# Patient Record
Sex: MALE | Race: WHITE | Employment: OTHER | URBAN - NONMETROPOLITAN AREA
[De-identification: names, ages, dates, MRNs, and addresses within clinical notes are randomized per-mention and may not be internally consistent; named-entity substitution may affect disease eponyms.]

---

## 2017-01-17 RX ORDER — DIVALPROEX SODIUM 500 MG/1
500 TABLET, EXTENDED RELEASE ORAL 3 TIMES DAILY
Qty: 30 TABLET | Refills: 2 | Status: SHIPPED | OUTPATIENT
Start: 2017-01-17 | End: 2018-02-28 | Stop reason: DRUGHIGH

## 2017-01-17 RX ORDER — VENLAFAXINE HYDROCHLORIDE 150 MG/1
300 CAPSULE, EXTENDED RELEASE ORAL DAILY
Qty: 60 CAPSULE | Refills: 2 | Status: SHIPPED | OUTPATIENT
Start: 2017-01-17 | End: 2018-11-14

## 2017-01-17 RX ORDER — CLONAZEPAM 1 MG/1
1 TABLET ORAL 3 TIMES DAILY PRN
Qty: 90 TABLET | Refills: 0 | Status: SHIPPED | OUTPATIENT
Start: 2017-01-17 | End: 2017-02-21 | Stop reason: SDUPTHER

## 2017-01-31 ENCOUNTER — TELEPHONE (OUTPATIENT)
Dept: PSYCHIATRY | Age: 42
End: 2017-01-31

## 2017-02-21 RX ORDER — CLONAZEPAM 1 MG/1
1 TABLET ORAL 3 TIMES DAILY PRN
Qty: 90 TABLET | Refills: 0 | Status: SHIPPED | OUTPATIENT
Start: 2017-02-21 | End: 2017-09-13

## 2017-02-24 ENCOUNTER — OFFICE VISIT (OUTPATIENT)
Dept: PSYCHIATRY | Age: 42
End: 2017-02-24
Payer: MEDICARE

## 2017-02-24 VITALS
WEIGHT: 305 LBS | BODY MASS INDEX: 39.16 KG/M2 | HEART RATE: 108 BPM | SYSTOLIC BLOOD PRESSURE: 108 MMHG | DIASTOLIC BLOOD PRESSURE: 74 MMHG | OXYGEN SATURATION: 95 %

## 2017-02-24 DIAGNOSIS — F31.9 BIPOLAR DEPRESSION (HCC): Primary | ICD-10-CM

## 2017-02-24 DIAGNOSIS — F41.9 ANXIETY: ICD-10-CM

## 2017-02-24 PROCEDURE — 99214 OFFICE O/P EST MOD 30 MIN: CPT | Performed by: NURSE PRACTITIONER

## 2017-02-24 PROCEDURE — G8427 DOCREV CUR MEDS BY ELIG CLIN: HCPCS | Performed by: NURSE PRACTITIONER

## 2017-02-24 PROCEDURE — G8484 FLU IMMUNIZE NO ADMIN: HCPCS | Performed by: NURSE PRACTITIONER

## 2017-02-24 PROCEDURE — G8419 CALC BMI OUT NRM PARAM NOF/U: HCPCS | Performed by: NURSE PRACTITIONER

## 2017-02-24 PROCEDURE — 4004F PT TOBACCO SCREEN RCVD TLK: CPT | Performed by: NURSE PRACTITIONER

## 2017-02-27 ENCOUNTER — TELEPHONE (OUTPATIENT)
Dept: PSYCHIATRY | Age: 42
End: 2017-02-27

## 2017-03-06 ENCOUNTER — APPOINTMENT (OUTPATIENT)
Dept: CT IMAGING | Age: 42
DRG: 885 | End: 2017-03-06
Payer: MEDICARE

## 2017-03-06 ENCOUNTER — HOSPITAL ENCOUNTER (INPATIENT)
Age: 42
LOS: 2 days | Discharge: HOME | DRG: 885 | End: 2017-03-08
Attending: EMERGENCY MEDICINE | Admitting: PSYCHIATRY & NEUROLOGY
Payer: MEDICARE

## 2017-03-06 DIAGNOSIS — F32.A DEPRESSION, UNSPECIFIED DEPRESSION TYPE: Primary | ICD-10-CM

## 2017-03-06 PROBLEM — F17.210 CIGARETTE NICOTINE DEPENDENCE WITHOUT COMPLICATION: Status: ACTIVE | Noted: 2017-03-06

## 2017-03-06 LAB
ALBUMIN SERPL-MCNC: 4.1 G/DL (ref 3.5–5.2)
ALP BLD-CCNC: 102 U/L (ref 40–130)
ALT SERPL-CCNC: 30 U/L (ref 5–41)
AMPHETAMINE SCREEN, URINE: NEGATIVE
ANION GAP SERPL CALCULATED.3IONS-SCNC: 12 MMOL/L (ref 7–19)
AST SERPL-CCNC: 31 U/L (ref 5–40)
BARBITURATE SCREEN URINE: NEGATIVE
BASOPHILS ABSOLUTE: 0.1 K/UL (ref 0–0.2)
BASOPHILS RELATIVE PERCENT: 0.4 % (ref 0–1)
BENZODIAZEPINE SCREEN, URINE: NEGATIVE
BILIRUB SERPL-MCNC: 0.3 MG/DL (ref 0.2–1.2)
BUN BLDV-MCNC: 9 MG/DL (ref 6–20)
CALCIUM SERPL-MCNC: 9 MG/DL (ref 8.6–10)
CANNABINOID SCREEN URINE: NEGATIVE
CHLORIDE BLD-SCNC: 94 MMOL/L (ref 98–111)
CO2: 28 MMOL/L (ref 22–29)
COCAINE METABOLITE SCREEN URINE: NEGATIVE
CREAT SERPL-MCNC: 0.8 MG/DL (ref 0.5–1.2)
EOSINOPHILS ABSOLUTE: 0.2 K/UL (ref 0–0.6)
EOSINOPHILS RELATIVE PERCENT: 1.7 % (ref 0–5)
ETHANOL: <10 MG/DL (ref 0–0.08)
GFR NON-AFRICAN AMERICAN: >60
GLOBULIN: 2.7 G/DL
GLUCOSE BLD-MCNC: 110 MG/DL (ref 74–109)
HCT VFR BLD CALC: 44.6 % (ref 42–52)
HEMOGLOBIN: 15 G/DL (ref 14–18)
LYMPHOCYTES ABSOLUTE: 4.5 K/UL (ref 1.1–4.5)
LYMPHOCYTES RELATIVE PERCENT: 39.1 % (ref 20–40)
Lab: ABNORMAL
MCH RBC QN AUTO: 30.7 PG (ref 27–31)
MCHC RBC AUTO-ENTMCNC: 33.6 G/DL (ref 33–37)
MCV RBC AUTO: 91.4 FL (ref 80–94)
MONOCYTES ABSOLUTE: 0.4 K/UL (ref 0–0.9)
MONOCYTES RELATIVE PERCENT: 3.6 % (ref 0–10)
NEUTROPHILS ABSOLUTE: 6.3 K/UL (ref 1.5–7.5)
NEUTROPHILS RELATIVE PERCENT: 54.9 % (ref 50–65)
OPIATE SCREEN URINE: POSITIVE
PDW BLD-RTO: 13.5 % (ref 11.5–14.5)
PLATELET # BLD: 255 K/UL (ref 130–400)
PMV BLD AUTO: 8.7 FL (ref 7.4–10.4)
POTASSIUM SERPL-SCNC: 3.9 MMOL/L (ref 3.5–5)
RBC # BLD: 4.88 M/UL (ref 4.7–6.1)
SODIUM BLD-SCNC: 134 MMOL/L (ref 136–145)
TOTAL PROTEIN: 6.8 G/DL (ref 6.6–8.7)
VALPROIC ACID LEVEL: 56.7 UG/ML (ref 50–100)
WBC # BLD: 11.4 K/UL (ref 4.8–10.8)

## 2017-03-06 PROCEDURE — 99282 EMERGENCY DEPT VISIT SF MDM: CPT | Performed by: EMERGENCY MEDICINE

## 2017-03-06 PROCEDURE — 80053 COMPREHEN METABOLIC PANEL: CPT

## 2017-03-06 PROCEDURE — 6370000000 HC RX 637 (ALT 250 FOR IP): Performed by: NURSE PRACTITIONER

## 2017-03-06 PROCEDURE — 80307 DRUG TEST PRSMV CHEM ANLYZR: CPT

## 2017-03-06 PROCEDURE — 85025 COMPLETE CBC W/AUTO DIFF WBC: CPT

## 2017-03-06 PROCEDURE — G0480 DRUG TEST DEF 1-7 CLASSES: HCPCS

## 2017-03-06 PROCEDURE — 70450 CT HEAD/BRAIN W/O DYE: CPT

## 2017-03-06 PROCEDURE — 1240000000 HC EMOTIONAL WELLNESS R&B

## 2017-03-06 PROCEDURE — 36415 COLL VENOUS BLD VENIPUNCTURE: CPT

## 2017-03-06 PROCEDURE — 80164 ASSAY DIPROPYLACETIC ACD TOT: CPT

## 2017-03-06 PROCEDURE — 93005 ELECTROCARDIOGRAM TRACING: CPT

## 2017-03-06 PROCEDURE — 6370000000 HC RX 637 (ALT 250 FOR IP): Performed by: PSYCHIATRY & NEUROLOGY

## 2017-03-06 PROCEDURE — 99285 EMERGENCY DEPT VISIT HI MDM: CPT

## 2017-03-06 PROCEDURE — 90792 PSYCH DIAG EVAL W/MED SRVCS: CPT | Performed by: NURSE PRACTITIONER

## 2017-03-06 RX ORDER — CLONAZEPAM 1 MG/1
1 TABLET ORAL 3 TIMES DAILY PRN
Status: DISCONTINUED | OUTPATIENT
Start: 2017-03-06 | End: 2017-03-08 | Stop reason: HOSPADM

## 2017-03-06 RX ORDER — LISINOPRIL AND HYDROCHLOROTHIAZIDE 12.5; 1 MG/1; MG/1
1 TABLET ORAL DAILY
Status: DISCONTINUED | OUTPATIENT
Start: 2017-03-06 | End: 2017-03-06 | Stop reason: CLARIF

## 2017-03-06 RX ORDER — IBUPROFEN 600 MG/1
600 TABLET ORAL EVERY 6 HOURS PRN
Status: DISCONTINUED | OUTPATIENT
Start: 2017-03-06 | End: 2017-03-08 | Stop reason: HOSPADM

## 2017-03-06 RX ORDER — NICOTINE 21 MG/24HR
1 PATCH, TRANSDERMAL 24 HOURS TRANSDERMAL DAILY
Status: DISCONTINUED | OUTPATIENT
Start: 2017-03-06 | End: 2017-03-08 | Stop reason: HOSPADM

## 2017-03-06 RX ORDER — VENLAFAXINE HYDROCHLORIDE 75 MG/1
300 CAPSULE, EXTENDED RELEASE ORAL DAILY
Status: DISCONTINUED | OUTPATIENT
Start: 2017-03-06 | End: 2017-03-08 | Stop reason: HOSPADM

## 2017-03-06 RX ORDER — LOPERAMIDE HYDROCHLORIDE 2 MG/1
2 CAPSULE ORAL 4 TIMES DAILY PRN
Status: DISCONTINUED | OUTPATIENT
Start: 2017-03-06 | End: 2017-03-08 | Stop reason: HOSPADM

## 2017-03-06 RX ORDER — DIVALPROEX SODIUM 500 MG/1
500 TABLET, EXTENDED RELEASE ORAL 3 TIMES DAILY
Status: DISCONTINUED | OUTPATIENT
Start: 2017-03-06 | End: 2017-03-08 | Stop reason: HOSPADM

## 2017-03-06 RX ORDER — HYDROCHLOROTHIAZIDE 12.5 MG/1
12.5 CAPSULE, GELATIN COATED ORAL DAILY
Status: DISCONTINUED | OUTPATIENT
Start: 2017-03-06 | End: 2017-03-08 | Stop reason: HOSPADM

## 2017-03-06 RX ORDER — LISINOPRIL 10 MG/1
10 TABLET ORAL DAILY
Status: DISCONTINUED | OUTPATIENT
Start: 2017-03-06 | End: 2017-03-08 | Stop reason: HOSPADM

## 2017-03-06 RX ORDER — PRAVASTATIN SODIUM 20 MG
40 TABLET ORAL NIGHTLY
Status: DISCONTINUED | OUTPATIENT
Start: 2017-03-06 | End: 2017-03-08 | Stop reason: HOSPADM

## 2017-03-06 RX ORDER — GLYCOPYRROLATE 0.2 MG/ML
0.2 INJECTION INTRAMUSCULAR; INTRAVENOUS ONCE
Status: COMPLETED | OUTPATIENT
Start: 2017-03-08 | End: 2017-03-08

## 2017-03-06 RX ORDER — CHOLESTYRAMINE LIGHT 4 G/5.7G
4 POWDER, FOR SUSPENSION ORAL DAILY
Status: DISCONTINUED | OUTPATIENT
Start: 2017-03-06 | End: 2017-03-08 | Stop reason: HOSPADM

## 2017-03-06 RX ORDER — CLONIDINE HYDROCHLORIDE 0.1 MG/1
0.1 TABLET ORAL EVERY 4 HOURS PRN
Status: DISCONTINUED | OUTPATIENT
Start: 2017-03-06 | End: 2017-03-08 | Stop reason: HOSPADM

## 2017-03-06 RX ORDER — ONDANSETRON 4 MG/1
4 TABLET, FILM COATED ORAL EVERY 8 HOURS PRN
Status: DISCONTINUED | OUTPATIENT
Start: 2017-03-06 | End: 2017-03-08 | Stop reason: HOSPADM

## 2017-03-06 RX ORDER — DICYCLOMINE HYDROCHLORIDE 10 MG/1
10 CAPSULE ORAL
Status: DISCONTINUED | OUTPATIENT
Start: 2017-03-06 | End: 2017-03-08 | Stop reason: HOSPADM

## 2017-03-06 RX ADMIN — CLONAZEPAM 1 MG: 1 TABLET ORAL at 15:18

## 2017-03-06 RX ADMIN — CLONAZEPAM 1 MG: 1 TABLET ORAL at 21:10

## 2017-03-06 RX ADMIN — DICYCLOMINE HYDROCHLORIDE 10 MG: 10 CAPSULE ORAL at 15:18

## 2017-03-06 RX ADMIN — PRAVASTATIN SODIUM 40 MG: 20 TABLET ORAL at 21:10

## 2017-03-06 RX ADMIN — LISINOPRIL 10 MG: 10 TABLET ORAL at 13:29

## 2017-03-06 RX ADMIN — VENLAFAXINE HYDROCHLORIDE 300 MG: 75 CAPSULE, EXTENDED RELEASE ORAL at 13:30

## 2017-03-06 RX ADMIN — TRAZODONE HYDROCHLORIDE 150 MG: 100 TABLET ORAL at 21:10

## 2017-03-06 RX ADMIN — CHOLESTYRAMINE 4 G: 4 POWDER, FOR SUSPENSION ORAL at 13:31

## 2017-03-06 RX ADMIN — DIVALPROEX SODIUM 500 MG: 500 TABLET, EXTENDED RELEASE ORAL at 21:10

## 2017-03-06 RX ADMIN — HYDROCHLOROTHIAZIDE 12.5 MG: 12.5 CAPSULE ORAL at 13:30

## 2017-03-06 RX ADMIN — DIVALPROEX SODIUM 500 MG: 500 TABLET, EXTENDED RELEASE ORAL at 14:15

## 2017-03-06 RX ADMIN — LURASIDONE HYDROCHLORIDE 40 MG: 40 TABLET, FILM COATED ORAL at 13:29

## 2017-03-06 ASSESSMENT — SLEEP AND FATIGUE QUESTIONNAIRES
RESTFUL SLEEP: YES
SLEEP PATTERN: DISTURBED/INTERRUPTED SLEEP;INSOMNIA
DO YOU USE A SLEEP AID: YES
DIFFICULTY ARISING: NO
AVERAGE NUMBER OF SLEEP HOURS: 4
DIFFICULTY STAYING ASLEEP: YES
DO YOU HAVE DIFFICULTY SLEEPING: YES
DIFFICULTY FALLING ASLEEP: YES

## 2017-03-06 ASSESSMENT — ENCOUNTER SYMPTOMS
ABDOMINAL PAIN: 0
SHORTNESS OF BREATH: 0
COUGH: 0

## 2017-03-07 PROCEDURE — 6370000000 HC RX 637 (ALT 250 FOR IP): Performed by: NURSE PRACTITIONER

## 2017-03-07 PROCEDURE — 99231 SBSQ HOSP IP/OBS SF/LOW 25: CPT | Performed by: NURSE PRACTITIONER

## 2017-03-07 PROCEDURE — 93005 ELECTROCARDIOGRAM TRACING: CPT

## 2017-03-07 PROCEDURE — 1240000000 HC EMOTIONAL WELLNESS R&B

## 2017-03-07 PROCEDURE — 6370000000 HC RX 637 (ALT 250 FOR IP): Performed by: PSYCHIATRY & NEUROLOGY

## 2017-03-07 RX ADMIN — CLONAZEPAM 1 MG: 1 TABLET ORAL at 14:37

## 2017-03-07 RX ADMIN — DICYCLOMINE HYDROCHLORIDE 10 MG: 10 CAPSULE ORAL at 11:02

## 2017-03-07 RX ADMIN — LISINOPRIL 10 MG: 10 TABLET ORAL at 08:16

## 2017-03-07 RX ADMIN — DICYCLOMINE HYDROCHLORIDE 10 MG: 10 CAPSULE ORAL at 17:47

## 2017-03-07 RX ADMIN — CLONAZEPAM 1 MG: 1 TABLET ORAL at 08:11

## 2017-03-07 RX ADMIN — DIVALPROEX SODIUM 500 MG: 500 TABLET, EXTENDED RELEASE ORAL at 21:07

## 2017-03-07 RX ADMIN — DICYCLOMINE HYDROCHLORIDE 10 MG: 10 CAPSULE ORAL at 06:01

## 2017-03-07 RX ADMIN — TRAZODONE HYDROCHLORIDE 150 MG: 100 TABLET ORAL at 21:08

## 2017-03-07 RX ADMIN — LURASIDONE HYDROCHLORIDE 40 MG: 40 TABLET, FILM COATED ORAL at 08:15

## 2017-03-07 RX ADMIN — DIVALPROEX SODIUM 500 MG: 500 TABLET, EXTENDED RELEASE ORAL at 08:17

## 2017-03-07 RX ADMIN — DIVALPROEX SODIUM 500 MG: 500 TABLET, EXTENDED RELEASE ORAL at 14:35

## 2017-03-07 RX ADMIN — VENLAFAXINE HYDROCHLORIDE 300 MG: 75 CAPSULE, EXTENDED RELEASE ORAL at 08:12

## 2017-03-07 RX ADMIN — HYDROCHLOROTHIAZIDE 12.5 MG: 12.5 CAPSULE ORAL at 08:17

## 2017-03-07 RX ADMIN — CHOLESTYRAMINE 4 G: 4 POWDER, FOR SUSPENSION ORAL at 08:11

## 2017-03-07 RX ADMIN — PRAVASTATIN SODIUM 40 MG: 20 TABLET ORAL at 21:08

## 2017-03-07 ASSESSMENT — PATIENT HEALTH QUESTIONNAIRE - PHQ9: SUM OF ALL RESPONSES TO PHQ QUESTIONS 1-9: 12

## 2017-03-07 ASSESSMENT — LIFESTYLE VARIABLES: HISTORY_ALCOHOL_USE: NO

## 2017-03-08 ENCOUNTER — ANESTHESIA EVENT (OUTPATIENT)
Dept: OPERATING ROOM | Age: 42
DRG: 885 | End: 2017-03-08
Payer: MEDICARE

## 2017-03-08 ENCOUNTER — ANESTHESIA (OUTPATIENT)
Dept: OPERATING ROOM | Age: 42
DRG: 885 | End: 2017-03-08
Payer: MEDICARE

## 2017-03-08 VITALS
RESPIRATION RATE: 17 BRPM | DIASTOLIC BLOOD PRESSURE: 91 MMHG | OXYGEN SATURATION: 98 % | SYSTOLIC BLOOD PRESSURE: 170 MMHG

## 2017-03-08 VITALS
BODY MASS INDEX: 39.06 KG/M2 | OXYGEN SATURATION: 99 % | RESPIRATION RATE: 18 BRPM | HEART RATE: 108 BPM | HEIGHT: 74 IN | WEIGHT: 304.4 LBS | DIASTOLIC BLOOD PRESSURE: 71 MMHG | TEMPERATURE: 98.6 F | SYSTOLIC BLOOD PRESSURE: 108 MMHG

## 2017-03-08 LAB
HBA1C MFR BLD: 5.1 %
TSH SERPL DL<=0.05 MIU/L-ACNC: 2.2 UIU/ML (ref 0.27–4.2)
VITAMIN B-12: 481 PG/ML (ref 211–946)
VITAMIN D 25-HYDROXY: 8.6 NG/ML

## 2017-03-08 PROCEDURE — 82306 VITAMIN D 25 HYDROXY: CPT

## 2017-03-08 PROCEDURE — 83036 HEMOGLOBIN GLYCOSYLATED A1C: CPT

## 2017-03-08 PROCEDURE — 36415 COLL VENOUS BLD VENIPUNCTURE: CPT

## 2017-03-08 PROCEDURE — 84443 ASSAY THYROID STIM HORMONE: CPT

## 2017-03-08 PROCEDURE — 2500000003 HC RX 250 WO HCPCS: Performed by: NURSE PRACTITIONER

## 2017-03-08 PROCEDURE — 6360000002 HC RX W HCPCS: Performed by: NURSE ANESTHETIST, CERTIFIED REGISTERED

## 2017-03-08 PROCEDURE — 90870 ELECTROCONVULSIVE THERAPY: CPT | Performed by: PSYCHIATRY & NEUROLOGY

## 2017-03-08 PROCEDURE — 6370000000 HC RX 637 (ALT 250 FOR IP): Performed by: PSYCHIATRY & NEUROLOGY

## 2017-03-08 PROCEDURE — 3700000000 HC ANESTHESIA ATTENDED CARE: Performed by: PSYCHIATRY & NEUROLOGY

## 2017-03-08 PROCEDURE — 7100000000 HC PACU RECOVERY - FIRST 15 MIN: Performed by: PSYCHIATRY & NEUROLOGY

## 2017-03-08 PROCEDURE — 3700000001 HC ADD 15 MINUTES (ANESTHESIA): Performed by: PSYCHIATRY & NEUROLOGY

## 2017-03-08 PROCEDURE — 6370000000 HC RX 637 (ALT 250 FOR IP): Performed by: NURSE PRACTITIONER

## 2017-03-08 PROCEDURE — 7100000001 HC PACU RECOVERY - ADDTL 15 MIN: Performed by: PSYCHIATRY & NEUROLOGY

## 2017-03-08 PROCEDURE — GZB4ZZZ OTHER ELECTROCONVULSIVE THERAPY: ICD-10-PCS | Performed by: PSYCHIATRY & NEUROLOGY

## 2017-03-08 PROCEDURE — 99238 HOSP IP/OBS DSCHRG MGMT 30/<: CPT | Performed by: PSYCHIATRY & NEUROLOGY

## 2017-03-08 PROCEDURE — 2500000003 HC RX 250 WO HCPCS: Performed by: NURSE ANESTHETIST, CERTIFIED REGISTERED

## 2017-03-08 PROCEDURE — 5130000000 HC BRIDGE APPOINTMENT

## 2017-03-08 PROCEDURE — 82607 VITAMIN B-12: CPT

## 2017-03-08 RX ORDER — ONDANSETRON 2 MG/ML
INJECTION INTRAMUSCULAR; INTRAVENOUS PRN
Status: DISCONTINUED | OUTPATIENT
Start: 2017-03-08 | End: 2017-03-08 | Stop reason: SDUPTHER

## 2017-03-08 RX ORDER — KETOROLAC TROMETHAMINE 30 MG/ML
INJECTION, SOLUTION INTRAMUSCULAR; INTRAVENOUS PRN
Status: DISCONTINUED | OUTPATIENT
Start: 2017-03-08 | End: 2017-03-08 | Stop reason: SDUPTHER

## 2017-03-08 RX ORDER — SUCCINYLCHOLINE CHLORIDE 20 MG/ML
INJECTION INTRAMUSCULAR; INTRAVENOUS PRN
Status: DISCONTINUED | OUTPATIENT
Start: 2017-03-08 | End: 2017-03-08 | Stop reason: SDUPTHER

## 2017-03-08 RX ORDER — HALOPERIDOL 5 MG/ML
INJECTION INTRAMUSCULAR
Status: DISCONTINUED
Start: 2017-03-08 | End: 2017-03-08 | Stop reason: WASHOUT

## 2017-03-08 RX ORDER — HYDROXYZINE HYDROCHLORIDE 25 MG/1
50 TABLET, FILM COATED ORAL EVERY 6 HOURS PRN
Status: DISCONTINUED | OUTPATIENT
Start: 2017-03-08 | End: 2017-03-08 | Stop reason: HOSPADM

## 2017-03-08 RX ADMIN — LISINOPRIL 10 MG: 10 TABLET ORAL at 08:48

## 2017-03-08 RX ADMIN — SUCCINYLCHOLINE CHLORIDE 100 MG: 20 INJECTION, SOLUTION INTRAMUSCULAR; INTRAVENOUS; PARENTERAL at 07:39

## 2017-03-08 RX ADMIN — CLONAZEPAM 1 MG: 1 TABLET ORAL at 08:49

## 2017-03-08 RX ADMIN — ONDANSETRON HYDROCHLORIDE 4 MG: 2 INJECTION, SOLUTION INTRAVENOUS at 07:45

## 2017-03-08 RX ADMIN — GLYCOPYRROLATE 0.2 MG: 0.2 INJECTION, SOLUTION INTRAMUSCULAR; INTRAVENOUS at 05:55

## 2017-03-08 RX ADMIN — DIVALPROEX SODIUM 500 MG: 500 TABLET, EXTENDED RELEASE ORAL at 08:51

## 2017-03-08 RX ADMIN — LURASIDONE HYDROCHLORIDE 40 MG: 40 TABLET, FILM COATED ORAL at 08:49

## 2017-03-08 RX ADMIN — Medication 100 MG: at 07:39

## 2017-03-08 RX ADMIN — KETOROLAC TROMETHAMINE 30 MG: 30 INJECTION, SOLUTION INTRAMUSCULAR at 07:45

## 2017-03-08 RX ADMIN — HYDROCHLOROTHIAZIDE 12.5 MG: 12.5 CAPSULE ORAL at 08:49

## 2017-03-08 RX ADMIN — CHOLESTYRAMINE 4 G: 4 POWDER, FOR SUSPENSION ORAL at 08:48

## 2017-03-08 RX ADMIN — VENLAFAXINE HYDROCHLORIDE 300 MG: 75 CAPSULE, EXTENDED RELEASE ORAL at 08:48

## 2017-03-08 RX ADMIN — DICYCLOMINE HYDROCHLORIDE 10 MG: 10 CAPSULE ORAL at 11:51

## 2017-03-08 ASSESSMENT — PAIN SCALES - GENERAL
PAINLEVEL_OUTOF10: 0

## 2017-03-13 ENCOUNTER — TELEPHONE (OUTPATIENT)
Dept: PSYCHIATRY | Age: 42
End: 2017-03-13

## 2017-03-13 LAB
EKG P AXIS: 53 DEGREES
EKG P AXIS: 55 DEGREES
EKG P-R INTERVAL: 118 MS
EKG P-R INTERVAL: 120 MS
EKG Q-T INTERVAL: 358 MS
EKG Q-T INTERVAL: 376 MS
EKG QRS DURATION: 112 MS
EKG QRS DURATION: 114 MS
EKG QTC CALCULATION (BAZETT): 428 MS
EKG QTC CALCULATION (BAZETT): 430 MS
EKG T AXIS: 52 DEGREES
EKG T AXIS: 52 DEGREES

## 2017-05-03 ENCOUNTER — HOSPITAL ENCOUNTER (OUTPATIENT)
Dept: GENERAL RADIOLOGY | Age: 42
Discharge: HOME | End: 2017-05-03
Payer: MEDICARE

## 2017-05-03 ENCOUNTER — OFFICE VISIT (OUTPATIENT)
Dept: PRIMARY CARE CLINIC | Age: 42
End: 2017-05-03
Payer: MEDICARE

## 2017-05-03 VITALS
HEART RATE: 116 BPM | BODY MASS INDEX: 40.3 KG/M2 | HEIGHT: 74 IN | RESPIRATION RATE: 18 BRPM | SYSTOLIC BLOOD PRESSURE: 124 MMHG | TEMPERATURE: 97.5 F | WEIGHT: 314 LBS | OXYGEN SATURATION: 95 % | DIASTOLIC BLOOD PRESSURE: 88 MMHG

## 2017-05-03 DIAGNOSIS — F17.210 CIGARETTE NICOTINE DEPENDENCE WITHOUT COMPLICATION: ICD-10-CM

## 2017-05-03 DIAGNOSIS — M54.42 ACUTE BILATERAL LOW BACK PAIN WITH LEFT-SIDED SCIATICA: ICD-10-CM

## 2017-05-03 DIAGNOSIS — F31.9 BIPOLAR DEPRESSION (HCC): Primary | ICD-10-CM

## 2017-05-03 PROCEDURE — G8417 CALC BMI ABV UP PARAM F/U: HCPCS | Performed by: FAMILY MEDICINE

## 2017-05-03 PROCEDURE — 99214 OFFICE O/P EST MOD 30 MIN: CPT | Performed by: FAMILY MEDICINE

## 2017-05-03 PROCEDURE — 4004F PT TOBACCO SCREEN RCVD TLK: CPT | Performed by: FAMILY MEDICINE

## 2017-05-03 PROCEDURE — 72100 X-RAY EXAM L-S SPINE 2/3 VWS: CPT

## 2017-05-03 PROCEDURE — G8427 DOCREV CUR MEDS BY ELIG CLIN: HCPCS | Performed by: FAMILY MEDICINE

## 2017-05-03 RX ORDER — CYCLOBENZAPRINE HCL 10 MG
10 TABLET ORAL 2 TIMES DAILY PRN
COMMUNITY
Start: 2017-04-07 | End: 2018-02-14 | Stop reason: ALTCHOICE

## 2017-05-03 RX ORDER — PRAZOSIN HYDROCHLORIDE 1 MG/1
1 CAPSULE ORAL NIGHTLY
COMMUNITY
Start: 2017-04-14 | End: 2017-09-13

## 2017-05-03 RX ORDER — ZIPRASIDONE HYDROCHLORIDE 40 MG/1
40 CAPSULE ORAL NIGHTLY
COMMUNITY
Start: 2017-04-28 | End: 2018-08-14

## 2017-05-03 ASSESSMENT — ENCOUNTER SYMPTOMS
BOWEL INCONTINENCE: 0
COUGH: 0
BACK PAIN: 1
SHORTNESS OF BREATH: 0

## 2017-05-08 RX ORDER — MONTELUKAST SODIUM 4 MG/1
TABLET, CHEWABLE ORAL
Qty: 60 TABLET | Refills: 11 | Status: SHIPPED | OUTPATIENT
Start: 2017-05-08 | End: 2018-06-04 | Stop reason: SDUPTHER

## 2017-06-14 ENCOUNTER — OFFICE VISIT (OUTPATIENT)
Dept: PRIMARY CARE CLINIC | Age: 42
End: 2017-06-14
Payer: MEDICARE

## 2017-06-14 VITALS
HEIGHT: 74 IN | WEIGHT: 310.6 LBS | DIASTOLIC BLOOD PRESSURE: 84 MMHG | SYSTOLIC BLOOD PRESSURE: 122 MMHG | BODY MASS INDEX: 39.86 KG/M2 | OXYGEN SATURATION: 98 % | TEMPERATURE: 96.6 F | HEART RATE: 114 BPM

## 2017-06-14 DIAGNOSIS — F17.210 CIGARETTE NICOTINE DEPENDENCE WITHOUT COMPLICATION: ICD-10-CM

## 2017-06-14 DIAGNOSIS — M54.41 CHRONIC MIDLINE LOW BACK PAIN WITH BILATERAL SCIATICA: ICD-10-CM

## 2017-06-14 DIAGNOSIS — M54.42 CHRONIC MIDLINE LOW BACK PAIN WITH BILATERAL SCIATICA: ICD-10-CM

## 2017-06-14 DIAGNOSIS — I10 ESSENTIAL HYPERTENSION: ICD-10-CM

## 2017-06-14 DIAGNOSIS — F31.9 BIPOLAR DEPRESSION (HCC): Primary | ICD-10-CM

## 2017-06-14 DIAGNOSIS — G89.29 CHRONIC MIDLINE LOW BACK PAIN WITH BILATERAL SCIATICA: ICD-10-CM

## 2017-06-14 PROCEDURE — 4004F PT TOBACCO SCREEN RCVD TLK: CPT | Performed by: FAMILY MEDICINE

## 2017-06-14 PROCEDURE — 99214 OFFICE O/P EST MOD 30 MIN: CPT | Performed by: FAMILY MEDICINE

## 2017-06-14 PROCEDURE — G8427 DOCREV CUR MEDS BY ELIG CLIN: HCPCS | Performed by: FAMILY MEDICINE

## 2017-06-14 PROCEDURE — G8417 CALC BMI ABV UP PARAM F/U: HCPCS | Performed by: FAMILY MEDICINE

## 2017-06-14 ASSESSMENT — ENCOUNTER SYMPTOMS
COUGH: 0
BACK PAIN: 1
SHORTNESS OF BREATH: 0

## 2017-08-09 LAB
AMPHETAMINE SCREEN, URINE: NEGATIVE
BARBITURATE SCREEN URINE: NEGATIVE
BENZODIAZEPINE SCREEN, URINE: NEGATIVE
CANNABINOID SCREEN URINE: NEGATIVE
COCAINE METABOLITE SCREEN URINE: NEGATIVE
Lab: ABNORMAL
OPIATE SCREEN URINE: POSITIVE

## 2017-08-22 ENCOUNTER — OFFICE VISIT (OUTPATIENT)
Dept: GASTROENTEROLOGY | Facility: CLINIC | Age: 42
End: 2017-08-22

## 2017-08-22 VITALS
HEIGHT: 74 IN | HEART RATE: 112 BPM | WEIGHT: 308 LBS | BODY MASS INDEX: 39.53 KG/M2 | TEMPERATURE: 99.3 F | DIASTOLIC BLOOD PRESSURE: 66 MMHG | OXYGEN SATURATION: 98 % | SYSTOLIC BLOOD PRESSURE: 124 MMHG

## 2017-08-22 DIAGNOSIS — K63.5 COLON POLYPS: Primary | ICD-10-CM

## 2017-08-22 DIAGNOSIS — Z87.19 HISTORY OF ANAL FISSURES: ICD-10-CM

## 2017-08-22 PROCEDURE — S0260 H&P FOR SURGERY: HCPCS | Performed by: NURSE PRACTITIONER

## 2017-08-22 RX ORDER — PRAZOSIN HYDROCHLORIDE 1 MG/1
CAPSULE ORAL
Status: ON HOLD | COMMUNITY
Start: 2017-04-14 | End: 2017-10-19

## 2017-08-22 RX ORDER — MONTELUKAST SODIUM 4 MG/1
TABLET, CHEWABLE ORAL 2 TIMES DAILY
COMMUNITY
Start: 2017-08-07

## 2017-08-22 RX ORDER — TRAZODONE HYDROCHLORIDE 100 MG/1
150 TABLET ORAL NIGHTLY
COMMUNITY
Start: 2017-07-18

## 2017-08-22 RX ORDER — LISINOPRIL AND HYDROCHLOROTHIAZIDE 12.5; 1 MG/1; MG/1
TABLET ORAL
COMMUNITY
Start: 2017-03-27

## 2017-08-22 RX ORDER — ZIPRASIDONE HYDROCHLORIDE 40 MG/1
CAPSULE ORAL
COMMUNITY
Start: 2017-07-31

## 2017-08-22 RX ORDER — SODIUM, POTASSIUM,MAG SULFATES 17.5-3.13G
2 SOLUTION, RECONSTITUTED, ORAL ORAL ONCE
Qty: 2 BOTTLE | Refills: 0 | Status: SHIPPED | OUTPATIENT
Start: 2017-08-22 | End: 2017-08-22

## 2017-08-22 RX ORDER — DIVALPROEX SODIUM 500 MG/1
1500 TABLET, EXTENDED RELEASE ORAL DAILY
COMMUNITY
Start: 2017-01-17

## 2017-08-22 RX ORDER — CLONAZEPAM 1 MG/1
TABLET ORAL
Status: ON HOLD | COMMUNITY
Start: 2017-08-07 | End: 2017-10-19

## 2017-08-22 RX ORDER — HYDROCODONE BITARTRATE AND ACETAMINOPHEN 7.5; 325 MG/1; MG/1
1 TABLET ORAL EVERY 6 HOURS PRN
COMMUNITY
Start: 2017-08-05

## 2017-08-22 RX ORDER — PRAVASTATIN SODIUM 40 MG
TABLET ORAL DAILY
COMMUNITY
Start: 2017-07-31

## 2017-08-22 RX ORDER — VENLAFAXINE HYDROCHLORIDE 150 MG/1
CAPSULE, EXTENDED RELEASE ORAL DAILY
COMMUNITY
Start: 2017-01-17

## 2017-08-22 NOTE — PROGRESS NOTES
Chief Complaint   Patient presents with   • Colon Cancer Screening     Patient is here today for colon screening.     Subjective   HPI  Lane Cleaning is a 42 y.o. male who presents as a referral for preventative maintenance. He has no complaints of nausea or vomiting. No change in bowels. No wt loss. No BRBPR. No melena. There is no family hx for colon cancer. No abdominal pain. The patients last colonoscopy was 8/23/12 with anal diaz   Past Medical History:   Diagnosis Date   • Anxiety    • Colon polyp    • HTN (hypertension)    • Rectal fissure      Past Surgical History:   Procedure Laterality Date   • ANAL FISSURECTOMY     • CHOLECYSTECTOMY     • COLONOSCOPY  08/23/2012   • HERNIA REPAIR         Current Outpatient Prescriptions:   •  clonazePAM (KlonoPIN) 1 MG tablet, , Disp: , Rfl:   •  colestipol (COLESTID) 1 g tablet, , Disp: , Rfl:   •  divalproex (DEPAKOTE) 500 MG 24 hr tablet, Take  by mouth., Disp: , Rfl:   •  HYDROcodone-acetaminophen (NORCO) 7.5-325 MG per tablet, , Disp: , Rfl:   •  lisinopril-hydrochlorothiazide (PRINZIDE,ZESTORETIC) 10-12.5 MG per tablet, TAKE 1 TABLET BY MOUTH ONCE DAILY., Disp: , Rfl:   •  pravastatin (PRAVACHOL) 40 MG tablet, , Disp: , Rfl:   •  prazosin (MINIPRESS) 1 MG capsule, Take  by mouth., Disp: , Rfl:   •  traZODone (DESYREL) 100 MG tablet, , Disp: , Rfl:   •  venlafaxine XR (EFFEXOR-XR) 150 MG 24 hr capsule, Take  by mouth., Disp: , Rfl:   •  ziprasidone (GEODON) 40 MG capsule, , Disp: , Rfl:   •  sodium-potassium-magnesium sulfates (SUPREP BOWEL PREP KIT) 17.5-3.13-1.6 GM/180ML solution oral solution, Take 2 bottles by mouth 1 (One) Time for 1 dose. Split dose prep as directed by office instructions provided.  2 bottles = one kit., Disp: 2 bottle, Rfl: 0  No Known Allergies  Social History     Social History   • Marital status:      Spouse name: N/A   • Number of children: N/A   • Years of education: N/A     Occupational History   • Not on file.     Social  "History Main Topics   • Smoking status: Current Every Day Smoker   • Smokeless tobacco: Never Used   • Alcohol use No   • Drug use: Not on file   • Sexual activity: Not on file     Other Topics Concern   • Not on file     Social History Narrative     History reviewed. No pertinent family history.    REVIEW OF SYSTEMS  General: well appearing, no fever chills or sweats, no unexplained wt loss  HEENT: no acute visual or hearing disturbances  Cardiovascular: No chest pain or palpitations  Pulmonary: No shortness of breath, coughing, wheezing or hemoptysis  : No burning, urgency, hematuria, or dysuria  Musculoskeletal: No joint pain or stiffness  Peripheral: no edema  Skin: No lesions or rashes  Neuro: No dizziness, headaches, stroke, syncope  Endocrine: No hot or cold intolerances  Hematological: No blood dyscrasias    Objective   Vitals:    08/22/17 1400   BP: 124/66   Pulse: 112   Temp: 99.3 °F (37.4 °C)   SpO2: 98%   Weight: (!) 308 lb (140 kg)   Height: 74\" (188 cm)     Body mass index is 39.54 kg/(m^2).    PHYSICAL EXAM  General: age appropriate well nourished well appearing, no acute distress  Head: normocephalic and atraumatic  Global assessment-supple  Neck-No JVD noted, no lymphadenopathy  Pulmonary-clear to auscultation bilaterally, normal respiratory effort  Cardiovascular-normal rate and rhythm, normal heart sounds, S1 and S2 noted  Abdomen-soft, non tender, non distended, normal bowel sounds all 4 quadrants, no hepatosplenomegaly noted  Extremities-No clubbing cyanosis or edema  Neuro-Non focal, converses appropriately, awake, alert, oriented    Imaging Results (most recent)     None        Assessment/Plan   Lane was seen today for colon cancer screening.    Diagnoses and all orders for this visit:    Colon polyps  -     Case Request; Standing  -     Case Request    History of anal fissures  Comments:   repaired 2012  Orders:  -     Case Request; Standing  -     Case Request    Other " orders  -     Implement Anesthesia Orders Day of Procedure; Standing  -     Obtain Informed Consent; Standing  -     Verify bowel prep was successful; Standing  -     sodium-potassium-magnesium sulfates (SUPREP BOWEL PREP KIT) 17.5-3.13-1.6 GM/180ML solution oral solution; Take 2 bottles by mouth 1 (One) Time for 1 dose. Split dose prep as directed by office instructions provided.  2 bottles = one kit.      COLONOSCOPY WITH ANESTHESIA (N/A)  Return for FU sp procedure as directed.  There are no Patient Instructions on file for this visit.    All risks, benefits, alternatives, and indications of colonoscopy procedure have been discussed with the patient. Risks to include perforation of the colon requiring possible surgery or colostomy, risk of bleeding from biopsies or removal of colon tissue, possibility of missing a colon polyp or cancer, or adverse drug reaction.  Benefits to include the diagnosis and management of disease of the colon and rectum. Alternatives to include barium enema, radiographic evaluation, lab testing or no intervention. Pt verbalizes understanding and agrees.

## 2017-09-13 ENCOUNTER — OFFICE VISIT (OUTPATIENT)
Dept: PRIMARY CARE CLINIC | Age: 42
End: 2017-09-13
Payer: MEDICARE

## 2017-09-13 VITALS
SYSTOLIC BLOOD PRESSURE: 118 MMHG | OXYGEN SATURATION: 95 % | DIASTOLIC BLOOD PRESSURE: 78 MMHG | WEIGHT: 305.6 LBS | TEMPERATURE: 96.3 F | HEIGHT: 74 IN | BODY MASS INDEX: 39.22 KG/M2 | HEART RATE: 112 BPM

## 2017-09-13 DIAGNOSIS — F31.9 BIPOLAR DEPRESSION (HCC): ICD-10-CM

## 2017-09-13 DIAGNOSIS — K14.6 TONGUE PAIN: ICD-10-CM

## 2017-09-13 DIAGNOSIS — G89.29 CHRONIC MIDLINE LOW BACK PAIN WITH BILATERAL SCIATICA: ICD-10-CM

## 2017-09-13 DIAGNOSIS — M54.42 CHRONIC MIDLINE LOW BACK PAIN WITH BILATERAL SCIATICA: ICD-10-CM

## 2017-09-13 DIAGNOSIS — I10 ESSENTIAL HYPERTENSION: Primary | ICD-10-CM

## 2017-09-13 DIAGNOSIS — M54.41 CHRONIC MIDLINE LOW BACK PAIN WITH BILATERAL SCIATICA: ICD-10-CM

## 2017-09-13 PROCEDURE — G8427 DOCREV CUR MEDS BY ELIG CLIN: HCPCS | Performed by: FAMILY MEDICINE

## 2017-09-13 PROCEDURE — G8417 CALC BMI ABV UP PARAM F/U: HCPCS | Performed by: FAMILY MEDICINE

## 2017-09-13 PROCEDURE — 99214 OFFICE O/P EST MOD 30 MIN: CPT | Performed by: FAMILY MEDICINE

## 2017-09-13 PROCEDURE — 4004F PT TOBACCO SCREEN RCVD TLK: CPT | Performed by: FAMILY MEDICINE

## 2017-09-13 RX ORDER — LISINOPRIL AND HYDROCHLOROTHIAZIDE 12.5; 1 MG/1; MG/1
0.5 TABLET ORAL DAILY
Qty: 30 TABLET | Refills: 11 | Status: SHIPPED | OUTPATIENT
Start: 2017-09-13 | End: 2018-04-09 | Stop reason: ALTCHOICE

## 2017-09-13 ASSESSMENT — ENCOUNTER SYMPTOMS
COUGH: 0
SHORTNESS OF BREATH: 0
BACK PAIN: 1

## 2017-10-09 ENCOUNTER — OFFICE VISIT (OUTPATIENT)
Dept: PRIMARY CARE CLINIC | Age: 42
End: 2017-10-09
Payer: MEDICARE

## 2017-10-09 VITALS
SYSTOLIC BLOOD PRESSURE: 124 MMHG | HEART RATE: 105 BPM | WEIGHT: 315 LBS | BODY MASS INDEX: 40.43 KG/M2 | DIASTOLIC BLOOD PRESSURE: 80 MMHG | HEIGHT: 74 IN | OXYGEN SATURATION: 97 % | TEMPERATURE: 97.9 F

## 2017-10-09 DIAGNOSIS — R42 DIZZINESS: Primary | ICD-10-CM

## 2017-10-09 PROCEDURE — 90688 IIV4 VACCINE SPLT 0.5 ML IM: CPT | Performed by: FAMILY MEDICINE

## 2017-10-09 PROCEDURE — G8427 DOCREV CUR MEDS BY ELIG CLIN: HCPCS | Performed by: FAMILY MEDICINE

## 2017-10-09 PROCEDURE — G8484 FLU IMMUNIZE NO ADMIN: HCPCS | Performed by: FAMILY MEDICINE

## 2017-10-09 PROCEDURE — 4004F PT TOBACCO SCREEN RCVD TLK: CPT | Performed by: FAMILY MEDICINE

## 2017-10-09 PROCEDURE — 99213 OFFICE O/P EST LOW 20 MIN: CPT | Performed by: FAMILY MEDICINE

## 2017-10-09 PROCEDURE — G0008 ADMIN INFLUENZA VIRUS VAC: HCPCS | Performed by: FAMILY MEDICINE

## 2017-10-09 PROCEDURE — G8417 CALC BMI ABV UP PARAM F/U: HCPCS | Performed by: FAMILY MEDICINE

## 2017-10-09 ASSESSMENT — ENCOUNTER SYMPTOMS
SHORTNESS OF BREATH: 0
COUGH: 0

## 2017-10-09 NOTE — PROGRESS NOTES
After obtaining consent, and per orders of Dr. Bethanie Calderon, injection of Flu given in Left deltoid by Gita Oswald. Patient instructed to remain in clinic for 20 minutes afterwards, and to report any adverse reaction to me immediately.
Adam Banda MD   HYDROcodone-acetaminophen (NORCO) 7.5-325 MG per tablet Take 1 tablet by mouth every 6 hours as needed. Yes Historical Provider, MD       Past Medical History:   Diagnosis Date    Anxiety     Chronic back pain     Depression     Hypertension     IBS (irritable bowel syndrome)     Panic disorder     at times afraid to go outside    Unspecified sleep apnea     bipap       Past Surgical History:   Procedure Laterality Date    ANUS SURGERY      APPENDECTOMY  2/3/14    CHOLECYSTECTOMY  2009    COLONOSCOPY  2012        ELECTROCONVULSIVE THERAPY N/A 3/8/2017    ELECTROCONVULSIVE THERAPY performed by Birgit Crooks DO at [address]  [address]    umbilical     INGUINAL HERNIA REPAIR Right 1990    right ing with mesh    VASECTOMY         Social History     Social History    Marital status:      Spouse name: N/A    Number of children: N/A    Years of education: N/A     Social History Main Topics    Smoking status: Current Every Day Smoker     Packs/day: 1.00     Years: 24.00     Types: Cigarettes    Smokeless tobacco: Never Used    Alcohol use No    Drug use: No    Sexual activity: Not Asked     Other Topics Concern    None     Social History Narrative    None       Physical Exam   Constitutional: He is oriented to person, place, and time. He appears well-developed and well-nourished. No distress. HENT:   Head: Normocephalic and atraumatic. Eyes: Conjunctivae are normal. No scleral icterus. Cardiovascular: Normal rate, regular rhythm and normal heart sounds. No murmur heard. Pulmonary/Chest: Effort normal and breath sounds normal. No respiratory distress. He has no wheezes. Genitourinary: Rectal exam shows no fissure and no tenderness. Prostate is not enlarged and not tender. Musculoskeletal: He exhibits no edema. Neurological: He is alert and oriented to person, place, and time. Skin: Skin is warm. No rash noted.

## 2017-10-19 ENCOUNTER — HOSPITAL ENCOUNTER (OUTPATIENT)
Facility: HOSPITAL | Age: 42
Setting detail: HOSPITAL OUTPATIENT SURGERY
Discharge: HOME | End: 2017-10-19
Attending: INTERNAL MEDICINE | Admitting: INTERNAL MEDICINE

## 2017-10-19 ENCOUNTER — ANESTHESIA (OUTPATIENT)
Dept: GASTROENTEROLOGY | Facility: HOSPITAL | Age: 42
End: 2017-10-19

## 2017-10-19 ENCOUNTER — ANESTHESIA EVENT (OUTPATIENT)
Dept: GASTROENTEROLOGY | Facility: HOSPITAL | Age: 42
End: 2017-10-19

## 2017-10-19 VITALS
HEART RATE: 93 BPM | DIASTOLIC BLOOD PRESSURE: 84 MMHG | WEIGHT: 310 LBS | SYSTOLIC BLOOD PRESSURE: 131 MMHG | BODY MASS INDEX: 39.78 KG/M2 | HEIGHT: 74 IN | RESPIRATION RATE: 15 BRPM | OXYGEN SATURATION: 98 % | TEMPERATURE: 98.7 F

## 2017-10-19 DIAGNOSIS — Z87.19 HISTORY OF ANAL FISSURES: ICD-10-CM

## 2017-10-19 DIAGNOSIS — K63.5 COLON POLYPS: ICD-10-CM

## 2017-10-19 PROCEDURE — 45385 COLONOSCOPY W/LESION REMOVAL: CPT | Performed by: INTERNAL MEDICINE

## 2017-10-19 PROCEDURE — 25010000002 PROPOFOL 10 MG/ML EMULSION: Performed by: NURSE ANESTHETIST, CERTIFIED REGISTERED

## 2017-10-19 PROCEDURE — 88305 TISSUE EXAM BY PATHOLOGIST: CPT | Performed by: INTERNAL MEDICINE

## 2017-10-19 RX ORDER — SODIUM CHLORIDE 0.9 % (FLUSH) 0.9 %
3 SYRINGE (ML) INJECTION AS NEEDED
Status: DISCONTINUED | OUTPATIENT
Start: 2017-10-19 | End: 2017-10-19 | Stop reason: HOSPADM

## 2017-10-19 RX ORDER — PROPOFOL 10 MG/ML
VIAL (ML) INTRAVENOUS AS NEEDED
Status: DISCONTINUED | OUTPATIENT
Start: 2017-10-19 | End: 2017-10-19 | Stop reason: SURG

## 2017-10-19 RX ORDER — SODIUM CHLORIDE 9 MG/ML
500 INJECTION, SOLUTION INTRAVENOUS CONTINUOUS PRN
Status: DISCONTINUED | OUTPATIENT
Start: 2017-10-19 | End: 2017-10-19 | Stop reason: HOSPADM

## 2017-10-19 RX ADMIN — SODIUM CHLORIDE 500 ML: 0.9 INJECTION, SOLUTION INTRAVENOUS at 07:31

## 2017-10-19 RX ADMIN — PROPOFOL 50 MG: 10 INJECTION, EMULSION INTRAVENOUS at 08:24

## 2017-10-19 RX ADMIN — PROPOFOL 50 MG: 10 INJECTION, EMULSION INTRAVENOUS at 08:20

## 2017-10-19 RX ADMIN — PROPOFOL 50 MG: 10 INJECTION, EMULSION INTRAVENOUS at 08:06

## 2017-10-19 RX ADMIN — PROPOFOL 50 MG: 10 INJECTION, EMULSION INTRAVENOUS at 08:19

## 2017-10-19 RX ADMIN — PROPOFOL 50 MG: 10 INJECTION, EMULSION INTRAVENOUS at 08:15

## 2017-10-19 RX ADMIN — PROPOFOL 50 MG: 10 INJECTION, EMULSION INTRAVENOUS at 08:09

## 2017-10-19 RX ADMIN — PROPOFOL 50 MG: 10 INJECTION, EMULSION INTRAVENOUS at 08:12

## 2017-10-19 RX ADMIN — PROPOFOL 50 MG: 10 INJECTION, EMULSION INTRAVENOUS at 08:17

## 2017-10-19 RX ADMIN — PROPOFOL 100 MG: 10 INJECTION, EMULSION INTRAVENOUS at 08:03

## 2017-10-19 NOTE — ANESTHESIA PREPROCEDURE EVALUATION
Anesthesia Evaluation     Patient summary reviewed   no history of anesthetic complications:  NPO Solid Status: > 8 hours       Airway   Mallampati: II  TM distance: >3 FB  Neck ROM: full  Dental      Pulmonary    (+) a smoker, sleep apnea,   (-) COPD, asthma  Cardiovascular   Exercise tolerance: good (4-7 METS)    (+) hypertension, hyperlipidemia  (-) pacemaker, past MI, angina, cardiac stents      Neuro/Psych  (-) seizures, TIA, CVA  GI/Hepatic/Renal/Endo    (+) morbid obesity,   (-) GERD, liver disease, no renal disease, diabetes    Musculoskeletal     Abdominal    Substance History      OB/GYN          Other                                        Anesthesia Plan    ASA 3     general     intravenous induction   Anesthetic plan and risks discussed with patient.

## 2017-10-19 NOTE — PLAN OF CARE
Problem: GI Endoscopy (Adult)  Goal: Signs and Symptoms of Listed Potential Problems Will be Absent or Manageable (GI Endoscopy)  Outcome: Outcome(s) achieved Date Met:  10/19/17    10/19/17 0818   GI Endoscopy   Problems Assessed (GI Endoscopy) all   Problems Present (GI Endoscopy) none

## 2017-10-19 NOTE — H&P
"    Chief Complaint:   COLON polyps    Subjective     HPI:   He had an adenomatous colon polyp removed from the rectum in 2009.  Last colonoscopy was in 2012 and was unremarkable.    Past Medical History:   Past Medical History:   Diagnosis Date   • Anxiety    • Colon polyp    • HTN (hypertension)    • Rectal fissure        Past Surgical History:  [unfilled]    Family History:  History reviewed. No pertinent family history.    Social History:   reports that he has been smoking.  He has been smoking about 1.00 pack per day. He has never used smokeless tobacco. He reports that he does not drink alcohol.    Medications:   Prescriptions Prior to Admission   Medication Sig Dispense Refill Last Dose   • colestipol (COLESTID) 1 g tablet 2 (Two) Times a Day.   10/18/2017 at Unknown time   • divalproex (DEPAKOTE) 500 MG 24 hr tablet Take 1,500 mg by mouth Daily.   10/18/2017 at Unknown time   • HYDROcodone-acetaminophen (NORCO) 7.5-325 MG per tablet 1 tablet Every 6 (Six) Hours As Needed.   10/19/2017 at 0400   • lisinopril-hydrochlorothiazide (PRINZIDE,ZESTORETIC) 10-12.5 MG per tablet TAKE 1/2 TABLET BY MOUTH ONCE DAILY.   10/18/2017 at pm   • pravastatin (PRAVACHOL) 40 MG tablet Daily.   10/18/2017 at Unknown time   • traZODone (DESYREL) 100 MG tablet 150 mg Every Night.   10/17/2017 at Unknown time   • venlafaxine XR (EFFEXOR-XR) 150 MG 24 hr capsule Take  by mouth Daily.   10/18/2017 at Unknown time   • ziprasidone (GEODON) 40 MG capsule    10/18/2017 at Unknown time       Allergies:  Review of patient's allergies indicates no known allergies.    ROS:    General: Weight stable  Resp: No SOA  Cardiovascular: No CP      Objective     /71 (Patient Position: Sitting)  Pulse 90  Temp 98.7 °F (37.1 °C) (Temporal Artery )   Resp 20  Ht 74\" (188 cm)  Wt (!) 310 lb (141 kg)  SpO2 99%  BMI 39.8 kg/m2    Physical Exam   Constitutional: Pt is oriented to person, place, and in no distress.  Eyes: No icterus  ENMT: " Unremarkable   Cardiovascular: Normal rate, regular rhythm.    Pulmonary/Chest: No distress.  No audible wheezes  Abdominal: Soft.   Skin: Skin is warm and dry.   Psychiatric: Mood, memory, affect and judgment appear normal.     Assessment/Plan     Diagnosis:  Colon polyps    Anticipated Surgical Procedure:  Colonoscopy    The risks, benefits, and alternatives of colonoscopy were reviewed with the patient today.  Risks including perforation of the colon possibly requiring surgery or colostomy.  Additional risks include risk of bleeding from biopsies or removal of colon tissue.  There is also the risk of a drug reaction or problems with anesthesia.  This will be discussed with the further by the anesthesia team on the day of the procedure.  Lastly there is a possibility of missing a colon polyp or cancer.  The benefits include the diagnosis and management of disease of the colon and rectum.  Alternatives to colonoscopy include barium enema, laboratory testing, radiographic evaluation, or no intervention.  The patient verbalizes understanding and agrees.    Much of this encounter note is an electronic transcription/translation of spoken language to printed text. The electronic translation of spoken language may permit erroneous, or at times, nonsensical words or phrases to be inadvertently transcribed; although I have reviewed the note for such errors, some may still exist.

## 2017-10-19 NOTE — ANESTHESIA POSTPROCEDURE EVALUATION
Patient: Lane Cleaning    Procedure Summary     Date Anesthesia Start Anesthesia Stop Room / Location    10/19/17 0759 0831  [facility] /  [facility]       Procedure Diagnosis Surgeon Provider    COLONOSCOPY WITH ANESTHESIA (N/A ) Colon polyps; History of anal fissures  (Colon polyps [K63.5]; History of anal fissures [Z87.19]) MD Timothy Huff CRNA          Anesthesia Type: general  Last vitals  BP   135/71 (10/19/17 0712)   Temp   98.7 °F (37.1 °C) (10/19/17 0712)   Pulse   90 (10/19/17 0712)   Resp   20 (10/19/17 0712)     SpO2   99 % (10/19/17 0712)     Post Anesthesia Care and Evaluation    Patient location during evaluation: PHASE II  Patient participation: complete - patient participated  Level of consciousness: awake and alert  Pain score: 0  Pain management: adequate  Airway patency: patent  Anesthetic complications: No anesthetic complications  PONV Status: none  Cardiovascular status: acceptable and stable  Respiratory status: acceptable and unassisted  Hydration status: acceptable

## 2017-10-19 NOTE — PLAN OF CARE
Problem: Patient Care Overview (Adult)  Goal: Adult Individualization and Mutuality  Outcome: Ongoing (interventions implemented as appropriate)    10/19/17 0710   Individualization   Patient Specific Preferences none

## 2017-10-19 NOTE — PLAN OF CARE
Problem: Patient Care Overview (Adult)  Goal: Plan of Care Review  Outcome: Outcome(s) achieved Date Met:  10/19/17    10/19/17 0834   Coping/Psychosocial Response Interventions   Plan Of Care Reviewed With patient;family   Patient Care Overview   Progress improving   Outcome Evaluation   Outcome Summary/Follow up Plan discharge criteria met

## 2017-10-19 NOTE — PLAN OF CARE
Problem: Patient Care Overview (Adult)  Goal: Plan of Care Review  Outcome: Ongoing (interventions implemented as appropriate)    10/19/17 0809   Coping/Psychosocial Response Interventions   Plan Of Care Reviewed With patient   Patient Care Overview   Progress no change   Outcome Evaluation   Outcome Summary/Follow up Plan tolerating well         Problem: GI Endoscopy (Adult)  Goal: Signs and Symptoms of Listed Potential Problems Will be Absent or Manageable (GI Endoscopy)  Outcome: Ongoing (interventions implemented as appropriate)

## 2017-10-20 ENCOUNTER — TELEPHONE (OUTPATIENT)
Dept: GASTROENTEROLOGY | Facility: CLINIC | Age: 42
End: 2017-10-20

## 2017-10-20 LAB
CYTO UR: NORMAL
LAB AP CASE REPORT: NORMAL
LAB AP CLINICAL INFORMATION: NORMAL
Lab: NORMAL
PATH REPORT.FINAL DX SPEC: NORMAL
PATH REPORT.GROSS SPEC: NORMAL

## 2017-10-20 NOTE — TELEPHONE ENCOUNTER
----- Message from Sharon Sigala MD sent at 10/20/2017 12:24 PM CDT -----  I am writing to inform you that the polyp removed from the colon did not have any cancer. It no longer poses a threat to you since it was completely removed.  However, in the future, it is possible that additional polyps might grow.  For this reason, we will repeat colonoscopy in 5 years as planned.    If you have any questions, please call our office.    Sincerely,        Sharon Sigala MD

## 2017-11-01 RX ORDER — PRAVASTATIN SODIUM 40 MG
TABLET ORAL
Qty: 30 TABLET | Refills: 0 | Status: SHIPPED | OUTPATIENT
Start: 2017-11-01 | End: 2017-12-11 | Stop reason: SDUPTHER

## 2017-11-23 ENCOUNTER — APPOINTMENT (OUTPATIENT)
Dept: GENERAL RADIOLOGY | Age: 42
End: 2017-11-23
Payer: MEDICARE

## 2017-11-23 ENCOUNTER — HOSPITAL ENCOUNTER (OUTPATIENT)
Age: 42
Setting detail: OBSERVATION
Discharge: HOME | End: 2017-11-24
Attending: EMERGENCY MEDICINE | Admitting: INTERNAL MEDICINE
Payer: MEDICARE

## 2017-11-23 DIAGNOSIS — R07.9 ACUTE CHEST PAIN: Primary | ICD-10-CM

## 2017-11-23 LAB
ALBUMIN SERPL-MCNC: 3.8 G/DL (ref 3.5–5.2)
ALP BLD-CCNC: 109 U/L (ref 40–130)
ALT SERPL-CCNC: 32 U/L (ref 5–41)
AMYLASE: 70 U/L (ref 28–100)
ANION GAP SERPL CALCULATED.3IONS-SCNC: 9 MMOL/L (ref 7–19)
AST SERPL-CCNC: 49 U/L (ref 5–40)
BASOPHILS ABSOLUTE: 0.1 K/UL (ref 0–0.2)
BASOPHILS RELATIVE PERCENT: 0.9 % (ref 0–1)
BILIRUB SERPL-MCNC: <0.2 MG/DL (ref 0.2–1.2)
BUN BLDV-MCNC: 6 MG/DL (ref 6–20)
C-REACTIVE PROTEIN: 0.25 MG/DL (ref 0–0.5)
CALCIUM SERPL-MCNC: 9.3 MG/DL (ref 8.6–10)
CHLORIDE BLD-SCNC: 95 MMOL/L (ref 98–111)
CO2: 30 MMOL/L (ref 22–29)
CREAT SERPL-MCNC: 0.9 MG/DL (ref 0.5–1.2)
D DIMER: <0.27 UG/ML FEU (ref 0–0.48)
EOSINOPHILS ABSOLUTE: 0.3 K/UL (ref 0–0.6)
EOSINOPHILS RELATIVE PERCENT: 2.3 % (ref 0–5)
GFR NON-AFRICAN AMERICAN: >60
GLUCOSE BLD-MCNC: 120 MG/DL (ref 74–109)
HCT VFR BLD CALC: 45.2 % (ref 42–52)
HEMOGLOBIN: 15.3 G/DL (ref 14–18)
LIPASE: 26 U/L (ref 13–60)
LYMPHOCYTES ABSOLUTE: 5.1 K/UL (ref 1.1–4.5)
LYMPHOCYTES RELATIVE PERCENT: 45.1 % (ref 20–40)
MCH RBC QN AUTO: 29.5 PG (ref 27–31)
MCHC RBC AUTO-ENTMCNC: 33.8 G/DL (ref 33–37)
MCV RBC AUTO: 87.1 FL (ref 80–94)
MONOCYTES ABSOLUTE: 0.5 K/UL (ref 0–0.9)
MONOCYTES RELATIVE PERCENT: 4.2 % (ref 0–10)
NEUTROPHILS ABSOLUTE: 5.3 K/UL (ref 1.5–7.5)
NEUTROPHILS RELATIVE PERCENT: 47.1 % (ref 50–65)
PDW BLD-RTO: 13.2 % (ref 11.5–14.5)
PERFORMED ON: NORMAL
PERFORMED ON: NORMAL
PLATELET # BLD: 252 K/UL (ref 130–400)
PMV BLD AUTO: 8.3 FL (ref 9.4–12.4)
POC TROPONIN I: 0 NG/ML (ref 0–0.08)
POC TROPONIN I: 0 NG/ML (ref 0–0.08)
POTASSIUM SERPL-SCNC: 3.9 MMOL/L (ref 3.5–5)
PRO-BNP: 5 PG/ML (ref 0–450)
RBC # BLD: 5.19 M/UL (ref 4.7–6.1)
SEDIMENTATION RATE, ERYTHROCYTE: 7 MM/HR (ref 0–10)
SODIUM BLD-SCNC: 134 MMOL/L (ref 136–145)
TOTAL PROTEIN: 6.9 G/DL (ref 6.6–8.7)
TROPONIN: <0.01 NG/ML (ref 0–0.03)
TROPONIN: <0.01 NG/ML (ref 0–0.03)
TSH SERPL DL<=0.05 MIU/L-ACNC: 3.31 UIU/ML (ref 0.27–4.2)
VALPROIC ACID LEVEL: 45.1 UG/ML (ref 50–100)
WBC # BLD: 11.2 K/UL (ref 4.8–10.8)

## 2017-11-23 PROCEDURE — 6370000000 HC RX 637 (ALT 250 FOR IP): Performed by: INTERNAL MEDICINE

## 2017-11-23 PROCEDURE — 85652 RBC SED RATE AUTOMATED: CPT

## 2017-11-23 PROCEDURE — 84443 ASSAY THYROID STIM HORMONE: CPT

## 2017-11-23 PROCEDURE — 82150 ASSAY OF AMYLASE: CPT

## 2017-11-23 PROCEDURE — 86140 C-REACTIVE PROTEIN: CPT

## 2017-11-23 PROCEDURE — G0378 HOSPITAL OBSERVATION PER HR: HCPCS

## 2017-11-23 PROCEDURE — 6370000000 HC RX 637 (ALT 250 FOR IP): Performed by: EMERGENCY MEDICINE

## 2017-11-23 PROCEDURE — 99284 EMERGENCY DEPT VISIT MOD MDM: CPT | Performed by: EMERGENCY MEDICINE

## 2017-11-23 PROCEDURE — 71010 XR CHEST PORTABLE: CPT

## 2017-11-23 PROCEDURE — 96365 THER/PROPH/DIAG IV INF INIT: CPT

## 2017-11-23 PROCEDURE — 2580000003 HC RX 258: Performed by: INTERNAL MEDICINE

## 2017-11-23 PROCEDURE — 99285 EMERGENCY DEPT VISIT HI MDM: CPT

## 2017-11-23 PROCEDURE — 99220 PR INITIAL OBSERVATION CARE/DAY 70 MINUTES: CPT | Performed by: INTERNAL MEDICINE

## 2017-11-23 PROCEDURE — 96372 THER/PROPH/DIAG INJ SC/IM: CPT

## 2017-11-23 PROCEDURE — 83690 ASSAY OF LIPASE: CPT

## 2017-11-23 PROCEDURE — 96375 TX/PRO/DX INJ NEW DRUG ADDON: CPT

## 2017-11-23 PROCEDURE — 36415 COLL VENOUS BLD VENIPUNCTURE: CPT

## 2017-11-23 PROCEDURE — 83880 ASSAY OF NATRIURETIC PEPTIDE: CPT

## 2017-11-23 PROCEDURE — 80164 ASSAY DIPROPYLACETIC ACD TOT: CPT

## 2017-11-23 PROCEDURE — 6360000002 HC RX W HCPCS: Performed by: HOSPITALIST

## 2017-11-23 PROCEDURE — 6360000002 HC RX W HCPCS: Performed by: EMERGENCY MEDICINE

## 2017-11-23 PROCEDURE — 2580000003 HC RX 258: Performed by: EMERGENCY MEDICINE

## 2017-11-23 PROCEDURE — 84484 ASSAY OF TROPONIN QUANT: CPT

## 2017-11-23 PROCEDURE — 80053 COMPREHEN METABOLIC PANEL: CPT

## 2017-11-23 PROCEDURE — 2500000003 HC RX 250 WO HCPCS: Performed by: INTERNAL MEDICINE

## 2017-11-23 PROCEDURE — C9113 INJ PANTOPRAZOLE SODIUM, VIA: HCPCS | Performed by: HOSPITALIST

## 2017-11-23 PROCEDURE — 85379 FIBRIN DEGRADATION QUANT: CPT

## 2017-11-23 PROCEDURE — 6360000002 HC RX W HCPCS: Performed by: INTERNAL MEDICINE

## 2017-11-23 PROCEDURE — 93005 ELECTROCARDIOGRAM TRACING: CPT

## 2017-11-23 PROCEDURE — 85025 COMPLETE CBC W/AUTO DIFF WBC: CPT

## 2017-11-23 RX ORDER — TRAZODONE HYDROCHLORIDE 50 MG/1
150 TABLET ORAL NIGHTLY
Status: DISCONTINUED | OUTPATIENT
Start: 2017-11-23 | End: 2017-11-24 | Stop reason: HOSPADM

## 2017-11-23 RX ORDER — ACETAMINOPHEN 325 MG/1
650 TABLET ORAL EVERY 4 HOURS PRN
Status: DISCONTINUED | OUTPATIENT
Start: 2017-11-23 | End: 2017-11-24 | Stop reason: HOSPADM

## 2017-11-23 RX ORDER — DIVALPROEX SODIUM 500 MG/1
1500 TABLET, EXTENDED RELEASE ORAL NIGHTLY
Status: DISCONTINUED | OUTPATIENT
Start: 2017-11-23 | End: 2017-11-24 | Stop reason: HOSPADM

## 2017-11-23 RX ORDER — FENTANYL CITRATE 50 UG/ML
50 INJECTION, SOLUTION INTRAMUSCULAR; INTRAVENOUS ONCE
Status: COMPLETED | OUTPATIENT
Start: 2017-11-23 | End: 2017-11-23

## 2017-11-23 RX ORDER — ZIPRASIDONE HYDROCHLORIDE 20 MG/1
40 CAPSULE ORAL 2 TIMES DAILY WITH MEALS
Status: DISCONTINUED | OUTPATIENT
Start: 2017-11-24 | End: 2017-11-24 | Stop reason: HOSPADM

## 2017-11-23 RX ORDER — NITROGLYCERIN 0.4 MG/1
0.4 TABLET SUBLINGUAL ONCE
Status: COMPLETED | OUTPATIENT
Start: 2017-11-23 | End: 2017-11-23

## 2017-11-23 RX ORDER — ASPIRIN 81 MG/1
81 TABLET, CHEWABLE ORAL DAILY
Status: DISCONTINUED | OUTPATIENT
Start: 2017-11-24 | End: 2017-11-24 | Stop reason: HOSPADM

## 2017-11-23 RX ORDER — NITROGLYCERIN 20 MG/100ML
5 INJECTION INTRAVENOUS CONTINUOUS
Status: DISCONTINUED | OUTPATIENT
Start: 2017-11-23 | End: 2017-11-24 | Stop reason: HOSPADM

## 2017-11-23 RX ORDER — PRAVASTATIN SODIUM 20 MG
40 TABLET ORAL NIGHTLY
Status: DISCONTINUED | OUTPATIENT
Start: 2017-11-23 | End: 2017-11-24 | Stop reason: HOSPADM

## 2017-11-23 RX ORDER — HYDROCODONE BITARTRATE AND ACETAMINOPHEN 7.5; 325 MG/1; MG/1
1 TABLET ORAL EVERY 6 HOURS PRN
Status: DISCONTINUED | OUTPATIENT
Start: 2017-11-23 | End: 2017-11-24 | Stop reason: HOSPADM

## 2017-11-23 RX ORDER — ONDANSETRON 2 MG/ML
4 INJECTION INTRAMUSCULAR; INTRAVENOUS EVERY 6 HOURS PRN
Status: DISCONTINUED | OUTPATIENT
Start: 2017-11-23 | End: 2017-11-24 | Stop reason: HOSPADM

## 2017-11-23 RX ORDER — NICOTINE 21 MG/24HR
1 PATCH, TRANSDERMAL 24 HOURS TRANSDERMAL DAILY
Status: DISCONTINUED | OUTPATIENT
Start: 2017-11-23 | End: 2017-11-24 | Stop reason: HOSPADM

## 2017-11-23 RX ORDER — LORAZEPAM 2 MG/ML
1 INJECTION INTRAMUSCULAR ONCE
Status: COMPLETED | OUTPATIENT
Start: 2017-11-23 | End: 2017-11-23

## 2017-11-23 RX ORDER — SODIUM CHLORIDE 0.9 % (FLUSH) 0.9 %
10 SYRINGE (ML) INJECTION PRN
Status: DISCONTINUED | OUTPATIENT
Start: 2017-11-23 | End: 2017-11-24 | Stop reason: HOSPADM

## 2017-11-23 RX ORDER — CYCLOBENZAPRINE HCL 10 MG
10 TABLET ORAL 2 TIMES DAILY PRN
Status: DISCONTINUED | OUTPATIENT
Start: 2017-11-23 | End: 2017-11-24 | Stop reason: HOSPADM

## 2017-11-23 RX ORDER — 0.9 % SODIUM CHLORIDE 0.9 %
1000 INTRAVENOUS SOLUTION INTRAVENOUS ONCE
Status: COMPLETED | OUTPATIENT
Start: 2017-11-23 | End: 2017-11-23

## 2017-11-23 RX ORDER — SODIUM CHLORIDE 0.9 % (FLUSH) 0.9 %
10 SYRINGE (ML) INJECTION EVERY 12 HOURS SCHEDULED
Status: DISCONTINUED | OUTPATIENT
Start: 2017-11-23 | End: 2017-11-24 | Stop reason: HOSPADM

## 2017-11-23 RX ORDER — VENLAFAXINE HYDROCHLORIDE 75 MG/1
300 CAPSULE, EXTENDED RELEASE ORAL DAILY
Status: DISCONTINUED | OUTPATIENT
Start: 2017-11-24 | End: 2017-11-24 | Stop reason: HOSPADM

## 2017-11-23 RX ORDER — PANTOPRAZOLE SODIUM 40 MG/10ML
40 INJECTION, POWDER, LYOPHILIZED, FOR SOLUTION INTRAVENOUS 2 TIMES DAILY
Status: DISCONTINUED | OUTPATIENT
Start: 2017-11-23 | End: 2017-11-24 | Stop reason: HOSPADM

## 2017-11-23 RX ADMIN — TRAZODONE HYDROCHLORIDE 150 MG: 50 TABLET ORAL at 22:11

## 2017-11-23 RX ADMIN — ENOXAPARIN SODIUM 40 MG: 40 INJECTION SUBCUTANEOUS at 22:11

## 2017-11-23 RX ADMIN — SODIUM CHLORIDE 1000 ML: 9 INJECTION, SOLUTION INTRAVENOUS at 16:09

## 2017-11-23 RX ADMIN — LORAZEPAM 1 MG: 2 INJECTION, SOLUTION INTRAMUSCULAR; INTRAVENOUS at 17:03

## 2017-11-23 RX ADMIN — METOPROLOL TARTRATE 25 MG: 25 TABLET ORAL at 22:12

## 2017-11-23 RX ADMIN — NITROGLYCERIN 0.4 MG: 0.4 TABLET SUBLINGUAL at 16:34

## 2017-11-23 RX ADMIN — Medication 10 ML: at 22:11

## 2017-11-23 RX ADMIN — FENTANYL CITRATE 50 MCG: 50 INJECTION, SOLUTION INTRAMUSCULAR; INTRAVENOUS at 19:13

## 2017-11-23 RX ADMIN — DIVALPROEX SODIUM 1500 MG: 500 TABLET, FILM COATED, EXTENDED RELEASE ORAL at 22:12

## 2017-11-23 RX ADMIN — NITROGLYCERIN 5 MCG/MIN: 20 INJECTION INTRAVENOUS at 22:30

## 2017-11-23 RX ADMIN — Medication 30 ML: at 21:40

## 2017-11-23 RX ADMIN — PRAVASTATIN SODIUM 40 MG: 20 TABLET ORAL at 22:12

## 2017-11-23 RX ADMIN — PANTOPRAZOLE SODIUM 40 MG: 40 INJECTION, POWDER, FOR SOLUTION INTRAVENOUS at 22:12

## 2017-11-23 RX ADMIN — NITROGLYCERIN 0.4 MG: 0.4 TABLET SUBLINGUAL at 18:31

## 2017-11-23 ASSESSMENT — PAIN SCALES - GENERAL
PAINLEVEL_OUTOF10: 5
PAINLEVEL_OUTOF10: 7
PAINLEVEL_OUTOF10: 7

## 2017-11-23 ASSESSMENT — PAIN DESCRIPTION - PAIN TYPE: TYPE: ACUTE PAIN

## 2017-11-23 ASSESSMENT — PAIN DESCRIPTION - LOCATION: LOCATION: CHEST

## 2017-11-23 ASSESSMENT — PAIN DESCRIPTION - DESCRIPTORS: DESCRIPTORS: PRESSURE;SHARP

## 2017-11-23 NOTE — ED NOTES
Patient states he was arguing with family and started having chestpain     Jayden Martínez RN  11/23/17 1689

## 2017-11-23 NOTE — ED NOTES
Pt reports pain before nitro was a 7, and now after nitro is down to a 4.      Arnie Tyson RN  11/23/17 0742

## 2017-11-23 NOTE — ED PROVIDER NOTES
Laterality Date    ANUS SURGERY      APPENDECTOMY  2/3/14    CHOLECYSTECTOMY  2009    COLONOSCOPY  2012        ELECTROCONVULSIVE THERAPY N/A 3/8/2017    ELECTROCONVULSIVE THERAPY performed by Gillian Ewing DO at [address]  [address]    umbilical     INGUINAL HERNIA REPAIR Right 1990    right ing with mesh    VASECTOMY           CURRENT MEDICATIONS       Current Discharge Medication List      CONTINUE these medications which have NOT CHANGED    Details   pravastatin (PRAVACHOL) 40 MG tablet TAKE 1 TABLET IN THE EVENING  Qty: 30 tablet, Refills: 0      lisinopril-hydrochlorothiazide (PRINZIDE;ZESTORETIC) 10-12.5 MG per tablet Take 0.5 tablets by mouth daily  Qty: 30 tablet, Refills: 11      Magic Mouthwash (MIRACLE MOUTHWASH) Swish and spit 5 mLs 4 times daily as needed for Irritation  Qty: 120 mL, Refills: 0    Associated Diagnoses: Tongue pain      colestipol (COLESTID) 1 G tablet TAKE 1 TABLET BY MOUTH TWICE DAILY. Qty: 60 tablet, Refills: 11      ziprasidone (GEODON) 40 MG capsule Take 40 mg by mouth 2 times daily (with meals)       cyclobenzaprine (FLEXERIL) 10 MG tablet Take 10 mg by mouth 2 times daily as needed       divalproex (DEPAKOTE ER) 500 MG extended release tablet Take 1 tablet by mouth three times daily  Qty: 30 tablet, Refills: 2      venlafaxine (EFFEXOR XR) 150 MG extended release capsule Take 2 capsules by mouth daily  Qty: 60 capsule, Refills: 2      traZODone (DESYREL) 150 MG tablet Take 50 mg (1/3 of tab) up to 150 (whole tab) mg by mouth at bedtime for sleep  Qty: 30 tablet, Refills: 2      HYDROcodone-acetaminophen (NORCO) 7.5-325 MG per tablet Take 1 tablet by mouth every 6 hours as needed.              ALLERGIES     Dye [gadolinium derivatives]    FAMILY HISTORY       Family History   Problem Relation Age of Onset    Diabetes Mother     Cancer Mother      uterine CA    Depression Mother     Mental Illness Mother     Hypertension (!) 144/82 93/62 (!) 92/58 109/73   Pulse: 90 81 88 92   Resp: 18 14 14 16   Temp: 97.6 °F (36.4 °C) 98.1 °F (36.7 °C) 98 °F (36.7 °C) 97.5 °F (36.4 °C)   TempSrc: Temporal Temporal Temporal Temporal   SpO2: 95% 92% 92% 96%   Weight: (!) 313 lb 9.6 oz (142.2 kg)  (!) 313 lb 9.6 oz (142.2 kg)    Height: 6' 2\" (1.88 m)          MDM  Number of Diagnoses or Management Options  Acute chest pain:      Amount and/or Complexity of Data Reviewed  Clinical lab tests: ordered and reviewed  Tests in the radiology section of CPT®: ordered and reviewed  Discuss the patient with other providers: yes  Independent visualization of images, tracings, or specimens: yes        Patient seems quite anxious and does have a history of anxiety and panic attacks, vital signs stable, mild tachypnea upon arrival and sat 91-93 while in the room, will check d-dimer, but have generally low suspicion of PE, sharp left-sided chest pain and after argument, questionable history of some mild dyspnea on exertion, no lower extremity edema generally have low suspicion of CHF will check BNP did have a echo and February 2016 that showed grade 1 diastolic dysfunction otherwise had a normal ejection fraction of 60-65%, had a stress test in April 2016 that was normal, does have RF of HTN, smoker and Fhx, continue closely monitor    Neg work up in ER, pt continues to complain of pain, got some relief with nitro, trop neg x2, no ekg changes, still question if stress/anxiety related, due to ongoing pain discussed case with Dr. Julianne Brown as does have risk factors, request hospitalist admit if want to keep, discussed case with Dr. Susanna Morales hospitalist service in regards to case and chest pain rule out, she is agreeable with admit         CONSULTS:  IP CONSULT TO CARDIOLOGY    PROCEDURES:  Unless otherwise noted below, none     Procedures    FINAL IMPRESSION      1.  Acute chest pain          DISPOSITION/PLAN   DISPOSITION Admitted    PATIENT REFERRED

## 2017-11-23 NOTE — ED NOTES
Pt reporting feeling itchy and weird after nitro administration, MD notified.      Aadm Coleman RN  11/23/17 1678

## 2017-11-24 VITALS
RESPIRATION RATE: 18 BRPM | HEART RATE: 105 BPM | DIASTOLIC BLOOD PRESSURE: 84 MMHG | TEMPERATURE: 96.6 F | OXYGEN SATURATION: 97 % | BODY MASS INDEX: 40.25 KG/M2 | HEIGHT: 74 IN | SYSTOLIC BLOOD PRESSURE: 126 MMHG | WEIGHT: 313.6 LBS

## 2017-11-24 LAB
LV EF: 58 %
LVEF MODALITY: NORMAL
TROPONIN: <0.01 NG/ML (ref 0–0.03)

## 2017-11-24 PROCEDURE — 96376 TX/PRO/DX INJ SAME DRUG ADON: CPT

## 2017-11-24 PROCEDURE — C9113 INJ PANTOPRAZOLE SODIUM, VIA: HCPCS | Performed by: HOSPITALIST

## 2017-11-24 PROCEDURE — 36415 COLL VENOUS BLD VENIPUNCTURE: CPT

## 2017-11-24 PROCEDURE — C8928 TTE W OR W/O FOL W/CON,STRES: HCPCS

## 2017-11-24 PROCEDURE — G0378 HOSPITAL OBSERVATION PER HR: HCPCS

## 2017-11-24 PROCEDURE — 2500000003 HC RX 250 WO HCPCS: Performed by: INTERNAL MEDICINE

## 2017-11-24 PROCEDURE — 6360000002 HC RX W HCPCS: Performed by: INTERNAL MEDICINE

## 2017-11-24 PROCEDURE — 93306 TTE W/DOPPLER COMPLETE: CPT

## 2017-11-24 PROCEDURE — 99217 PR OBSERVATION CARE DISCHARGE MANAGEMENT: CPT | Performed by: INTERNAL MEDICINE

## 2017-11-24 PROCEDURE — 6370000000 HC RX 637 (ALT 250 FOR IP): Performed by: INTERNAL MEDICINE

## 2017-11-24 PROCEDURE — 6360000004 HC RX CONTRAST MEDICATION: Performed by: INTERNAL MEDICINE

## 2017-11-24 PROCEDURE — 6360000002 HC RX W HCPCS: Performed by: HOSPITALIST

## 2017-11-24 PROCEDURE — 96366 THER/PROPH/DIAG IV INF ADDON: CPT

## 2017-11-24 PROCEDURE — 99205 OFFICE O/P NEW HI 60 MIN: CPT | Performed by: INTERNAL MEDICINE

## 2017-11-24 PROCEDURE — 2580000003 HC RX 258: Performed by: INTERNAL MEDICINE

## 2017-11-24 PROCEDURE — 84484 ASSAY OF TROPONIN QUANT: CPT

## 2017-11-24 RX ORDER — METOPROLOL TARTRATE 5 MG/5ML
2.5 INJECTION INTRAVENOUS PRN
Status: DISCONTINUED | OUTPATIENT
Start: 2017-11-24 | End: 2017-11-24 | Stop reason: HOSPADM

## 2017-11-24 RX ORDER — DOBUTAMINE HYDROCHLORIDE 200 MG/100ML
10 INJECTION INTRAVENOUS CONTINUOUS PRN
Status: DISCONTINUED | OUTPATIENT
Start: 2017-11-24 | End: 2017-11-24 | Stop reason: HOSPADM

## 2017-11-24 RX ORDER — SODIUM CHLORIDE 9 MG/ML
INJECTION, SOLUTION INTRAVENOUS
Status: COMPLETED | OUTPATIENT
Start: 2017-11-24 | End: 2017-11-24

## 2017-11-24 RX ORDER — ATROPINE SULFATE 0.1 MG/ML
0.5 INJECTION INTRAVENOUS PRN
Status: DISCONTINUED | OUTPATIENT
Start: 2017-11-24 | End: 2017-11-24 | Stop reason: HOSPADM

## 2017-11-24 RX ORDER — NICOTINE 21 MG/24HR
1 PATCH, TRANSDERMAL 24 HOURS TRANSDERMAL DAILY
Qty: 30 PATCH | Refills: 0 | Status: SHIPPED | OUTPATIENT
Start: 2017-11-25 | End: 2017-12-12

## 2017-11-24 RX ORDER — PANTOPRAZOLE SODIUM 40 MG/1
40 TABLET, DELAYED RELEASE ORAL DAILY
Qty: 30 TABLET | Refills: 0 | Status: SHIPPED | OUTPATIENT
Start: 2017-11-24 | End: 2017-12-12

## 2017-11-24 RX ADMIN — SODIUM CHLORIDE: 9 INJECTION, SOLUTION INTRAVENOUS at 16:02

## 2017-11-24 RX ADMIN — PANTOPRAZOLE SODIUM 40 MG: 40 INJECTION, POWDER, FOR SOLUTION INTRAVENOUS at 09:21

## 2017-11-24 RX ADMIN — HYDROCODONE BITARTRATE AND ACETAMINOPHEN 1 TABLET: 7.5; 325 TABLET ORAL at 14:28

## 2017-11-24 RX ADMIN — DOBUTAMINE HYDROCHLORIDE 10 MCG/KG/MIN: 200 INJECTION INTRAVENOUS at 16:04

## 2017-11-24 RX ADMIN — ATROPINE SULFATE 0.5 MG: 0.1 INJECTION, SOLUTION INTRAVENOUS at 16:26

## 2017-11-24 RX ADMIN — PERFLUTREN 2.2 MG: 6.52 INJECTION, SUSPENSION INTRAVENOUS at 16:05

## 2017-11-24 RX ADMIN — METOPROLOL TARTRATE 2.5 MG: 5 INJECTION INTRAVENOUS at 16:24

## 2017-11-24 RX ADMIN — HYDROCODONE BITARTRATE AND ACETAMINOPHEN 1 TABLET: 7.5; 325 TABLET ORAL at 08:19

## 2017-11-24 RX ADMIN — ASPIRIN 81 MG CHEWABLE TABLET 81 MG: 81 TABLET CHEWABLE at 09:20

## 2017-11-24 RX ADMIN — VENLAFAXINE HYDROCHLORIDE 300 MG: 75 CAPSULE, EXTENDED RELEASE ORAL at 09:20

## 2017-11-24 RX ADMIN — HYDROCODONE BITARTRATE AND ACETAMINOPHEN 1 TABLET: 7.5; 325 TABLET ORAL at 02:38

## 2017-11-24 ASSESSMENT — PAIN SCALES - GENERAL
PAINLEVEL_OUTOF10: 6
PAINLEVEL_OUTOF10: 6
PAINLEVEL_OUTOF10: 4
PAINLEVEL_OUTOF10: 0
PAINLEVEL_OUTOF10: 0

## 2017-11-24 ASSESSMENT — ENCOUNTER SYMPTOMS
RHINORRHEA: 0
SHORTNESS OF BREATH: 1
ORTHOPNEA: 0
EYE PAIN: 0
HEARTBURN: 0
HEMOPTYSIS: 0
NAUSEA: 0
VOMITING: 0
COUGH: 0
BLURRED VISION: 0
DOUBLE VISION: 0
ABDOMINAL PAIN: 0
WHEEZING: 0

## 2017-11-24 ASSESSMENT — PAIN DESCRIPTION - LOCATION: LOCATION: BACK

## 2017-11-24 ASSESSMENT — PAIN DESCRIPTION - PAIN TYPE: TYPE: CHRONIC PAIN

## 2017-11-24 NOTE — CONSULTS
Cardiology Consult Note    ADMISSION DAY:  11/23/2017  3:55 PM   Consult Date:  11/24/2017 8:19 AM    Reason for Consultation: chest pain    History of Present Illness:   He is a 41-year-old man with a history of chest pain. He has been seen in our clinic previously by one of our nurse practitioners about a year and a half ago. He underwent an exercise stress echocardiogram which did not show any evidence of ischemia. He describes somewhat atypical sharp chest pain at that time. He does have a history of hypertension and family history of CAD. He has not been seen back in our clinic since then. He describes a sharp pain left side of his chest also with some pressure has been on and off for the last 2 weeks. It usually happens with activity. It usually lasts about 10-15 minutes but sometimes up to 30 minutes. Yesterday's episode however lasted for several hours and he came to the emergency room. It is very similar to the pain he had a year and a half ago except that this time it is now more severe. He received nitroglycerin with some relief. However because he continued to have pain in the emergency room he was admitted for further evaluation. He also has some nausea with these episodes but no vomiting. Has shortness of breath and anxiety. He has chronic problems of hypertension but also has had some issues with dizziness. Those things are better when his medications were adjusted by Dr. Diogo Ca. His wife says that he has a tendency to elevated heart rates when she checks his blood pressure often gets above 100. It has been like that for years. He has not however had syncope. She also notes that he has been having a lot of heartburn lately. He has never had palpitations.     Allergies   Allergen Reactions    Dye [Gadolinium Derivatives] Hives     Mild rash that last less than 2 hours after MRI or CT scans       Current Medications  Current Facility-Administered Medications: pantoprazole

## 2017-11-24 NOTE — H&P
mouth daily  traZODone (DESYREL) 150 MG tablet, Take 50 mg (1/3 of tab) up to 150 (whole tab) mg by mouth at bedtime for sleep  HYDROcodone-acetaminophen (NORCO) 7.5-325 MG per tablet, Take 1 tablet by mouth every 6 hours as needed. Allergies:  Dye [gadolinium derivatives]    Social History:   TOBACCO:   reports that he has been smoking Cigarettes. He has a 24.00 pack-year smoking history. He has never used smokeless tobacco.  ETOH:   reports that he does not drink alcohol. Patient currently lives with family     Family History:       Problem Relation Age of Onset    Diabetes Mother     Cancer Mother      uterine CA    Depression Mother     Mental Illness Mother     Hypertension Mother     Other Mother      blood clots    Heart Disease Brother     Depression Brother     Mental Illness Brother     Hypertension Brother     Breast Cancer Maternal Grandmother     Cancer Maternal Grandmother      breast CA    Heart Attack Maternal Grandmother     Other Maternal Grandmother      blood clots       I have personally reviewed above histories  REVIEW OF SYSTEMS:  Review of Systems   Constitutional: Negative for chills, fever and weight loss. HENT: Negative for ear discharge, ear pain, hearing loss and tinnitus. Eyes: Negative for blurred vision, double vision and pain. Respiratory: Positive for shortness of breath. Negative for cough, hemoptysis and wheezing. Cardiovascular: Positive for chest pain. Negative for palpitations, orthopnea, claudication, leg swelling and PND. Gastrointestinal: Negative for abdominal pain, heartburn, nausea and vomiting. Genitourinary: Negative for dysuria, frequency and urgency. Musculoskeletal: Negative. Skin: Negative. Neurological: Negative for dizziness, tingling, tremors and headaches. Endo/Heme/Allergies: Does not bruise/bleed easily. Psychiatric/Behavioral: Negative.       PHYSICAL EXAM:  BP 93/62   Pulse 81   Temp 98.1 °F (36.7 °C) (Temporal) within normal limits   COMPREHENSIVE METABOLIC PANEL - Abnormal; Notable for the following:     Sodium 134 (*)     Chloride 95 (*)     CO2 30 (*)     Glucose 120 (*)     AST 49 (*)     All other components within normal limits   VALPROIC ACID LEVEL, TOTAL - Abnormal; Notable for the following:     Valproic Acid Lvl 45.1 (*)     All other components within normal limits   BRAIN NATRIURETIC PEPTIDE   D-DIMER, QUANTITATIVE   TROPONIN   SEDIMENTATION RATE    Narrative:     1600   C-REACTIVE PROTEIN   AMYLASE   LIPASE   TSH WITHOUT REFLEX   TROPONIN   TROPONIN   TROPONIN   TROPONIN   POCT TROPONIN   POCT TROPONIN   POCT VENOUS   POCT VENOUS          IMPRESSION:    1. Chest pain    2. HX of htn     PLAN:     1. Admit to pcu  2. Serial enzymes  3. ekg prn  4. Nitro gtt in view that pain not going away with conventional therapy  5.  Echo  6. sress test      Cassia Hodge MD    Internal Medicine Hospitalist

## 2017-11-24 NOTE — DISCHARGE SUMMARY
Hospitalist Discharge Summary    Darya Hussein  :  1975  MRN:  [id number]    Admit date:  2017  Discharge date: 2017     Admitting Physician:  Fany Silveira MD  Primary Care Physician:  Killian Cervantes MD    Discharge Diagnoses: Active Problems:    Chest pain (non-cardiac)      Hospital Course: This 37 yo man was admitted to [facility] through the ER with   recurring episodes of chest pain with some features suggestive of myocardial ischemic pain. His CBC and CMP were normal except for a very slight elevation of his AST. His admission EKG   showed no acute ST changes and his troponin levels were normal.  A D-dimer was normal   A pro-BNP level was normal.  His chest x-ray was normal.  He did have risk factors of inhabled   tobacco abuse, hypertension and hyperlipidemia. He has been taking medications for the last    two of these risk factors. He also admitted to frequent symptoms of \"heartburn\" and regurgitation   of stomach contents. The patient was seen in consultation by Dr. Patel Noonan, cardiologist.   Upon my recommendations, Dr. Jarvis Carter, cardiologist performed a dobutamine cardiac stress test   which was negative and normal.  During the hospitalization, pantoprazole therapy was   initiated to alleviate symptoms of gastroesophageal reflux. He was dicharged in stable condition,   greatly reassured by the results of his tests for outpatient follow-up. Discharge Medications:       Yakelin Biswas   Home Medication Instructions JORGE:[id number]    Printed on:17 3225   Medication Information                      colestipol (COLESTID) 1 G tablet  TAKE 1 TABLET BY MOUTH TWICE DAILY.              cyclobenzaprine (FLEXERIL) 10 MG tablet  Take 10 mg by mouth 2 times daily as needed              divalproex (DEPAKOTE ER) 500 MG extended release tablet  Take 1 tablet by mouth three times daily             HYDROcodone-acetaminophen (NORCO) 7.5-325 MG per tablet  Take 1 tablet by mouth every 6 hours as needed.              lisinopril-hydrochlorothiazide (PRINZIDE;ZESTORETIC) 10-12.5 MG per tablet  Take 0.5 tablets by mouth daily             Magic Mouthwash (MIRACLE MOUTHWASH)  Swish and spit 5 mLs 4 times daily as needed for Irritation             nicotine (NICODERM CQ) 21 MG/24HR  Place 1 patch onto the skin daily             pantoprazole (PROTONIX) 40 MG tablet  Take 1 tablet by mouth daily             pravastatin (PRAVACHOL) 40 MG tablet  TAKE 1 TABLET IN THE EVENING             traZODone (DESYREL) 150 MG tablet  Take 50 mg (1/3 of tab) up to 150 (whole tab) mg by mouth at bedtime for sleep             venlafaxine (EFFEXOR XR) 150 MG extended release capsule  Take 2 capsules by mouth daily             ziprasidone (GEODON) 40 MG capsule  Take 40 mg by mouth 2 times daily (with meals)                  Consults:  Cardiology    Significant Diagnostic Studies:  See summary above    Treatments:   See summary above    Disposition:   Discharge home to the care of his family  Follow up with Stiven Richmond MD as previously scheduled    Signed:  Mark Gage  11/24/2017, 5:51 PM   Less than 30 minutes was spent on this discharge

## 2017-11-24 NOTE — DISCHARGE INSTR - DIET
 Good nutrition is important when healing from an illness, injury, or surgery. Follow any nutrition recommendations given to you during your hospital stay.  If you were given an oral nutrition supplement while in the hospital, continue to take this supplement at home. You can take it with meals, in-between meals, and/or before bedtime. These supplements can be purchased at most local grocery stores, pharmacies, and chain super-stores.  If you have any questions about your diet or nutrition, call the hospital and ask for the dietitian. Follow a low fat diet and avoid drinks containing alcohol                      for the next two weeks.

## 2017-11-24 NOTE — PLAN OF CARE
Problem: Falls - Risk of  Goal: Absence of falls  Outcome: Ongoing      Problem: Pain:  Goal: Pain level will decrease  Pain level will decrease  Outcome: Ongoing    Goal: Control of acute pain  Control of acute pain  Outcome: Ongoing    Goal: Control of chronic pain  Control of chronic pain  Outcome: Ongoing

## 2017-11-24 NOTE — CARE COORDINATION
[address] Transitions Interview     2017    Patient: Isabelle Zheng Patient : 1975   MRN: [id number]  Reason for Admission: There are no discharge diagnoses documented for the most recent discharge. RARS: [facility] with: Isabelle Zheng and wife      Readmission Risk  Patient Active Problem List   Diagnosis    Depression    Hypertension    Anxiety    History of colon polyps    S/P laparoscopic appendectomy    Bipolar depression ([facility] [address]    Cigarette nicotine dependence without complication    Chest pain       Inpatient Assessment  Care Transitions Summary    Care Transitions Inpatient Review  Medication Review  Housing Review  Social Support  Durable Medical Equipment  Functional Review  Hearing and Vision  Care Transitions Interventions         Follow Up:  Met with patient and wife at the bedside. Discussed CTC process and patient reported that he does not feel this is something he is interested in at this time. He reported that he and his wife are very diligent and manage his health care very well at home and he does not see any need at this time. Did take the opportunity to discuss smoking cessation with patient. Encouraged him become involved in some type of cessation program so as to decrease his risks. Aware that if he changes his mind upon discharge, CTC will resume process as indicated. Future Appointments  Date Time Provider Praful Smith   2017 11:30 AM Darryl Chong MD [facility] [address] [facility]  There are no preventive care reminders to display for this patient.     Elena Wheatley RN

## 2017-11-27 ENCOUNTER — CARE COORDINATION (OUTPATIENT)
Dept: CASE MANAGEMENT | Age: 42
End: 2017-11-27

## 2017-11-27 LAB
EKG P AXIS: 39 DEGREES
EKG P-R INTERVAL: 118 MS
EKG Q-T INTERVAL: 362 MS
EKG QRS DURATION: 114 MS
EKG QTC CALCULATION (BAZETT): 425 MS
EKG T AXIS: 53 DEGREES

## 2017-12-12 ENCOUNTER — OFFICE VISIT (OUTPATIENT)
Dept: PRIMARY CARE CLINIC | Age: 42
End: 2017-12-12
Payer: MEDICARE

## 2017-12-12 VITALS
HEIGHT: 74 IN | DIASTOLIC BLOOD PRESSURE: 82 MMHG | HEART RATE: 105 BPM | OXYGEN SATURATION: 98 % | TEMPERATURE: 98.3 F | SYSTOLIC BLOOD PRESSURE: 130 MMHG | WEIGHT: 315 LBS | BODY MASS INDEX: 40.43 KG/M2

## 2017-12-12 DIAGNOSIS — R06.09 DOE (DYSPNEA ON EXERTION): ICD-10-CM

## 2017-12-12 DIAGNOSIS — Z72.0 TOBACCO ABUSE: ICD-10-CM

## 2017-12-12 DIAGNOSIS — I10 ESSENTIAL HYPERTENSION: ICD-10-CM

## 2017-12-12 DIAGNOSIS — M54.16 LUMBAR RADICULOPATHY: ICD-10-CM

## 2017-12-12 DIAGNOSIS — R07.9 CHEST PAIN, UNSPECIFIED TYPE: Primary | ICD-10-CM

## 2017-12-12 PROCEDURE — G8484 FLU IMMUNIZE NO ADMIN: HCPCS | Performed by: FAMILY MEDICINE

## 2017-12-12 PROCEDURE — 99214 OFFICE O/P EST MOD 30 MIN: CPT | Performed by: FAMILY MEDICINE

## 2017-12-12 PROCEDURE — G8427 DOCREV CUR MEDS BY ELIG CLIN: HCPCS | Performed by: FAMILY MEDICINE

## 2017-12-12 PROCEDURE — G8417 CALC BMI ABV UP PARAM F/U: HCPCS | Performed by: FAMILY MEDICINE

## 2017-12-12 PROCEDURE — 4004F PT TOBACCO SCREEN RCVD TLK: CPT | Performed by: FAMILY MEDICINE

## 2017-12-12 RX ORDER — PRAVASTATIN SODIUM 40 MG
TABLET ORAL
Qty: 30 TABLET | Refills: 0 | Status: SHIPPED | OUTPATIENT
Start: 2017-12-12 | End: 2017-12-12 | Stop reason: SDUPTHER

## 2017-12-12 RX ORDER — PRAVASTATIN SODIUM 40 MG
TABLET ORAL
Qty: 30 TABLET | Refills: 11 | Status: ON HOLD | OUTPATIENT
Start: 2017-12-12 | End: 2018-03-07 | Stop reason: HOSPADM

## 2017-12-12 ASSESSMENT — ENCOUNTER SYMPTOMS
BACK PAIN: 1
COUGH: 0
SHORTNESS OF BREATH: 1

## 2017-12-12 NOTE — PROGRESS NOTES
Darion Chaney is a 36 y.o. male    Chief Complaint   Patient presents with    Follow-up     2 months    Dizziness       Chest Pain    This is a new problem. The current episode started 1 to 4 weeks ago. The onset quality is sudden. The problem occurs constantly. The problem has been gradually worsening. The pain is present in the substernal region. The pain is moderate. The quality of the pain is described as dull, heavy and pressure. Associated symptoms include back pain and shortness of breath. Pertinent negatives include no cough or fever. The pain is aggravated by exertion. Risk factors include male gender, lack of exercise, obesity, smoking/tobacco exposure and sedentary lifestyle. His past medical history is significant for hyperlipidemia and hypertension. His family medical history is significant for CAD. Prior diagnostic workup includes stress echo. Review of Systems   Constitutional: Positive for fatigue. Negative for chills and fever. Respiratory: Positive for shortness of breath. Negative for cough. Cardiovascular: Positive for chest pain. Negative for leg swelling. Musculoskeletal: Positive for back pain. Prior to Admission medications    Medication Sig Start Date End Date Taking? Authorizing Provider   pravastatin (PRAVACHOL) 40 MG tablet TAKE 1 TABLET IN THE EVENING 12/12/17  Yes Evangelina Jimenez MD   lisinopril-hydrochlorothiazide [facility]) 10-12.5 MG per tablet Take 0.5 tablets by mouth daily 9/13/17  Yes Evangelina Jimenez MD   colestipol (COLESTID) 1 G tablet TAKE 1 TABLET BY MOUTH TWICE DAILY.  5/8/17  Yes Evangelina Jimenez MD   ziprasidone (GEODON) 40 MG capsule Take 40 mg by mouth 2 times daily (with meals)  4/28/17  Yes Historical Provider, MD   cyclobenzaprine (FLEXERIL) 10 MG tablet Take 10 mg by mouth 2 times daily as needed  4/7/17  Yes Historical Provider, MD   divalproex (DEPAKOTE ER) 500 MG extended release tablet Take 1 tablet by mouth No murmur heard. Pulmonary/Chest: Effort normal and breath sounds normal. No respiratory distress. He has no wheezes. Genitourinary: Rectal exam shows no fissure and no tenderness. Prostate is not enlarged and not tender. Musculoskeletal: He exhibits no edema. Neurological: He is alert and oriented to person, place, and time. Skin: Skin is warm. No rash noted. Psychiatric:   Flat affect     Nursing note and vitals reviewed. Assessment    ICD-10-CM ICD-9-CM    1. Chest pain, unspecified type R07.9 786.50 [facility] Ines Elizabeth MD for further evaluation. Possibly need heart cath due to patient's risk factors and worsening symptoms. 2. Essential hypertension I10 401.9 [facility] Ines Elizabeth MD   3. RODRIGUEZ (dyspnea on exertion) R06.09 786.09 [facility] Ines Elizabeth MD   4. Lumbar radiculopathy M54.16 724.4 Nerve conduction test   5. Tobacco abuse Z72.0 305.1 [facility] Ines Elizabeth MD       Plan    Orders Placed This Encounter   Medications    pravastatin (PRAVACHOL) 40 MG tablet     Sig: TAKE 1 TABLET IN THE EVENING     Dispense:  30 tablet     Refill:  11       Orders Placed This Encounter   Procedures   [facility] Ines Elizabeth MD    Nerve conduction test        No Follow-up on file. There are no Patient Instructions on file for this visit.

## 2017-12-21 ENCOUNTER — HOSPITAL ENCOUNTER (OUTPATIENT)
Dept: NEUROLOGY | Age: 42
Discharge: HOME | End: 2017-12-21
Payer: MEDICARE

## 2017-12-21 PROCEDURE — 95910 NRV CNDJ TEST 7-8 STUDIES: CPT | Performed by: PSYCHIATRY & NEUROLOGY

## 2017-12-21 PROCEDURE — 95910 NRV CNDJ TEST 7-8 STUDIES: CPT

## 2017-12-21 PROCEDURE — 95886 MUSC TEST DONE W/N TEST COMP: CPT | Performed by: PSYCHIATRY & NEUROLOGY

## 2017-12-21 PROCEDURE — 95886 MUSC TEST DONE W/N TEST COMP: CPT

## 2018-01-29 DIAGNOSIS — E78.1 HIGH TRIGLYCERIDES: ICD-10-CM

## 2018-01-29 LAB — VALPROIC ACID LEVEL: 42.1 UG/ML (ref 50–100)

## 2018-02-14 ENCOUNTER — HOSPITAL ENCOUNTER (OUTPATIENT)
Dept: NON INVASIVE DIAGNOSTICS | Age: 43
Discharge: HOME | End: 2018-02-14
Payer: MEDICARE

## 2018-02-14 ENCOUNTER — OFFICE VISIT (OUTPATIENT)
Dept: CARDIOLOGY | Age: 43
End: 2018-02-14
Payer: MEDICARE

## 2018-02-14 VITALS
BODY MASS INDEX: 40.17 KG/M2 | SYSTOLIC BLOOD PRESSURE: 140 MMHG | WEIGHT: 313 LBS | DIASTOLIC BLOOD PRESSURE: 98 MMHG | HEART RATE: 110 BPM | HEIGHT: 74 IN

## 2018-02-14 DIAGNOSIS — I10 ESSENTIAL HYPERTENSION: Primary | ICD-10-CM

## 2018-02-14 DIAGNOSIS — R07.9 CHEST PAIN IN ADULT: ICD-10-CM

## 2018-02-14 DIAGNOSIS — I10 ESSENTIAL HYPERTENSION: ICD-10-CM

## 2018-02-14 PROCEDURE — G8427 DOCREV CUR MEDS BY ELIG CLIN: HCPCS | Performed by: INTERNAL MEDICINE

## 2018-02-14 PROCEDURE — 93227 XTRNL ECG REC<48 HR R&I: CPT | Performed by: INTERNAL MEDICINE

## 2018-02-14 PROCEDURE — 93000 ELECTROCARDIOGRAM COMPLETE: CPT | Performed by: INTERNAL MEDICINE

## 2018-02-14 PROCEDURE — 99204 OFFICE O/P NEW MOD 45 MIN: CPT | Performed by: INTERNAL MEDICINE

## 2018-02-14 PROCEDURE — 93226 XTRNL ECG REC<48 HR SCAN A/R: CPT

## 2018-02-14 PROCEDURE — G8417 CALC BMI ABV UP PARAM F/U: HCPCS | Performed by: INTERNAL MEDICINE

## 2018-02-14 PROCEDURE — 4004F PT TOBACCO SCREEN RCVD TLK: CPT | Performed by: INTERNAL MEDICINE

## 2018-02-14 PROCEDURE — 93225 XTRNL ECG REC<48 HRS REC: CPT

## 2018-02-14 PROCEDURE — G8484 FLU IMMUNIZE NO ADMIN: HCPCS | Performed by: INTERNAL MEDICINE

## 2018-02-14 RX ORDER — ASPIRIN 81 MG/1
81 TABLET ORAL DAILY
Qty: 30 TABLET | Refills: 0 | COMMUNITY
Start: 2018-02-14 | End: 2020-06-12

## 2018-02-14 RX ORDER — NITROGLYCERIN 0.4 MG/1
0.4 TABLET SUBLINGUAL EVERY 5 MIN PRN
Qty: 25 TABLET | Refills: 3 | Status: SHIPPED | OUTPATIENT
Start: 2018-02-14 | End: 2018-04-09 | Stop reason: ALTCHOICE

## 2018-02-14 RX ORDER — HYDROCODONE BITARTRATE AND ACETAMINOPHEN 10; 325 MG/1; MG/1
1 TABLET ORAL EVERY 8 HOURS PRN
COMMUNITY

## 2018-02-14 ASSESSMENT — ENCOUNTER SYMPTOMS: SHORTNESS OF BREATH: 1

## 2018-02-14 NOTE — PROGRESS NOTES
(PRINZIDE;ZESTORETIC) 10-12.5 MG per tablet, Take 0.5 tablets by mouth daily, Disp: 30 tablet, Rfl: 11    colestipol (COLESTID) 1 G tablet, TAKE 1 TABLET BY MOUTH TWICE DAILY. , Disp: 60 tablet, Rfl: 11    ziprasidone (GEODON) 40 MG capsule, Take 40 mg by mouth 2 times daily (with meals) , Disp: , Rfl:     divalproex (DEPAKOTE ER) 500 MG extended release tablet, Take 1 tablet by mouth three times daily (Patient taking differently: Take 1,500 mg by mouth nightly ), Disp: 30 tablet, Rfl: 2    venlafaxine (EFFEXOR XR) 150 MG extended release capsule, Take 2 capsules by mouth daily, Disp: 60 capsule, Rfl: 2    traZODone (DESYREL) 150 MG tablet, Take 50 mg (1/3 of tab) up to 150 (whole tab) mg by mouth at bedtime for sleep, Disp: 30 tablet, Rfl: 2    PE:  Vitals:    02/14/18 1326   BP: (!) 140/98   Pulse:        Estimated body mass index is 40.19 kg/m² as calculated from the following:    Height as of this encounter: 6' 2\" (1.88 m). Weight as of this encounter: 313 lb (142 kg). General - No acute distress  Eyes - PERRL, anicteric sclerae; no lid-lag  ENMT - Atraumatic; Mucous membranes moist, oropharynx clear  Neck - trachea midline, thyroid non-tender  Cardio - No jugular venous distension                Clear s1 s2, no gallop, rub, murmur                 No edema, normal pulses  Resp - Normal effort, Clear to auscultation bilaterally  GI - abdomen soft, non-tender, no hepatosplenomegaly  Skin - warm and dry; no rashes  Psych - A+O x 3, normal affect    Lab Results   Component Value Date    CREATININE 0.9 11/23/2017    CREATININE 0.8 03/06/2017    CREATININE 0.7 10/26/2016    CREATININE 0.7 05/23/2012    HGB 15.3 11/23/2017    HGB 15.0 03/06/2017    HGB 13.2 01/31/2016    PROBNP 5 11/23/2017    PROBNP 43 01/31/2016       ECG 02/14/18  Sinus tachycardia, incomplete right bundle branch block    TTE 11/24/17    1. Normal LV systolic function with LVEF of 55-60%   2.  Trace mitral regurgitation     Assessment,

## 2018-02-28 ENCOUNTER — OFFICE VISIT (OUTPATIENT)
Dept: CARDIOLOGY | Age: 43
End: 2018-02-28
Payer: MEDICARE

## 2018-02-28 VITALS
BODY MASS INDEX: 40.43 KG/M2 | HEIGHT: 74 IN | HEART RATE: 100 BPM | DIASTOLIC BLOOD PRESSURE: 80 MMHG | SYSTOLIC BLOOD PRESSURE: 100 MMHG | WEIGHT: 315 LBS

## 2018-02-28 DIAGNOSIS — R07.89 OTHER CHEST PAIN: ICD-10-CM

## 2018-02-28 DIAGNOSIS — I10 ESSENTIAL HYPERTENSION: ICD-10-CM

## 2018-02-28 DIAGNOSIS — I10 ESSENTIAL HYPERTENSION: Primary | ICD-10-CM

## 2018-02-28 DIAGNOSIS — Z72.0 TOBACCO ABUSE: ICD-10-CM

## 2018-02-28 DIAGNOSIS — R06.02 SHORTNESS OF BREATH: ICD-10-CM

## 2018-02-28 DIAGNOSIS — Z01.818 PREOP TESTING: ICD-10-CM

## 2018-02-28 DIAGNOSIS — I95.1 ORTHOSTATIC HYPOTENSION: ICD-10-CM

## 2018-02-28 LAB
ANION GAP SERPL CALCULATED.3IONS-SCNC: 16 MMOL/L (ref 7–19)
BUN BLDV-MCNC: 8 MG/DL (ref 6–20)
CALCIUM SERPL-MCNC: 9.4 MG/DL (ref 8.6–10)
CHLORIDE BLD-SCNC: 94 MMOL/L (ref 98–111)
CO2: 26 MMOL/L (ref 22–29)
CREAT SERPL-MCNC: 0.7 MG/DL (ref 0.5–1.2)
GFR NON-AFRICAN AMERICAN: >60
GLUCOSE BLD-MCNC: 81 MG/DL (ref 74–109)
HCT VFR BLD CALC: 44.1 % (ref 42–52)
HEMOGLOBIN: 15.1 G/DL (ref 14–18)
MCH RBC QN AUTO: 29.8 PG (ref 27–31)
MCHC RBC AUTO-ENTMCNC: 34.2 G/DL (ref 33–37)
MCV RBC AUTO: 87 FL (ref 80–94)
PDW BLD-RTO: 13.7 % (ref 11.5–14.5)
PLATELET # BLD: 286 K/UL (ref 130–400)
PMV BLD AUTO: 8.6 FL (ref 9.4–12.4)
POTASSIUM SERPL-SCNC: 4.1 MMOL/L (ref 3.5–5)
RBC # BLD: 5.07 M/UL (ref 4.7–6.1)
SODIUM BLD-SCNC: 136 MMOL/L (ref 136–145)
WBC # BLD: 11.5 K/UL (ref 4.8–10.8)

## 2018-02-28 PROCEDURE — 99213 OFFICE O/P EST LOW 20 MIN: CPT | Performed by: NURSE PRACTITIONER

## 2018-02-28 PROCEDURE — 4004F PT TOBACCO SCREEN RCVD TLK: CPT | Performed by: NURSE PRACTITIONER

## 2018-02-28 PROCEDURE — G8484 FLU IMMUNIZE NO ADMIN: HCPCS | Performed by: NURSE PRACTITIONER

## 2018-02-28 PROCEDURE — 93000 ELECTROCARDIOGRAM COMPLETE: CPT | Performed by: NURSE PRACTITIONER

## 2018-02-28 PROCEDURE — 85610 PROTHROMBIN TIME: CPT | Performed by: NURSE PRACTITIONER

## 2018-02-28 PROCEDURE — G8427 DOCREV CUR MEDS BY ELIG CLIN: HCPCS | Performed by: NURSE PRACTITIONER

## 2018-02-28 PROCEDURE — G8417 CALC BMI ABV UP PARAM F/U: HCPCS | Performed by: NURSE PRACTITIONER

## 2018-02-28 RX ORDER — RANITIDINE 150 MG/1
150 TABLET ORAL 2 TIMES DAILY
Qty: 3 TABLET | Refills: 0 | Status: SHIPPED | OUTPATIENT
Start: 2018-02-28 | End: 2018-04-09 | Stop reason: ALTCHOICE

## 2018-02-28 RX ORDER — PREDNISONE 50 MG/1
50 TABLET ORAL 2 TIMES DAILY
Qty: 3 TABLET | Refills: 0 | Status: SHIPPED | OUTPATIENT
Start: 2018-02-28 | End: 2018-03-02

## 2018-02-28 RX ORDER — TRAZODONE HYDROCHLORIDE 150 MG/1
150 TABLET ORAL NIGHTLY
COMMUNITY
End: 2021-09-22

## 2018-02-28 RX ORDER — DIVALPROEX SODIUM 500 MG/1
500 TABLET, DELAYED RELEASE ORAL NIGHTLY
COMMUNITY
End: 2019-07-30

## 2018-02-28 RX ORDER — DIPHENHYDRAMINE HCL 25 MG
50 CAPSULE ORAL 2 TIMES DAILY
Qty: 6 CAPSULE | Refills: 0 | Status: SHIPPED | OUTPATIENT
Start: 2018-02-28 | End: 2018-03-02

## 2018-02-28 NOTE — PROGRESS NOTES
3%, stroke <1%, renal dysfunction <5%, myocardial infarction <1%, coronary dissection <1%, need for emergency open heart surgery <1, death <1% . He verbalized understanding and agreed to proceed with this plan. He understands that given his Dye allergy he will be pretreated with Benadryl, Prednisone, and Tagamet the day before and morning of the procedure. He will obtain pre op testing today. Patient Education: Material provided in the patient handout includes:   Smoking tips    Long discussion about smoking cessation. He is given information in his AVS. He is also advised he can try using the Nicotine patches starting with the higher strength for 6 weeks and taper down in 6 week intervals. While he is using the patches he can buy suckers to use in place of the cigarettes to help break the habit. He was receptive to this. Please do not hesitate to contact me for any questions or concerns.     Sincerely yours,    SANDY Feng

## 2018-03-01 ENCOUNTER — TELEPHONE (OUTPATIENT)
Dept: CARDIOLOGY | Age: 43
End: 2018-03-01

## 2018-03-01 DIAGNOSIS — R07.9 CHEST PAIN IN ADULT: Primary | ICD-10-CM

## 2018-03-06 ENCOUNTER — HOSPITAL ENCOUNTER (OUTPATIENT)
Dept: CARDIAC CATH/INVASIVE PROCEDURES | Age: 43
Setting detail: OBSERVATION
Discharge: HOME | End: 2018-03-07
Attending: INTERNAL MEDICINE | Admitting: INTERNAL MEDICINE
Payer: MEDICARE

## 2018-03-06 PROBLEM — I25.10 CAD IN NATIVE ARTERY: Status: ACTIVE | Noted: 2018-03-06

## 2018-03-06 LAB — POC ACT LR: 387 SEC

## 2018-03-06 PROCEDURE — 6370000000 HC RX 637 (ALT 250 FOR IP)

## 2018-03-06 PROCEDURE — 92928 PRQ TCAT PLMT NTRAC ST 1 LES: CPT | Performed by: INTERNAL MEDICINE

## 2018-03-06 PROCEDURE — 2720000001 HC MISC SURG SUPPLY STERILE $51-500

## 2018-03-06 PROCEDURE — C1769 GUIDE WIRE: HCPCS

## 2018-03-06 PROCEDURE — C1876 STENT, NON-COA/NON-COV W/DEL: HCPCS

## 2018-03-06 PROCEDURE — 93458 L HRT ARTERY/VENTRICLE ANGIO: CPT | Performed by: INTERNAL MEDICINE

## 2018-03-06 PROCEDURE — S0028 INJECTION, FAMOTIDINE, 20 MG: HCPCS

## 2018-03-06 PROCEDURE — 6370000000 HC RX 637 (ALT 250 FOR IP): Performed by: INTERNAL MEDICINE

## 2018-03-06 PROCEDURE — C1887 CATHETER, GUIDING: HCPCS

## 2018-03-06 PROCEDURE — 85347 COAGULATION TIME ACTIVATED: CPT

## 2018-03-06 PROCEDURE — 2580000003 HC RX 258: Performed by: INTERNAL MEDICINE

## 2018-03-06 PROCEDURE — C1725 CATH, TRANSLUMIN NON-LASER: HCPCS

## 2018-03-06 PROCEDURE — 2500000003 HC RX 250 WO HCPCS

## 2018-03-06 PROCEDURE — 2720000000 HC MISC SURG SUPPLY STERILE $0-50

## 2018-03-06 PROCEDURE — C1894 INTRO/SHEATH, NON-LASER: HCPCS

## 2018-03-06 PROCEDURE — 2709999900 HC NON-CHARGEABLE SUPPLY

## 2018-03-06 PROCEDURE — 6360000002 HC RX W HCPCS

## 2018-03-06 PROCEDURE — G0378 HOSPITAL OBSERVATION PER HR: HCPCS

## 2018-03-06 RX ORDER — SODIUM CHLORIDE 0.9 % (FLUSH) 0.9 %
10 SYRINGE (ML) INJECTION EVERY 12 HOURS SCHEDULED
Status: DISCONTINUED | OUTPATIENT
Start: 2018-03-06 | End: 2018-03-07 | Stop reason: HOSPADM

## 2018-03-06 RX ORDER — CHOLESTYRAMINE LIGHT 4 G/5.7G
4 POWDER, FOR SUSPENSION ORAL DAILY
Status: DISCONTINUED | OUTPATIENT
Start: 2018-03-06 | End: 2018-03-07 | Stop reason: HOSPADM

## 2018-03-06 RX ORDER — NICOTINE 21 MG/24HR
1 PATCH, TRANSDERMAL 24 HOURS TRANSDERMAL DAILY
Status: DISCONTINUED | OUTPATIENT
Start: 2018-03-06 | End: 2018-03-07 | Stop reason: HOSPADM

## 2018-03-06 RX ORDER — LISINOPRIL AND HYDROCHLOROTHIAZIDE 12.5; 1 MG/1; MG/1
0.5 TABLET ORAL DAILY
Status: DISCONTINUED | OUTPATIENT
Start: 2018-03-06 | End: 2018-03-06 | Stop reason: SDUPTHER

## 2018-03-06 RX ORDER — VENLAFAXINE HYDROCHLORIDE 75 MG/1
300 CAPSULE, EXTENDED RELEASE ORAL DAILY
Status: DISCONTINUED | OUTPATIENT
Start: 2018-03-06 | End: 2018-03-07 | Stop reason: HOSPADM

## 2018-03-06 RX ORDER — DIPHENHYDRAMINE HCL 25 MG
50 TABLET ORAL
COMMUNITY
End: 2018-03-12 | Stop reason: CLARIF

## 2018-03-06 RX ORDER — HYDROCHLOROTHIAZIDE 25 MG/1
6.25 TABLET ORAL DAILY
Status: DISCONTINUED | OUTPATIENT
Start: 2018-03-06 | End: 2018-03-07 | Stop reason: HOSPADM

## 2018-03-06 RX ORDER — TRAZODONE HYDROCHLORIDE 150 MG/1
150 TABLET ORAL NIGHTLY
Status: DISCONTINUED | OUTPATIENT
Start: 2018-03-06 | End: 2018-03-07 | Stop reason: HOSPADM

## 2018-03-06 RX ORDER — HYDROCODONE BITARTRATE AND ACETAMINOPHEN 10; 325 MG/1; MG/1
1 TABLET ORAL EVERY 6 HOURS PRN
Status: DISCONTINUED | OUTPATIENT
Start: 2018-03-06 | End: 2018-03-07 | Stop reason: HOSPADM

## 2018-03-06 RX ORDER — ACETAMINOPHEN 325 MG/1
650 TABLET ORAL EVERY 4 HOURS PRN
Status: DISCONTINUED | OUTPATIENT
Start: 2018-03-06 | End: 2018-03-07 | Stop reason: HOSPADM

## 2018-03-06 RX ORDER — CLOPIDOGREL BISULFATE 75 MG/1
75 TABLET ORAL DAILY
Status: DISCONTINUED | OUTPATIENT
Start: 2018-03-07 | End: 2018-03-07 | Stop reason: HOSPADM

## 2018-03-06 RX ORDER — DIVALPROEX SODIUM 500 MG/1
1500 TABLET, DELAYED RELEASE ORAL NIGHTLY
Status: DISCONTINUED | OUTPATIENT
Start: 2018-03-06 | End: 2018-03-07 | Stop reason: HOSPADM

## 2018-03-06 RX ORDER — NITROGLYCERIN 0.4 MG/1
0.4 TABLET SUBLINGUAL EVERY 5 MIN PRN
Status: DISCONTINUED | OUTPATIENT
Start: 2018-03-06 | End: 2018-03-07 | Stop reason: HOSPADM

## 2018-03-06 RX ORDER — SODIUM CHLORIDE 0.9 % (FLUSH) 0.9 %
10 SYRINGE (ML) INJECTION PRN
Status: DISCONTINUED | OUTPATIENT
Start: 2018-03-06 | End: 2018-03-07 | Stop reason: HOSPADM

## 2018-03-06 RX ORDER — SODIUM CHLORIDE 9 MG/ML
INJECTION, SOLUTION INTRAVENOUS CONTINUOUS
Status: DISCONTINUED | OUTPATIENT
Start: 2018-03-06 | End: 2018-03-06

## 2018-03-06 RX ORDER — HYDROCHLOROTHIAZIDE 25 MG/1
6.25 TABLET ORAL DAILY
Status: DISCONTINUED | OUTPATIENT
Start: 2018-03-06 | End: 2018-03-06 | Stop reason: SDUPTHER

## 2018-03-06 RX ORDER — SODIUM CHLORIDE 0.9 % (FLUSH) 0.9 %
10 SYRINGE (ML) INJECTION EVERY 12 HOURS SCHEDULED
Status: DISCONTINUED | OUTPATIENT
Start: 2018-03-06 | End: 2018-03-06 | Stop reason: SDUPTHER

## 2018-03-06 RX ORDER — ONDANSETRON 2 MG/ML
4 INJECTION INTRAMUSCULAR; INTRAVENOUS EVERY 6 HOURS PRN
Status: DISCONTINUED | OUTPATIENT
Start: 2018-03-06 | End: 2018-03-07 | Stop reason: HOSPADM

## 2018-03-06 RX ORDER — ASPIRIN 325 MG
325 TABLET ORAL ONCE
Status: DISCONTINUED | OUTPATIENT
Start: 2018-03-06 | End: 2018-03-06 | Stop reason: SDUPTHER

## 2018-03-06 RX ORDER — LISINOPRIL 5 MG/1
2.5 TABLET ORAL DAILY
Status: DISCONTINUED | OUTPATIENT
Start: 2018-03-06 | End: 2018-03-07 | Stop reason: HOSPADM

## 2018-03-06 RX ORDER — ASPIRIN 81 MG/1
243 TABLET, CHEWABLE ORAL ONCE
Status: COMPLETED | OUTPATIENT
Start: 2018-03-06 | End: 2018-03-06

## 2018-03-06 RX ORDER — DIVALPROEX SODIUM 250 MG/1
500 TABLET, DELAYED RELEASE ORAL 3 TIMES DAILY
Status: DISCONTINUED | OUTPATIENT
Start: 2018-03-06 | End: 2018-03-06

## 2018-03-06 RX ORDER — NICOTINE 21 MG/24HR
1 PATCH, TRANSDERMAL 24 HOURS TRANSDERMAL EVERY 24 HOURS
COMMUNITY
End: 2018-04-11

## 2018-03-06 RX ORDER — LISINOPRIL 5 MG/1
2.5 TABLET ORAL DAILY
Status: DISCONTINUED | OUTPATIENT
Start: 2018-03-06 | End: 2018-03-06 | Stop reason: SDUPTHER

## 2018-03-06 RX ORDER — ATORVASTATIN CALCIUM 40 MG/1
80 TABLET, FILM COATED ORAL NIGHTLY
Status: DISCONTINUED | OUTPATIENT
Start: 2018-03-06 | End: 2018-03-07 | Stop reason: HOSPADM

## 2018-03-06 RX ORDER — SODIUM CHLORIDE 9 MG/ML
INJECTION, SOLUTION INTRAVENOUS CONTINUOUS
Status: ACTIVE | OUTPATIENT
Start: 2018-03-06 | End: 2018-03-06

## 2018-03-06 RX ORDER — ASPIRIN 81 MG/1
81 TABLET ORAL DAILY
Status: DISCONTINUED | OUTPATIENT
Start: 2018-03-06 | End: 2018-03-07 | Stop reason: HOSPADM

## 2018-03-06 RX ORDER — ZIPRASIDONE HYDROCHLORIDE 20 MG/1
40 CAPSULE ORAL NIGHTLY
Status: DISCONTINUED | OUTPATIENT
Start: 2018-03-06 | End: 2018-03-07 | Stop reason: HOSPADM

## 2018-03-06 RX ORDER — SODIUM CHLORIDE 0.9 % (FLUSH) 0.9 %
10 SYRINGE (ML) INJECTION PRN
Status: DISCONTINUED | OUTPATIENT
Start: 2018-03-06 | End: 2018-03-06 | Stop reason: SDUPTHER

## 2018-03-06 RX ADMIN — METOPROLOL TARTRATE 25 MG: 25 TABLET ORAL at 13:25

## 2018-03-06 RX ADMIN — ASPIRIN 81 MG CHEWABLE TABLET 243 MG: 81 TABLET CHEWABLE at 08:01

## 2018-03-06 RX ADMIN — LISINOPRIL 2.5 MG: 5 TABLET ORAL at 13:24

## 2018-03-06 RX ADMIN — Medication 10 ML: at 20:01

## 2018-03-06 RX ADMIN — HYDROCHLOROTHIAZIDE 6.25 MG: 25 TABLET ORAL at 13:24

## 2018-03-06 RX ADMIN — ATORVASTATIN CALCIUM 80 MG: 40 TABLET, FILM COATED ORAL at 20:00

## 2018-03-06 RX ADMIN — SODIUM CHLORIDE: 9 INJECTION, SOLUTION INTRAVENOUS at 07:34

## 2018-03-06 RX ADMIN — ZIPRASIDONE HYDROCHLORIDE 40 MG: 20 CAPSULE ORAL at 20:01

## 2018-03-06 RX ADMIN — HYDROCODONE BITARTRATE AND ACETAMINOPHEN 1 TABLET: 10; 325 TABLET ORAL at 13:25

## 2018-03-06 RX ADMIN — TRAZODONE HYDROCHLORIDE 150 MG: 150 TABLET ORAL at 20:01

## 2018-03-06 RX ADMIN — METOPROLOL TARTRATE 25 MG: 25 TABLET ORAL at 20:01

## 2018-03-06 RX ADMIN — DIVALPROEX SODIUM 1500 MG: 500 TABLET, DELAYED RELEASE ORAL at 22:19

## 2018-03-06 RX ADMIN — HYDROCODONE BITARTRATE AND ACETAMINOPHEN 1 TABLET: 10; 325 TABLET ORAL at 20:01

## 2018-03-06 ASSESSMENT — ENCOUNTER SYMPTOMS: SHORTNESS OF BREATH: 1

## 2018-03-06 ASSESSMENT — PAIN SCALES - GENERAL
PAINLEVEL_OUTOF10: 0
PAINLEVEL_OUTOF10: 8
PAINLEVEL_OUTOF10: 0
PAINLEVEL_OUTOF10: 7

## 2018-03-06 NOTE — H&P
divalproex (DEPAKOTE) DR tablet 1,500 mg, 1,500 mg, Oral, Nightly, Chandra Mistry MD    Current Infused Meds   sodium chloride 75 mL/hr at 03/06/18 1004        PE:  BP (!) 150/104   Pulse 93   Temp 98.8 °F (37.1 °C) (Temporal)   Resp 18   Ht 6' 2\" (1.88 m)   Wt (!) 315 lb 3.2 oz (143 kg)   SpO2 94%   BMI 40.47 kg/m²       Intake/Output Summary (Last 24 hours) at 03/06/18 1704  Last data filed at 03/06/18 1047   Gross per 24 hour   Intake              360 ml   Output                0 ml   Net              360 ml       Gen - NAD  Psych - A+O x 3  ENMT - MMM, OP Clear  GI - soft, non-tender  Resp - Normal effort, CTA Bilat  Cardio - No JVD or bruit,                 Clear s1 s2, no gallop, rub, murmur                No cyanosis or edema    R Radial 2+     R Femoral 2+     R Popliteal 2+     R Posterior Tibial 2+     R Dorsalis Pedis 2+        Justin's Test Nml R     Mallampati Score: II (soft palate, uvula, fauces visible)  CCS Class:  III  New York Heart Association Class: I  ASA Risk Classification: ASA 2 - Patient with mild systemic disease with no functional limitations    Is this procedure considered an emergency? No  Does the patient have an upcoming procedure or surgery? yes  epidural   Does the patient have h/o GI bleeding? no  Does the patient have a significant history of falls? no    Impression:  36 y.o. male with unstable angina, smoking, hypertension    Plan:  Planned Procedure: Left Heart Cath and Percutaneous Coronary or Graft Intervention  Sedation Plan:   Moderate sedation  Post Sedation Plan of care => Location:  [facility]  Type of stent to be used if needed:  BMS    [facility]

## 2018-03-06 NOTE — LETTER
[address] [facility]  Cardiac Rehab Department  [address]   [phone]  Toll Free [phone]              March 7, 2018    Dear Dante Lew,    We apologize that a member of the Cardiac Rehab staff was unable to see you prior to your discharge from [address] [place] [address]. Please find this informational packet that has been put together for you on heart disease and the guidelines you are to follow concerning your present cardiac condition and immediate recovery. We kindly ask that you call us to confirm your receipt of this letter and to discuss any questions that you may presently have. Due to your recent hospitalization and/or intervention your cardiologist, Dr. Basia Huggins, has referred you to Phase II Outpatient Cardiac Rehab. Your orientation and assessment appointment has been scheduled for:     DAY:  Tuesday  DATE:   March 20, 2018  TIME:   10:00 a.m    For your convenience the Cardiac Rehab Department is located on the first floor of the hospital within [facility] [place] [address]. Please confirm or cancel this appointment no less than 24 hours prior. If you live outside of the immediate [facility], when you call we will provide contact information for a Cardiac Rehab facility nearer your residence. Furthermore, feel free to reach out to us at anytime and our staff will be more than pleased to assist you. Thank you.     To Your Health,        Cardiac Rehab Staff

## 2018-03-07 VITALS
BODY MASS INDEX: 40.43 KG/M2 | DIASTOLIC BLOOD PRESSURE: 96 MMHG | WEIGHT: 315 LBS | OXYGEN SATURATION: 93 % | HEIGHT: 74 IN | SYSTOLIC BLOOD PRESSURE: 140 MMHG | HEART RATE: 85 BPM | RESPIRATION RATE: 16 BRPM | TEMPERATURE: 97 F

## 2018-03-07 PROCEDURE — G0378 HOSPITAL OBSERVATION PER HR: HCPCS

## 2018-03-07 PROCEDURE — 99217 PR OBSERVATION CARE DISCHARGE MANAGEMENT: CPT | Performed by: INTERNAL MEDICINE

## 2018-03-07 PROCEDURE — 6370000000 HC RX 637 (ALT 250 FOR IP): Performed by: INTERNAL MEDICINE

## 2018-03-07 PROCEDURE — 2580000003 HC RX 258: Performed by: INTERNAL MEDICINE

## 2018-03-07 RX ORDER — ATORVASTATIN CALCIUM 80 MG/1
80 TABLET, FILM COATED ORAL NIGHTLY
Qty: 30 TABLET | Refills: 3 | Status: SHIPPED | OUTPATIENT
Start: 2018-03-07 | End: 2018-06-28 | Stop reason: SDUPTHER

## 2018-03-07 RX ORDER — CLOPIDOGREL BISULFATE 75 MG/1
75 TABLET ORAL DAILY
Qty: 30 TABLET | Refills: 3 | Status: SHIPPED | OUTPATIENT
Start: 2018-03-07 | End: 2018-08-14

## 2018-03-07 RX ADMIN — LISINOPRIL 2.5 MG: 5 TABLET ORAL at 08:10

## 2018-03-07 RX ADMIN — METOPROLOL TARTRATE 25 MG: 25 TABLET ORAL at 08:10

## 2018-03-07 RX ADMIN — Medication 10 ML: at 08:10

## 2018-03-07 RX ADMIN — VENLAFAXINE HYDROCHLORIDE 300 MG: 75 CAPSULE, EXTENDED RELEASE ORAL at 08:15

## 2018-03-07 RX ADMIN — HYDROCHLOROTHIAZIDE 6.25 MG: 25 TABLET ORAL at 08:16

## 2018-03-07 RX ADMIN — CHOLESTYRAMINE 4 G: 4 POWDER, FOR SUSPENSION ORAL at 08:15

## 2018-03-07 RX ADMIN — ASPIRIN 81 MG: 81 TABLET, COATED ORAL at 08:10

## 2018-03-07 RX ADMIN — CLOPIDOGREL BISULFATE 75 MG: 75 TABLET ORAL at 08:10

## 2018-03-07 ASSESSMENT — PAIN SCALES - GENERAL
PAINLEVEL_OUTOF10: 0

## 2018-03-07 NOTE — DISCHARGE SUMMARY
tablet  · clopidogrel 75 MG tablet         D/C Instructions:     Diet: Cardiac     Activity: Ad natasha, no restrictions     Follow-Up:  Magda Segura 4 weeks    Buzz Segura MD, MSc  Director of the [facility]

## 2018-03-08 ENCOUNTER — TELEPHONE (OUTPATIENT)
Dept: INTERNAL MEDICINE | Age: 43
End: 2018-03-08

## 2018-03-08 ENCOUNTER — CARE COORDINATION (OUTPATIENT)
Dept: CASE MANAGEMENT | Age: 43
End: 2018-03-08

## 2018-03-08 DIAGNOSIS — I25.10 CAD IN NATIVE ARTERY: Primary | ICD-10-CM

## 2018-03-08 PROCEDURE — 1111F DSCHRG MED/CURRENT MED MERGE: CPT | Performed by: FAMILY MEDICINE

## 2018-03-08 NOTE — CARE COORDINATION
[facility] Transitions Initial Follow Up Call    Call within 2 business days of discharge: Yes    Patient: Radha Nieves Patient : 1975   MRN: [id number]  Reason for Admission: There are no discharge diagnoses documented for the most recent discharge. Discharge Date: 3/7/18 RARS: [facility] Risk Score: 25     Spoke with: Radha Salter    Non-face-to-face services provided:      Care Transitions 24 Hour Call    Do you have a copy of your discharge instructions?:  Yes  Do you have all of your prescriptions and are they filled?:  Yes  Have you scheduled your follow up appointment?:  Yes  How are you going to get to your appointment?:  Car - family or friend to transport  Were you discharged with any Home Care or Post Acute Services:  No  Do you have support at home?:  Partner/Spouse/SO  Do you feel like you have everything you need to keep you well at home?:  Yes  Are you an active caregiver in your home?:  No  Care Transitions Interventions         Follow Up: Spoke with patient for initial follow up call. He reported, \"I haven't felt right since I left. \"  When asked what he meant, he reported that he has no energy, is not in a good mood, is not himself, in worn out but cannot sleep. He denied any chest pain, shortness of breath, dizziness, lightheadedness, edema. Denied any GI related symptoms, nausea, vomiting, diarrhea, constipation, etc.  Reported that appetite is very good. Reported that he is drinking plenty of fluids. Does have a blood pressure monitor but has not been checking his blood pressure and heart rate. Encouraged to do so at least twice daily, possibly three times daily for the next few days just to see how it is going to run. Did a med review and noted differences in the way patient reported that he takes Lisinopril-HCTZ and also Metoprolol.   He did report that his wife usually manages his meds and sets them up in a planner, but he is not aware

## 2018-03-12 ENCOUNTER — OFFICE VISIT (OUTPATIENT)
Dept: PRIMARY CARE CLINIC | Age: 43
End: 2018-03-12
Payer: MEDICARE

## 2018-03-12 ENCOUNTER — HOSPITAL ENCOUNTER (OUTPATIENT)
Dept: GENERAL RADIOLOGY | Age: 43
Discharge: HOME | End: 2018-03-12
Payer: MEDICARE

## 2018-03-12 VITALS
OXYGEN SATURATION: 96 % | DIASTOLIC BLOOD PRESSURE: 85 MMHG | WEIGHT: 315 LBS | TEMPERATURE: 97.1 F | RESPIRATION RATE: 18 BRPM | HEART RATE: 83 BPM | SYSTOLIC BLOOD PRESSURE: 123 MMHG | BODY MASS INDEX: 40.43 KG/M2 | HEIGHT: 74 IN

## 2018-03-12 DIAGNOSIS — R06.02 SOB (SHORTNESS OF BREATH): ICD-10-CM

## 2018-03-12 DIAGNOSIS — Z71.6 TOBACCO ABUSE COUNSELING: ICD-10-CM

## 2018-03-12 DIAGNOSIS — Z87.891 PERSONAL HISTORY OF SMOKING: ICD-10-CM

## 2018-03-12 DIAGNOSIS — F33.1 MODERATE EPISODE OF RECURRENT MAJOR DEPRESSIVE DISORDER (HCC): Chronic | ICD-10-CM

## 2018-03-12 DIAGNOSIS — Z09 HOSPITAL DISCHARGE FOLLOW-UP: ICD-10-CM

## 2018-03-12 DIAGNOSIS — I25.10 CAD IN NATIVE ARTERY: Primary | ICD-10-CM

## 2018-03-12 DIAGNOSIS — I25.10 CAD IN NATIVE ARTERY: ICD-10-CM

## 2018-03-12 PROCEDURE — 1111F DSCHRG MED/CURRENT MED MERGE: CPT | Performed by: NURSE PRACTITIONER

## 2018-03-12 PROCEDURE — 71046 X-RAY EXAM CHEST 2 VIEWS: CPT

## 2018-03-12 PROCEDURE — 4000F TOBACCO USE TXMNT COUNSELING: CPT | Performed by: NURSE PRACTITIONER

## 2018-03-12 PROCEDURE — 99495 TRANSJ CARE MGMT MOD F2F 14D: CPT | Performed by: NURSE PRACTITIONER

## 2018-03-12 ASSESSMENT — ENCOUNTER SYMPTOMS
SHORTNESS OF BREATH: 0
COUGH: 1
CHEST TIGHTNESS: 0
APNEA: 0
ALLERGIC/IMMUNOLOGIC NEGATIVE: 1
WHEEZING: 1
CHOKING: 0
GASTROINTESTINAL NEGATIVE: 1
STRIDOR: 0

## 2018-03-12 ASSESSMENT — PATIENT HEALTH QUESTIONNAIRE - PHQ9
4. FEELING TIRED OR HAVING LITTLE ENERGY: 2
1. LITTLE INTEREST OR PLEASURE IN DOING THINGS: 2
SUM OF ALL RESPONSES TO PHQ9 QUESTIONS 1 & 2: 4
6. FEELING BAD ABOUT YOURSELF - OR THAT YOU ARE A FAILURE OR HAVE LET YOURSELF OR YOUR FAMILY DOWN: 1
5. POOR APPETITE OR OVEREATING: 1
2. FEELING DOWN, DEPRESSED OR HOPELESS: 2
7. TROUBLE CONCENTRATING ON THINGS, SUCH AS READING THE NEWSPAPER OR WATCHING TELEVISION: 1
SUM OF ALL RESPONSES TO PHQ QUESTIONS 1-9: 11
10. IF YOU CHECKED OFF ANY PROBLEMS, HOW DIFFICULT HAVE THESE PROBLEMS MADE IT FOR YOU TO DO YOUR WORK, TAKE CARE OF THINGS AT HOME, OR GET ALONG WITH OTHER PEOPLE: 1
8. MOVING OR SPEAKING SO SLOWLY THAT OTHER PEOPLE COULD HAVE NOTICED. OR THE OPPOSITE, BEING SO FIGETY OR RESTLESS THAT YOU HAVE BEEN MOVING AROUND A LOT MORE THAN USUAL: 1
3. TROUBLE FALLING OR STAYING ASLEEP: 1
9. THOUGHTS THAT YOU WOULD BE BETTER OFF DEAD, OR OF HURTING YOURSELF: 0

## 2018-03-13 ENCOUNTER — CARE COORDINATION (OUTPATIENT)
Dept: CASE MANAGEMENT | Age: 43
End: 2018-03-13

## 2018-03-13 NOTE — CARE COORDINATION
[facility] Transitions Follow Up Call    3/13/2018    Patient: Arthur Valdovinos  Patient : 1975   MRN: [id number]  Reason for Admission: There are no discharge diagnoses documented for the most recent discharge. Discharge Date: 3/7/18 RARS: Risk Score: 24       Spoke with:  [address] Transitions Subsequent and Final Call    Subsequent and Final Calls  Do you have any ongoing symptoms?:  No  Have your medications changed?:  No  Do you have any questions related to your medications?:  No  Do you currently have any active services?:  No  Do you have any needs or concerns that I can assist you with?:  No  Identified Barriers:  None  Care Transitions Interventions  Other Interventions: Follow Up: Spoke with patient yesterday and he reported that he is doing very well. Reported that he is much better. Is getting stronger and feeling fine. Denied any shortness of breath, congestion, cough, chest pain or other issues. Reported that he is eating well, drinking well and sleeping better, normal for him. No new complaints. Reported that he did not think he needed any further follow up calls.   Ending CTC per patient request.  Future Appointments  Date Time Provider Praful Smith   2018 8:45 AM Diya Casas MD [facility] [place]   2018 4:00 PM Marlen Guerrero MD [address] [facility] Almodovar RN

## 2018-03-13 NOTE — PROGRESS NOTES
[address] [place] [address], [facility]  Phone:  [phone]  Fax:  [phone] Transitional Care Management Services      Katherine Moralez   YOB: 1975    Date of Office Visit:  3/12/2018  Date of Hospital Admission: 3/6/18  Date of Hospital Discharge: 3/7/18  [facility] Risk Score [risk of hospital readmission >=10  medium risk (chance of readmission ~ 12%) >14  high risk (chance of readmission ~18%)]:Risk Score: 24    Care management risk score Rising risk (score 2-5) and Complex Care (Scores >=6): 4       Patient Active Problem List   Diagnosis    Depression    Hypertension    Anxiety    History of colon polyps    S/P laparoscopic appendectomy    Bipolar depression ([facility] [address]    Cigarette nicotine dependence without complication    Chest pain    Acute chest pain    Leg pain, bilateral    Preop testing    CAD in native artery       Allergies   Allergen Reactions    Dye [Gadolinium Derivatives] Hives     Mild rash that last less than 2 hours after MRI or CT scans       Medications listed as ordered at the time of discharge from hospital   Dagmar CALVILLO   Home Medication Instructions MARCELINO:    Printed on:03/14/18 7347   Medication Information                      aspirin EC 81 MG EC tablet  Take 1 tablet by mouth daily             atorvastatin (LIPITOR) 80 MG tablet  Take 1 tablet by mouth nightly             clopidogrel (PLAVIX) 75 MG tablet  Take 1 tablet by mouth daily             colestipol (COLESTID) 1 G tablet  TAKE 1 TABLET BY MOUTH TWICE DAILY. divalproex (DEPAKOTE) 500 MG DR tablet  Take 1,500 mg by mouth nightly              HYDROcodone-acetaminophen (NORCO)  MG per tablet  Take 1 tablet by mouth every 8 hours as needed for Pain.              lisinopril-hydrochlorothiazide (PRINZIDE;ZESTORETIC) 10-12.5 MG per tablet  Take 0.5 tablets by mouth daily             metoprolol tartrate (LOPRESSOR) 25 MG tablet  Take 1 tablet

## 2018-03-14 ASSESSMENT — PATIENT HEALTH QUESTIONNAIRE - PHQ9
9. THOUGHTS THAT YOU WOULD BE BETTER OFF DEAD, OR OF HURTING YOURSELF: 0
4. FEELING TIRED OR HAVING LITTLE ENERGY: 1
6. FEELING BAD ABOUT YOURSELF - OR THAT YOU ARE A FAILURE OR HAVE LET YOURSELF OR YOUR FAMILY DOWN: 0
SUM OF ALL RESPONSES TO PHQ9 QUESTIONS 1 & 2: 3
3. TROUBLE FALLING OR STAYING ASLEEP: 1
7. TROUBLE CONCENTRATING ON THINGS, SUCH AS READING THE NEWSPAPER OR WATCHING TELEVISION: 2
10. IF YOU CHECKED OFF ANY PROBLEMS, HOW DIFFICULT HAVE THESE PROBLEMS MADE IT FOR YOU TO DO YOUR WORK, TAKE CARE OF THINGS AT HOME, OR GET ALONG WITH OTHER PEOPLE: 1
2. FEELING DOWN, DEPRESSED OR HOPELESS: 1
8. MOVING OR SPEAKING SO SLOWLY THAT OTHER PEOPLE COULD HAVE NOTICED. OR THE OPPOSITE, BEING SO FIGETY OR RESTLESS THAT YOU HAVE BEEN MOVING AROUND A LOT MORE THAN USUAL: 2
1. LITTLE INTEREST OR PLEASURE IN DOING THINGS: 2
5. POOR APPETITE OR OVEREATING: 1
SUM OF ALL RESPONSES TO PHQ QUESTIONS 1-9: 10

## 2018-03-27 ENCOUNTER — TELEPHONE (OUTPATIENT)
Dept: CARDIOLOGY | Age: 43
End: 2018-03-27

## 2018-04-09 ENCOUNTER — OFFICE VISIT (OUTPATIENT)
Dept: CARDIOLOGY | Age: 43
End: 2018-04-09
Payer: MEDICARE

## 2018-04-09 VITALS
HEART RATE: 90 BPM | WEIGHT: 315 LBS | HEIGHT: 74 IN | DIASTOLIC BLOOD PRESSURE: 80 MMHG | BODY MASS INDEX: 40.43 KG/M2 | SYSTOLIC BLOOD PRESSURE: 108 MMHG

## 2018-04-09 DIAGNOSIS — I10 ESSENTIAL HYPERTENSION: Primary | ICD-10-CM

## 2018-04-09 PROCEDURE — 99214 OFFICE O/P EST MOD 30 MIN: CPT | Performed by: INTERNAL MEDICINE

## 2018-04-09 PROCEDURE — 4004F PT TOBACCO SCREEN RCVD TLK: CPT | Performed by: INTERNAL MEDICINE

## 2018-04-09 PROCEDURE — G8427 DOCREV CUR MEDS BY ELIG CLIN: HCPCS | Performed by: INTERNAL MEDICINE

## 2018-04-09 PROCEDURE — G8417 CALC BMI ABV UP PARAM F/U: HCPCS | Performed by: INTERNAL MEDICINE

## 2018-04-09 PROCEDURE — G8598 ASA/ANTIPLAT THER USED: HCPCS | Performed by: INTERNAL MEDICINE

## 2018-04-09 PROCEDURE — 93000 ELECTROCARDIOGRAM COMPLETE: CPT | Performed by: INTERNAL MEDICINE

## 2018-04-09 ASSESSMENT — ENCOUNTER SYMPTOMS: SHORTNESS OF BREATH: 0

## 2018-04-11 ENCOUNTER — OFFICE VISIT (OUTPATIENT)
Dept: PRIMARY CARE CLINIC | Age: 43
End: 2018-04-11
Payer: MEDICARE

## 2018-04-11 VITALS
OXYGEN SATURATION: 93 % | SYSTOLIC BLOOD PRESSURE: 122 MMHG | BODY MASS INDEX: 40.43 KG/M2 | HEART RATE: 84 BPM | WEIGHT: 315 LBS | HEIGHT: 74 IN | DIASTOLIC BLOOD PRESSURE: 82 MMHG | TEMPERATURE: 97.4 F

## 2018-04-11 DIAGNOSIS — Z71.6 TOBACCO ABUSE COUNSELING: ICD-10-CM

## 2018-04-11 DIAGNOSIS — I10 ESSENTIAL HYPERTENSION: ICD-10-CM

## 2018-04-11 DIAGNOSIS — G89.29 CHRONIC MIDLINE LOW BACK PAIN WITH BILATERAL SCIATICA: ICD-10-CM

## 2018-04-11 DIAGNOSIS — F31.9 BIPOLAR DEPRESSION (HCC): ICD-10-CM

## 2018-04-11 DIAGNOSIS — M54.42 CHRONIC MIDLINE LOW BACK PAIN WITH BILATERAL SCIATICA: ICD-10-CM

## 2018-04-11 DIAGNOSIS — M54.41 CHRONIC MIDLINE LOW BACK PAIN WITH BILATERAL SCIATICA: ICD-10-CM

## 2018-04-11 DIAGNOSIS — I25.10 CAD IN NATIVE ARTERY: Primary | ICD-10-CM

## 2018-04-11 PROBLEM — Z01.818 PREOP TESTING: Status: RESOLVED | Noted: 2018-02-28 | Resolved: 2018-04-11

## 2018-04-11 PROCEDURE — G8427 DOCREV CUR MEDS BY ELIG CLIN: HCPCS | Performed by: FAMILY MEDICINE

## 2018-04-11 PROCEDURE — G8417 CALC BMI ABV UP PARAM F/U: HCPCS | Performed by: FAMILY MEDICINE

## 2018-04-11 PROCEDURE — G8598 ASA/ANTIPLAT THER USED: HCPCS | Performed by: FAMILY MEDICINE

## 2018-04-11 PROCEDURE — 99214 OFFICE O/P EST MOD 30 MIN: CPT | Performed by: FAMILY MEDICINE

## 2018-04-11 PROCEDURE — 4004F PT TOBACCO SCREEN RCVD TLK: CPT | Performed by: FAMILY MEDICINE

## 2018-04-11 RX ORDER — NICOTINE 21 MG/24HR
1 PATCH, TRANSDERMAL 24 HOURS TRANSDERMAL EVERY 24 HOURS
Qty: 30 PATCH | Refills: 0 | Status: SHIPPED | OUTPATIENT
Start: 2018-04-11 | End: 2018-08-14

## 2018-04-30 ASSESSMENT — ENCOUNTER SYMPTOMS
SHORTNESS OF BREATH: 0
COUGH: 0

## 2018-06-04 RX ORDER — MONTELUKAST SODIUM 4 MG/1
TABLET, CHEWABLE ORAL
Qty: 60 TABLET | Refills: 0 | Status: SHIPPED | OUTPATIENT
Start: 2018-06-04 | End: 2018-07-03 | Stop reason: SDUPTHER

## 2018-06-28 DIAGNOSIS — I25.10 CAD IN NATIVE ARTERY: Primary | ICD-10-CM

## 2018-06-29 RX ORDER — ATORVASTATIN CALCIUM 80 MG/1
80 TABLET, FILM COATED ORAL NIGHTLY
Qty: 30 TABLET | Refills: 5 | Status: SHIPPED | OUTPATIENT
Start: 2018-06-29 | End: 2019-08-19 | Stop reason: SDUPTHER

## 2018-07-03 RX ORDER — MONTELUKAST SODIUM 4 MG/1
TABLET, CHEWABLE ORAL
Qty: 60 TABLET | Refills: 0 | Status: SHIPPED | OUTPATIENT
Start: 2018-07-03 | End: 2018-08-09 | Stop reason: SDUPTHER

## 2018-08-09 RX ORDER — MONTELUKAST SODIUM 4 MG/1
TABLET, CHEWABLE ORAL
Qty: 60 TABLET | Refills: 0 | Status: SHIPPED | OUTPATIENT
Start: 2018-08-09 | End: 2018-09-05 | Stop reason: SDUPTHER

## 2018-08-14 ENCOUNTER — OFFICE VISIT (OUTPATIENT)
Dept: PRIMARY CARE CLINIC | Age: 43
End: 2018-08-14
Payer: MEDICARE

## 2018-08-14 VITALS
HEART RATE: 108 BPM | OXYGEN SATURATION: 97 % | BODY MASS INDEX: 40.43 KG/M2 | WEIGHT: 315 LBS | DIASTOLIC BLOOD PRESSURE: 80 MMHG | TEMPERATURE: 97.9 F | HEIGHT: 74 IN | SYSTOLIC BLOOD PRESSURE: 130 MMHG

## 2018-08-14 DIAGNOSIS — I10 ESSENTIAL HYPERTENSION: ICD-10-CM

## 2018-08-14 DIAGNOSIS — I25.10 CAD IN NATIVE ARTERY: Primary | ICD-10-CM

## 2018-08-14 DIAGNOSIS — R73.9 HYPERGLYCEMIA: ICD-10-CM

## 2018-08-14 DIAGNOSIS — F31.9 BIPOLAR DEPRESSION (HCC): ICD-10-CM

## 2018-08-14 DIAGNOSIS — E66.01 MORBID OBESITY WITH BMI OF 40.0-44.9, ADULT (HCC): ICD-10-CM

## 2018-08-14 DIAGNOSIS — M54.42 CHRONIC MIDLINE LOW BACK PAIN WITH BILATERAL SCIATICA: ICD-10-CM

## 2018-08-14 DIAGNOSIS — G89.29 CHRONIC MIDLINE LOW BACK PAIN WITH BILATERAL SCIATICA: ICD-10-CM

## 2018-08-14 DIAGNOSIS — M54.41 CHRONIC MIDLINE LOW BACK PAIN WITH BILATERAL SCIATICA: ICD-10-CM

## 2018-08-14 DIAGNOSIS — K92.1 BLOOD IN STOOL: ICD-10-CM

## 2018-08-14 LAB
ALBUMIN SERPL-MCNC: 4 G/DL (ref 3.5–5.2)
ALP BLD-CCNC: 131 U/L (ref 40–130)
ALT SERPL-CCNC: 48 U/L (ref 5–41)
ANION GAP SERPL CALCULATED.3IONS-SCNC: 16 MMOL/L (ref 7–19)
AST SERPL-CCNC: 64 U/L (ref 5–40)
BILIRUB SERPL-MCNC: 0.5 MG/DL (ref 0.2–1.2)
BUN BLDV-MCNC: 11 MG/DL (ref 6–20)
CALCIUM SERPL-MCNC: 9.4 MG/DL (ref 8.6–10)
CHLORIDE BLD-SCNC: 97 MMOL/L (ref 98–111)
CO2: 26 MMOL/L (ref 22–29)
CREAT SERPL-MCNC: 0.8 MG/DL (ref 0.5–1.2)
GFR NON-AFRICAN AMERICAN: >60
GLUCOSE BLD-MCNC: 97 MG/DL (ref 74–109)
HBA1C MFR BLD: 5.4 %
HCT VFR BLD CALC: 43.7 % (ref 42–52)
HEMOGLOBIN: 15 G/DL (ref 14–18)
MCH RBC QN AUTO: 30.4 PG (ref 27–31)
MCHC RBC AUTO-ENTMCNC: 34.3 G/DL (ref 33–37)
MCV RBC AUTO: 88.5 FL (ref 80–94)
PDW BLD-RTO: 13.2 % (ref 11.5–14.5)
PLATELET # BLD: 273 K/UL (ref 130–400)
PMV BLD AUTO: 9.1 FL (ref 9.4–12.4)
POTASSIUM SERPL-SCNC: 4.1 MMOL/L (ref 3.5–5)
RBC # BLD: 4.94 M/UL (ref 4.7–6.1)
SODIUM BLD-SCNC: 139 MMOL/L (ref 136–145)
TOTAL PROTEIN: 7.4 G/DL (ref 6.6–8.7)
WBC # BLD: 11.6 K/UL (ref 4.8–10.8)

## 2018-08-14 PROCEDURE — G8598 ASA/ANTIPLAT THER USED: HCPCS | Performed by: FAMILY MEDICINE

## 2018-08-14 PROCEDURE — G8417 CALC BMI ABV UP PARAM F/U: HCPCS | Performed by: FAMILY MEDICINE

## 2018-08-14 PROCEDURE — 99214 OFFICE O/P EST MOD 30 MIN: CPT | Performed by: FAMILY MEDICINE

## 2018-08-14 PROCEDURE — 4004F PT TOBACCO SCREEN RCVD TLK: CPT | Performed by: FAMILY MEDICINE

## 2018-08-14 PROCEDURE — G8427 DOCREV CUR MEDS BY ELIG CLIN: HCPCS | Performed by: FAMILY MEDICINE

## 2018-08-14 PROCEDURE — 83036 HEMOGLOBIN GLYCOSYLATED A1C: CPT | Performed by: FAMILY MEDICINE

## 2018-08-14 RX ORDER — VORTIOXETINE 10 MG/1
TABLET, FILM COATED ORAL
COMMUNITY
Start: 2018-05-23 | End: 2021-09-22

## 2018-08-14 RX ORDER — LAMOTRIGINE 25 MG/1
50 TABLET ORAL 2 TIMES DAILY
COMMUNITY
Start: 2018-05-23 | End: 2019-02-13

## 2018-08-14 NOTE — PROGRESS NOTES
Mayra Alcala is a 39 y.o. male    Chief Complaint   Patient presents with    Follow-up     3 months    Hypertension    Coronary Artery Disease       HPI  Mayra Alcala presents to the office for follow up of multiple medical problems including HTN, CAD, HLD, and bipolar disorder. Patient also has history of chronic pain and sees pain management. Patient also noticed one episode of bright red blood in his stool. Patient states this occurred after he had to strain significantly. Patient denies any further bleeding. Patient states he hasn't noticed any blood in stool in the past 3 weeks. Patient has a long-standing history of bipolar disorder. Patient is currently seeing Dr. Sidney Gonsalves for treatment. Patient denies any suicidal or homicidal ideation. Patient is stable on his current medications. Patient denies any side effects to these medications. Treatment Adherence:   Medication compliance:  compliant all of the time  Diet compliance:  compliant most of the time  Weight trend: stable  Current exercise: no regular exercise  Barriers: impairment:  physical: chronic back pain    Hypertension:  Home blood pressure monitoring: No. Patient denies chest pain and shortness of breath. Antihypertensive medication side effects: no medication side effects noted. Use of agents associated with hypertension: none. Sodium   Date Value   08/14/2018 139 mmol/L   05/23/2012 137 MEQ/L    BUN (mg/dL)   Date Value   08/14/2018 11    Glucose (mg/dL)   Date Value   08/14/2018 97      Potassium   Date Value   08/14/2018 4.1 mmol/L   05/23/2012 4.0 mEq/L    Creatinine (MG/DL)   Date Value   05/23/2012 0.7     CREATININE (mg/dL)   Date Value   08/14/2018 0.8         Hyperlipidemia:  No new myalgias or GI upset on atorvastatin (Lipitor).      Lab Results   Component Value Date    CHOL 194 10/26/2016    TRIG 433 (H) 10/26/2016    HDL 39 (L) 10/26/2016    LDLCALC - (AA) 10/26/2016 APPENDECTOMY  2/3/14    CHOLECYSTECTOMY  2009    COLONOSCOPY  2012        ELECTROCONVULSIVE THERAPY N/A 3/8/2017    ELECTROCONVULSIVE THERAPY performed by Victoriano Camilo DO at [address]  [address]    umbilical     INGUINAL HERNIA REPAIR Right 1990    right ing with mesh    VASECTOMY         Social History     Social History    Marital status:      Spouse name: N/A    Number of children: N/A    Years of education: N/A     Social History Main Topics    Smoking status: Current Every Day Smoker     Packs/day: 0.50     Years: 24.00     Types: Cigarettes    Smokeless tobacco: Never Used    Alcohol use No    Drug use: No    Sexual activity: Not Asked     Other Topics Concern    None     Social History Narrative    None       Physical Exam   Constitutional: He is oriented to person, place, and time. He appears well-developed and well-nourished. No distress. HENT:   Head: Normocephalic and atraumatic. Eyes: Conjunctivae are normal. No scleral icterus. Cardiovascular: Normal rate, regular rhythm and normal heart sounds. No murmur heard. Pulmonary/Chest: Effort normal and breath sounds normal. No respiratory distress. He has no wheezes. Genitourinary: Rectal exam shows no fissure and no tenderness. Prostate is not enlarged and not tender. Musculoskeletal: He exhibits no edema. Neurological: He is alert and oriented to person, place, and time. Skin: Skin is warm. No rash noted. Psychiatric:   Flat affect     Nursing note and vitals reviewed. Assessment    ICD-10-CM ICD-9-CM    1. CAD in native artery I25.10 414.01 Stable. No acute chest pain. 2. Essential hypertension I10 401.9 The current medical regimen is effective;  continue present plan and medications. CBC      Comprehensive Metabolic Panel   3. Chronic midline low back pain with bilateral sciatica M54.41 724.2 Stable. Continue to see pain management.      M54.42 724.3

## 2018-08-16 ENCOUNTER — TELEPHONE (OUTPATIENT)
Dept: PRIMARY CARE CLINIC | Age: 43
End: 2018-08-16

## 2018-08-17 ASSESSMENT — ENCOUNTER SYMPTOMS
COLOR CHANGE: 0
COUGH: 0
SHORTNESS OF BREATH: 0

## 2018-08-28 ENCOUNTER — TELEPHONE (OUTPATIENT)
Dept: PRIMARY CARE CLINIC | Age: 43
End: 2018-08-28

## 2018-08-28 NOTE — TELEPHONE ENCOUNTER
Dr. Nancy Helms stated they discussed this at his appointment and he does not feel that Chantix is right for the patient do to his bipolar depressive order.

## 2018-08-28 NOTE — TELEPHONE ENCOUNTER
Pt called and is requesting to start chantix. I let him know that he would more than likely need to be seen first due to possible side effects. Pt was agreeable and would like a call back.

## 2018-09-05 RX ORDER — MONTELUKAST SODIUM 4 MG/1
TABLET, CHEWABLE ORAL
Qty: 60 TABLET | Refills: 0 | Status: SHIPPED | OUTPATIENT
Start: 2018-09-05 | End: 2018-10-16 | Stop reason: SDUPTHER

## 2018-10-16 RX ORDER — MONTELUKAST SODIUM 4 MG/1
TABLET, CHEWABLE ORAL
Qty: 60 TABLET | Refills: 0 | Status: SHIPPED | OUTPATIENT
Start: 2018-10-16 | End: 2018-11-15 | Stop reason: SDUPTHER

## 2018-11-14 ENCOUNTER — OFFICE VISIT (OUTPATIENT)
Dept: PRIMARY CARE CLINIC | Age: 43
End: 2018-11-14
Payer: MEDICARE

## 2018-11-14 VITALS
BODY MASS INDEX: 40.43 KG/M2 | HEIGHT: 74 IN | DIASTOLIC BLOOD PRESSURE: 82 MMHG | WEIGHT: 315 LBS | HEART RATE: 84 BPM | TEMPERATURE: 97.1 F | SYSTOLIC BLOOD PRESSURE: 122 MMHG | OXYGEN SATURATION: 93 %

## 2018-11-14 DIAGNOSIS — I25.10 CAD IN NATIVE ARTERY: ICD-10-CM

## 2018-11-14 DIAGNOSIS — E66.01 MORBID OBESITY WITH BMI OF 40.0-44.9, ADULT (HCC): ICD-10-CM

## 2018-11-14 DIAGNOSIS — F31.9 BIPOLAR DEPRESSION (HCC): ICD-10-CM

## 2018-11-14 DIAGNOSIS — I10 ESSENTIAL HYPERTENSION: Primary | ICD-10-CM

## 2018-11-14 PROCEDURE — G8427 DOCREV CUR MEDS BY ELIG CLIN: HCPCS | Performed by: FAMILY MEDICINE

## 2018-11-14 PROCEDURE — 99214 OFFICE O/P EST MOD 30 MIN: CPT | Performed by: FAMILY MEDICINE

## 2018-11-14 PROCEDURE — G8482 FLU IMMUNIZE ORDER/ADMIN: HCPCS | Performed by: FAMILY MEDICINE

## 2018-11-14 PROCEDURE — 4004F PT TOBACCO SCREEN RCVD TLK: CPT | Performed by: FAMILY MEDICINE

## 2018-11-14 PROCEDURE — G8417 CALC BMI ABV UP PARAM F/U: HCPCS | Performed by: FAMILY MEDICINE

## 2018-11-14 PROCEDURE — G8598 ASA/ANTIPLAT THER USED: HCPCS | Performed by: FAMILY MEDICINE

## 2018-11-14 NOTE — PROGRESS NOTES
Value Date    LDLCALC - (AA) 10/26/2016     No results found for: LABVLDL, VLDL  No results found for: [facility]  Lab Results   Component Value Date     08/14/2018    K 4.1 08/14/2018    CL 97 (L) 08/14/2018    CO2 26 08/14/2018    BUN 11 08/14/2018    CREATININE 0.8 08/14/2018    GLUCOSE 97 08/14/2018    CALCIUM 9.4 08/14/2018    PROT 7.4 08/14/2018    LABALBU 4.0 08/14/2018    BILITOT 0.5 08/14/2018    ALKPHOS 131 (H) 08/14/2018    AST 64 (H) 08/14/2018    ALT 48 (H) 08/14/2018    LABGLOM >60 08/14/2018    GLOB 2.7 03/06/2017           Review of Systems   Constitutional: Negative for chills and fever. Respiratory: Negative for cough and shortness of breath. Cardiovascular: Negative for chest pain and leg swelling. Prior to Admission medications    Medication Sig Start Date End Date Taking? Authorizing Provider   metoprolol tartrate (LOPRESSOR) 25 MG tablet TAKE 1 TABLET BY MOUTH TWICE A DAY. 10/31/18  Yes Talbot Mohs, APRN   TRINTELLIX 10 MG TABS tablet  5/23/18  Yes Historical Provider, MD   lamoTRIgine (LAMICTAL) 25 MG tablet  5/23/18  Yes Historical Provider, MD   atorvastatin (LIPITOR) 80 MG tablet Take 1 tablet by mouth nightly 6/29/18  Yes SANDY Ceron   divalproex (DEPAKOTE) 500 MG DR tablet Take 1,500 mg by mouth nightly    Yes Historical Provider, MD   traZODone (DESYREL) 150 MG tablet Take 150 mg by mouth nightly   Yes Historical Provider, MD   HYDROcodone-acetaminophen (NORCO)  MG per tablet Take 1 tablet by mouth every 8 hours as needed for Pain. Yes Historical Provider, MD   aspirin EC 81 MG EC tablet Take 1 tablet by mouth daily 2/14/18  Yes Ammy Puente MD   colestipol (COLESTID) 1 g tablet TAKE 1 TABLET BY MOUTH TWICE DAILY.  11/15/18   Desean Goddard MD       Past Medical History:   Diagnosis Date    Anxiety     Arthritis     CAD in native artery 3/6/2018    Chronic back pain     Depression     Hyperlipidemia     Hypertension     IBS (irritable

## 2018-11-15 RX ORDER — MONTELUKAST SODIUM 4 MG/1
TABLET, CHEWABLE ORAL
Qty: 60 TABLET | Refills: 0 | Status: SHIPPED | OUTPATIENT
Start: 2018-11-15 | End: 2018-12-26 | Stop reason: SDUPTHER

## 2018-12-02 ASSESSMENT — ENCOUNTER SYMPTOMS
SHORTNESS OF BREATH: 0
COUGH: 0

## 2019-01-09 ENCOUNTER — OFFICE VISIT (OUTPATIENT)
Dept: CARDIOLOGY | Age: 44
End: 2019-01-09
Payer: MEDICARE

## 2019-01-09 VITALS
HEART RATE: 106 BPM | SYSTOLIC BLOOD PRESSURE: 138 MMHG | HEIGHT: 74 IN | BODY MASS INDEX: 40.43 KG/M2 | WEIGHT: 315 LBS | DIASTOLIC BLOOD PRESSURE: 106 MMHG

## 2019-01-09 DIAGNOSIS — E78.5 DYSLIPIDEMIA: ICD-10-CM

## 2019-01-09 DIAGNOSIS — I10 ESSENTIAL HYPERTENSION: Primary | ICD-10-CM

## 2019-01-09 DIAGNOSIS — F31.9 BIPOLAR DEPRESSION (HCC): ICD-10-CM

## 2019-01-09 DIAGNOSIS — Z72.0 TOBACCO ABUSE: ICD-10-CM

## 2019-01-09 DIAGNOSIS — R07.89 OTHER CHEST PAIN: ICD-10-CM

## 2019-01-09 DIAGNOSIS — I25.10 CAD IN NATIVE ARTERY: ICD-10-CM

## 2019-01-09 DIAGNOSIS — I10 ESSENTIAL HYPERTENSION: ICD-10-CM

## 2019-01-09 DIAGNOSIS — Z71.6 TOBACCO ABUSE COUNSELING: ICD-10-CM

## 2019-01-09 LAB
ALBUMIN SERPL-MCNC: 4 G/DL (ref 3.5–5.2)
ALP BLD-CCNC: 166 U/L (ref 40–130)
ALT SERPL-CCNC: 47 U/L (ref 5–41)
ANION GAP SERPL CALCULATED.3IONS-SCNC: 12 MMOL/L (ref 7–19)
AST SERPL-CCNC: 56 U/L (ref 5–40)
BILIRUB SERPL-MCNC: 0.6 MG/DL (ref 0.2–1.2)
BUN BLDV-MCNC: 9 MG/DL (ref 6–20)
CALCIUM SERPL-MCNC: 9.4 MG/DL (ref 8.6–10)
CHLORIDE BLD-SCNC: 103 MMOL/L (ref 98–111)
CHOLESTEROL, TOTAL: 122 MG/DL (ref 160–199)
CO2: 26 MMOL/L (ref 22–29)
CREAT SERPL-MCNC: 0.8 MG/DL (ref 0.5–1.2)
GFR NON-AFRICAN AMERICAN: >60
GLUCOSE BLD-MCNC: 95 MG/DL (ref 74–109)
HCT VFR BLD CALC: 46.4 % (ref 42–52)
HDLC SERPL-MCNC: 45 MG/DL (ref 55–121)
HEMOGLOBIN: 15.4 G/DL (ref 14–18)
LDL CHOLESTEROL CALCULATED: 30 MG/DL
MCH RBC QN AUTO: 28.8 PG (ref 27–31)
MCHC RBC AUTO-ENTMCNC: 33.2 G/DL (ref 33–37)
MCV RBC AUTO: 86.9 FL (ref 80–94)
PDW BLD-RTO: 13.4 % (ref 11.5–14.5)
PLATELET # BLD: 319 K/UL (ref 130–400)
PMV BLD AUTO: 8.8 FL (ref 9.4–12.4)
POTASSIUM SERPL-SCNC: 4.3 MMOL/L (ref 3.5–5)
RBC # BLD: 5.34 M/UL (ref 4.7–6.1)
SODIUM BLD-SCNC: 141 MMOL/L (ref 136–145)
TOTAL PROTEIN: 7.6 G/DL (ref 6.6–8.7)
TRIGL SERPL-MCNC: 234 MG/DL (ref 0–149)
WBC # BLD: 12 K/UL (ref 4.8–10.8)

## 2019-01-09 PROCEDURE — 99406 BEHAV CHNG SMOKING 3-10 MIN: CPT | Performed by: NURSE PRACTITIONER

## 2019-01-09 PROCEDURE — 4004F PT TOBACCO SCREEN RCVD TLK: CPT | Performed by: NURSE PRACTITIONER

## 2019-01-09 PROCEDURE — 93000 ELECTROCARDIOGRAM COMPLETE: CPT | Performed by: NURSE PRACTITIONER

## 2019-01-09 PROCEDURE — 99213 OFFICE O/P EST LOW 20 MIN: CPT | Performed by: NURSE PRACTITIONER

## 2019-01-09 PROCEDURE — G8427 DOCREV CUR MEDS BY ELIG CLIN: HCPCS | Performed by: NURSE PRACTITIONER

## 2019-01-09 PROCEDURE — G8599 NO ASA/ANTIPLAT THER USE RNG: HCPCS | Performed by: NURSE PRACTITIONER

## 2019-01-09 PROCEDURE — G8417 CALC BMI ABV UP PARAM F/U: HCPCS | Performed by: NURSE PRACTITIONER

## 2019-01-09 PROCEDURE — G8482 FLU IMMUNIZE ORDER/ADMIN: HCPCS | Performed by: NURSE PRACTITIONER

## 2019-01-14 DIAGNOSIS — I25.10 CAD IN NATIVE ARTERY: ICD-10-CM

## 2019-01-14 RX ORDER — ATORVASTATIN CALCIUM 80 MG/1
80 TABLET, FILM COATED ORAL NIGHTLY
Qty: 30 TABLET | Refills: 5 | OUTPATIENT
Start: 2019-01-14

## 2019-01-17 ENCOUNTER — HOSPITAL ENCOUNTER (OUTPATIENT)
Dept: NON INVASIVE DIAGNOSTICS | Age: 44
Discharge: HOME | End: 2019-01-17
Payer: MEDICARE

## 2019-01-17 VITALS
HEART RATE: 100 BPM | SYSTOLIC BLOOD PRESSURE: 150 MMHG | WEIGHT: 315 LBS | DIASTOLIC BLOOD PRESSURE: 89 MMHG | BODY MASS INDEX: 43.02 KG/M2

## 2019-01-17 PROCEDURE — 2580000003 HC RX 258: Performed by: INTERNAL MEDICINE

## 2019-01-17 PROCEDURE — C8928 TTE W OR W/O FOL W/CON,STRES: HCPCS

## 2019-01-17 PROCEDURE — 93017 CV STRESS TEST TRACING ONLY: CPT

## 2019-01-17 PROCEDURE — 6360000004 HC RX CONTRAST MEDICATION: Performed by: INTERNAL MEDICINE

## 2019-01-17 PROCEDURE — 6360000002 HC RX W HCPCS: Performed by: INTERNAL MEDICINE

## 2019-01-17 RX ORDER — DOBUTAMINE HYDROCHLORIDE 200 MG/100ML
10 INJECTION INTRAVENOUS CONTINUOUS PRN
Status: ACTIVE | OUTPATIENT
Start: 2019-01-17 | End: 2019-01-18

## 2019-01-17 RX ORDER — SODIUM CHLORIDE 9 MG/ML
INJECTION, SOLUTION INTRAVENOUS
Status: COMPLETED | OUTPATIENT
Start: 2019-01-17 | End: 2019-01-17

## 2019-01-17 RX ADMIN — DOBUTAMINE HYDROCHLORIDE 10 MCG/KG/MIN: 200 INJECTION INTRAVENOUS at 11:15

## 2019-01-17 RX ADMIN — PERFLUTREN 1.65 MG: 6.52 INJECTION, SUSPENSION INTRAVENOUS at 11:00

## 2019-01-17 RX ADMIN — SODIUM CHLORIDE: 9 INJECTION, SOLUTION INTRAVENOUS at 11:15

## 2019-01-18 ENCOUNTER — TELEPHONE (OUTPATIENT)
Dept: CARDIOLOGY | Age: 44
End: 2019-01-18

## 2019-01-21 ENCOUNTER — TELEPHONE (OUTPATIENT)
Dept: PRIMARY CARE CLINIC | Age: 44
End: 2019-01-21

## 2019-02-13 ENCOUNTER — OFFICE VISIT (OUTPATIENT)
Dept: PRIMARY CARE CLINIC | Age: 44
End: 2019-02-13
Payer: MEDICARE

## 2019-02-13 VITALS
OXYGEN SATURATION: 97 % | HEIGHT: 74 IN | DIASTOLIC BLOOD PRESSURE: 72 MMHG | WEIGHT: 315 LBS | TEMPERATURE: 98.5 F | HEART RATE: 94 BPM | SYSTOLIC BLOOD PRESSURE: 124 MMHG | BODY MASS INDEX: 40.43 KG/M2

## 2019-02-13 DIAGNOSIS — E66.01 MORBID OBESITY WITH BMI OF 40.0-44.9, ADULT (HCC): ICD-10-CM

## 2019-02-13 DIAGNOSIS — I25.10 CAD IN NATIVE ARTERY: Primary | ICD-10-CM

## 2019-02-13 DIAGNOSIS — Z71.6 TOBACCO ABUSE COUNSELING: ICD-10-CM

## 2019-02-13 DIAGNOSIS — F31.9 BIPOLAR DEPRESSION (HCC): ICD-10-CM

## 2019-02-13 DIAGNOSIS — I10 ESSENTIAL HYPERTENSION: ICD-10-CM

## 2019-02-13 PROCEDURE — G8599 NO ASA/ANTIPLAT THER USE RNG: HCPCS | Performed by: FAMILY MEDICINE

## 2019-02-13 PROCEDURE — G8417 CALC BMI ABV UP PARAM F/U: HCPCS | Performed by: FAMILY MEDICINE

## 2019-02-13 PROCEDURE — 4004F PT TOBACCO SCREEN RCVD TLK: CPT | Performed by: FAMILY MEDICINE

## 2019-02-13 PROCEDURE — 99214 OFFICE O/P EST MOD 30 MIN: CPT | Performed by: FAMILY MEDICINE

## 2019-02-13 PROCEDURE — G8482 FLU IMMUNIZE ORDER/ADMIN: HCPCS | Performed by: FAMILY MEDICINE

## 2019-02-13 PROCEDURE — G8427 DOCREV CUR MEDS BY ELIG CLIN: HCPCS | Performed by: FAMILY MEDICINE

## 2019-02-13 RX ORDER — LAMOTRIGINE 100 MG/1
TABLET ORAL 2 TIMES DAILY
COMMUNITY
Start: 2019-02-06 | End: 2021-11-29 | Stop reason: SDUPTHER

## 2019-02-13 RX ORDER — ARIPIPRAZOLE 5 MG/1
TABLET ORAL
COMMUNITY
Start: 2019-02-06 | End: 2021-05-29 | Stop reason: ALTCHOICE

## 2019-02-13 ASSESSMENT — ENCOUNTER SYMPTOMS
COUGH: 0
COLOR CHANGE: 0
SHORTNESS OF BREATH: 0

## 2019-07-30 ENCOUNTER — OFFICE VISIT (OUTPATIENT)
Dept: PRIMARY CARE CLINIC | Age: 44
End: 2019-07-30
Payer: MEDICARE

## 2019-07-30 VITALS
HEIGHT: 74 IN | WEIGHT: 315 LBS | OXYGEN SATURATION: 98 % | TEMPERATURE: 96.8 F | BODY MASS INDEX: 40.43 KG/M2 | SYSTOLIC BLOOD PRESSURE: 138 MMHG | HEART RATE: 94 BPM | DIASTOLIC BLOOD PRESSURE: 88 MMHG

## 2019-07-30 DIAGNOSIS — R06.02 SOB (SHORTNESS OF BREATH) ON EXERTION: Primary | ICD-10-CM

## 2019-07-30 DIAGNOSIS — M62.08 DIASTASIS RECTI: ICD-10-CM

## 2019-07-30 DIAGNOSIS — F41.9 ANXIETY: ICD-10-CM

## 2019-07-30 DIAGNOSIS — E66.01 CLASS 3 SEVERE OBESITY DUE TO EXCESS CALORIES WITH SERIOUS COMORBIDITY AND BODY MASS INDEX (BMI) OF 45.0 TO 49.9 IN ADULT (HCC): ICD-10-CM

## 2019-07-30 DIAGNOSIS — F31.9 BIPOLAR DEPRESSION (HCC): ICD-10-CM

## 2019-07-30 PROBLEM — E66.813 CLASS 3 SEVERE OBESITY DUE TO EXCESS CALORIES WITH SERIOUS COMORBIDITY AND BODY MASS INDEX (BMI) OF 45.0 TO 49.9 IN ADULT: Status: ACTIVE | Noted: 2019-07-30

## 2019-07-30 PROCEDURE — G8599 NO ASA/ANTIPLAT THER USE RNG: HCPCS | Performed by: NURSE PRACTITIONER

## 2019-07-30 PROCEDURE — 99214 OFFICE O/P EST MOD 30 MIN: CPT | Performed by: NURSE PRACTITIONER

## 2019-07-30 PROCEDURE — G8417 CALC BMI ABV UP PARAM F/U: HCPCS | Performed by: NURSE PRACTITIONER

## 2019-07-30 PROCEDURE — 99406 BEHAV CHNG SMOKING 3-10 MIN: CPT | Performed by: NURSE PRACTITIONER

## 2019-07-30 PROCEDURE — G8427 DOCREV CUR MEDS BY ELIG CLIN: HCPCS | Performed by: NURSE PRACTITIONER

## 2019-07-30 PROCEDURE — 4004F PT TOBACCO SCREEN RCVD TLK: CPT | Performed by: NURSE PRACTITIONER

## 2019-07-30 ASSESSMENT — ENCOUNTER SYMPTOMS
GASTROINTESTINAL NEGATIVE: 1
EYES NEGATIVE: 1
SHORTNESS OF BREATH: 1

## 2019-07-30 NOTE — PROGRESS NOTES
Pulse 94   Temp 96.8 °F (36 °C) (Temporal)   Ht 6' 2\" (1.88 m)   Wt (!) 354 lb (160.6 kg)   SpO2 98%   BMI 45.45 kg/m²     Assessment:      Diagnosis Orders   1. SOB (shortness of breath) on exertion  Full PFT Study With Bronchodilator   2. Class 3 severe obesity due to excess calories with serious comorbidity and body mass index (BMI) of 45.0 to 49.9 in adult ([facility] [address]     3. Bipolar depression ([facility] [address]     4. Anxiety     5. Diastasis recti         No results found for this visit on 07/30/19. Plan:     I am checking PFTs due to increased SOB with exertion. He had stress test earlier this year and it was negative. Discussed diastasis recti. Patient is to continue current bipolar regimen and follow up with psych. Approximately 5 minutes of education was provided about quit smoking and the harms of tobacco.  Patient does show understanding. Patient has  the desire to quit smoking in the future. Patient to wear patches. Return in about 3 weeks (around 8/20/2019) for SOB and PFT results. Orders Placed This Encounter   Procedures    Full PFT Study With Bronchodilator     Standing Status:   Future     Standing Expiration Date:   7/30/2020       No orders of the defined types were placed in this encounter. Patient offered educational handouts and has had all questions answered. Patient voices understanding and agrees to plans along with risks and benefits of plan. Patient is instructed to continue prior meds, diet, and exercise plans as instructed. Patient agrees to follow up as instructed and sooner if needed. Patient agrees to go to ER if condition becomes emergent. EMR Dragon/transcription disclaimer: Some of this encounter note is an electronic transcription/translation of spoken language to printed text. The electronic translation of spoken language may permit erroneous, or at times, nonsensical words or phrases to be inadvertently transcribed.  Although I have reviewed the note for such errors, some may still exist.    Electronically signed by Hyacinth Cranker, APRN on 7/30/2019 at 8:46 AM

## 2019-08-02 ENCOUNTER — HOSPITAL ENCOUNTER (OUTPATIENT)
Dept: PULMONOLOGY | Age: 44
Discharge: HOME | End: 2019-08-02
Payer: MEDICARE

## 2019-08-02 ENCOUNTER — OUTSIDE FACILITY SERVICE (OUTPATIENT)
Dept: PULMONOLOGY | Facility: CLINIC | Age: 44
End: 2019-08-02

## 2019-08-02 DIAGNOSIS — R06.02 SOB (SHORTNESS OF BREATH) ON EXERTION: ICD-10-CM

## 2019-08-02 PROCEDURE — 6360000002 HC RX W HCPCS: Performed by: NURSE PRACTITIONER

## 2019-08-02 PROCEDURE — 94727 GAS DIL/WSHOT DETER LNG VOL: CPT

## 2019-08-02 PROCEDURE — 94060 EVALUATION OF WHEEZING: CPT | Performed by: INTERNAL MEDICINE

## 2019-08-02 PROCEDURE — 94729 DIFFUSING CAPACITY: CPT

## 2019-08-02 PROCEDURE — 94727 GAS DIL/WSHOT DETER LNG VOL: CPT | Performed by: INTERNAL MEDICINE

## 2019-08-02 PROCEDURE — 94060 EVALUATION OF WHEEZING: CPT

## 2019-08-02 PROCEDURE — 94729 DIFFUSING CAPACITY: CPT | Performed by: INTERNAL MEDICINE

## 2019-08-02 RX ORDER — ALBUTEROL SULFATE 2.5 MG/3ML
2.5 SOLUTION RESPIRATORY (INHALATION) EVERY 6 HOURS PRN
Status: DISCONTINUED | OUTPATIENT
Start: 2019-08-02 | End: 2019-08-04 | Stop reason: HOSPADM

## 2019-08-02 RX ADMIN — ALBUTEROL SULFATE 2.5 MG: 2.5 SOLUTION RESPIRATORY (INHALATION) at 09:44

## 2019-08-07 NOTE — PROCEDURES
[facility] [address] [facility]                               PULMONARY FUNCTION    PATIENT NAME: Jak Yang                  :        1975  MED REC NO:   [id number]                              ROOM:  ACCOUNT NO:   [de-identified]                           ADMIT DATE: 2019  PROVIDER:     Diana Centeno MD    DATE OF PROCEDURE:  2019    PULMONARY FUNCTION TESTS:  1. Spirometry shows moderate restrictive physiology. 2.  Following bronchodilator, there is no change. 3.  Lung volume measurements show increases in both total lung capacity  and residual volume suggesting hyperinflation and gas trapping. 4.  Diffusion capacity is reduced, but is normal when corrected for  alveolar volume. 5.  There is some flattening of the inspiratory curve and also on the  prebronchodilator expiratory curve suggesting a variable extrathoracic  airflow obstruction. Notably there was no flattening of the expiratory  curve on the postbronchodilator curve.         Familia Henderson MD    D: 2019 11:29:25      T: 2019 11:34:06     [email or web address]  Job#: [id number]     Doc#: [id number]    CC:

## 2019-08-13 ENCOUNTER — OFFICE VISIT (OUTPATIENT)
Dept: PRIMARY CARE CLINIC | Age: 44
End: 2019-08-13
Payer: MEDICARE

## 2019-08-13 ENCOUNTER — TELEPHONE (OUTPATIENT)
Dept: PRIMARY CARE CLINIC | Age: 44
End: 2019-08-13

## 2019-08-13 VITALS
DIASTOLIC BLOOD PRESSURE: 86 MMHG | OXYGEN SATURATION: 98 % | BODY MASS INDEX: 40.43 KG/M2 | TEMPERATURE: 97.8 F | HEIGHT: 74 IN | WEIGHT: 315 LBS | HEART RATE: 91 BPM | SYSTOLIC BLOOD PRESSURE: 138 MMHG

## 2019-08-13 DIAGNOSIS — Z71.6 TOBACCO ABUSE COUNSELING: ICD-10-CM

## 2019-08-13 DIAGNOSIS — J44.9 CHRONIC OBSTRUCTIVE PULMONARY DISEASE, UNSPECIFIED COPD TYPE (HCC): Primary | ICD-10-CM

## 2019-08-13 DIAGNOSIS — Z72.0 TOBACCO ABUSE: ICD-10-CM

## 2019-08-13 PROCEDURE — G8599 NO ASA/ANTIPLAT THER USE RNG: HCPCS | Performed by: NURSE PRACTITIONER

## 2019-08-13 PROCEDURE — G8427 DOCREV CUR MEDS BY ELIG CLIN: HCPCS | Performed by: NURSE PRACTITIONER

## 2019-08-13 PROCEDURE — 99213 OFFICE O/P EST LOW 20 MIN: CPT | Performed by: NURSE PRACTITIONER

## 2019-08-13 PROCEDURE — G8417 CALC BMI ABV UP PARAM F/U: HCPCS | Performed by: NURSE PRACTITIONER

## 2019-08-13 PROCEDURE — 99406 BEHAV CHNG SMOKING 3-10 MIN: CPT | Performed by: NURSE PRACTITIONER

## 2019-08-13 PROCEDURE — 3023F SPIROM DOC REV: CPT | Performed by: NURSE PRACTITIONER

## 2019-08-13 PROCEDURE — G8926 SPIRO NO PERF OR DOC: HCPCS | Performed by: NURSE PRACTITIONER

## 2019-08-13 PROCEDURE — 4004F PT TOBACCO SCREEN RCVD TLK: CPT | Performed by: NURSE PRACTITIONER

## 2019-08-13 RX ORDER — VARENICLINE TARTRATE 25 MG
KIT ORAL
Qty: 1 BOX | Refills: 0 | Status: SHIPPED | OUTPATIENT
Start: 2019-08-13 | End: 2019-09-12

## 2019-08-13 RX ORDER — FLUTICASONE FUROATE AND VILANTEROL 100; 25 UG/1; UG/1
1 POWDER RESPIRATORY (INHALATION) DAILY
Qty: 1 EACH | Refills: 2 | Status: SHIPPED | OUTPATIENT
Start: 2019-08-13 | End: 2019-12-17

## 2019-08-13 NOTE — PROGRESS NOTES
Spriva Respimat inhaler provided in office today. Pt. Instructed on use, and informed to use 2 puffs daily.

## 2019-08-14 ENCOUNTER — TELEPHONE (OUTPATIENT)
Dept: PRIMARY CARE CLINIC | Age: 44
End: 2019-08-14

## 2019-08-14 PROBLEM — J44.9 CHRONIC OBSTRUCTIVE PULMONARY DISEASE (HCC): Status: ACTIVE | Noted: 2019-08-14

## 2019-08-14 PROBLEM — Z72.0 TOBACCO ABUSE: Status: ACTIVE | Noted: 2019-08-14

## 2019-08-14 ASSESSMENT — ENCOUNTER SYMPTOMS
COUGH: 1
SHORTNESS OF BREATH: 1
EYES NEGATIVE: 1
GASTROINTESTINAL NEGATIVE: 1

## 2019-08-14 NOTE — TELEPHONE ENCOUNTER
Patient called and informed spiriva inhaler not covered on insurance and new inhaler Breo called to pharmacy   Voices understanding

## 2019-08-15 NOTE — TELEPHONE ENCOUNTER
This was done yesterday per notes:Patient called and informed spiriva inhaler not covered on insurance and new inhaler Breo called to pharmacy   Voices understanding

## 2019-08-19 DIAGNOSIS — I25.10 CAD IN NATIVE ARTERY: ICD-10-CM

## 2019-08-19 RX ORDER — ATORVASTATIN CALCIUM 80 MG/1
TABLET, FILM COATED ORAL
Qty: 30 TABLET | Refills: 0 | Status: SHIPPED | OUTPATIENT
Start: 2019-08-19 | End: 2019-09-24 | Stop reason: SDUPTHER

## 2019-09-12 RX ORDER — VARENICLINE TARTRATE 25 MG
KIT ORAL
Qty: 53 TABLET | Refills: 0 | OUTPATIENT
Start: 2019-09-12

## 2019-09-12 RX ORDER — VARENICLINE TARTRATE 1 MG/1
1 TABLET, FILM COATED ORAL 2 TIMES DAILY
Qty: 180 TABLET | Refills: 1 | Status: SHIPPED | OUTPATIENT
Start: 2019-09-12 | End: 2019-12-17

## 2019-12-17 ENCOUNTER — OFFICE VISIT (OUTPATIENT)
Dept: PRIMARY CARE CLINIC | Age: 44
End: 2019-12-17
Payer: MEDICARE

## 2019-12-17 VITALS
HEIGHT: 74 IN | OXYGEN SATURATION: 98 % | RESPIRATION RATE: 17 BRPM | SYSTOLIC BLOOD PRESSURE: 136 MMHG | WEIGHT: 315 LBS | TEMPERATURE: 97.8 F | DIASTOLIC BLOOD PRESSURE: 84 MMHG | HEART RATE: 94 BPM | BODY MASS INDEX: 40.43 KG/M2

## 2019-12-17 DIAGNOSIS — R06.2 WHEEZING: ICD-10-CM

## 2019-12-17 DIAGNOSIS — Z71.6 TOBACCO ABUSE COUNSELING: ICD-10-CM

## 2019-12-17 DIAGNOSIS — B96.89 ACUTE BACTERIAL SINUSITIS: Primary | ICD-10-CM

## 2019-12-17 DIAGNOSIS — J01.90 ACUTE BACTERIAL SINUSITIS: Primary | ICD-10-CM

## 2019-12-17 DIAGNOSIS — Z72.0 TOBACCO ABUSE: ICD-10-CM

## 2019-12-17 DIAGNOSIS — R05.9 COUGH: ICD-10-CM

## 2019-12-17 PROCEDURE — G8417 CALC BMI ABV UP PARAM F/U: HCPCS | Performed by: NURSE PRACTITIONER

## 2019-12-17 PROCEDURE — G0008 ADMIN INFLUENZA VIRUS VAC: HCPCS | Performed by: NURSE PRACTITIONER

## 2019-12-17 PROCEDURE — G8599 NO ASA/ANTIPLAT THER USE RNG: HCPCS | Performed by: NURSE PRACTITIONER

## 2019-12-17 PROCEDURE — G8482 FLU IMMUNIZE ORDER/ADMIN: HCPCS | Performed by: NURSE PRACTITIONER

## 2019-12-17 PROCEDURE — 99213 OFFICE O/P EST LOW 20 MIN: CPT | Performed by: NURSE PRACTITIONER

## 2019-12-17 PROCEDURE — G8427 DOCREV CUR MEDS BY ELIG CLIN: HCPCS | Performed by: NURSE PRACTITIONER

## 2019-12-17 PROCEDURE — 4004F PT TOBACCO SCREEN RCVD TLK: CPT | Performed by: NURSE PRACTITIONER

## 2019-12-17 PROCEDURE — 90686 IIV4 VACC NO PRSV 0.5 ML IM: CPT | Performed by: NURSE PRACTITIONER

## 2019-12-17 PROCEDURE — 99406 BEHAV CHNG SMOKING 3-10 MIN: CPT | Performed by: NURSE PRACTITIONER

## 2019-12-17 RX ORDER — AZITHROMYCIN 250 MG/1
250 TABLET, FILM COATED ORAL SEE ADMIN INSTRUCTIONS
Qty: 6 TABLET | Refills: 0 | Status: SHIPPED | OUTPATIENT
Start: 2019-12-17 | End: 2019-12-22

## 2019-12-17 RX ORDER — ALBUTEROL SULFATE 90 UG/1
2 AEROSOL, METERED RESPIRATORY (INHALATION) EVERY 6 HOURS PRN
Qty: 1 INHALER | Refills: 1 | Status: SHIPPED | OUTPATIENT
Start: 2019-12-17 | End: 2020-05-04

## 2019-12-17 RX ORDER — PREDNISONE 10 MG/1
10 TABLET ORAL DAILY
Qty: 7 TABLET | Refills: 0 | Status: SHIPPED | OUTPATIENT
Start: 2019-12-17 | End: 2019-12-24

## 2019-12-17 ASSESSMENT — ENCOUNTER SYMPTOMS
COUGH: 1
WHEEZING: 1
EYES NEGATIVE: 1
GASTROINTESTINAL NEGATIVE: 1

## 2020-01-20 RX ORDER — MONTELUKAST SODIUM 4 MG/1
TABLET, CHEWABLE ORAL
Qty: 60 TABLET | Refills: 2 | Status: SHIPPED | OUTPATIENT
Start: 2020-01-20 | End: 2020-05-11

## 2020-01-20 NOTE — TELEPHONE ENCOUNTER
Requested Prescriptions     Pending Prescriptions Disp Refills    metoprolol tartrate (LOPRESSOR) 25 MG tablet [Pharmacy Med Name: METOPROLOL TAR. 25MG TABLET] 135 tablet 0     Sig: TAKE 1 & 1/2 TABLETS BY MOUTH 2 TIMES A DAY.        Last Appointment Date: 12/17/2019  Next Appointment Date: 1/20/2020

## 2020-03-06 ENCOUNTER — OFFICE VISIT (OUTPATIENT)
Dept: PRIMARY CARE CLINIC | Age: 45
End: 2020-03-06
Payer: MEDICARE

## 2020-03-06 VITALS
WEIGHT: 315 LBS | DIASTOLIC BLOOD PRESSURE: 88 MMHG | HEIGHT: 74 IN | SYSTOLIC BLOOD PRESSURE: 130 MMHG | RESPIRATION RATE: 22 BRPM | OXYGEN SATURATION: 97 % | TEMPERATURE: 97.1 F | HEART RATE: 80 BPM | BODY MASS INDEX: 40.43 KG/M2

## 2020-03-06 PROCEDURE — 99214 OFFICE O/P EST MOD 30 MIN: CPT | Performed by: NURSE PRACTITIONER

## 2020-03-06 PROCEDURE — G0444 DEPRESSION SCREEN ANNUAL: HCPCS | Performed by: NURSE PRACTITIONER

## 2020-03-06 PROCEDURE — G8417 CALC BMI ABV UP PARAM F/U: HCPCS | Performed by: NURSE PRACTITIONER

## 2020-03-06 PROCEDURE — G8427 DOCREV CUR MEDS BY ELIG CLIN: HCPCS | Performed by: NURSE PRACTITIONER

## 2020-03-06 PROCEDURE — G8926 SPIRO NO PERF OR DOC: HCPCS | Performed by: NURSE PRACTITIONER

## 2020-03-06 PROCEDURE — 3023F SPIROM DOC REV: CPT | Performed by: NURSE PRACTITIONER

## 2020-03-06 PROCEDURE — G8482 FLU IMMUNIZE ORDER/ADMIN: HCPCS | Performed by: NURSE PRACTITIONER

## 2020-03-06 PROCEDURE — 4004F PT TOBACCO SCREEN RCVD TLK: CPT | Performed by: NURSE PRACTITIONER

## 2020-03-06 PROCEDURE — G8431 POS CLIN DEPRES SCRN F/U DOC: HCPCS | Performed by: NURSE PRACTITIONER

## 2020-03-06 RX ORDER — FUROSEMIDE 20 MG/1
20 TABLET ORAL DAILY
Qty: 5 TABLET | Refills: 0 | Status: SHIPPED | OUTPATIENT
Start: 2020-03-06 | End: 2020-03-27 | Stop reason: SDUPTHER

## 2020-03-06 RX ORDER — POTASSIUM CHLORIDE 750 MG/1
10 TABLET, EXTENDED RELEASE ORAL DAILY
Qty: 5 TABLET | Refills: 0 | Status: SHIPPED | OUTPATIENT
Start: 2020-03-06 | End: 2020-06-12

## 2020-03-06 ASSESSMENT — PATIENT HEALTH QUESTIONNAIRE - PHQ9
2. FEELING DOWN, DEPRESSED OR HOPELESS: 2
4. FEELING TIRED OR HAVING LITTLE ENERGY: 2
SUM OF ALL RESPONSES TO PHQ9 QUESTIONS 1 & 2: 4
SUM OF ALL RESPONSES TO PHQ QUESTIONS 1-9: 15
10. IF YOU CHECKED OFF ANY PROBLEMS, HOW DIFFICULT HAVE THESE PROBLEMS MADE IT FOR YOU TO DO YOUR WORK, TAKE CARE OF THINGS AT HOME, OR GET ALONG WITH OTHER PEOPLE: 1
1. LITTLE INTEREST OR PLEASURE IN DOING THINGS: 2
3. TROUBLE FALLING OR STAYING ASLEEP: 2
8. MOVING OR SPEAKING SO SLOWLY THAT OTHER PEOPLE COULD HAVE NOTICED. OR THE OPPOSITE, BEING SO FIGETY OR RESTLESS THAT YOU HAVE BEEN MOVING AROUND A LOT MORE THAN USUAL: 1
7. TROUBLE CONCENTRATING ON THINGS, SUCH AS READING THE NEWSPAPER OR WATCHING TELEVISION: 2
SUM OF ALL RESPONSES TO PHQ QUESTIONS 1-9: 15
9. THOUGHTS THAT YOU WOULD BE BETTER OFF DEAD, OR OF HURTING YOURSELF: 0
5. POOR APPETITE OR OVEREATING: 2
6. FEELING BAD ABOUT YOURSELF - OR THAT YOU ARE A FAILURE OR HAVE LET YOURSELF OR YOUR FAMILY DOWN: 2

## 2020-03-06 NOTE — PROGRESS NOTES
[facility]  [address] [address]  [address] [address]  Dept: [phone]  Dept Fax: [phone]  Loc: [phone]    Daniel Blanchard is a 38 y.o. male who presents today for his medical conditions/complaints as noted below. Daniel Blanchard is c/o of Foot Swelling (been swelling for the past 2 weeks the feet and ankles)      Chief Complaint   Patient presents with    Foot Swelling     been swelling for the past 2 weeks the feet and ankles       HPI:     HPI  Patient here with complaints of left lower leg edema. He reports that symptoms have been ongoing for greater than 2 weeks. He has been taking all of his meds as prescribed.      Past Medical History:   Diagnosis Date    Anxiety     Arthritis     CAD in native artery 3/6/2018    Chronic back pain     Depression     Hyperlipidemia     Hypertension     IBS (irritable bowel syndrome)     Panic disorder     at times afraid to go outside    Unspecified sleep apnea     bipap        Past Surgical History:   Procedure Laterality Date    ANUS SURGERY      APPENDECTOMY  2/3/14    CHOLECYSTECTOMY  2009    COLONOSCOPY  2012        ELECTROCONVULSIVE THERAPY N/A 3/8/2017    ELECTROCONVULSIVE THERAPY performed by Kathleen Vick DO at [address] [address]    umbilical     INGUINAL HERNIA REPAIR Right 1990    right ing with mesh    VASECTOMY         Social History     Tobacco Use    Smoking status: Current Every Day Smoker     Packs/day: 0.50     Years: 24.00     Pack years: 12.00     Types: Cigarettes    Smokeless tobacco: Never Used   Substance Use Topics    Alcohol use: No     Alcohol/week: 0.0 standard drinks        Current Outpatient Medications   Medication Sig Dispense Refill    furosemide (LASIX) 20 MG tablet Take 1 tablet by mouth daily 5 tablet 0    potassium chloride (KLOR-CON M) 10 MEQ extended release tablet Take 1 tablet by mouth daily 5 tablet 0    metoprolol tartrate (LOPRESSOR) 25 MG tablet TAKE 1 & 1/2 TABLETS BY MOUTH 2 TIMES A DAY. 135 tablet 0    colestipol (COLESTID) 1 g tablet TAKE 1 TABLET BY MOUTH TWICE DAILY. 60 tablet 2    albuterol sulfate HFA (VENTOLIN HFA) 108 (90 Base) MCG/ACT inhaler Inhale 2 puffs into the lungs every 6 hours as needed for Wheezing 1 Inhaler 1    atorvastatin (LIPITOR) 80 MG tablet TAKE 1 TABLET BY MOUTH AT NIGHT 30 tablet 11    ARIPiprazole (ABILIFY) 5 MG tablet       lamoTRIgine (LAMICTAL) 100 MG tablet       TRINTELLIX 10 MG TABS tablet       traZODone (DESYREL) 150 MG tablet Take 150 mg by mouth nightly      HYDROcodone-acetaminophen (NORCO)  MG per tablet Take 1 tablet by mouth every 8 hours as needed for Pain.  aspirin EC 81 MG EC tablet Take 1 tablet by mouth daily 30 tablet 0     No current facility-administered medications for this visit. Allergies   Allergen Reactions    Dye [Gadolinium Derivatives] Hives     Mild rash that last less than 2 hours after MRI or CT scans       Family History   Problem Relation Age of Onset    Diabetes Mother     Cancer Mother         uterine CA    Depression Mother     Mental Illness Mother     Hypertension Mother    [employer] Other Mother         blood clots    Heart Disease Brother     Depression Brother     Mental Illness Brother     Hypertension Brother     Breast Cancer Maternal Grandmother     Cancer Maternal Grandmother         breast CA    Heart Attack Maternal Grandmother     Other Maternal Grandmother         blood clots               Subjective:      Review of Systems   Constitutional: Negative. HENT: Negative. Eyes: Negative. Respiratory: Negative. Cardiovascular: Positive for leg swelling (left). Gastrointestinal: Negative. Endocrine: Negative. Genitourinary: Negative. Musculoskeletal: Negative. Skin: Negative. Neurological: Negative. Hematological: Negative.     Psychiatric/Behavioral: Negative. Objective:     Physical Exam  Vitals signs and nursing note reviewed. Constitutional:       Appearance: Normal appearance. HENT:      Head: Normocephalic and atraumatic. Right Ear: Hearing, tympanic membrane, ear canal and external ear normal.      Left Ear: Hearing, tympanic membrane, ear canal and external ear normal.      Nose: Nose normal.      Mouth/Throat:      Lips: Pink. Mouth: Mucous membranes are moist.      Pharynx: Oropharynx is clear. Eyes:      General: Lids are normal.      Extraocular Movements: Extraocular movements intact. Conjunctiva/sclera: Conjunctivae normal.      Pupils: Pupils are equal, round, and reactive to light. Neck:      Musculoskeletal: Full passive range of motion without pain, normal range of motion and neck supple. Thyroid: No thyromegaly. Cardiovascular:      Rate and Rhythm: Normal rate and regular rhythm. Pulses: Normal pulses. Dorsalis pedis pulses are 2+ on the right side and 2+ on the left side. Posterior tibial pulses are 2+ on the right side and 2+ on the left side. Heart sounds: Normal heart sounds. Pulmonary:      Effort: Pulmonary effort is normal.      Breath sounds: Normal breath sounds and air entry. Abdominal:      General: Bowel sounds are normal.      Palpations: Abdomen is soft. Musculoskeletal:      Thoracic back: He exhibits normal range of motion and no tenderness. Lumbar back: He exhibits decreased range of motion. He exhibits no tenderness. Left lower leg: He exhibits swelling. Edema present. Lymphadenopathy:      Cervical: No cervical adenopathy. Skin:     General: Skin is warm and dry. Capillary Refill: Capillary refill takes 2 to 3 seconds. Neurological:      General: No focal deficit present. Mental Status: He is alert and oriented to person, place, and time. Mental status is at baseline. Coordination: Coordination is intact.    Psychiatric:

## 2020-03-09 ASSESSMENT — ENCOUNTER SYMPTOMS
RESPIRATORY NEGATIVE: 1
EYES NEGATIVE: 1
GASTROINTESTINAL NEGATIVE: 1

## 2020-03-27 RX ORDER — FUROSEMIDE 20 MG/1
20 TABLET ORAL DAILY
Qty: 5 TABLET | Refills: 0 | Status: SHIPPED | OUTPATIENT
Start: 2020-03-27 | End: 2020-06-12

## 2020-03-27 NOTE — PROGRESS NOTES
Wife called and states bilateral legs are swollen. This is not a new issue, but this has not resolved. Lasix was prescribed a few weeks ago. This helped. He has missed visits and was unable to get labs done because of [address] pandemic. Will give lasix for 5 more days. I have encouraged wife and patient to call and make appt for video visit on Monday. They are to go to ED with any distress.

## 2020-05-01 ENCOUNTER — TELEMEDICINE (OUTPATIENT)
Dept: PRIMARY CARE CLINIC | Age: 45
End: 2020-05-01
Payer: MEDICARE

## 2020-05-01 PROCEDURE — 99406 BEHAV CHNG SMOKING 3-10 MIN: CPT | Performed by: NURSE PRACTITIONER

## 2020-05-01 PROCEDURE — 99497 ADVNCD CARE PLAN 30 MIN: CPT | Performed by: NURSE PRACTITIONER

## 2020-05-01 PROCEDURE — G0438 PPPS, INITIAL VISIT: HCPCS | Performed by: NURSE PRACTITIONER

## 2020-05-01 RX ORDER — VARENICLINE TARTRATE
KIT
Qty: 1 BOX | Refills: 0 | Status: SHIPPED | OUTPATIENT
Start: 2020-05-01 | End: 2020-09-15

## 2020-05-01 RX ORDER — VARENICLINE TARTRATE 1 MG/1
1 TABLET, FILM COATED ORAL 2 TIMES DAILY
Qty: 60 TABLET | Refills: 3 | Status: SHIPPED | OUTPATIENT
Start: 2020-06-01 | End: 2020-09-15

## 2020-05-01 ASSESSMENT — PATIENT HEALTH QUESTIONNAIRE - PHQ9
SUM OF ALL RESPONSES TO PHQ QUESTIONS 1-9: 0
SUM OF ALL RESPONSES TO PHQ QUESTIONS 1-9: 0

## 2020-05-01 NOTE — PROGRESS NOTES
Medicare Annual Wellness Visit  Name: Susi Dan Date: 2020   MRN: [id number] Sex: Male   Age: 54 y.o. Ethnicity: Non-/Non    : 1975 Race: Melvin Trinidad is here for Medicare AWV    Screenings for behavioral, psychosocial and functional/safety risks, and cognitive dysfunction are all negative except as indicated below. These results, as well as other patient data from the [address] form, are documented in Flowsheets linked to this Encounter. Allergies   Allergen Reactions    Dye [Gadolinium Derivatives] Hives     Mild rash that last less than 2 hours after MRI or CT scans         Prior to Visit Medications    Medication Sig Taking? Authorizing Provider   varenicline (CHANTIX STARTING MONTH SIS) 0.5 MG X 11 & 1 MG X 42 tablet Take by mouth. Yes SANDY Méndez   varenicline (CHANTIX CONTINUING MONTH SIS) 1 MG tablet Take 1 tablet by mouth 2 times daily Yes SANDY Méndez   metoprolol tartrate (LOPRESSOR) 25 MG tablet TAKE 1 & 1/2 TABLETS BY MOUTH 2 TIMES A DAY. Elba Bumpers, APRN   albuterol sulfate  (90 Base) MCG/ACT inhaler INHALE 2 PUFFS BY MOUTH INTO LUNGS EVERY 6 HOURS AS NEEDED FOR WHEEZING. SANDY Méndez   furosemide (LASIX) 20 MG tablet Take 1 tablet by mouth daily  SANDY Ambrose   potassium chloride (KLOR-CON M) 10 MEQ extended release tablet Take 1 tablet by mouth daily  SANDY Méndez   colestipol (COLESTID) 1 g tablet TAKE 1 TABLET BY MOUTH TWICE DAILY.   SANDY Méndez   atorvastatin (LIPITOR) 80 MG tablet TAKE 1 TABLET BY MOUTH AT NIGHT  SANDY Méndez   ARIPiprazole (ABILIFY) 5 MG tablet   Historical Provider, MD   lamoTRIgine (LAMICTAL) 100 MG tablet   Historical Provider, MD   TRINTELLIX 10 MG TABS tablet   Historical Provider, MD   traZODone (DESYREL) 150 MG tablet Take 150 mg by mouth nightly  Historical Provider, MD   HYDROcodone-acetaminophen (NORCO)  MG per tablet Take 1 tablet by increased physical activity    Hearing/Vision:  No exam data present  Hearing/Vision  Do you or your family notice any trouble with your hearing?: No  Do you have difficulty driving, watching TV, or doing any of your daily activities because of your eyesight?: (!) Yes(Cannot read small print)  Have you had an eye exam within the past year?: (!) No  Hearing/Vision Interventions:  · Vision concerns:  patient encouraged to make appointment with his/her eye specialist    Safety:  Safety  Do you have working smoke detectors?: Yes  Have all throw rugs been removed or fastened?: (!) No  Do you have non-slip mats or surfaces in all bathtubs/showers?: (!) No  Do all of your stairways have a railing or banister?: Yes  Are your doorways, halls and stairs free of clutter?: Yes  Do you always fasten your seatbelt when you are in a car?: Yes  Safety Interventions:  · Home safety tips provided    ADL:  ADLs  In the past 7 days, did you need help from others to perform any of the following everyday activities? Eating, dressing, grooming, bathing, toileting, or walking/balance?: (!) Walking/Balance(uses walker and cane)  In the past 7 days, did you need help from others to take care of any of the following?  Laundry, housekeeping, banking/finances, shopping, telephone use, food preparation, transportation, or taking medications?: (!) Housekeeping  ADL Interventions:  · Patient declines any further evaluation/treatment for this issue    Personalized Preventive Plan   Current Health Maintenance Status  Immunization History   Administered Date(s) Administered    Influenza 12/03/2013    Influenza Virus Vaccine 12/18/2014, 01/19/2016    Influenza, Quadv, IM, (6 mo and older Fluzone, Flulaval, Fluarix and 3 yrs and older Afluria) 10/09/2017, 10/31/2018    Influenza, Quadv, IM, PF (6 mo and older Fluzone, Flulaval, Fluarix, and 3 yrs and older Afluria) 10/17/2016, 12/17/2019    Pneumococcal Polysaccharide ([id number]) 01/19/2016   

## 2020-05-04 RX ORDER — ALBUTEROL SULFATE 90 UG/1
AEROSOL, METERED RESPIRATORY (INHALATION)
Qty: 18 G | Refills: 0 | Status: SHIPPED | OUTPATIENT
Start: 2020-05-04 | End: 2020-06-01

## 2020-05-11 RX ORDER — MONTELUKAST SODIUM 4 MG/1
TABLET, CHEWABLE ORAL
Qty: 60 TABLET | Refills: 0 | Status: SHIPPED | OUTPATIENT
Start: 2020-05-11 | End: 2020-06-11

## 2020-06-01 RX ORDER — ALBUTEROL SULFATE 90 UG/1
AEROSOL, METERED RESPIRATORY (INHALATION)
Qty: 18 G | Refills: 0 | Status: SHIPPED | OUTPATIENT
Start: 2020-06-01 | End: 2020-06-12 | Stop reason: SDUPTHER

## 2020-06-12 ENCOUNTER — TELEMEDICINE (OUTPATIENT)
Dept: PRIMARY CARE CLINIC | Age: 45
End: 2020-06-12
Payer: MEDICARE

## 2020-06-12 PROCEDURE — 99214 OFFICE O/P EST MOD 30 MIN: CPT | Performed by: NURSE PRACTITIONER

## 2020-06-12 PROCEDURE — G8428 CUR MEDS NOT DOCUMENT: HCPCS | Performed by: NURSE PRACTITIONER

## 2020-06-12 RX ORDER — ALBUTEROL SULFATE 90 UG/1
AEROSOL, METERED RESPIRATORY (INHALATION)
Qty: 18 G | Refills: 2 | Status: SHIPPED | OUTPATIENT
Start: 2020-06-12 | End: 2020-09-25

## 2020-06-12 ASSESSMENT — ENCOUNTER SYMPTOMS
EYES NEGATIVE: 1
GASTROINTESTINAL NEGATIVE: 1
RESPIRATORY NEGATIVE: 1

## 2020-06-18 ENCOUNTER — TRANSCRIBE ORDERS (OUTPATIENT)
Dept: ADMINISTRATIVE | Facility: HOSPITAL | Age: 45
End: 2020-06-18

## 2020-06-18 DIAGNOSIS — Z01.818 PREOP TESTING: Primary | ICD-10-CM

## 2020-06-22 ENCOUNTER — LAB (OUTPATIENT)
Dept: LAB | Facility: HOSPITAL | Age: 45
End: 2020-06-22

## 2020-06-22 PROCEDURE — U0003 INFECTIOUS AGENT DETECTION BY NUCLEIC ACID (DNA OR RNA); SEVERE ACUTE RESPIRATORY SYNDROME CORONAVIRUS 2 (SARS-COV-2) (CORONAVIRUS DISEASE [COVID-19]), AMPLIFIED PROBE TECHNIQUE, MAKING USE OF HIGH THROUGHPUT TECHNOLOGIES AS DESCRIBED BY CMS-2020-01-R: HCPCS | Performed by: PAIN MEDICINE

## 2020-06-23 LAB
COVID LABCORP PRIORITY: NORMAL
SARS-COV-2 RNA RESP QL NAA+PROBE: NOT DETECTED

## 2020-07-10 ENCOUNTER — TRANSCRIBE ORDERS (OUTPATIENT)
Dept: ADMINISTRATIVE | Facility: HOSPITAL | Age: 45
End: 2020-07-10

## 2020-07-10 DIAGNOSIS — Z01.818 PREOP TESTING: Primary | ICD-10-CM

## 2020-07-14 ENCOUNTER — LAB (OUTPATIENT)
Dept: LAB | Facility: HOSPITAL | Age: 45
End: 2020-07-14

## 2020-07-14 PROCEDURE — U0003 INFECTIOUS AGENT DETECTION BY NUCLEIC ACID (DNA OR RNA); SEVERE ACUTE RESPIRATORY SYNDROME CORONAVIRUS 2 (SARS-COV-2) (CORONAVIRUS DISEASE [COVID-19]), AMPLIFIED PROBE TECHNIQUE, MAKING USE OF HIGH THROUGHPUT TECHNOLOGIES AS DESCRIBED BY CMS-2020-01-R: HCPCS | Performed by: PAIN MEDICINE

## 2020-07-14 PROCEDURE — C9803 HOPD COVID-19 SPEC COLLECT: HCPCS | Performed by: PAIN MEDICINE

## 2020-07-15 LAB
COVID LABCORP PRIORITY: NORMAL
SARS-COV-2 RNA RESP QL NAA+PROBE: NOT DETECTED

## 2020-09-15 ENCOUNTER — APPOINTMENT (OUTPATIENT)
Dept: GENERAL RADIOLOGY | Age: 45
End: 2020-09-15
Payer: MEDICARE

## 2020-09-15 ENCOUNTER — HOSPITAL ENCOUNTER (EMERGENCY)
Age: 45
Discharge: HOME | End: 2020-09-15
Attending: PEDIATRICS
Payer: MEDICARE

## 2020-09-15 ENCOUNTER — APPOINTMENT (OUTPATIENT)
Dept: CT IMAGING | Age: 45
End: 2020-09-15
Payer: MEDICARE

## 2020-09-15 VITALS
OXYGEN SATURATION: 97 % | SYSTOLIC BLOOD PRESSURE: 132 MMHG | DIASTOLIC BLOOD PRESSURE: 84 MMHG | BODY MASS INDEX: 46.22 KG/M2 | HEART RATE: 78 BPM | RESPIRATION RATE: 22 BRPM | WEIGHT: 315 LBS | TEMPERATURE: 98.9 F

## 2020-09-15 LAB
ALBUMIN SERPL-MCNC: 4.4 G/DL (ref 3.5–5.2)
ALP BLD-CCNC: 121 U/L (ref 40–130)
ALT SERPL-CCNC: 52 U/L (ref 5–41)
ANION GAP SERPL CALCULATED.3IONS-SCNC: 13 MMOL/L (ref 7–19)
AST SERPL-CCNC: 114 U/L (ref 5–40)
BASOPHILS ABSOLUTE: 0.1 K/UL (ref 0–0.2)
BASOPHILS RELATIVE PERCENT: 0.6 % (ref 0–1)
BILIRUB SERPL-MCNC: 1.1 MG/DL (ref 0.2–1.2)
BUN BLDV-MCNC: 9 MG/DL (ref 6–20)
CALCIUM SERPL-MCNC: 9.3 MG/DL (ref 8.6–10)
CHLORIDE BLD-SCNC: 98 MMOL/L (ref 98–111)
CO2: 25 MMOL/L (ref 22–29)
CREAT SERPL-MCNC: 0.6 MG/DL (ref 0.5–1.2)
D DIMER: <0.27 UG/ML FEU (ref 0–0.48)
EOSINOPHILS ABSOLUTE: 0 K/UL (ref 0–0.6)
EOSINOPHILS RELATIVE PERCENT: 0.1 % (ref 0–5)
GFR AFRICAN AMERICAN: >59
GFR NON-AFRICAN AMERICAN: >60
GLUCOSE BLD-MCNC: 105 MG/DL (ref 74–109)
HCT VFR BLD CALC: 44.4 % (ref 42–52)
HEMOGLOBIN: 15.3 G/DL (ref 14–18)
IMMATURE GRANULOCYTES #: 0 K/UL
LIPASE: 18 U/L (ref 13–60)
LYMPHOCYTES ABSOLUTE: 3.3 K/UL (ref 1.1–4.5)
LYMPHOCYTES RELATIVE PERCENT: 28.2 % (ref 20–40)
MCH RBC QN AUTO: 30.7 PG (ref 27–31)
MCHC RBC AUTO-ENTMCNC: 34.5 G/DL (ref 33–37)
MCV RBC AUTO: 89 FL (ref 80–94)
MONOCYTES ABSOLUTE: 0.5 K/UL (ref 0–0.9)
MONOCYTES RELATIVE PERCENT: 4.6 % (ref 0–10)
NEUTROPHILS ABSOLUTE: 7.7 K/UL (ref 1.5–7.5)
NEUTROPHILS RELATIVE PERCENT: 66.2 % (ref 50–65)
PDW BLD-RTO: 13.3 % (ref 11.5–14.5)
PLATELET # BLD: 198 K/UL (ref 130–400)
PMV BLD AUTO: 8.7 FL (ref 9.4–12.4)
POTASSIUM REFLEX MAGNESIUM: 4 MMOL/L (ref 3.5–5)
PRO-BNP: 18 PG/ML (ref 0–450)
RBC # BLD: 4.99 M/UL (ref 4.7–6.1)
SODIUM BLD-SCNC: 136 MMOL/L (ref 136–145)
TOTAL PROTEIN: 7.6 G/DL (ref 6.6–8.7)
TROPONIN: <0.01 NG/ML (ref 0–0.03)
WBC # BLD: 11.7 K/UL (ref 4.8–10.8)

## 2020-09-15 PROCEDURE — 85025 COMPLETE CBC W/AUTO DIFF WBC: CPT

## 2020-09-15 PROCEDURE — 99999 PR OFFICE/OUTPT VISIT,PROCEDURE ONLY: CPT | Performed by: PEDIATRICS

## 2020-09-15 PROCEDURE — 83690 ASSAY OF LIPASE: CPT

## 2020-09-15 PROCEDURE — 83880 ASSAY OF NATRIURETIC PEPTIDE: CPT

## 2020-09-15 PROCEDURE — 93229 REMOTE 30 DAY ECG TECH SUPP: CPT

## 2020-09-15 PROCEDURE — 80053 COMPREHEN METABOLIC PANEL: CPT

## 2020-09-15 PROCEDURE — 85379 FIBRIN DEGRADATION QUANT: CPT

## 2020-09-15 PROCEDURE — 84484 ASSAY OF TROPONIN QUANT: CPT

## 2020-09-15 PROCEDURE — 6360000004 HC RX CONTRAST MEDICATION: Performed by: PEDIATRICS

## 2020-09-15 PROCEDURE — 6370000000 HC RX 637 (ALT 250 FOR IP): Performed by: PEDIATRICS

## 2020-09-15 PROCEDURE — 71275 CT ANGIOGRAPHY CHEST: CPT

## 2020-09-15 PROCEDURE — 36415 COLL VENOUS BLD VENIPUNCTURE: CPT

## 2020-09-15 PROCEDURE — 71045 X-RAY EXAM CHEST 1 VIEW: CPT

## 2020-09-15 PROCEDURE — 93005 ELECTROCARDIOGRAM TRACING: CPT | Performed by: PEDIATRICS

## 2020-09-15 PROCEDURE — 99284 EMERGENCY DEPT VISIT MOD MDM: CPT

## 2020-09-15 RX ORDER — NITROGLYCERIN 0.4 MG/1
0.4 TABLET SUBLINGUAL EVERY 5 MIN PRN
Status: DISCONTINUED | OUTPATIENT
Start: 2020-09-15 | End: 2020-09-15 | Stop reason: HOSPADM

## 2020-09-15 RX ORDER — ASPIRIN 81 MG/1
324 TABLET, CHEWABLE ORAL ONCE
Status: COMPLETED | OUTPATIENT
Start: 2020-09-15 | End: 2020-09-15

## 2020-09-15 RX ADMIN — NITROGLYCERIN 0.4 MG: 0.4 TABLET, ORALLY DISINTEGRATING SUBLINGUAL at 16:21

## 2020-09-15 RX ADMIN — IOPAMIDOL 90 ML: 755 INJECTION, SOLUTION INTRAVENOUS at 18:38

## 2020-09-15 RX ADMIN — ASPIRIN 324 MG: 81 TABLET, CHEWABLE ORAL at 16:21

## 2020-09-15 ASSESSMENT — ENCOUNTER SYMPTOMS
RHINORRHEA: 0
VOMITING: 0
COLOR CHANGE: 0
ABDOMINAL PAIN: 0
BACK PAIN: 0
EYE DISCHARGE: 0
COUGH: 0
SHORTNESS OF BREATH: 1
NAUSEA: 0

## 2020-09-16 LAB
EKG P AXIS: 41 DEGREES
EKG P-R INTERVAL: 132 MS
EKG Q-T INTERVAL: 410 MS
EKG QRS DURATION: 120 MS
EKG QTC CALCULATION (BAZETT): 443 MS
EKG T AXIS: 58 DEGREES

## 2020-09-16 NOTE — ED NOTES
Pt provided with urine cup. Waiting for specimen.    Rt contacted to place Ana Bates RN  09/15/20 [id number]

## 2020-09-16 NOTE — ED PROVIDER NOTES
[facility] EMERGENCY DEPT  eMERGENCY dEPARTMENT eNCOUnter      Pt Name: Jose Enrique Manzanares  MRN: [id number]  [place] 1975  Date of evaluation: 9/15/2020  Provider: Joanne Chang MD    CHIEF COMPLAINT       Chief Complaint   Patient presents with    Shortness of Breath     chronic issue for 1 year, worse within the week         HISTORY OF PRESENT ILLNESS   (Location/Symptom, Timing/Onset,Context/Setting, Quality, Duration, Modifying Factors, Severity)  Note limiting factors. Jose Enrique Manzanares is a 54 y.o. male who presents to the emergency department with shortness of breath intermittently x1 year. Patient states \"I cannot breathe. \"  Patient quit smoking 3 months ago due to difficulty breathing. Patient states that he has intermittent episodes of shortness of breath and chest pain. Episodes have become more frequent over the last 2 months. Patient states that for the last 2 days he has had shortness of breath and \"squeezing in my chest.\"  Patient had a stress echo test performed on 1/17/2019 that was negative for ischemia. Patient also states he has palpitations during these episodes. Patient denies cough, congestion, fever, vomiting, diarrhea, black or bloody stools, or sweatiness. Patient states that he thinks the episodes may be secondary to his anxiety. Patient states that he is currently breathing comfortably while laying on the bed. Wife states that patient has had swelling in his lower extremities. Patient denies pain of the lower extremities. HPI    NursingNotes were reviewed. REVIEW OF SYSTEMS    (2-9 systems for level 4, 10 or more for level 5)     Review of Systems   Constitutional: Negative for chills, diaphoresis and fever. HENT: Negative for congestion and rhinorrhea. Eyes: Negative for discharge. Respiratory: Positive for shortness of breath. Negative for cough. Cardiovascular: Positive for chest pain, palpitations and leg swelling.    Gastrointestinal: Negative for abdominal pain, nausea and vomiting. Genitourinary: Negative for difficulty urinating and dysuria. Musculoskeletal: Negative for back pain and neck pain. Skin: Negative for color change and pallor. Neurological: Negative for syncope and light-headedness. Psychiatric/Behavioral: Negative for agitation and confusion. All other systems reviewed and are negative. PAST MEDICALHISTORY     Past Medical History:   Diagnosis Date    Anxiety     Arthritis     CAD in native artery 3/6/2018    Chronic back pain     Depression     Hyperlipidemia     Hypertension     IBS (irritable bowel syndrome)     Panic disorder     at times afraid to go outside    Unspecified sleep apnea     bipap         SURGICAL HISTORY       Past Surgical History:   Procedure Laterality Date    ANUS SURGERY      APPENDECTOMY  2/3/14    CHOLECYSTECTOMY  2009    COLONOSCOPY  2012        ELECTROCONVULSIVE THERAPY N/A 3/8/2017    ELECTROCONVULSIVE THERAPY performed by Reggie London DO at [address]  [address]    umbilical     INGUINAL HERNIA REPAIR Right 1990    right ing with mesh    VASECTOMY           CURRENT MEDICATIONS     Discharge Medication List as of 9/15/2020  9:12 PM      CONTINUE these medications which have NOT CHANGED    Details   metoprolol tartrate (LOPRESSOR) 25 MG tablet TAKE 1 & 1/2 TABLETS BY MOUTH 2 TIMES A DAY., Disp-135 tablet,R-0Normal      albuterol sulfate  (90 Base) MCG/ACT inhaler INHALE 2 PUFFS BY MOUTH INTO LUNGS EVERY 6 HOURS AS NEEDED FOR WHEEZING., Disp-18 g, R-2Normal      colestipol (COLESTID) 1 g tablet TAKE 1 TABLET BY MOUTH TWICE DAILY. , Disp-60 tablet, R-5Normal      atorvastatin (LIPITOR) 80 MG tablet TAKE 1 TABLET BY MOUTH AT NIGHT, Disp-30 tablet, R-11$Normal      ARIPiprazole (ABILIFY) 5 MG tablet Historical Med      lamoTRIgine (LAMICTAL) 100 MG tablet Historical Med      TRINTELLIX 10 MG TABS tablet DAWHistorical Med Emotionally abused: None     Physically abused: None     Forced sexual activity: None   Other Topics Concern    None   Social History Narrative    None       SCREENINGS    [place] Coma Scale  Eye Opening: Spontaneous  Best Verbal Response: Oriented  Best Motor Response: Obeys commands  [place] Coma Scale Score: 15        PHYSICAL EXAM    (up to 7 for level 4, 8 or more for level 5)     ED Triage Vitals [09/15/20 1022]   BP Temp Temp Source Pulse Resp SpO2 Height Weight   (!) 158/97 98.9 °F (37.2 °C) Temporal 78 20 95 % -- (!) 360 lb (163.3 kg)       Physical Exam  Vitals signs and nursing note reviewed. Constitutional:       General: He is not in acute distress. Appearance: He is obese. HENT:      Head: Normocephalic and atraumatic. Right Ear: External ear normal.      Left Ear: External ear normal.      Nose: Nose normal.      Mouth/Throat:      Mouth: Mucous membranes are moist.      Pharynx: Oropharynx is clear. No oropharyngeal exudate. Eyes:      General: No scleral icterus. Conjunctiva/sclera: Conjunctivae normal.      Pupils: Pupils are equal, round, and reactive to light. Neck:      Musculoskeletal: Neck supple. No neck rigidity. Cardiovascular:      Rate and Rhythm: Normal rate and regular rhythm. Pulses: Normal pulses. Heart sounds: Normal heart sounds. No murmur. Pulmonary:      Effort: Pulmonary effort is normal.      Breath sounds: Normal breath sounds. No wheezing or rales. Abdominal:      General: Bowel sounds are normal.      Palpations: Abdomen is soft. Tenderness: There is no abdominal tenderness. There is no guarding. Musculoskeletal:         General: No tenderness or deformity. Right lower leg: Edema present. Left lower leg: Edema present. Skin:     General: Skin is warm and dry. Capillary Refill: Capillary refill takes less than 2 seconds. Coloration: Skin is not jaundiced. Neurological:      General: No focal deficit present. Mental Status: He is alert and oriented to person, place, and time. Mental status is at baseline. Coordination: Coordination normal.   Psychiatric:         Mood and Affect: Mood is anxious. Behavior: Behavior normal.         DIAGNOSTIC RESULTS     EKG: All EKG's areinterpreted by the Emergency Department Physician who either signs or Co-signs this chart in the absence of a cardiologist.    EKG dated 9/15/2020 at 1550 7 PM: Normal sinus rhythm, rate 79. Delta wave present in 2, 3, and aVF. No ST elevation or depression. RADIOLOGY:  Non-plain film images such as CT, Ultrasound and MRI are read by the radiologist. Plain radiographic images are visualized and preliminarily interpreted bythe emergency physician with the below findings:      CTA Chest W WO Contrast   Final Result   1. No evidence of pulmonary embolus or other acute cardiopulmonary   process. 2. Right lower lobe and left lingular atelectasis. No evidence of   consolidative pneumonia or effusion. 3. Coronary calcifications are present. No evidence of cardiomegaly or   pericardial effusion. Signed by Dr Andriy Miranda on 9/15/2020 8:15 PM      XR CHEST PORTABLE   Final Result   Chronic interstitial opacities in the left lung   base/lingula may reflect chronic inflammation, interstitial lung   disease, or scarring.    Signed by Dr Vu Arriaga on 9/15/2020 4:02 PM              LABS:  Labs Reviewed   CBC WITH AUTO DIFFERENTIAL - Abnormal; Notable for the following components:       Result Value    WBC 11.7 (*)     MPV 8.7 (*)     Neutrophils % 66.2 (*)     Neutrophils Absolute 7.7 (*)     All other components within normal limits   COMPREHENSIVE METABOLIC PANEL W/ REFLEX TO MG FOR LOW K - Abnormal; Notable for the following components:    ALT 52 (*)      (*)     All other components within normal limits   TROPONIN   LIPASE   BRAIN NATRIURETIC PEPTIDE   D-DIMER, QUANTITATIVE   URINE RT REFLEX TO CULTURE       All other labs were within normal range or not returned as of this dictation. EMERGENCY DEPARTMENT COURSE and DIFFERENTIAL DIAGNOSIS/MDM:   Vitals:    Vitals:    09/15/20 1230 09/15/20 1627 09/15/20 1706 09/15/20 1858   BP: 130/79 135/87 124/73 132/84   Pulse: 85 97 74 78   Resp: 20 22   Temp:       TempSrc:       SpO2: 97%      Weight:           MDM  50-year-old male presents to the emergency department with episodic dyspnea x1 year. Lab, EKG, and radiology results reviewed. Patient was offered hospital observation admission. Patient will be discharged home to follow-up with Dr. Kanika Bailey, cardiologist and SANDY Mello, primary care provider. Patient will have a low tolerance for returning with chest pain, increased difficulty breathing, or other concerns. ZIO heart monitor has been placed. Jaleel Bourne RN and I have made it clear to patient and wife that he is welcome to return at any time. CONSULTS:  None    PROCEDURES:  Unless otherwise noted below, none     Procedures    FINAL IMPRESSION      1. Dyspnea, unspecified type    2.  Chest pain, unspecified type          DISPOSITION/PLAN   DISPOSITION Decision To Discharge 09/15/2020 08:38:24 PM      PATIENT REFERRED TO:  Leno Mello [address] [facility] [phone]    Schedule an appointment as soon as possible for a visit       Sharyle Cogan, MD  [address] [facility] [address] [facility] [place]  [phone]    Schedule an appointment as soon as possible for a visit         DISCHARGE MEDICATIONS:  Discharge Medication List as of 9/15/2020  9:12 PM             (Please note that portions of this note were completed with a voice recognition program.  Efforts were made to edit thedictations but occasionally words are mis-transcribed.)    Clare Peralta MD (electronically signed)  Attending Emergency Physician          Clare Peralta MD  09/15/20 [id number]

## 2020-09-17 ENCOUNTER — OFFICE VISIT (OUTPATIENT)
Dept: PRIMARY CARE CLINIC | Age: 45
End: 2020-09-17
Payer: MEDICARE

## 2020-09-17 VITALS
TEMPERATURE: 98.2 F | HEART RATE: 95 BPM | BODY MASS INDEX: 40.43 KG/M2 | DIASTOLIC BLOOD PRESSURE: 82 MMHG | WEIGHT: 315 LBS | SYSTOLIC BLOOD PRESSURE: 128 MMHG | HEIGHT: 74 IN | OXYGEN SATURATION: 97 %

## 2020-09-17 PROCEDURE — G8417 CALC BMI ABV UP PARAM F/U: HCPCS | Performed by: NURSE PRACTITIONER

## 2020-09-17 PROCEDURE — G8427 DOCREV CUR MEDS BY ELIG CLIN: HCPCS | Performed by: NURSE PRACTITIONER

## 2020-09-17 PROCEDURE — 4004F PT TOBACCO SCREEN RCVD TLK: CPT | Performed by: NURSE PRACTITIONER

## 2020-09-17 PROCEDURE — 99214 OFFICE O/P EST MOD 30 MIN: CPT | Performed by: NURSE PRACTITIONER

## 2020-09-17 NOTE — PROGRESS NOTES
[facility] PR[facility]  [address] [address] [place] [address]  Dept: [phone]  Dept Fax: [phone]  Loc: [phone]    Valentine Ratliff is a 54 y.o. male who presents today for his medical conditions/complaints as noted below. Valentine Ratliff is c/o of COPD and Hypertension      Chief Complaint   Patient presents with    COPD    Hypertension       HPI:     HPI  Patient is here with complaints of chest pain/tightness and SOB with any exertion. Patient did go to ER 2 days ago and acute MI was ruled out. He was told to follow-up with PCP and cardiology. Patient has a history of coronary artery disease and has a stent in the past.  Patient does have up with cardiology in 5 days. He reports Tums have been stable at this time. Past Medical History:   Diagnosis Date    Anxiety     Arthritis     CAD in native artery 3/6/2018    Chronic back pain     Depression     Hyperlipidemia     Hypertension     IBS (irritable bowel syndrome)     Panic disorder     at times afraid to go outside    Unspecified sleep apnea     bipap        Past Surgical History:   Procedure Laterality Date    ANUS SURGERY      APPENDECTOMY  2/3/14    CHOLECYSTECTOMY  2009    COLONOSCOPY  2012        ELECTROCONVULSIVE THERAPY N/A 3/8/2017    ELECTROCONVULSIVE THERAPY performed by Mayra Stoner DO at [address]  [address]    umbilical     INGUINAL HERNIA REPAIR Right 1990    right ing with mesh    VASECTOMY         Social History     Tobacco Use    Smoking status: Current Every Day Smoker     Packs/day: 0.50     Years: 24.00     Pack years: 12.00     Types: Cigarettes    Smokeless tobacco: Never Used   Substance Use Topics    Alcohol use: No     Alcohol/week: 0.0 standard drinks        Current Outpatient Medications   Medication Sig Dispense Refill    Misc.  Devices (ROLLING WALKER/BURGUNDY) MISC Dx: gen weakness and fatigue use daily as directed 1 each 0    metoprolol tartrate (LOPRESSOR) 25 MG tablet TAKE 1 & 1/2 TABLETS BY MOUTH 2 TIMES A DAY. 135 tablet 0    albuterol sulfate  (90 Base) MCG/ACT inhaler INHALE 2 PUFFS BY MOUTH INTO LUNGS EVERY 6 HOURS AS NEEDED FOR WHEEZING. 18 g 2    colestipol (COLESTID) 1 g tablet TAKE 1 TABLET BY MOUTH TWICE DAILY. 60 tablet 5    atorvastatin (LIPITOR) 80 MG tablet TAKE 1 TABLET BY MOUTH AT NIGHT 30 tablet 11    ARIPiprazole (ABILIFY) 5 MG tablet       lamoTRIgine (LAMICTAL) 100 MG tablet       TRINTELLIX 10 MG TABS tablet       traZODone (DESYREL) 150 MG tablet Take 150 mg by mouth nightly      HYDROcodone-acetaminophen (NORCO)  MG per tablet Take 1 tablet by mouth every 8 hours as needed for Pain. No current facility-administered medications for this visit. Allergies   Allergen Reactions    Dye [Gadolinium Derivatives] Hives     Mild rash that last less than 2 hours after MRI or CT scans       Family History   Problem Relation Age of Onset    Diabetes Mother     Cancer Mother         uterine CA    Depression Mother     Mental Illness Mother     Hypertension Mother    Santi Doyle Other Mother         blood clots    Heart Disease Brother     Depression Brother     Mental Illness Brother     Hypertension Brother     Breast Cancer Maternal Grandmother     Cancer Maternal Grandmother         breast CA    Heart Attack Maternal Grandmother     Other Maternal Grandmother         blood clots               Subjective:      Review of Systems   Constitutional: Negative. HENT: Negative. Eyes: Negative. Respiratory: Positive for chest tightness and shortness of breath. Cardiovascular: Positive for chest pain. Gastrointestinal: Negative. Endocrine: Negative. Genitourinary: Negative. Musculoskeletal: Negative. Skin: Negative. Neurological: Negative. Hematological: Negative. Psychiatric/Behavioral: The patient is nervous/anxious. Objective:     Physical Exam  Vitals signs and nursing note reviewed. Constitutional:       Appearance: Normal appearance. HENT:      Head: Normocephalic and atraumatic. Right Ear: Hearing, tympanic membrane, ear canal and external ear normal.      Left Ear: Hearing, tympanic membrane, ear canal and external ear normal.      Nose: Nose normal.      Mouth/Throat:      Lips: Pink. Mouth: Mucous membranes are moist.      Pharynx: Oropharynx is clear. Eyes:      General: Lids are normal.      Extraocular Movements: Extraocular movements intact. Conjunctiva/sclera: Conjunctivae normal.      Pupils: Pupils are equal, round, and reactive to light. Neck:      Musculoskeletal: Full passive range of motion without pain, normal range of motion and neck supple. Thyroid: No thyromegaly. Cardiovascular:      Rate and Rhythm: Normal rate and regular rhythm. Pulses: Normal pulses. Dorsalis pedis pulses are 2+ on the right side and 2+ on the left side. Posterior tibial pulses are 2+ on the right side and 2+ on the left side. Heart sounds: Normal heart sounds. Pulmonary:      Effort: Pulmonary effort is normal.      Breath sounds: Normal breath sounds and air entry. Abdominal:      General: Bowel sounds are normal.      Palpations: Abdomen is soft. Musculoskeletal:      Thoracic back: He exhibits normal range of motion and no tenderness. Lumbar back: He exhibits decreased range of motion. He exhibits no tenderness. Left lower leg: He exhibits swelling. Edema present. Lymphadenopathy:      Cervical: No cervical adenopathy. Skin:     General: Skin is warm and dry. Capillary Refill: Capillary refill takes 2 to 3 seconds. Neurological:      General: No focal deficit present. Mental Status: He is alert and oriented to person, place, and time. Mental status is at baseline. Coordination: Coordination is intact.    Psychiatric:         Mood and or phrases to be inadvertently transcribed.  Although I have reviewed the note for such errors, some may still exist.    Electronically signed by SANDY Aguirre on 9/18/2020 at 8:31 AM

## 2020-09-18 ENCOUNTER — CARE COORDINATION (OUTPATIENT)
Dept: CARE COORDINATION | Age: 45
End: 2020-09-18

## 2020-09-18 ASSESSMENT — ENCOUNTER SYMPTOMS
EYES NEGATIVE: 1
SHORTNESS OF BREATH: 1
GASTROINTESTINAL NEGATIVE: 1
CHEST TIGHTNESS: 1

## 2020-09-18 NOTE — CARE COORDINATION
sharing food. If you must care for your pet or be around animals while you are sick, wash your hands before and after you interact with pets and wear a facemask. Call ahead before visiting your doctor  If you have a medical appointment, call the healthcare provider and tell them that you have or may have COVID-19. This will help the healthcare provider's office take steps to keep other people from getting infected or exposed. Wear a facemask  You should wear a facemask when you are around other people (e.g., sharing a room or vehicle) or pets and before you enter a healthcare provider's office. If you are not able to wear a facemask (for example, because it causes trouble breathing), then people who live with you should not stay in the same room with you, or they should wear a facemask if they enter your room. Cover your coughs and sneezes  Cover your mouth and nose with a tissue when you cough or sneeze. Throw used tissues in a lined trash can. Immediately wash your hands with soap and water for at least 20 seconds or, if soap and water are not available, clean your hands with an alcohol-based hand  that contains at least 60% alcohol. Clean your hands often  Wash your hands often with soap and water for at least 20 seconds, especially after blowing your nose, coughing, or sneezing; going to the bathroom; and before eating or preparing food. If soap and water are not readily available, use an alcohol-based hand  with at least 60% alcohol, covering all surfaces of your hands and rubbing them together until they feel dry. Soap and water are the best option if hands are visibly dirty. Avoid touching your eyes, nose, and mouth with unwashed hands. Avoid sharing personal household items  You should not share dishes, drinking glasses, cups, eating utensils, towels, or bedding with other people or pets in your home.  After using these items, they should be washed thoroughly with soap and water. Clean all high-touch surfaces everyday  High touch surfaces include counters, tabletops, doorknobs, bathroom fixtures, toilets, phones, keyboards, tablets, and bedside tables. Also, clean any surfaces that may have blood, stool, or body fluids on them. Use a household cleaning spray or wipe, according to the label instructions. Labels contain instructions for safe and effective use of the cleaning product including precautions you should take when applying the product, such as wearing gloves and making sure you have good ventilation during use of the product. Monitor your symptoms  Seek prompt medical attention if your illness is worsening (e.g., difficulty breathing). Before seeking care, call your healthcare provider and tell them that you have, or are being evaluated for, COVID-19. Put on a facemask before you enter the facility. These steps will help the healthcare provider's office to keep other people in the office or waiting room from getting infected or exposed. Ask your healthcare provider to call the local or [facility] health department. Persons who are placed under If you have a medical emergency and need to call 911, notify the dispatch personnel that you have, or are being evaluated for COVID-19. If possible, put on a facemask before emergency medical services arrive. The patient agrees to contact the [facility] exposure line [phone], local [facility] department [facility]: ([phone]) and PCP office for questions related to their healthcare. Author provided contact information for future reference. Patient/family/caregiver given information for [employer] and agrees to enroll no    Patient has MyChart. Sending message containing information about COVID-19 Precautions, [facility]) [facility] Line, and [facility].     Submitted by Loren/SETHW

## 2020-09-25 ENCOUNTER — OFFICE VISIT (OUTPATIENT)
Dept: CARDIOLOGY | Age: 45
End: 2020-09-25
Payer: MEDICARE

## 2020-09-25 VITALS
BODY MASS INDEX: 40.43 KG/M2 | WEIGHT: 315 LBS | HEART RATE: 80 BPM | SYSTOLIC BLOOD PRESSURE: 138 MMHG | OXYGEN SATURATION: 97 % | HEIGHT: 74 IN | DIASTOLIC BLOOD PRESSURE: 88 MMHG

## 2020-09-25 PROCEDURE — 1036F TOBACCO NON-USER: CPT | Performed by: NURSE PRACTITIONER

## 2020-09-25 PROCEDURE — 93000 ELECTROCARDIOGRAM COMPLETE: CPT | Performed by: NURSE PRACTITIONER

## 2020-09-25 PROCEDURE — G8417 CALC BMI ABV UP PARAM F/U: HCPCS | Performed by: NURSE PRACTITIONER

## 2020-09-25 PROCEDURE — 99214 OFFICE O/P EST MOD 30 MIN: CPT | Performed by: NURSE PRACTITIONER

## 2020-09-25 PROCEDURE — G8427 DOCREV CUR MEDS BY ELIG CLIN: HCPCS | Performed by: NURSE PRACTITIONER

## 2020-09-25 ASSESSMENT — ENCOUNTER SYMPTOMS
EYES NEGATIVE: 1
GASTROINTESTINAL NEGATIVE: 1
SHORTNESS OF BREATH: 1
WHEEZING: 0
SORE THROAT: 0
CHEST TIGHTNESS: 1
COUGH: 0

## 2020-09-25 NOTE — PROGRESS NOTES
[id number] [facility] Cardiology  [address] [facility]. [address] [facility]  [phone]      Chief Complaint / Reason for Being Seen: Referral to cardiology for chest pain and shortness of breath. 1. Hypertension, unspecified type    2. Coronary artery disease involving native coronary artery of native heart without angina pectoris    3. Dyspnea, unspecified type      Patient with a history of hypertension, hyperlipidemia, sleep apnea, and coronary artery disease. Patient had an ER visit on 9/15/2020 with complaints of shortness of breath chronic for 1 year worse within the last week upon arrival to the ER. Patient also reported to the ER that he has been having some chest discomfort. Work-up included EKG with no ST elevation or depression. CTA of the chest no evidence of PE or other acute cardiopulmonary process. Right lower lobe and left lung lingular atelectasis. No evidence of consolidative pneumonia or infusion. Coronary calcifications are present. No evidence of cardiomegaly or pericardial effusion. Troponins negative. D-dimer negative. BNP within normal limits. ZIO patch was placed and patient was discharged from the ER with instructions to follow-up with primary care provider and cardiology. Patient quit smoking about 3 months ago. DATA:    1/17/2019 stress echocardiogram -   Summary   Dobutamine stress echocardiogram without clinical, electrocardiographic,   or echocardiographic evidence of myocardial ischemia. Signature      ----------------------------------------------------------------   Electronically signed by Nadia Giron MD(Interpreting physician)   on 01/17/2019 12:31 PM    Catheterization March 2018 bare-metal stent and was on aspirin and Plavix for 30 days. Subjective: Patient presents to clinic today with complaints of shortness of breath with minimal exertion. As well as chest pain described as a squeezing sensation. This has been occurring several times a day. Dye [gadolinium derivatives]  Past Medical History:   Diagnosis Date    Anxiety     Arthritis     CAD in native artery 3/6/2018    Chronic back pain     Depression     Hyperlipidemia     Hypertension     IBS (irritable bowel syndrome)     Panic disorder     at times afraid to go outside    Unspecified sleep apnea     bipap     Past Surgical History:   Procedure Laterality Date    ANUS SURGERY      APPENDECTOMY  2/3/14    CHOLECYSTECTOMY  2009    COLONOSCOPY          ELECTROCONVULSIVE THERAPY N/A 3/8/2017    ELECTROCONVULSIVE THERAPY performed by Nitish Queen DO at [address]  [address]    umbilical     INGUINAL HERNIA REPAIR Right 1990    right ing with mesh    VASECTOMY       Family History   Problem Relation Age of Onset    Diabetes Mother     Cancer Mother         uterine CA    Depression Mother     Mental Illness Mother     Hypertension Mother    [facility] Other Mother         blood clots    Heart Disease Brother     Depression Brother     Mental Illness Brother     Hypertension Brother     Breast Cancer Maternal Grandmother     Cancer Maternal Grandmother         breast CA    Heart Attack Maternal Grandmother     Other Maternal Grandmother         blood clots     Social History     Tobacco Use    Smoking status: Former Smoker     Packs/day: 0.50     Years: 24.00     Pack years: 12.00     Types: Cigarettes     Last attempt to quit: 7/15/2020     Years since quittin.1    Smokeless tobacco: Never Used   Substance Use Topics    Alcohol use: No     Alcohol/week: 0.0 standard drinks          Review of System:    Review of Systems   Constitutional: Negative for activity change, chills, diaphoresis, fatigue and fever. HENT: Negative for congestion and sore throat. Eyes: Negative. Respiratory: Positive for chest tightness and shortness of breath. Negative for cough and wheezing. Cardiovascular: Positive for chest pain.  Negative for palpitations and leg swelling. Gastrointestinal: Negative. Genitourinary: Negative. Musculoskeletal: Negative. Neurological: Negative for dizziness, syncope, light-headedness and headaches. Psychiatric/Behavioral: Negative for confusion. The patient is not nervous/anxious. Objective:    /88   Pulse 80   Ht 6' 2\" (1.88 m)   Wt (!) 366 lb (166 kg)   SpO2 97%   BMI 46.99 kg/m²     GENERAL - well developed and well nourished    HEENT -  PERRLA, Hearing appears normal, conjunctive and lids are normal.  External inspection of ears and nose appear normal.  NECK - no thyromegaly, no JVD, trachea is in the midline  CARDIOVASCULAR - PMI is in the mid line clavicular position, Normal S1 and S2 with no systolic murmur. No S3 or S4    PULMONARY - no respiratory distress. No wheezes or rales. Lungs are clear to ausculation, normal respiratory effort. ABDOMEN  - soft, non tender, no rebound  MUSCULOSKELETAL  - range of motion of the upper and lower extermites appears normal and equal and is without pain   EXTREMITIES - no significant edema   NEUROLOGIC - gait and station are normal  SKIN - turgor is normal, skin warm and dry. PSYCHIATRIC - normal mood and affect, alert and orientated x 3,      ASSESSMENT:    ALL THE CARDIOLOGY PROBLEMS ARE LISTED ABOVE; HOWEVER, THE FOLLOWING SPECIFIC CARDIAC PROBLEMS / CONDITIONS WERE ADDRESSED AND TREATED DURING THE OFFICE VISIT TODAY:       Cardiac Specific Problem  Discussion and Plan         1. Shortness of breath with exertion  chief complaint   O2 sat in the office 97%. Will order 2D echo to evaluate for current EF as well as any valvular heart disease. 2.   Chest pain  chief complaint   EKG in the office showing sinus rhythm with an incomplete right bundle liane block heart rate 80 bpm.  This is unchanged from previous EKG.   Due to patient's history of coronary artery disease and risk factors will order dobutamine stress echocardiogram. 3.   Hypertension  initial encounter   Blood pressure in the office today 138/88. Patient is a very very anxious individual.  Will recheck on return to clinic may need to adjust antihypertensive medication. 4.  Hyperlipidemia -managed by primary care provider. Patient is on Lipitor 80 mg daily. PLAN:    Orders Placed This Encounter   Procedures    EKG 12 lead     Order Specific Question:   Reason for Exam?     Answer:   Hypertension    Echo 2D w doppler w color complete     Standing Status:   Future     Standing Expiration Date:   9/25/2021     Order Specific Question:   Reason for exam:     Answer:   dyspnea    Echo pharmacological stress test     Standing Status:   Future     Standing Expiration Date:   9/25/2021     Order Specific Question:   Reason for exam:     Answer:   dypnea and chest pain     No orders of the defined types were placed in this encounter. Return in about 2 weeks (around 10/9/2020) for results with me . Discussed with patient and spouse. I greatly appreciate the opportunity to meet Bonifacio Sine and your confidence in allowing me to participate in his cardiovascular care. SANDY Haley NP 9/25/2020 9:50 AM CDT                    This dictation was generated by voice recognition computer software. Although all attempts are made to edit dictation for accuracy, there may be errors in the transcription that are not intended.

## 2020-09-29 ENCOUNTER — TELEPHONE (OUTPATIENT)
Dept: CARDIOLOGY | Age: 45
End: 2020-09-29

## 2020-09-29 NOTE — TELEPHONE ENCOUNTER
Patients wife called and she said patient is scared to death to do DSE. She said that last time he had a DSE he ran out of there. She wanted to know if there was anything else that could be done. Asked if he could walk on a treadmill and she said no. She asked if he could just have heart cath. Advised that the reason the stress test is ordered is to see if there is a possible blockage and then if indicated heart cath would be done. She said he just doesn't want to do it. Advised I would reach out to you.

## 2020-09-29 NOTE — TELEPHONE ENCOUNTER
Spoke with patients wife and she will see if patient would rather do lexiscan. She will return call.

## 2020-09-30 ENCOUNTER — HOSPITAL ENCOUNTER (OUTPATIENT)
Dept: NON INVASIVE DIAGNOSTICS | Age: 45
Discharge: HOME | End: 2020-09-30
Payer: MEDICARE

## 2020-09-30 VITALS
BODY MASS INDEX: 46.22 KG/M2 | WEIGHT: 315 LBS | SYSTOLIC BLOOD PRESSURE: 148 MMHG | DIASTOLIC BLOOD PRESSURE: 63 MMHG | HEART RATE: 84 BPM

## 2020-09-30 LAB
LV EF: 65 %
LVEF MODALITY: NORMAL

## 2020-09-30 PROCEDURE — 6360000004 HC RX CONTRAST MEDICATION: Performed by: INTERNAL MEDICINE

## 2020-09-30 PROCEDURE — C8928 TTE W OR W/O FOL W/CON,STRES: HCPCS

## 2020-09-30 PROCEDURE — 93017 CV STRESS TEST TRACING ONLY: CPT

## 2020-09-30 PROCEDURE — 6360000002 HC RX W HCPCS: Performed by: INTERNAL MEDICINE

## 2020-09-30 PROCEDURE — 93306 TTE W/DOPPLER COMPLETE: CPT

## 2020-09-30 PROCEDURE — 2580000003 HC RX 258: Performed by: INTERNAL MEDICINE

## 2020-09-30 RX ORDER — SODIUM CHLORIDE 9 MG/ML
INJECTION, SOLUTION INTRAVENOUS
Status: COMPLETED | OUTPATIENT
Start: 2020-09-30 | End: 2020-09-30

## 2020-09-30 RX ORDER — DOBUTAMINE HYDROCHLORIDE 200 MG/100ML
10 INJECTION INTRAVENOUS CONTINUOUS PRN
Status: DISCONTINUED | OUTPATIENT
Start: 2020-09-30 | End: 2020-10-01 | Stop reason: HOSPADM

## 2020-09-30 RX ADMIN — DOBUTAMINE HYDROCHLORIDE 10 MCG/KG/MIN: 200 INJECTION INTRAVENOUS at 11:18

## 2020-09-30 RX ADMIN — PERFLUTREN 1.65 MG: 6.52 INJECTION, SUSPENSION INTRAVENOUS at 10:50

## 2020-09-30 RX ADMIN — SODIUM CHLORIDE: 9 INJECTION, SOLUTION INTRAVENOUS at 11:18

## 2020-10-05 ENCOUNTER — CARE COORDINATION (OUTPATIENT)
Dept: CARE COORDINATION | Age: 45
End: 2020-10-05

## 2020-10-05 NOTE — CARE COORDINATION
COVID-19 ED/Flu Clinic Follow-up Note    Patient contacted regarding COVID-19 risk and screening. This author contacted the patient by telephone to perform follow-up call. Verified name and  with patient as identifiers. Symptoms reviewed with patient. Patient reports symptoms are improving. Due to no new or worsening symptoms the RN CTN/ACM was not notified for escalation. This author reviewed discharge instructions, medical action plan and red flags such as increased shortness of breath, increasing fever, worsening cough or chest pain with patient who verbalized understanding. Discussed exposure protocols and quarantine with CDC Guidelines What To Do If You Are Sick     Patient who was given an opportunity for questions and concerns. The patient agrees to contact the [facility] exposure line [phone], [facility] department [address] [place] [address] of [facility]: ([phone] PCP office for questions related to their healthcare. Author provided contact information for future reference.     Submitted by Loren/JOAQUIM

## 2020-10-09 ENCOUNTER — OFFICE VISIT (OUTPATIENT)
Dept: CARDIOLOGY | Age: 45
End: 2020-10-09
Payer: MEDICARE

## 2020-10-09 VITALS
WEIGHT: 315 LBS | SYSTOLIC BLOOD PRESSURE: 142 MMHG | OXYGEN SATURATION: 97 % | HEIGHT: 74 IN | DIASTOLIC BLOOD PRESSURE: 86 MMHG | BODY MASS INDEX: 40.43 KG/M2 | HEART RATE: 102 BPM

## 2020-10-09 PROCEDURE — 99214 OFFICE O/P EST MOD 30 MIN: CPT | Performed by: NURSE PRACTITIONER

## 2020-10-09 PROCEDURE — G8484 FLU IMMUNIZE NO ADMIN: HCPCS | Performed by: NURSE PRACTITIONER

## 2020-10-09 PROCEDURE — G8417 CALC BMI ABV UP PARAM F/U: HCPCS | Performed by: NURSE PRACTITIONER

## 2020-10-09 PROCEDURE — 1036F TOBACCO NON-USER: CPT | Performed by: NURSE PRACTITIONER

## 2020-10-09 PROCEDURE — G8427 DOCREV CUR MEDS BY ELIG CLIN: HCPCS | Performed by: NURSE PRACTITIONER

## 2020-10-09 ASSESSMENT — ENCOUNTER SYMPTOMS
COUGH: 0
CHEST TIGHTNESS: 0
WHEEZING: 0
SORE THROAT: 0
GASTROINTESTINAL NEGATIVE: 1
SHORTNESS OF BREATH: 1
EYES NEGATIVE: 1

## 2020-10-09 NOTE — PROGRESS NOTES
[id number] [facility] Cardiology   Established Patient Office Visit  [address] [facility]  [phone]        OFFICE VISIT:  10/9/2020    Chin José Miguel - : 1975    Reason For Visit:  Zita Clarke is a 54 y.o. male who is here for Follow-up    1. Coronary artery disease involving native coronary artery of native heart without angina pectoris    2. Essential hypertension    3. Hyperlipidemia, unspecified hyperlipidemia type        Patient with a history of hypertension, hyperlipidemia, sleep apnea, and coronary artery disease. Patient had an ER visit on 9/15/2020 with complaints of shortness of breath chronic for 1 year worse within the last week upon arrival to the ER. Patient also reported to the ER that he has been having some chest discomfort. Work-up included EKG with no ST elevation or depression. CTA of the chest no evidence of PE or other acute cardiopulmonary process. Right lower lobe and left lung lingular atelectasis. No evidence of consolidative pneumonia or infusion. Coronary calcifications are present. No evidence of cardiomegaly or pericardial effusion. Troponins negative. D-dimer negative. BNP within normal limits. Presented to our clinic on 2020 as a new patient. Patient with complaints of shortness of breath with minimal exertion and some chest discomfort. 2D echo and dobutamine stress echocardiogram were ordered. 2D echo showed:     Conclusions      Summary   Normal left ventricular chamber size and systolic function. No regional wall motion abnormalities. EF 65%.    Signature      ----------------------------------------------------------------   Electronically signed by Matias Bonilla DO(Interpreting   physician) on 2020 01:45 PM   ----------------------------------------------------------------    Dobutamine stress echocardiogram showed:   Conclusions      Summary   Dobutamine stress echocardiogram without clinical, electrocardiographic,   or echocardiographic evidence of myocardial ischemia. Signature      ----------------------------------------------------------------   Electronically signed by Radha Cervantes DO(Interpreting   physician) on 09/30/2020 03:00 PM   ----------------------------------------------------------------     ZIO PATCH:    Predominantly sinus rhythm with rare PACs. One SVT run 4 beats. Rare PVCs. No VT, no pauses, no high degree AV blocks, nor atrial fib noted. Patient presents to clinic today for follow-up of the aforementioned test.  Patient states he is still having some shortness of breath with exertion. Not as bad. He is 75 days nicotine free. He has had no further complaints of chest pain. There is no lightheadedness dizziness or syncope. PRIOR DATA:     1/17/2019 stress echocardiogram -   Summary   Dobutamine stress echocardiogram without clinical, electrocardiographic,   or echocardiographic evidence of myocardial ischemia.      Signature      ----------------------------------------------------------------   Electronically signed by Shay Layne MD(Interpreting physician)   on 01/17/2019 12:31 PM        Catheterization March 2018 bare-metal stent and was on aspirin and Plavix for 30 days.             Subjective    Jeffrey Hall is a 54 y.o. male with the [facility]:    Patient Active Problem List    Diagnosis Date Noted    CAD in native artery 03/06/2018     Priority: High    Chronic obstructive pulmonary disease ([facility] 08/14/2019    Tobacco abuse 08/14/2019    Class 3 severe obesity due to excess calories with serious comorbidity and body mass index (BMI) of 45.0 to 49.9 in adult ([address] 07/30/2019    Leg pain, bilateral     Acute chest pain     Chest pain 11/23/2017    Cigarette nicotine dependence without complication 79/02/6077    Bipolar depression ([facility] 12/16/2016    S/P laparoscopic appendectomy 02/27/2014    History of colon polyps 05/17/2013    Depression     Hypertension     Anxiety      Current Outpatient Medications   Medication Sig Dispense Refill    metoprolol tartrate (LOPRESSOR) 25 MG tablet Take 2 tablets by mouth 2 times daily 180 tablet 3    Misc. Devices (ROLLING WALKER/BURGUNDY) MISC Dx: gen weakness and fatigue use daily as directed 1 each 0    colestipol (COLESTID) 1 g tablet TAKE 1 TABLET BY MOUTH TWICE DAILY. 60 tablet 5    atorvastatin (LIPITOR) 80 MG tablet TAKE 1 TABLET BY MOUTH AT NIGHT 30 tablet 11    ARIPiprazole (ABILIFY) 5 MG tablet       lamoTRIgine (LAMICTAL) 100 MG tablet       TRINTELLIX 10 MG TABS tablet       traZODone (DESYREL) 150 MG tablet Take 150 mg by mouth nightly      HYDROcodone-acetaminophen (NORCO)  MG per tablet Take 1 tablet by mouth every 8 hours as needed for Pain. No current facility-administered medications for this visit.       Allergies: Dye [gadolinium derivatives]  Past Medical History:   Diagnosis Date    Anxiety     Arthritis     CAD in native artery 3/6/2018    Chronic back pain     Depression     Hyperlipidemia     Hypertension     IBS (irritable bowel syndrome)     Panic disorder     at times afraid to go outside    Unspecified sleep apnea     bipap     Past Surgical History:   Procedure Laterality Date    ANUS SURGERY      APPENDECTOMY  2/3/14    CHOLECYSTECTOMY  2009    COLONOSCOPY  2012        ELECTROCONVULSIVE THERAPY N/A 3/8/2017    ELECTROCONVULSIVE THERAPY performed by Akshat Martinez DO at [address]  [address]    umbilical     INGUINAL HERNIA REPAIR Right 1990    right ing with mesh    VASECTOMY       Family History   Problem Relation Age of Onset    Diabetes Mother     Cancer Mother         uterine CA    Depression Mother     Mental Illness Mother     Hypertension Mother     Other Mother         blood clots    Heart Disease Brother     Depression Brother     Mental without pain   EXTREMITIES - no significant edema   NEUROLOGIC - gait and station are normal  SKIN - turgor is normal, can warm and dry. PSYCHIATRIC - normal mood and affect, alert and orientated x 3,      ASSESSMENT:    ALL THE CARDIOLOGY PROBLEMS ARE LISTED ABOVE; HOWEVER, THE FOLLOWING SPECIFIC CARDIAC PROBLEMS / CONDITIONS WERE ADDRESSED AND TREATED DURING THE OFFICE VISIT TODAY:                                                                                            MEDICAL DECISION MAKING             Cardiac Specific Problem / Diagnosis  Discussion and Data Reviewed Diagnostic Procedures Ordered Management Options Selected           1. Shortness of breath with exertion  Improving   Review and summation of old records:    As stated above patient is 75 days nicotine free. Patient is overweight and is working diligently to reduce his caloric intake. PFTs followed by primary care provider. O2 in the office today 97%. No Continue current medications:     Yes           2. CAD  Stable   No chest pain. Will increase Lopressor to 50 mg twice daily. Blood pressure today 142/86 and pulse rate 102. Patient to keep blood pressure and heart rate log for 4 weeks and will have phone visit in 4 weeks with me. Patient verbalized understanding and agreed with plan No Continue current medications:    Yes           3. Hyperlipidemia Stable  managed by primary care provider. Patient is on Lipitor 80 mg daily. No Continue current medications: Yes                     No orders of the defined types were placed in this encounter. Orders Placed This Encounter   Medications    metoprolol tartrate (LOPRESSOR) 25 MG tablet     Sig: Take 2 tablets by mouth 2 times daily     Dispense:  180 tablet     Refill:  3       Discussed with patient. Return in about 4 weeks (around 11/6/2020) for Phone visit with me .     I greatly appreciate the opportunity to meet Davonte Plolock and your confidence in allowing me to participate in his cardiovascular care. Ezio Raza, SANDY Warren NP  10/9/2020 10:00 AM CDT                    This dictation was generated by voice recognition computer software. Although all attempts are made to edit dictation for accuracy, there may be errors in the transcription that are not intended.

## 2020-10-15 ENCOUNTER — TRANSCRIBE ORDERS (OUTPATIENT)
Dept: ADMINISTRATIVE | Facility: HOSPITAL | Age: 45
End: 2020-10-15

## 2020-10-15 DIAGNOSIS — Z01.818 PREOP TESTING: Primary | ICD-10-CM

## 2020-10-16 ENCOUNTER — VIRTUAL VISIT (OUTPATIENT)
Dept: PRIMARY CARE CLINIC | Age: 45
End: 2020-10-16
Payer: MEDICARE

## 2020-10-16 PROCEDURE — G8428 CUR MEDS NOT DOCUMENT: HCPCS | Performed by: NURSE PRACTITIONER

## 2020-10-16 PROCEDURE — 99214 OFFICE O/P EST MOD 30 MIN: CPT | Performed by: NURSE PRACTITIONER

## 2020-10-16 ASSESSMENT — ENCOUNTER SYMPTOMS
EYES NEGATIVE: 1
SHORTNESS OF BREATH: 1
GASTROINTESTINAL NEGATIVE: 1

## 2020-10-16 NOTE — PROGRESS NOTES
[address] [place] [address]     Phone:  [phone]  Fax:  [phone]      10/16/2020    TELEHEALTH EVALUATION -- Audio/Visual (During HQROG-05 public health emergency)    HPI:    Chief Complaint   Patient presents with    Shortness of Breath         Chin José Miguel (:  1975) has requested an audio/video evaluation for the following concern(s): This is a VV for follow up on SOB and CP. He had follow up with cardiology and stress test and echo were negative. He is still having increased SOB. He denies CP. He has a known hx of COPD. He is not on any inhalers at this time. Review of Systems   Constitutional: Negative. HENT: Negative. Eyes: Negative. Respiratory: Positive for shortness of breath. Cardiovascular: Negative. Gastrointestinal: Negative. Endocrine: Negative. Genitourinary: Negative. Musculoskeletal: Negative. Skin: Negative. Neurological: Negative. Hematological: Negative. Psychiatric/Behavioral: Negative. Prior to Visit Medications    Medication Sig Taking? Authorizing Provider   metoprolol tartrate (LOPRESSOR) 25 MG tablet Take 2 tablets by mouth 2 times daily  SANDY Zapata - NP   Misc. Devices (ROLLING WALKER/BURGUNDY) MISC Dx: gen weakness and fatigue use daily as directed  SANDY Marcos   colestipol (COLESTID) 1 g tablet TAKE 1 TABLET BY MOUTH TWICE DAILY. SANDY Marcos   atorvastatin (LIPITOR) 80 MG tablet TAKE 1 TABLET BY MOUTH AT NIGHT  SANDY Marcos   ARIPiprazole (ABILIFY) 5 MG tablet   Historical Provider, MD   lamoTRIgine (LAMICTAL) 100 MG tablet   Historical Provider, MD   TRINTELLIX 10 MG TABS tablet   Historical Provider, MD   traZODone (DESYREL) 150 MG tablet Take 150 mg by mouth nightly  Historical Provider, MD   HYDROcodone-acetaminophen (NORCO)  MG per tablet Take 1 tablet by mouth every 8 hours as needed for Pain.   Historical Provider, MD       Social Appears well-developed and well-nourished [x] No apparent distress      [] Abnormal   Mental status  [x] Alert and awake  [x] Oriented to person/place/time [x]Able to follow commands        Eyes:  EOM    [x]  Normal  [] Abnormal-  Sclera  [x]  Normal  [] Abnormal -         Discharge []  None visible  [] Abnormal -    HENT:   [x] Normocephalic, atraumatic. [] Abnormal   [x] Mouth/Throat: Mucous membranes are moist.     External Ears [x] Normal  [] Abnormal-    Neck: [x] No visualized mass     Pulmonary/Chest: [x] Respiratory effort normal.  [x] No visualized signs of difficulty breathing or respiratory distress        [] Abnormal      Musculoskeletal:   [x] Normal gait with no signs of ataxia         [x] Normal range of motion of neck        [] Abnormal       Neurological:        [x] No Facial Asymmetry (Cranial nerve 7 motor function) (limited exam to video visit)          [] No gaze palsy        [] Abnormal         Skin:        [x] No significant exanthematous lesions or discoloration noted on facial skin         [] Abnormal            Psychiatric:       [x] Normal Affect [] Abnormal        [] No Hallucinations    Other pertinent observable physical exam findings:-    Due to this being a TeleHealth encounter, evaluation of the following organ systems is limited: Vitals/Constitutional/EENT/Resp/CV/GI//MS/Neuro/Skin/Heme-Lymph-Imm. ASSESSMENT/PLAN:  1. Short of breath on exertion    - Full PFT Study With Bronchodilator; Future    2. Chronic obstructive pulmonary disease, unspecified COPD type ([facility]    - Full PFT Study With Bronchodilator; Future      I am checking PFTs. If they have changed at all from last set 1 year ago we will plan on starting long acting inhaler. Return in about 2 months (around 12/16/2020) for Office Visit, SOB. An  electronic signature was used to authenticate this note.     --SANDY Shelton on 10/16/2020 at 10:28 AM        Pursuant to the emergency declaration under the [address] [facility] [address] waiver authority and the [employer] and [employer] Act, this Virtual  Visit was conducted, with patient's consent, to reduce the patient's risk of exposure to COVID-19 and provide continuity of care for an established patient. Services were provided through a video synchronous discussion virtually to substitute for in-person clinic visit.

## 2020-10-17 ENCOUNTER — LAB (OUTPATIENT)
Dept: LAB | Facility: HOSPITAL | Age: 45
End: 2020-10-17

## 2020-10-17 PROCEDURE — U0003 INFECTIOUS AGENT DETECTION BY NUCLEIC ACID (DNA OR RNA); SEVERE ACUTE RESPIRATORY SYNDROME CORONAVIRUS 2 (SARS-COV-2) (CORONAVIRUS DISEASE [COVID-19]), AMPLIFIED PROBE TECHNIQUE, MAKING USE OF HIGH THROUGHPUT TECHNOLOGIES AS DESCRIBED BY CMS-2020-01-R: HCPCS | Performed by: PAIN MEDICINE

## 2020-10-17 PROCEDURE — C9803 HOPD COVID-19 SPEC COLLECT: HCPCS | Performed by: PAIN MEDICINE

## 2020-10-18 LAB
COVID LABCORP PRIORITY: NORMAL
SARS-COV-2 RNA RESP QL NAA+PROBE: NOT DETECTED

## 2020-10-19 RX ORDER — ATORVASTATIN CALCIUM 80 MG/1
TABLET, FILM COATED ORAL
Qty: 30 TABLET | Refills: 0 | Status: SHIPPED | OUTPATIENT
Start: 2020-10-19 | End: 2020-11-18

## 2020-10-29 ENCOUNTER — TRANSCRIBE ORDERS (OUTPATIENT)
Dept: ADMINISTRATIVE | Facility: HOSPITAL | Age: 45
End: 2020-10-29

## 2020-10-29 ENCOUNTER — TELEPHONE (OUTPATIENT)
Dept: PRIMARY CARE CLINIC | Age: 45
End: 2020-10-29

## 2020-10-29 DIAGNOSIS — Z01.818 PREOP TESTING: Primary | ICD-10-CM

## 2020-10-31 ENCOUNTER — LAB (OUTPATIENT)
Dept: LAB | Facility: HOSPITAL | Age: 45
End: 2020-10-31

## 2020-10-31 PROCEDURE — C9803 HOPD COVID-19 SPEC COLLECT: HCPCS | Performed by: PAIN MEDICINE

## 2020-10-31 PROCEDURE — U0003 INFECTIOUS AGENT DETECTION BY NUCLEIC ACID (DNA OR RNA); SEVERE ACUTE RESPIRATORY SYNDROME CORONAVIRUS 2 (SARS-COV-2) (CORONAVIRUS DISEASE [COVID-19]), AMPLIFIED PROBE TECHNIQUE, MAKING USE OF HIGH THROUGHPUT TECHNOLOGIES AS DESCRIBED BY CMS-2020-01-R: HCPCS | Performed by: PAIN MEDICINE

## 2020-11-01 LAB
COVID LABCORP PRIORITY: NORMAL
SARS-COV-2 RNA RESP QL NAA+PROBE: NOT DETECTED

## 2020-11-03 ENCOUNTER — VIRTUAL VISIT (OUTPATIENT)
Dept: PRIMARY CARE CLINIC | Age: 45
End: 2020-11-03
Payer: MEDICARE

## 2020-11-03 PROBLEM — R07.9 CHEST PAIN: Status: RESOLVED | Noted: 2017-11-23 | Resolved: 2020-11-03

## 2020-11-03 PROCEDURE — G8926 SPIRO NO PERF OR DOC: HCPCS | Performed by: FAMILY MEDICINE

## 2020-11-03 PROCEDURE — G8428 CUR MEDS NOT DOCUMENT: HCPCS | Performed by: FAMILY MEDICINE

## 2020-11-03 PROCEDURE — 1036F TOBACCO NON-USER: CPT | Performed by: FAMILY MEDICINE

## 2020-11-03 PROCEDURE — G8484 FLU IMMUNIZE NO ADMIN: HCPCS | Performed by: FAMILY MEDICINE

## 2020-11-03 PROCEDURE — 3023F SPIROM DOC REV: CPT | Performed by: FAMILY MEDICINE

## 2020-11-03 PROCEDURE — 99213 OFFICE O/P EST LOW 20 MIN: CPT | Performed by: FAMILY MEDICINE

## 2020-11-03 PROCEDURE — G8417 CALC BMI ABV UP PARAM F/U: HCPCS | Performed by: FAMILY MEDICINE

## 2020-11-03 RX ORDER — PREDNISONE 20 MG/1
40 TABLET ORAL DAILY
Qty: 10 TABLET | Refills: 0 | Status: SHIPPED | OUTPATIENT
Start: 2020-11-03 | End: 2020-11-08

## 2020-11-03 RX ORDER — AZITHROMYCIN 250 MG/1
250 TABLET, FILM COATED ORAL SEE ADMIN INSTRUCTIONS
Qty: 6 TABLET | Refills: 0 | Status: SHIPPED | OUTPATIENT
Start: 2020-11-03 | End: 2020-11-08

## 2020-11-03 RX ORDER — IPRATROPIUM BROMIDE AND ALBUTEROL SULFATE 2.5; .5 MG/3ML; MG/3ML
1 SOLUTION RESPIRATORY (INHALATION) EVERY 4 HOURS PRN
Qty: 360 ML | Refills: 0 | Status: SHIPPED | OUTPATIENT
Start: 2020-11-03 | End: 2021-02-26

## 2020-11-03 NOTE — PROGRESS NOTES
11/3/2020    TELEHEALTH EVALUATION -- Audio/Visual (During EKJOG-14 public health emergency)    HPI:    Hossein Espinoza (:  1975) has requested an audio/video evaluation for the following concern(s):    Patient presents today via video visit for breathing issues. He is worried that he is having some Sx. No covid exposure. Never had a copd exacerbation before and believes that he could be having one at this time. He feels rather ill. He notes that he is not having any myalgias. He is feeling like he is wheezing. Review of Systems   Constitutional: Positive for fatigue. Negative for chills and fever. HENT: Positive for congestion, rhinorrhea, sneezing and sore throat. Respiratory: Positive for cough. Negative for chest tightness, shortness of breath and wheezing. Cardiovascular: Negative for chest pain, palpitations and leg swelling. Gastrointestinal: Negative for abdominal pain, constipation, diarrhea, nausea and vomiting. Genitourinary: Negative for difficulty urinating, dysuria and frequency. Prior to Visit Medications    Medication Sig Taking? Authorizing Provider   ipratropium-albuterol (DUONEB) 0.5-2.5 (3) MG/3ML SOLN nebulizer solution Inhale 3 mLs into the lungs every 4 hours as needed for Shortness of Breath Yes Maryellen Nones, MD   atorvastatin (LIPITOR) 80 MG tablet TAKE 1 TABLET BY MOUTH AT NIGHT  SANDY Spivey   metoprolol tartrate (LOPRESSOR) 25 MG tablet Take 2 tablets by mouth 2 times daily  SANDY Burgos - NP   Misc. Devices (ROLLING WALKER/BURGUNDY) MISC Dx: gen weakness and fatigue use daily as directed  SANDY Spivey   colestipol (COLESTID) 1 g tablet TAKE 1 TABLET BY MOUTH TWICE DAILY.   SANDY Spivey   ARIPiprazole (ABILIFY) 5 MG tablet   Historical Provider, MD   lamoTRIgine (LAMICTAL) 100 MG tablet   Historical Provider, MD   TRINTELLIX 10 MG TABS tablet   Historical Provider, MD   traZODone (DESYREL) 150 MG tablet Take 150 mg by mouth nightly  Historical Provider, MD   HYDROcodone-acetaminophen (NORCO)  MG per tablet Take 1 tablet by mouth every 8 hours as needed for Pain.   Historical Provider, MD       Social History     Tobacco Use    Smoking status: Former Smoker     Packs/day: 0.50     Years: 24.00     Pack years: 12.00     Types: Cigarettes     Last attempt to quit: 7/15/2020     Years since quittin.3    Smokeless tobacco: Never Used   Substance Use Topics    Alcohol use: No     Alcohol/week: 0.0 standard drinks    Drug use: No        Allergies   Allergen Reactions    Dye [Gadolinium Derivatives] Hives     Mild rash that last less than 2 hours after MRI or CT scans   ,   Past Medical History:   Diagnosis Date    Anxiety     Arthritis     CAD in native artery 3/6/2018    Chronic back pain     Depression     Hyperlipidemia     Hypertension     IBS (irritable bowel syndrome)     Panic disorder     at times afraid to go outside    Unspecified sleep apnea     bipap   ,   Past Surgical History:   Procedure Laterality Date    ANUS SURGERY      APPENDECTOMY  2/3/14    CHOLECYSTECTOMY      COLONOSCOPY          ELECTROCONVULSIVE THERAPY N/A 3/8/2017    ELECTROCONVULSIVE THERAPY performed by Roshan Farrar DO at [address] [address]    umbilical     INGUINAL HERNIA REPAIR Right 1990    right ing with mesh    VASECTOMY     ,   Social History     Tobacco Use    Smoking status: Former Smoker     Packs/day: 0.50     Years: 24.00     Pack years: 12.00     Types: Cigarettes     Last attempt to quit: 7/15/2020     Years since quittin.3    Smokeless tobacco: Never Used   Substance Use Topics    Alcohol use: No     Alcohol/week: 0.0 standard drinks    Drug use: No   ,   Family History   Problem Relation Age of Onset    Diabetes Mother     Cancer Mother         uterine CA    Depression Mother     Mental Illness Mother     Hypertension Mother    Ramila Manuel Other Mother blood clots    Heart Disease Brother     Depression Brother     Mental Illness Brother     Hypertension Brother     Breast Cancer Maternal Grandmother     Cancer Maternal Grandmother         breast CA    Heart Attack Maternal Grandmother     Other Maternal Grandmother         blood clots   ,   Immunization History   Administered Date(s) Administered    Influenza 12/03/2013    Influenza Virus Vaccine 12/18/2014, 01/19/2016, 10/03/2020    Influenza, Quadv, IM, (6 mo and older Fluzone, Flulaval, Fluarix and 3 yrs and older Afluria) 10/09/2017, 10/31/2018    Influenza, Quadv, IM, PF (6 mo and older Fluzone, Flulaval, Fluarix, and 3 yrs and older Afluria) 10/17/2016, 12/17/2019    Pneumococcal Polysaccharide ([id number]) 01/19/2016    Tdap (Boostrix, Adacel) 07/17/2012   ,   Health Maintenance   Topic Date Due    Lipid screen  01/09/2020    HIV screen  03/06/2021 (Originally 3/17/1989)    Annual Wellness Visit (AWV)  05/02/2021    Diabetes screen  08/14/2021    DTaP/Tdap/Td vaccine (2 - Td) 07/17/2022    Flu vaccine  Completed    Pneumococcal 0-64 years Vaccine  Completed    Hepatitis A vaccine  Aged Out    Hepatitis B vaccine  Aged Out    Hib vaccine  Aged Out    Meningococcal (ACWY) vaccine  Aged Out       PHYSICAL EXAMINATION:  [ INSTRUCTIONS:  \"[x]\" Indicates a positive item  \"[]\" Indicates a negative item  -- DELETE ALL ITEMS NOT EXAMINED]  Vital Signs: (As obtained by patient/caregiver or practitioner observation)    Blood pressure-  Heart rate-    Respiratory rate-    Temperature-  Pulse oximetry-     Constitutional: [x] Appears well-developed and well-nourished [] No apparent distress      [] Abnormal-   Mental status  [x] Alert and awake  [x] Oriented to person/place/time [x]Able to follow commands      Eyes:  EOM    [x]  Normal  [] Abnormal-  Sclera  [x]  Normal  [] Abnormal -         Discharge []  None visible  [] Abnormal -    HENT:   [x] Normocephalic, atraumatic.   [] Abnormal   [x] Mouth/Throat: Mucous membranes are moist.     External Ears [x] Normal  [] Abnormal-     Neck: [x] No visualized mass     Pulmonary/Chest: [x] Respiratory effort normal.  [x] No visualized signs of difficulty breathing or respiratory distress        [] Abnormal-      Musculoskeletal:   [x] Normal gait with no signs of ataxia         [x] Normal range of motion of neck        [] Abnormal-       Neurological:        [x] No Facial Asymmetry (Cranial nerve 7 motor function) (limited exam to video visit)          [x] No gaze palsy        [] Abnormal-         Skin:        [x] No significant exanthematous lesions or discoloration noted on facial skin         [] Abnormal-            Psychiatric:       [] Normal Affect [x] No Hallucinations        [x] Abnormal-anxiety affect  Other pertinent observable physical exam findings-     ASSESSMENT/PLAN:    ICD-10-CM    1. COPD exacerbation (HCC)  J44.1 azithromycin (ZITHROMAX) 250 MG tablet       Explained COPD exacerbation. Also explained the risk that this could be Covid as well but we will start treatment based on the COPD history    Orders Placed This Encounter   Medications    ipratropium-albuterol (DUONEB) 0.5-2.5 (3) MG/3ML SOLN nebulizer solution     Sig: Inhale 3 mLs into the lungs every 4 hours as needed for Shortness of Breath     Dispense:  360 mL     Refill:  0    predniSONE (DELTASONE) 20 MG tablet     Sig: Take 2 tablets by mouth daily for 5 days     Dispense:  10 tablet     Refill:  0    azithromycin (ZITHROMAX) 250 MG tablet     Sig: Take 1 tablet by mouth See Admin Instructions for 5 days 500mg on day 1 followed by 250mg on days 2 - 5     Dispense:  6 tablet     Refill:  0       No orders of the defined types were placed in this encounter. Return if symptoms worsen or fail to improve. Billy Terry is a 54 y.o. male being evaluated by a Virtual Visit (video visit) encounter to address concerns as mentioned above.   A caregiver was present when appropriate. Due to this being a TeleHealth encounter (During UIZLQ-02 public health emergency), evaluation of the following organ systems was limited: Vitals/Constitutional/EENT/Resp/CV/GI//MS/Neuro/Skin/Heme-Lymph-Imm. Pursuant to the emergency declaration under the [address] [place] [address], [address] [place] [address] and the [employer] Act, this Virtual Visit was conducted with patient's (and/or legal guardian's) consent, to reduce the patient's risk of exposure to COVID-19 and provide necessary medical care. The patient (and/or legal guardian) has also been advised to contact this office for worsening conditions or problems, and seek emergency medical treatment and/or call 911 if deemed necessary. Services were provided through a video synchronous discussion virtually to substitute for in-person clinic visit. Patient and provider were located at their individual homes or provider at secure site for business. It is possible that material may be posted from a notepad that is used for a Dragon dictation device for dictating notes outside the EMR and I am the original author of all of this content. --Stephen Kessler MD on 11/16/2020 at 5:55 PM    An electronic signature was used to authenticate this note.

## 2020-11-06 ENCOUNTER — TELEPHONE (OUTPATIENT)
Dept: CARDIOLOGY | Age: 45
End: 2020-11-06

## 2020-11-06 NOTE — TELEPHONE ENCOUNTER
Called patient since he had an appt with Harjinder Mendoza CNP today at 10:00 and I had noticed where he had cancelled and I wanted to see how he was doing and if I could reschedule-he stated that he was having a lot of trouble breathing(maybe COPD exacerbation)and lung pain so he was given prednisone 20mg take two at once (he thinks)but he had to quit taking it because it made his BP go up to 180-he says his BP is now 130/75-but he says that he is \"really weak\" and he sounds really weak as well-I told him that if he feels that bad he should go to ER now-he said his son was there and had a  permit and I said well you need to go-patient expressed concern for covid-19 but I told him to put a mask on and that he needs to be seen-patient stated\"ok\". [facility]

## 2020-11-16 ASSESSMENT — ENCOUNTER SYMPTOMS
RHINORRHEA: 1
ABDOMINAL PAIN: 0
DIARRHEA: 0
SORE THROAT: 1
SHORTNESS OF BREATH: 0
VOMITING: 0
COUGH: 1
CONSTIPATION: 0
NAUSEA: 0
CHEST TIGHTNESS: 0
WHEEZING: 0

## 2020-11-18 RX ORDER — ATORVASTATIN CALCIUM 80 MG/1
TABLET, FILM COATED ORAL
Qty: 30 TABLET | Refills: 0 | Status: SHIPPED | OUTPATIENT
Start: 2020-11-18 | End: 2020-12-21

## 2020-12-09 ENCOUNTER — HOSPITAL ENCOUNTER (OUTPATIENT)
Dept: PULMONOLOGY | Age: 45
Discharge: HOME | End: 2020-12-09
Payer: MEDICARE

## 2020-12-21 RX ORDER — ATORVASTATIN CALCIUM 80 MG/1
TABLET, FILM COATED ORAL
Qty: 30 TABLET | Refills: 0 | Status: SHIPPED | OUTPATIENT
Start: 2020-12-21 | End: 2021-02-10

## 2021-01-04 RX ORDER — MONTELUKAST SODIUM 4 MG/1
TABLET, CHEWABLE ORAL
Qty: 60 TABLET | Refills: 0 | Status: SHIPPED | OUTPATIENT
Start: 2021-01-04 | End: 2021-02-01

## 2021-01-21 ENCOUNTER — OFFICE VISIT (OUTPATIENT)
Age: 46
End: 2021-01-21

## 2021-01-21 VITALS — TEMPERATURE: 98.2 F | OXYGEN SATURATION: 93 % | HEART RATE: 83 BPM

## 2021-01-21 DIAGNOSIS — Z11.59 SCREENING FOR VIRAL DISEASE: Primary | ICD-10-CM

## 2021-01-21 PROCEDURE — 99999 PR OFFICE/OUTPT VISIT,PROCEDURE ONLY: CPT | Performed by: NURSE PRACTITIONER

## 2021-01-22 LAB — SARS-COV-2, NAA: DETECTED

## 2021-01-23 ENCOUNTER — APPOINTMENT (OUTPATIENT)
Dept: GENERAL RADIOLOGY | Age: 46
End: 2021-01-23
Payer: MEDICARE

## 2021-01-23 ENCOUNTER — HOSPITAL ENCOUNTER (EMERGENCY)
Age: 46
Discharge: HOME | End: 2021-01-23
Attending: EMERGENCY MEDICINE
Payer: MEDICARE

## 2021-01-23 VITALS
TEMPERATURE: 99.2 F | HEART RATE: 108 BPM | RESPIRATION RATE: 22 BRPM | HEIGHT: 74 IN | WEIGHT: 315 LBS | DIASTOLIC BLOOD PRESSURE: 96 MMHG | SYSTOLIC BLOOD PRESSURE: 148 MMHG | BODY MASS INDEX: 40.43 KG/M2 | OXYGEN SATURATION: 98 %

## 2021-01-23 DIAGNOSIS — U07.1 COVID-19 VIRUS INFECTION: ICD-10-CM

## 2021-01-23 DIAGNOSIS — I47.1 PAROXYSMAL SUPRAVENTRICULAR TACHYCARDIA (HCC): Primary | ICD-10-CM

## 2021-01-23 LAB
ALBUMIN SERPL-MCNC: 4.2 G/DL (ref 3.5–5.2)
ALP BLD-CCNC: 162 U/L (ref 40–130)
ALT SERPL-CCNC: 62 U/L (ref 5–41)
ANION GAP SERPL CALCULATED.3IONS-SCNC: 7 MMOL/L (ref 7–19)
AST SERPL-CCNC: 86 U/L (ref 5–40)
BASOPHILS ABSOLUTE: 0 K/UL (ref 0–0.2)
BASOPHILS RELATIVE PERCENT: 0.5 % (ref 0–1)
BILIRUB SERPL-MCNC: 0.7 MG/DL (ref 0.2–1.2)
BUN BLDV-MCNC: 8 MG/DL (ref 6–20)
CALCIUM SERPL-MCNC: 8.9 MG/DL (ref 8.6–10)
CHLORIDE BLD-SCNC: 99 MMOL/L (ref 98–111)
CO2: 31 MMOL/L (ref 22–29)
CREAT SERPL-MCNC: 0.8 MG/DL (ref 0.5–1.2)
D DIMER: <0.27 UG/ML FEU (ref 0–0.48)
EOSINOPHILS ABSOLUTE: 0.3 K/UL (ref 0–0.6)
EOSINOPHILS RELATIVE PERCENT: 2.9 % (ref 0–5)
GFR AFRICAN AMERICAN: >59
GFR NON-AFRICAN AMERICAN: >60
GLUCOSE BLD-MCNC: 125 MG/DL (ref 74–109)
HCT VFR BLD CALC: 47.5 % (ref 42–52)
HEMOGLOBIN: 15.8 G/DL (ref 14–18)
IMMATURE GRANULOCYTES #: 0 K/UL
LYMPHOCYTES ABSOLUTE: 4.9 K/UL (ref 1.1–4.5)
LYMPHOCYTES RELATIVE PERCENT: 56.3 % (ref 20–40)
MCH RBC QN AUTO: 30.4 PG (ref 27–31)
MCHC RBC AUTO-ENTMCNC: 33.3 G/DL (ref 33–37)
MCV RBC AUTO: 91.3 FL (ref 80–94)
MONOCYTES ABSOLUTE: 0.6 K/UL (ref 0–0.9)
MONOCYTES RELATIVE PERCENT: 6.8 % (ref 0–10)
NEUTROPHILS ABSOLUTE: 2.9 K/UL (ref 1.5–7.5)
NEUTROPHILS RELATIVE PERCENT: 33.4 % (ref 50–65)
PDW BLD-RTO: 13.3 % (ref 11.5–14.5)
PLATELET # BLD: 145 K/UL (ref 130–400)
PMV BLD AUTO: 8.6 FL (ref 9.4–12.4)
POTASSIUM REFLEX MAGNESIUM: 3.9 MMOL/L (ref 3.5–5)
PRO-BNP: 11 PG/ML (ref 0–450)
RBC # BLD: 5.2 M/UL (ref 4.7–6.1)
SODIUM BLD-SCNC: 137 MMOL/L (ref 136–145)
TOTAL PROTEIN: 7.5 G/DL (ref 6.6–8.7)
TROPONIN: <0.01 NG/ML (ref 0–0.03)
WBC # BLD: 8.6 K/UL (ref 4.8–10.8)

## 2021-01-23 PROCEDURE — 84484 ASSAY OF TROPONIN QUANT: CPT

## 2021-01-23 PROCEDURE — 6370000000 HC RX 637 (ALT 250 FOR IP): Performed by: EMERGENCY MEDICINE

## 2021-01-23 PROCEDURE — 85025 COMPLETE CBC W/AUTO DIFF WBC: CPT

## 2021-01-23 PROCEDURE — 85379 FIBRIN DEGRADATION QUANT: CPT

## 2021-01-23 PROCEDURE — 83880 ASSAY OF NATRIURETIC PEPTIDE: CPT

## 2021-01-23 PROCEDURE — 80053 COMPREHEN METABOLIC PANEL: CPT

## 2021-01-23 PROCEDURE — 71045 X-RAY EXAM CHEST 1 VIEW: CPT

## 2021-01-23 PROCEDURE — 99283 EMERGENCY DEPT VISIT LOW MDM: CPT

## 2021-01-23 PROCEDURE — 6360000002 HC RX W HCPCS

## 2021-01-23 PROCEDURE — 93005 ELECTROCARDIOGRAM TRACING: CPT | Performed by: EMERGENCY MEDICINE

## 2021-01-23 PROCEDURE — 36415 COLL VENOUS BLD VENIPUNCTURE: CPT

## 2021-01-23 RX ORDER — ADENOSINE 3 MG/ML
INJECTION, SOLUTION INTRAVENOUS
Status: COMPLETED
Start: 2021-01-23 | End: 2021-01-23

## 2021-01-23 RX ORDER — ACETAMINOPHEN 500 MG
1000 TABLET ORAL ONCE
Status: COMPLETED | OUTPATIENT
Start: 2021-01-23 | End: 2021-01-23

## 2021-01-23 RX ADMIN — ACETAMINOPHEN 1000 MG: 500 TABLET, COATED ORAL at 18:33

## 2021-01-23 RX ADMIN — ADENOSINE 12 MG: 3 INJECTION, SOLUTION INTRAVENOUS at 18:06

## 2021-01-23 ASSESSMENT — ENCOUNTER SYMPTOMS
VOMITING: 0
SHORTNESS OF BREATH: 1
NAUSEA: 0

## 2021-01-23 ASSESSMENT — PAIN SCALES - GENERAL
PAINLEVEL_OUTOF10: 8
PAINLEVEL_OUTOF10: 8

## 2021-01-24 NOTE — ED PROVIDER NOTES
[facility] EMERGENCY DEPT  eMERGENCY dEPARTMENT eNCOUnter      Pt Name: Bib Irwin  MRN: [id number]  [place] 1975  Date of evaluation: 1/23/2021  Provider: Daisy Bahena MD    [address] [place] [address]       Chief Complaint   Patient presents with    Chest Pain     started at 1700    Positive For Covid-19         HISTORY OF PRESENT ILLNESS   (Location/Symptom, Timing/Onset,Context/Setting, Quality, Duration, Modifying Factors, Severity)  Note limiting factors. Bib Irwin is a 54 y.o. male who presents to the emergency department      The history is provided by the patient. Palpitations  Palpitations quality:  Fast  Onset quality:  Sudden  Duration:  1 hour  Timing:  Constant  Progression:  Unchanged  Chronicity:  New  Context comment:  Dx'd with Covid 2 days ago - H/A, fever sxs. Relieved by:  Nothing  Worsened by:  Nothing  Ineffective treatments:  None tried  Associated symptoms: chest pressure and shortness of breath    Associated symptoms: no diaphoresis, no dizziness, no lower extremity edema, no nausea, no near-syncope and no vomiting    Risk factors comment:  Stent x 1 ~ 3 years ago      NursingNotes were reviewed. REVIEW OF SYSTEMS    (2-9 systems for level 4, 10 or more for level 5)     Review of Systems   Constitutional: Negative for diaphoresis. Respiratory: Positive for shortness of breath. Cardiovascular: Positive for palpitations. Negative for near-syncope. Gastrointestinal: Negative for nausea and vomiting. Neurological: Negative for dizziness. All other systems reviewed and are negative. Except as noted above the remainder of the review of systems was reviewed and negative.        PAST MEDICAL HISTORY     Past Medical History:   Diagnosis Date    Anxiety     Arthritis     CAD in native artery 3/6/2018    Chronic back pain     Depression     Hyperlipidemia     Hypertension     IBS (irritable bowel syndrome)     Panic disorder     at times afraid to go outside  Unspecified sleep apnea     bipap         SURGICALHISTORY       Past Surgical History:   Procedure Laterality Date    ANUS SURGERY      APPENDECTOMY  2/3/14    CHOLECYSTECTOMY  2009    COLONOSCOPY  2012        ELECTROCONVULSIVE THERAPY N/A 3/8/2017    ELECTROCONVULSIVE THERAPY performed by Parrish Rodriguez DO at [address]  [address]    umbilical     INGUINAL HERNIA REPAIR Right 1990    right ing with mesh    VASECTOMY           CURRENT MEDICATIONS       Discharge Medication List as of 1/23/2021  7:33 PM      CONTINUE these medications which have NOT CHANGED    Details   colestipol (COLESTID) 1 g tablet TAKE 1 TABLET BY MOUTH TWICE DAILY. , Disp-60 tablet, R-0Normal      atorvastatin (LIPITOR) 80 MG tablet TAKE 1 TABLET BY MOUTH AT NIGHT, Disp-30 tablet, R-0Normal      ipratropium-albuterol (DUONEB) 0.5-2.5 (3) MG/3ML SOLN nebulizer solution Inhale 3 mLs into the lungs every 4 hours as needed for Shortness of Breath, Disp-360 mL,R-0Normal      metoprolol tartrate (LOPRESSOR) 25 MG tablet Take 2 tablets by mouth 2 times daily, Disp-180 tablet,R-3Normal      Misc. Devices (ROLLING WALKER/BURGUNDY) MISC Dx: gen weakness and fatigue use daily as directed, Disp-1 each,R-0Print      ARIPiprazole (ABILIFY) 5 MG tablet Historical Med      lamoTRIgine (LAMICTAL) 100 MG tablet Historical Med      TRINTELLIX 10 MG TABS tablet DAWHistorical Med      traZODone (DESYREL) 150 MG tablet Take 150 mg by mouth nightlyHistorical Med      HYDROcodone-acetaminophen (NORCO)  MG per tablet Take 1 tablet by mouth every 8 hours as needed for Pain. Historical Med             ALLERGIES     Dye [gadolinium derivatives]    FAMILY HISTORY       Family History   Problem Relation Age of Onset    Diabetes Mother     Cancer Mother         uterine CA    Depression Mother     Mental Illness Mother     Hypertension Mother     Other Mother         blood clots    Heart Disease Brother  Depression Brother     Mental Illness Brother     Hypertension Brother     Breast Cancer Maternal Grandmother     Cancer Maternal Grandmother         breast CA    Heart Attack Maternal Grandmother     Other Maternal Grandmother         blood clots          SOCIAL HISTORY       Social History     Socioeconomic History    Marital status:      Spouse name: Not on file    Number of children: Not on file    Years of education: Not on file    Highest education level: Not on file   Occupational History    Not on file   Social Needs    Financial resource strain: Not on file    Food insecurity     Worry: Not on file     Inability: Not on file   [employer] needs     Medical: Not on file     Non-medical: Not on file   Tobacco Use    Smoking status: Former Smoker     Packs/day: 0.50     Years: 24.00     Pack years: 12.00     Types: Cigarettes     Quit date: 7/15/2020     Years since quittin.5    Smokeless tobacco: Never Used   Substance and Sexual Activity    Alcohol use: No     Alcohol/week: 0.0 standard drinks    Drug use: No    Sexual activity: Not on file   Lifestyle    Physical activity     Days per week: Not on file     Minutes per session: Not on file    Stress: Not on file   Relationships    Social connections     Talks on phone: Not on file     Gets together: Not on file     Attends [employer] service: Not on file     Active member of club or organization: Not on file     Attends meetings of clubs or organizations: Not on file     Relationship status: Not on file    Intimate partner violence     Fear of current or ex partner: Not on file     Emotionally abused: Not on file     Physically abused: Not on file     Forced sexual activity: Not on file   Other Topics Concern    Not on file   Social History Narrative    Not on file       SCREENINGS      @SRBU([id number])@      PHYSICAL EXAM    (up to 7 for level 4, 8 or more for level 5)     ED Triage Vitals [21 1756] BP Temp Temp src Pulse Resp SpO2 Height Weight   (!) 148/96 99.2 °F (37.3 °C) -- (!) 204 22 98 % 6' 2\" (1.88 m) (!) 360 lb (163.3 kg)       Physical Exam  Vitals signs and nursing note reviewed. Constitutional:       General: He is in acute distress (mild). Appearance: He is obese. He is ill-appearing. He is not diaphoretic. HENT:      Nose: Nose normal.      Mouth/Throat:      Mouth: Mucous membranes are moist.      Pharynx: Oropharynx is clear. Eyes:      Conjunctiva/sclera: Conjunctivae normal.   Neck:      Musculoskeletal: Normal range of motion and neck supple. Cardiovascular:      Rate and Rhythm: Regular rhythm. Tachycardia present. Heart sounds: Normal heart sounds. Pulmonary:      Effort: Respiratory distress (mild) present. Breath sounds: No wheezing or rales. Musculoskeletal:      Right lower leg: No edema. Left lower leg: No edema. Skin:     General: Skin is warm and dry. Neurological:      General: No focal deficit present. Mental Status: He is alert and oriented to person, place, and time. Cranial Nerves: No cranial nerve deficit. Psychiatric:         Mood and Affect: Mood normal.         DIAGNOSTIC RESULTS     EKG: All EKG's are interpreted by the Emergency Department Physician who either signs or Co-signsthis chart in the absence of a cardiologist.    EKG 1: SVT,   EKG 2: sinus, VR 98, incomplete RBBB    RADIOLOGY:   Non-plain filmimages such as CT, Ultrasound and MRI are read by the radiologist. Plain radiographic images are visualized and preliminarily interpreted by the emergency physician with the below findings:        Interpretation per the Radiologist below, if available at the time ofthis note:    XR CHEST PORTABLE   Final Result   Some mild chronic interstitial changes are similar to the   prior exam. No evidence of acute cardiopulmonary process.    Signed by Dr Irma Osuna on 1/23/2021 6:54 PM            ED BEDSIDE ULTRASOUND: Performed by ED Physician - none    LABS:  Labs Reviewed   CBC WITH AUTO DIFFERENTIAL - Abnormal; Notable for the following components:       Result Value    MPV 8.6 (*)     Neutrophils % 33.4 (*)     Lymphocytes % 56.3 (*)     Lymphocytes Absolute 4.9 (*)     All other components within normal limits   COMPREHENSIVE METABOLIC PANEL W/ REFLEX TO MG FOR LOW K - Abnormal; Notable for the following components:    CO2 31 (*)     Glucose 125 (*)     Alkaline Phosphatase 162 (*)     ALT 62 (*)     AST 86 (*)     All other components within normal limits   TROPONIN   BRAIN NATRIURETIC PEPTIDE   D-DIMER, QUANTITATIVE       All other labs were within normal range or not returned as of this dictation. EMERGENCY DEPARTMENT COURSE and DIFFERENTIAL DIAGNOSIS/MDM:   Vitals:    Vitals:    01/23/21 1756 01/23/21 1809   BP: (!) 148/96    Pulse: (!) 204 108   Resp: 22    Temp: 99.2 °F (37.3 °C)    SpO2: 98%    Weight: (!) 360 lb (163.3 kg)    Height: 6' 2\" (1.88 m)            MDM  Number of Diagnoses or Management Options  Paroxysmal supraventricular tachycardia (HCC)  Diagnosis management comments: Converted after 12 mg Adenocard. Pt had 17 second run of SVT before d/c. Discussed with Dr. Vignesh Russo who said he could still go. On beta-blocker. Return if recurs and persists. CRITICAL CARE TIME   Total Critical Care time was 10 minutes, excluding separately reportable procedures. There was a high probability of clinically significant/lifethreatening deterioration in the patient's condition which required my urgent intervention. CONSULTS:  None    PROCEDURES:  Unless otherwise noted below, none     Procedures    FINAL IMPRESSION      1.  Paroxysmal supraventricular tachycardia ([address]    2. COVID-19 virus infection          DISPOSITION/PLAN   DISPOSITION        PATIENT REFERRED TO:  [facility] EMERGENCY DEPT  Darell Wills  [phone]    If symptoms worsen      DISCHARGE MEDICATIONS: Discharge Medication List as of 1/23/2021  7:33 PM             (Please note that portions of this note were completed with a voice recognition program.  Efforts were made to edit the dictations but occasionally words are mis-transcribed.)    Dez Mack MD (electronically signed)  Attending Emergency Physician          Dez Mack MD  01/24/21 9040

## 2021-01-24 NOTE — ED NOTES
12mg of adenosine given per Reyna Loo in a 20g RAC, with Dr. Marli Decker at bedside.        Niki Delgadillo RN  01/23/21 3012

## 2021-01-24 NOTE — PROGRESS NOTES
Called Cardio for Dr. Cindy Goddard and spoke with Dr. Eri Miller @ [phone].     Transferred call to Dr. Cindy Goddard at [facility] Dr. Eri Miller again at 1925, per  request.  Transferred call at [facility]

## 2021-01-24 NOTE — ED NOTES
Episode of SVT lasting 17 seconds and pt came out of it on his own. Dr. Marli Decker notified who called cardiology.      Vish Hector RN  01/23/21 2001

## 2021-01-25 ENCOUNTER — CARE COORDINATION (OUTPATIENT)
Dept: CARE COORDINATION | Age: 46
End: 2021-01-25

## 2021-01-25 ENCOUNTER — TELEPHONE (OUTPATIENT)
Dept: ADMINISTRATIVE | Age: 46
End: 2021-01-25

## 2021-01-25 LAB
EKG P AXIS: 45 DEGREES
EKG P AXIS: NORMAL DEGREES
EKG P-R INTERVAL: 126 MS
EKG P-R INTERVAL: NORMAL MS
EKG Q-T INTERVAL: 252 MS
EKG Q-T INTERVAL: 352 MS
EKG QRS DURATION: 112 MS
EKG QRS DURATION: 86 MS
EKG QTC CALCULATION (BAZETT): 418 MS
EKG QTC CALCULATION (BAZETT): 497 MS
EKG T AXIS: 103 DEGREES
EKG T AXIS: 53 DEGREES

## 2021-01-25 PROCEDURE — 93010 ELECTROCARDIOGRAM REPORT: CPT | Performed by: INTERNAL MEDICINE

## 2021-01-25 NOTE — TELEPHONE ENCOUNTER
I attempted to reach pt and LM with appt information. I made contact with pt wife and informed her of appt information.

## 2021-01-25 NOTE — TELEPHONE ENCOUNTER
Pt called and req Ed FU, was in Er for supra ventricular tachcardia, on 1/24 pulse was over 200, pt needs VV doxy. me  [phone]

## 2021-01-25 NOTE — CARE COORDINATION
Patient contacted regarding LMIFT-60 diagnosis\". Discussed COVID-19 related testing which was available at this time. Test results were Pt was already positive before ER visit. Patient informed of results, if available? Pt already aware. Care Transition Nurse/ Ambulatory Care Manager contacted the patient by telephone to perform post discharge assessment. Call within 2 business days of discharge: Yes. Verified name and  with patient as identifiers. Provided introduction to self, and explanation of the CTN/ACM role, and reason for call due to risk factors for infection and/or exposure to COVID-19. Symptoms reviewed with patient who verbalized the following symptoms: no new symptoms and no worsening symptoms. Due to no new or worsening symptoms encounter was not routed to provider for escalation. Discussed follow-up appointments. If no appointment was previously scheduled, appointment scheduling offered: Pt already contacted his PCP, has appt as below. [facility] follow up appointment(s):   Future Appointments   Date Time Provider Praful Smith   2021 10:15 AM SANDY Borja [facility]   3/8/2021  2:30 PM L PFT ROOM L PFT [facility]     Non-[facility] follow up appointment(s): NA    Non-face-to-face services provided:  Education of patient/family/caregiver/guardian to support self-management-Reviewed self-isolation, quarantining, home sanitizing, handwashing, mask wearing, distancing. Urged care when around others at home, avoid close contact and frequent cleaning of household surfaces, bathroom, appliances. Stressed regular meals and push fluids to keep urine clear and lighter color. Pt vu.  Assessment and support for treatment adherence and medication management-Pt did not receive any new meds. He has his routine medications and ACM encouraged he use his nebulizer at home. Discuss any symptom mgmt needed with PCP.  Pt vu.     Advance Care Planning:   Does patient have an Advance Directive:  not on file. Patient has following risk factors of: COPD and CAD, HTN. CTN/ACM reviewed discharge instructions, medical action plan and red flags such as increased shortness of breath, increasing fever and signs of decompensation with patient who verbalized understanding. Discussed exposure protocols and quarantine with CDC Guidelines What to do if you are sick with coronavirus disease 2019.  Patient was given an opportunity for questions and concerns. The patient agrees to contact the [facility] exposure line [phone], local health department [facility]: ([phone]) and PCP office for questions related to their healthcare. CTN/ACM provided contact information for future needs. Reviewed and educated patient on any new and changed medications related to discharge diagnosis  Joe Chanel denied chest pain today, stated he feels better. He has a pulse oximeter and we discussed safe parameters for his readings. If Sp02 falls below 88% and does not recover, pt should return to the ER. Return also for severe weakness, severe SOA, fainting, chest pain, confusion or discolored lips/nailbeds. Pt voiced understanding. He agreed to [employer] and will receive his first text on 1/26. Encouraged him to call the [employer] hotline if needed as well. Patient/family/caregiver given information for [employer] and agrees to enroll yes  Patient's preferred e-mail:    Patient's preferred phone number: [phone]  Based on Loop alert triggers, patient will be contacted by nurse care manager for worsening symptoms. Pt will be further monitored by COVID Loop Team based on severity of symptoms and risk factors.

## 2021-01-28 ENCOUNTER — TELEPHONE (OUTPATIENT)
Dept: CARDIOLOGY CLINIC | Age: 46
End: 2021-01-28

## 2021-01-28 ENCOUNTER — VIRTUAL VISIT (OUTPATIENT)
Dept: PRIMARY CARE CLINIC | Age: 46
End: 2021-01-28
Payer: MEDICARE

## 2021-01-28 DIAGNOSIS — I47.1 SVT (SUPRAVENTRICULAR TACHYCARDIA) (HCC): ICD-10-CM

## 2021-01-28 DIAGNOSIS — R19.7 DIARRHEA, UNSPECIFIED TYPE: Primary | ICD-10-CM

## 2021-01-28 DIAGNOSIS — U07.1 COVID-19: ICD-10-CM

## 2021-01-28 PROCEDURE — G8428 CUR MEDS NOT DOCUMENT: HCPCS | Performed by: NURSE PRACTITIONER

## 2021-01-28 PROCEDURE — 99214 OFFICE O/P EST MOD 30 MIN: CPT | Performed by: NURSE PRACTITIONER

## 2021-01-28 ASSESSMENT — ENCOUNTER SYMPTOMS
CONSTIPATION: 1
EYES NEGATIVE: 1
DIARRHEA: 1
ABDOMINAL PAIN: 1
RESPIRATORY NEGATIVE: 1

## 2021-01-28 NOTE — TELEPHONE ENCOUNTER
Haider Mcfadden spouse called to schedule a ed follow up with jillian. Pt is covid positive    Please be advised that the best time to call him to accommodate their needs is Anytime. Thank you.

## 2021-01-28 NOTE — PROGRESS NOTES
[facility]   [address] [place] [address]     Phone:  [phone]  Fax:  [phone]      2021    TELEHEALTH EVALUATION -- Audio/Visual (During XDTSJ-84 public health emergency)    HPI:    Chief Complaint   Patient presents with    Other     ER follow up         Daren Avalos (:  1975) has requested an audio/video evaluation for the following concern(s): This is a video visit for follow-up on recent ER visit. Patient was seen in ER on  for episode of tachycardia. He is also noted to be Covid positive. Patient was in SVT and was given adenosine while in the ER. He was started on metoprolol and discharged home. Patient states that he has not had any other episodes of the tachycardia since going home. He states that symptoms of the COVID are improving as well. He is also having issues with worsening diarrhea. He reports not being able to go out in public without having episodes of diarrhea. He is also mentions that he has to take a towel in the car with him just in case he has an accident. Review of Systems   Constitutional: Negative. HENT: Negative. Eyes: Negative. Respiratory: Negative. Cardiovascular: Positive for palpitations (tachycardia). Gastrointestinal: Positive for abdominal pain, constipation and diarrhea. Endocrine: Negative. Genitourinary: Negative. Musculoskeletal: Negative. Skin: Negative. Neurological: Negative. Hematological: Negative. Psychiatric/Behavioral: Negative. Prior to Visit Medications    Medication Sig Taking? Authorizing Provider   colestipol (COLESTID) 1 g tablet TAKE 1 TABLET BY MOUTH TWICE DAILY.   SANDY Renee   atorvastatin (LIPITOR) 80 MG tablet TAKE 1 TABLET BY MOUTH AT NIGHT  SANDY Renee   ipratropium-albuterol (DUONEB) 0.5-2.5 (3) MG/3ML SOLN nebulizer solution Inhale 3 mLs into the lungs every 4 hours as needed for Shortness of Breath Patient not taking: Reported on 2021  Analia Solis MD   metoprolol tartrate (LOPRESSOR) 25 MG tablet Take 2 tablets by mouth 2 times daily  SANDY Herrera - NP   Misc. Devices (ROLLING WALKER/BURGUNDY) MISC Dx: gen weakness and fatigue use daily as directed  SANDY Everett   ARIPiprazole (ABILIFY) 5 MG tablet   Historical Provider, MD   lamoTRIgine (LAMICTAL) 100 MG tablet   Historical Provider, MD   TRINTELLIX 10 MG TABS tablet   Historical Provider, MD   traZODone (DESYREL) 150 MG tablet Take 150 mg by mouth nightly  Historical Provider, MD   HYDROcodone-acetaminophen (NORCO)  MG per tablet Take 1 tablet by mouth every 8 hours as needed for Pain.   Historical Provider, MD       Social History     Tobacco Use    Smoking status: Former Smoker     Packs/day: 0.50     Years: 24.00     Pack years: 12.00     Types: Cigarettes     Quit date: 7/15/2020     Years since quittin.5    Smokeless tobacco: Never Used   Substance Use Topics    Alcohol use: No     Alcohol/week: 0.0 standard drinks    Drug use: No        Allergies   Allergen Reactions    Dye [Gadolinium Derivatives] Hives     Mild rash that last less than 2 hours after MRI or CT scans   ,   Past Medical History:   Diagnosis Date    Anxiety     Arthritis     CAD in native artery 3/6/2018    Chronic back pain     Depression     Hyperlipidemia     Hypertension     IBS (irritable bowel syndrome)     Panic disorder     at times afraid to go outside    Unspecified sleep apnea     bipap   ,   Past Surgical History:   Procedure Laterality Date    ANUS SURGERY      APPENDECTOMY  2/3/14    CHOLECYSTECTOMY      COLONOSCOPY          ELECTROCONVULSIVE THERAPY N/A 3/8/2017    ELECTROCONVULSIVE THERAPY performed by Mark Herring DO at [address] [address]    umbilical     INGUINAL HERNIA REPAIR Right     right ing with mesh    VASECTOMY     ,   Family History Problem Relation Age of Onset    Diabetes Mother    Jayson Flores Cancer Mother         uterine CA    Depression Mother     Mental Illness Mother     Hypertension Mother    Jayson Flores Other Mother         blood clots    Heart Disease Brother     Depression Brother     Mental Illness Brother     Hypertension Brother     Breast Cancer Maternal Grandmother     Cancer Maternal Grandmother         breast CA    Heart Attack Maternal Grandmother     Other Maternal Grandmother         blood clots       PHYSICAL EXAMINATION:  [ INSTRUCTIONS:  \"[x]\" Indicates a positive item  \"[]\" Indicates a negative item  -- DELETE ALL ITEMS NOT EXAMINED]  Vital Signs: (As obtained by patient/caregiver at home)        Constitutional: [x] Appears well-developed and well-nourished [x] No apparent distress      [] Abnormal   Mental status  [x] Alert and awake  [x] Oriented to person/place/time [x]Able to follow commands        Eyes:  EOM    [x]  Normal  [] Abnormal-  Sclera  [x]  Normal  [] Abnormal -         Discharge []  None visible  [] Abnormal -    HENT:   [x] Normocephalic, atraumatic.   [] Abnormal   [x] Mouth/Throat: Mucous membranes are moist.     External Ears [x] Normal  [] Abnormal-    Neck: [x] No visualized mass     Pulmonary/Chest: [x] Respiratory effort normal.  [x] No visualized signs of difficulty breathing or respiratory distress        [] Abnormal      Musculoskeletal:   [x] Normal gait with no signs of ataxia         [x] Normal range of motion of neck        [] Abnormal       Neurological:        [x] No Facial Asymmetry (Cranial nerve 7 motor function) (limited exam to video visit)          [] No gaze palsy        [] Abnormal         Skin:        [x] No significant exanthematous lesions or discoloration noted on facial skin         [] Abnormal            Psychiatric:       [x] Normal Affect [] Abnormal        [] No Hallucinations    Other pertinent observable physical exam findings:- Due to this being a TeleHealth encounter, evaluation of the following organ systems is limited: Vitals/Constitutional/EENT/Resp/CV/GI//MS/Neuro/Skin/Heme-Lymph-Imm. ASSESSMENT/PLAN:  1. Diarrhea, unspecified type    - [address] [place] [address] Road, [employer] SANDY [place], Gastroenterology, [place]    2. COVID-19      3. SVT (supraventricular tachycardia) [facility])      Referral to [facility] gastroenterology due to episodes of diarrhea. Patient is to continue quarantine until next Thursday. I am having him call for appointment with cardiology. I do want their recommendations due to him having a repeat episode of SVT. He was noted to have SVT on a Zio patch, although was only a 4 beat run. Return in about 4 weeks (around 2/25/2021) for Video Visit, Follow up chronic conditions. An  electronic signature was used to authenticate this note. --SANDY Auguste on 1/28/2021 at 10:36 AM        Pursuant to the emergency declaration under the [facility] waiver authority and the [employer] and [employer] Act, this Virtual  Visit was conducted, with patient's consent, to reduce the patient's risk of exposure to COVID-19 and provide continuity of care for an established patient. Services were provided through a video synchronous discussion virtually to substitute for in-person clinic visit.

## 2021-02-01 RX ORDER — MONTELUKAST SODIUM 4 MG/1
TABLET, CHEWABLE ORAL
Qty: 60 TABLET | Refills: 0 | Status: SHIPPED | OUTPATIENT
Start: 2021-02-01 | End: 2021-03-08

## 2021-02-03 ENCOUNTER — VIRTUAL VISIT (OUTPATIENT)
Dept: GASTROENTEROLOGY | Age: 46
End: 2021-02-03
Payer: MEDICARE

## 2021-02-03 DIAGNOSIS — R19.7 DIARRHEA, UNSPECIFIED TYPE: Primary | ICD-10-CM

## 2021-02-03 PROCEDURE — G8484 FLU IMMUNIZE NO ADMIN: HCPCS | Performed by: NURSE PRACTITIONER

## 2021-02-03 PROCEDURE — G8428 CUR MEDS NOT DOCUMENT: HCPCS | Performed by: NURSE PRACTITIONER

## 2021-02-03 PROCEDURE — 1036F TOBACCO NON-USER: CPT | Performed by: NURSE PRACTITIONER

## 2021-02-03 PROCEDURE — G8417 CALC BMI ABV UP PARAM F/U: HCPCS | Performed by: NURSE PRACTITIONER

## 2021-02-03 PROCEDURE — 99204 OFFICE O/P NEW MOD 45 MIN: CPT | Performed by: NURSE PRACTITIONER

## 2021-02-03 ASSESSMENT — ENCOUNTER SYMPTOMS
ABDOMINAL DISTENTION: 0
DIARRHEA: 1
VOMITING: 0
COUGH: 0
TROUBLE SWALLOWING: 0
CHOKING: 0
RECTAL PAIN: 0
ABDOMINAL PAIN: 0
SHORTNESS OF BREATH: 0
ANAL BLEEDING: 0
CONSTIPATION: 0
NAUSEA: 0
BLOOD IN STOOL: 0

## 2021-02-03 NOTE — PROGRESS NOTES
metoprolol tartrate (LOPRESSOR) 25 MG tablet Take 2 tablets by mouth 2 times daily  SANDY Herrera - NP   Misc. Devices (ROLLING WALKER/BURGUNDY) MISC Dx: gen weakness and fatigue use daily as directed  SANDY Everett   ARIPiprazole (ABILIFY) 5 MG tablet   Historical Provider, MD   lamoTRIgine (LAMICTAL) 100 MG tablet   Historical Provider, MD   TRINTELLIX 10 MG TABS tablet   Historical Provider, MD   traZODone (DESYREL) 150 MG tablet Take 150 mg by mouth nightly  Historical Provider, MD   HYDROcodone-acetaminophen (NORCO)  MG per tablet Take 1 tablet by mouth every 8 hours as needed for Pain.   Historical Provider, MD       Social History     Tobacco Use    Smoking status: Former Smoker     Packs/day: 0.50     Years: 24.00     Pack years: 12.00     Types: Cigarettes     Quit date: 7/15/2020     Years since quittin.5    Smokeless tobacco: Never Used   Substance Use Topics    Alcohol use: No     Alcohol/week: 0.0 standard drinks    Drug use: No        Allergies   Allergen Reactions    Dye [Gadolinium Derivatives] Hives     Mild rash that last less than 2 hours after MRI or CT scans   ,   Past Medical History:   Diagnosis Date    Anxiety     Arthritis     CAD in native artery 3/6/2018    Chronic back pain     Depression     Hyperlipidemia     Hypertension     IBS (irritable bowel syndrome)     Panic disorder     at times afraid to go outside    Unspecified sleep apnea     bipap   ,   Past Surgical History:   Procedure Laterality Date    ANUS SURGERY      APPENDECTOMY  2014    CHOLECYSTECTOMY  2009    COLONOSCOPY  2012        COLONOSCOPY  10/19/2017    Dr Khalida Gore, Benign HP    [facility]COPY  2009    [facility]    ELECTROCONVULSIVE THERAPY N/A 2017    ELECTROCONVULSIVE THERAPY performed by Mark Herring DO at [address]  48/15/3398    umbilical     INGUINAL HERNIA REPAIR Right 1990 right ing with mesh    VASECTOMY     ,   Social History     Tobacco Use    Smoking status: Former Smoker     Packs/day: 0.50     Years: 24.00     Pack years: 12.00     Types: Cigarettes     Quit date: 7/15/2020     Years since quittin.5    Smokeless tobacco: Never Used   Substance Use Topics    Alcohol use: No     Alcohol/week: 0.0 standard drinks    Drug use: No   ,   Family History   Problem Relation Age of Onset    Diabetes Mother     Cancer Mother         uterine CA    Depression Mother     Mental Illness Mother     Hypertension Mother     Other Mother         blood clots    Heart Disease Brother     Depression Brother     Mental Illness Brother     Hypertension Brother     Breast Cancer Maternal Grandmother     Cancer Maternal Grandmother         breast CA    Heart Attack Maternal Grandmother     Other Maternal Grandmother         blood clots       PHYSICAL EXAMINATION:  [ INSTRUCTIONS:  \"[x]\" Indicates a positive item  \"[]\" Indicates a negative item  -- DELETE ALL ITEMS NOT EXAMINED]  Vital Signs: (As obtained by patient/caregiver or practitioner observation)    Blood pressure-  Heart rate-    Respiratory rate-    Temperature-  Pulse oximetry-     Constitutional: [x] Appears well-developed and well-nourished [x] No apparent distress      [] Abnormal-   Mental status  [x] Alert and awake  [x] Oriented to person/place/time [x]Able to follow commands      Eyes:  EOM    [x]  Normal  [] Abnormal-  Sclera  [x]  Normal  [] Abnormal -         Discharge [x]  None visible  [] Abnormal -    HENT:   [x] Normocephalic, atraumatic.   [] Abnormal   [x] Mouth/Throat: Mucous membranes are moist.     External Ears [x] Normal  [] Abnormal-     Neck: [x] No visualized mass     Pulmonary/Chest: [x] Respiratory effort normal.  [x] No visualized signs of difficulty breathing or respiratory distress        [] Abnormal-      Musculoskeletal:   [x] Normal gait with no signs of ataxia [x] Normal range of motion of neck        [] Abnormal-       Neurological:        [x] No Facial Asymmetry (Cranial nerve 7 motor function) (limited exam to video visit)          [x] No gaze palsy        [] Abnormal-         Skin:        [x] No significant exanthematous lesions or discoloration noted on facial skin         [] Abnormal-            Psychiatric:       [x] Normal Affect [x] No Hallucinations        [] Abnormal-     Other pertinent observable physical exam findings-     ASSESSMENT/PLAN:  1. Diarrhea, unspecified type    - Di[facility]rix for GI panel  - If negative stools, plan for colonoscopy and trial of Imodium prn, again if stools are negative  - Follow up prn or sooner if needed  - Consider IBS-D, trial of Xifaxan      Return if symptoms worsen or fail to improve. Maggie Ramos is a 54 y.o. male being evaluated by a Virtual Visit (video visit) encounter to address concerns as mentioned above. A caregiver was present when appropriate. Due to this being a TeleHealth encounter (During DDNHW-36 public health emergency), evaluation of the following organ systems was limited: Vitals/Constitutional/EENT/Resp/CV/GI//MS/Neuro/Skin/Heme-Lymph-Imm. Pursuant to the emergency declaration under the [address] [place] [address] authority and the [employer] and [employer] Act, this Virtual Visit was conducted with patient's (and/or legal guardian's) consent, to reduce the patient's risk of exposure to COVID-19 and provide necessary medical care. The patient (and/or legal guardian) has also been advised to contact this office for worsening conditions or problems, and seek emergency medical treatment and/or call 911 if deemed necessary.      Patient identification was verified at the start of the visit: Yes    Total time spent on this encounter: Not billed by time Services were provided through a video synchronous discussion virtually to substitute for in-person clinic visit. Patient and provider were located at their individual homes. --SANDY Cespedes NP on 2/3/2021 at 2:12 PM    An electronic signature was used to authenticate this note.

## 2021-02-17 ENCOUNTER — TRANSCRIBE ORDERS (OUTPATIENT)
Dept: LAB | Facility: HOSPITAL | Age: 46
End: 2021-02-17

## 2021-02-17 DIAGNOSIS — Z01.818 PREOP TESTING: Primary | ICD-10-CM

## 2021-02-18 ENCOUNTER — LAB (OUTPATIENT)
Dept: LAB | Facility: HOSPITAL | Age: 46
End: 2021-02-18

## 2021-02-18 LAB — SARS-COV-2 RNA PNL SPEC NAA+PROBE: NOT DETECTED

## 2021-02-18 PROCEDURE — 87635 SARS-COV-2 COVID-19 AMP PRB: CPT | Performed by: PAIN MEDICINE

## 2021-02-18 PROCEDURE — C9803 HOPD COVID-19 SPEC COLLECT: HCPCS | Performed by: PAIN MEDICINE

## 2021-02-19 ENCOUNTER — TELEPHONE (OUTPATIENT)
Dept: GASTROENTEROLOGY | Age: 46
End: 2021-02-19

## 2021-02-26 ENCOUNTER — VIRTUAL VISIT (OUTPATIENT)
Dept: PRIMARY CARE CLINIC | Age: 46
End: 2021-02-26
Payer: MEDICARE

## 2021-02-26 DIAGNOSIS — J44.9 CHRONIC OBSTRUCTIVE PULMONARY DISEASE, UNSPECIFIED COPD TYPE (HCC): ICD-10-CM

## 2021-02-26 DIAGNOSIS — E66.01 CLASS 3 SEVERE OBESITY DUE TO EXCESS CALORIES WITH SERIOUS COMORBIDITY AND BODY MASS INDEX (BMI) OF 45.0 TO 49.9 IN ADULT (HCC): ICD-10-CM

## 2021-02-26 DIAGNOSIS — F31.9 BIPOLAR DEPRESSION (HCC): ICD-10-CM

## 2021-02-26 DIAGNOSIS — E78.2 MIXED HYPERLIPIDEMIA: ICD-10-CM

## 2021-02-26 DIAGNOSIS — R73.09 ELEVATED GLUCOSE: ICD-10-CM

## 2021-02-26 DIAGNOSIS — I10 ESSENTIAL HYPERTENSION: Primary | ICD-10-CM

## 2021-02-26 PROCEDURE — 99443 PR PHYS/QHP TELEPHONE EVALUATION 21-30 MIN: CPT | Performed by: NURSE PRACTITIONER

## 2021-02-26 RX ORDER — LISINOPRIL 20 MG/1
20 TABLET ORAL DAILY
Qty: 30 TABLET | Refills: 5 | Status: SHIPPED | OUTPATIENT
Start: 2021-02-26 | End: 2021-08-23

## 2021-02-26 ASSESSMENT — ENCOUNTER SYMPTOMS
EYES NEGATIVE: 1
GASTROINTESTINAL NEGATIVE: 1
RESPIRATORY NEGATIVE: 1

## 2021-02-26 NOTE — PROGRESS NOTES
[address] [address]            Phone:  [phone]  Fax:  [phone]    Daren Avalos is a 54 y.o. male evaluated via telephone on 2/26/2021. Consent:    He and/or health care decision maker is aware that that he may receive a bill for this telephone service, depending on his insurance coverage, and has provided verbal consent to proceed: Yes      HPI  Chief Complaint   Patient presents with    Tachycardia    Anxiety       I communicated with the patient and/or health care decision maker about tachycardia and HTN. He reports that any time he goes out of the office then his BP will be elevated. He as monitored it at home and it has been 160/90 or higher. Review of Systems   Constitutional: Negative. HENT: Negative. Eyes: Negative. Respiratory: Negative. Cardiovascular: Negative. Gastrointestinal: Negative. Endocrine: Negative. Genitourinary: Negative. Musculoskeletal: Negative. Skin: Negative. Neurological: Positive for headaches (intermittent). Hematological: Negative. Psychiatric/Behavioral: Negative. PLAN    Details of this discussion including any medical advice provided:     1. Bipolar depression ([facility] [address]      2. Class 3 severe obesity due to excess calories with serious comorbidity and body mass index (BMI) of 45.0 to 49.9 in adult ([facility] [address]      3. Chronic obstructive pulmonary disease, unspecified COPD type ([facility] [address]      4. Essential hypertension    - CBC; Future  - Comprehensive Metabolic Panel; Future    5. Mixed hyperlipidemia    - Lipid, Fasting; Future    6. Elevated glucose    - Hemoglobin A1C; Future    I am checking labs will call these results once completed. We will start lisinopril 20 mg daily due to elevated blood pressure readings. I am having him return in approximately 1 month to monitor start of this medication. I affirm this is a Patient Initiated Episode with an Established Patient who has not had a related appointment within my department in the past 7 days or scheduled within the next 24 hours. Total Time: minutes: 21-30 minutes      Note: not billable if this call serves to triage the patient into an appointment for the relevant concern      Sreekanth Dai to the emergency declaration under the [address] [place] [address] waiver authority and the [employer] and [employer] Act, this Virtual  Visit was conducted, with patient's consent, to reduce the patient's risk of exposure to COVID-19 and provide continuity of care for an established patient. Services were provided through a telephone discussion  to substitute for in-person clinic visit. Parties involved during telephone call include myself, SANDY Hitchcock and patient listed.

## 2021-03-02 ENCOUNTER — OFFICE VISIT (OUTPATIENT)
Dept: CARDIOLOGY CLINIC | Age: 46
End: 2021-03-02
Payer: MEDICARE

## 2021-03-02 VITALS
DIASTOLIC BLOOD PRESSURE: 86 MMHG | WEIGHT: 315 LBS | BODY MASS INDEX: 40.43 KG/M2 | OXYGEN SATURATION: 98 % | HEIGHT: 74 IN | HEART RATE: 74 BPM | SYSTOLIC BLOOD PRESSURE: 150 MMHG

## 2021-03-02 DIAGNOSIS — I45.6 WPW (WOLFF-PARKINSON-WHITE SYNDROME): ICD-10-CM

## 2021-03-02 DIAGNOSIS — I10 ESSENTIAL HYPERTENSION: Primary | ICD-10-CM

## 2021-03-02 DIAGNOSIS — I25.10 CORONARY ARTERY DISEASE INVOLVING NATIVE CORONARY ARTERY OF NATIVE HEART WITHOUT ANGINA PECTORIS: ICD-10-CM

## 2021-03-02 PROCEDURE — G8484 FLU IMMUNIZE NO ADMIN: HCPCS | Performed by: NURSE PRACTITIONER

## 2021-03-02 PROCEDURE — 93000 ELECTROCARDIOGRAM COMPLETE: CPT | Performed by: NURSE PRACTITIONER

## 2021-03-02 PROCEDURE — 1036F TOBACCO NON-USER: CPT | Performed by: NURSE PRACTITIONER

## 2021-03-02 PROCEDURE — 99214 OFFICE O/P EST MOD 30 MIN: CPT | Performed by: NURSE PRACTITIONER

## 2021-03-02 PROCEDURE — G8417 CALC BMI ABV UP PARAM F/U: HCPCS | Performed by: NURSE PRACTITIONER

## 2021-03-02 PROCEDURE — G8427 DOCREV CUR MEDS BY ELIG CLIN: HCPCS | Performed by: NURSE PRACTITIONER

## 2021-03-02 RX ORDER — PAROXETINE 10 MG/1
TABLET, FILM COATED ORAL
COMMUNITY
Start: 2021-01-07 | End: 2021-10-04 | Stop reason: ALTCHOICE

## 2021-03-02 RX ORDER — CLONAZEPAM 0.5 MG/1
TABLET ORAL 3 TIMES DAILY PRN
Status: ON HOLD | COMMUNITY
Start: 2021-01-08 | End: 2021-11-11

## 2021-03-02 ASSESSMENT — ENCOUNTER SYMPTOMS
SHORTNESS OF BREATH: 0
SORE THROAT: 0
WHEEZING: 0
DIARRHEA: 1
COUGH: 0
CHEST TIGHTNESS: 0

## 2021-03-02 NOTE — PROGRESS NOTES
[facility] Cardiology   Established Patient Office Visit   [facility]. [address] [facility]  [phone]        OFFICE VISIT:  3/2/2021    Miriam Starkey - : 1975    Reason For Visit:  Sadie Cordova is a 54 y.o. male who is here for Follow-up (Reports high BP. palpitations reported. Patient states that his BPM was 200. ), Coronary Artery Disease, and Hypertension    1. Essential hypertension    2. Coronary artery disease involving native coronary artery of native heart without angina pectoris    3. WPW (Abhilash-Parkinson-White syndrome)      Patient with a history of hypertension, hyperlipidemia, sleep apnea, and coronary artery disease. Patient had ER visit on 2021 for paroxysmal SVT. Pulse rate 206 bpm.  He was converted after 12 mg of adenosine. Patient was discharged on beta-blocker. Presents to clinic today with no complaints of chest pain, pressure or tightness. There is no shortness of breath, orthopnea or PND. Patient does have some signs of tachycardia and lightheadedness. There has been no syncope. DATA:    2020 2D ECHO   Normal left ventricular chamber size and systolic function. No regional wall motion abnormalities.   EF 65%. 2020 DSE   Dobutamine stress echocardiogram without clinical, electrocardiographic, or echocardiographic evidence of myocardial ischemia. ZIO PATCH:     Predominantly sinus rhythm with rare PACs. One SVT run 4 beats. Rare PVCs. No VT, no pauses, no high degree AV blocks, nor atrial fib noted. Catheterization 2018 bare-metal stent and was on aspirin and Plavix for 30 days.     Subjective    Miriam Starkey is a 54 y.o. male with the following history as recorded in EpicCare:    Patient Active Problem List    Diagnosis Date Noted    CAD in native artery 2018     Priority: High    Chronic obstructive pulmonary disease ([facility] [address] 2019    Tobacco abuse 2019    Class 3 severe obesity due to excess calories with serious comorbidity and body mass index (BMI) of 45.0 to 49.9 in adult [facility]) 07/30/2019    Leg pain, bilateral     Acute chest pain     Cigarette nicotine dependence without complication 23/43/1674    Bipolar depression ([facility] [address] 12/16/2016    S/P laparoscopic appendectomy 02/27/2014    History of colon polyps 05/17/2013    Depression     Hypertension     Anxiety      Current Outpatient Medications   Medication Sig Dispense Refill    PARoxetine (PAXIL) 10 MG tablet       lisinopril (PRINIVIL;ZESTRIL) 20 MG tablet Take 1 tablet by mouth daily 30 tablet 5    atorvastatin (LIPITOR) 80 MG tablet TAKE 1 TABLET BY MOUTH AT NIGHT. 30 tablet 5    colestipol (COLESTID) 1 g tablet TAKE 1 TABLET BY MOUTH TWICE DAILY. 60 tablet 0    metoprolol tartrate (LOPRESSOR) 25 MG tablet Take 2 tablets by mouth 2 times daily 180 tablet 3    Misc. Devices (ROLLING WALKER/BURGUNDY) MISC Dx: gen weakness and fatigue use daily as directed 1 each 0    ARIPiprazole (ABILIFY) 5 MG tablet       lamoTRIgine (LAMICTAL) 100 MG tablet       TRINTELLIX 10 MG TABS tablet       traZODone (DESYREL) 150 MG tablet Take 150 mg by mouth nightly      HYDROcodone-acetaminophen (NORCO)  MG per tablet Take 1 tablet by mouth every 8 hours as needed for Pain.  clonazePAM (KLONOPIN) 0.5 MG tablet        No current facility-administered medications for this visit.       Allergies: Dye [gadolinium derivatives]  Past Medical History:   Diagnosis Date    Anxiety     Arthritis     CAD in native artery 3/6/2018    Chronic back pain     Depression     Hyperlipidemia     Hypertension     IBS (irritable bowel syndrome)     Panic disorder     at times afraid to go outside    Unspecified sleep apnea     bipap     Past Surgical History:   Procedure Laterality Date    ANUS SURGERY      APPENDECTOMY  02/03/2014    CHOLECYSTECTOMY  01/01/2009    COLONOSCOPY  01/01/2012        COLONOSCOPY  10/19/2017    Dr Dylan Dumont, Benign HP    [facility]  2009    [facility]    ELECTROCONVULSIVE THERAPY N/A 2017    ELECTROCONVULS[facility]ERAP[facility]quin [facility] at [facility] HERNIA REPAIR      umbilical     INGUINAL HERNIA REPAIR Right 1990    right ing with mesh    VASECTOMY       Family History   Problem Relation Age of Onset    Diabetes Mother     Cancer Mother         uterine CA    Depression Mother     Mental Illness Mother     Hypertension Mother    Velez Other Mother         blood clots    Heart Disease Brother     Depression Brother     Mental Illness Brother     Hypertension Brother     Breast Cancer Maternal Grandmother     Cancer Maternal Grandmother         breast CA    Heart Attack Maternal Grandmother     Other Maternal Grandmother         blood clots     Social History     Tobacco Use    Smoking status: Former Smoker     Packs/day: 0.50     Years: 24.00     Pack years: 12.00     Types: Cigarettes     Quit date: 7/15/2020     Years since quittin.6    Smokeless tobacco: Never Used   Substance Use Topics    Alcohol use: No     Alcohol/week: 0.0 standard drinks          Review of Systems:    Review of Systems   Constitutional: Positive for fatigue. Negative for activity change, chills, diaphoresis and fever. HENT: Negative for congestion and sore throat. Respiratory: Negative for cough, chest tightness, shortness of breath and wheezing. Cardiovascular: Positive for palpitations. Negative for chest pain and leg swelling. Gastrointestinal: Positive for diarrhea. Musculoskeletal: Negative. Neurological: Positive for dizziness and light-headedness. Negative for syncope and headaches. Psychiatric/Behavioral: Negative for confusion. The patient is not nervous/anxious.          Objective:    BP (!) 150/86   Pulse 74   Ht 6' 2\" (1.88 m)   Wt (!) 360 lb (163.3 kg)   SpO2 98%   BMI 46.22 kg/m²     GENERAL - well developed and well nourished, conversant  HENIMSEH JACOBS, Hearing appears normal, gentleman lids are normal.  External inspection of ears and nose appear normal.  NECK - no thyromegaly, no JVD, trachea is in the midline  CARDIOVASCULAR  PMI is in the mid line clavicular position, Normal S1 and S2 with no systolic murmur. No S3 or S4    PULMONARY  no respiratory distress. No wheezes or rales. Lungs are clear to ausculation, normal respiratory effort. ABDOMEN   soft, non tender, no rebound  MUSCULOSKELETAL   range of motion of the upper and lower extermites appears normal and equal and is without pain   EXTREMITIES - no significant edema   NEUROLOGIC  gait and station are normal  SKIN - turgor is normal, can warm and dry. PSYCHIATRIC - normal mood and affect, alert and orientated x 3,      ASSESSMENT:    ALL THE CARDIOLOGY PROBLEMS ARE LISTED ABOVE; HOWEVER, THE FOLLOWING SPECIFIC CARDIAC PROBLEMS / CONDITIONS WERE ADDRESSED AND TREATED DURING THE OFFICE VISIT TODAY:                                                                                            MEDICAL DECISION MAKING             Cardiac Specific Problem / Diagnosis  Discussion and Data Reviewed Diagnostic Procedures Ordered Management Options Selected           1. Possible WPW  Chief complaint   Review and summation of old records:    EKG in the office showing sinus rhythm with a heart rate of 74 bpm with WPW pattern. Will refer patient to Dr. Leatha Herrera electrophysiologist. Yes Continue current medications:     Yes           2. CAD  Stable   No chest pain. Patient is on statin, ACE, beta-blocker Yes Continue current medications:    Yes           3. Hypertension Improving  blood pressure in the office today 150/86 O2 sat 98%. Patient has recently last week started patient on lisinopril 20 mg daily. This is an improvement in his blood pressure reading. Patient to follow-up with PCP. No Continue current medications:       Yes           4.    Diarrhea Shows no change patient is due to have a colonoscopy. No Continue current medications:       Yes     Orders Placed This Encounter   Procedures    External Referral To Cardiac Electrophysiology     Referral Priority:   Routine     Referral Type:   Eval and Treat     Referral Reason:   Specialty Services Required     Referred to Provider:   Chitra Choe MD     Requested Specialty:   Cardiac Electrophysiology     Number of Visits Requested:   1    EKG 12 lead     Order Specific Question:   Reason for Exam?     Answer:   Hypertension     No orders of the defined types were placed in this encounter. Discussed with patient and spouse. Return in about 2 months (around 5/2/2021) for FOLLOW UP with me. I greatly appreciate the opportunity to meet Jolene Cui and your confidence in allowing me to participate in his cardiovascular care. SANDY Butler NP  3/2/2021 11:48 AM CST                    This dictation was generated by voice recognition computer software. Although all attempts are made to edit dictation for accuracy, there may be errors in the transcription that are not intended.

## 2021-03-04 ENCOUNTER — OFFICE VISIT (OUTPATIENT)
Age: 46
End: 2021-03-04

## 2021-03-04 VITALS — OXYGEN SATURATION: 94 % | HEART RATE: 100 BPM | TEMPERATURE: 96.1 F

## 2021-03-04 DIAGNOSIS — Z11.59 SCREENING FOR VIRAL DISEASE: Primary | ICD-10-CM

## 2021-03-04 PROCEDURE — 99999 PR OFFICE/OUTPT VISIT,PROCEDURE ONLY: CPT | Performed by: NURSE PRACTITIONER

## 2021-03-05 LAB — SARS-COV-2, NAA: NOT DETECTED

## 2021-03-08 ENCOUNTER — HOSPITAL ENCOUNTER (OUTPATIENT)
Dept: PULMONOLOGY | Age: 46
Discharge: HOME | End: 2021-03-08
Payer: MEDICARE

## 2021-03-08 DIAGNOSIS — R06.02 SHORT OF BREATH ON EXERTION: ICD-10-CM

## 2021-03-08 DIAGNOSIS — J44.9 CHRONIC OBSTRUCTIVE PULMONARY DISEASE, UNSPECIFIED COPD TYPE (HCC): ICD-10-CM

## 2021-03-08 PROCEDURE — 6370000000 HC RX 637 (ALT 250 FOR IP): Performed by: NURSE PRACTITIONER

## 2021-03-08 RX ORDER — MONTELUKAST SODIUM 4 MG/1
TABLET, CHEWABLE ORAL
Qty: 60 TABLET | Refills: 0 | Status: SHIPPED | OUTPATIENT
Start: 2021-03-08 | End: 2021-04-12

## 2021-03-08 RX ORDER — ALBUTEROL SULFATE 90 UG/1
4 AEROSOL, METERED RESPIRATORY (INHALATION)
Status: COMPLETED | OUTPATIENT
Start: 2021-03-08 | End: 2021-03-08

## 2021-03-08 RX ADMIN — ALBUTEROL SULFATE 4 PUFF: 90 AEROSOL, METERED RESPIRATORY (INHALATION) at 15:10

## 2021-03-08 NOTE — LETTER
Pulmonary Fuction Testing Lab  [address] [facility], [address] [place] [address]  [phone]  Fax [phone]   January 27, 2021    Dear Curt Faria,      This letter is to remind you of your upcoming appointment on 3/8/2021 at 2:30 AM. Due to the COVID-19 pandemic and states restrictions all patients are required to have a COVID-19 test done prior to Pulmonary Function Testing. You must do your COVID-19 test on 3/4/2021 between 8 AM and 12 PM. You need to self quarantine once your test is completed until your appointment. Your Covid test must be done 4 days prior to you pulmonary test. We are required to have results back prior to your appointment, otherwise you will need to be rescheduled. If you choose to have your Covid test done at another facility rather than at the Located within [facility], you will need to have the printed test results with you on the day of your appointment. Below is the address of the testing location:    [facility]-   Location: [address] Smita Bell 7 Monday thru Friday- 8 AM- 12 PM     On the day of your appointment you will come through the Main Entrance of the hospital in order to register at the [facility] [place] registration. Upon entry you will be screened for any temperature, symptoms and history. After being screened you will be directed to the registration area. This helps us identify any potential COVID-19 exposure early on and take appropriate steps for the health and saftey of all our patients and health care professionals. Thank you for entrusting us at [facility]) with your care. We look forward to caring for you.     [facility]) Pulmonary Function Lab    Adi Marinelli  Registered Respiratory Therapist

## 2021-03-09 NOTE — PROCEDURES
Forceful capacity is moderately reduced FEV1 is moderately reduced FEV1 over FVC ratio is normal.  After bronchodilator therapy there was a significant improvement in FEV1. Lung capacity is moderately reduced residual volume is reduced diffusing capacity when corrected for alveolar volume is increased     Impression:    Moderate restrictive lung defect likely secondary to body habitus. Cannot exclude underlying primary lung pathology such as interstitial lung disease. Significant response to bronchodilator suggestive of possible added asthma or reactive airway disorder. Clinical correlation recommended.

## 2021-03-11 ENCOUNTER — TELEPHONE (OUTPATIENT)
Dept: PRIMARY CARE CLINIC | Age: 46
End: 2021-03-11

## 2021-03-11 DIAGNOSIS — R94.2 ABNORMAL PFTS (PULMONARY FUNCTION TESTS): Primary | ICD-10-CM

## 2021-03-11 NOTE — TELEPHONE ENCOUNTER
----- Message from SANDY Arnett sent at 3/11/2021  2:31 PM CST -----  Please let patient know that PFTs have returned. They were slightly abnormal I would recommend following up with pulmonary. Please place referral to [facility].

## 2021-03-16 ENCOUNTER — TELEPHONE (OUTPATIENT)
Dept: CARDIOLOGY CLINIC | Age: 46
End: 2021-03-16

## 2021-03-16 NOTE — TELEPHONE ENCOUNTER
Tossa- referral was sent to wrong Dr. Shyann Rodas.  It needs to go to [facility]

## 2021-03-30 ENCOUNTER — OFFICE VISIT (OUTPATIENT)
Dept: PULMONOLOGY | Age: 46
End: 2021-03-30
Payer: MEDICARE

## 2021-03-30 VITALS
HEART RATE: 93 BPM | HEIGHT: 74 IN | SYSTOLIC BLOOD PRESSURE: 140 MMHG | OXYGEN SATURATION: 98 % | DIASTOLIC BLOOD PRESSURE: 80 MMHG | BODY MASS INDEX: 40.43 KG/M2 | TEMPERATURE: 98.5 F | WEIGHT: 315 LBS

## 2021-03-30 DIAGNOSIS — R06.02 SHORTNESS OF BREATH: Primary | ICD-10-CM

## 2021-03-30 DIAGNOSIS — E78.2 MIXED HYPERLIPIDEMIA: ICD-10-CM

## 2021-03-30 DIAGNOSIS — G47.8 NON-RESTORATIVE SLEEP: ICD-10-CM

## 2021-03-30 DIAGNOSIS — I10 ESSENTIAL HYPERTENSION: ICD-10-CM

## 2021-03-30 DIAGNOSIS — E66.01 MORBID OBESITY (HCC): ICD-10-CM

## 2021-03-30 DIAGNOSIS — G47.10 HYPERSOMNIA: ICD-10-CM

## 2021-03-30 DIAGNOSIS — G47.33 OBSTRUCTIVE SLEEP APNEA SYNDROME: ICD-10-CM

## 2021-03-30 DIAGNOSIS — J45.20 MILD INTERMITTENT ASTHMA WITHOUT COMPLICATION: ICD-10-CM

## 2021-03-30 DIAGNOSIS — J98.4 RESTRICTIVE LUNG DISEASE: ICD-10-CM

## 2021-03-30 DIAGNOSIS — R06.83 SNORING: ICD-10-CM

## 2021-03-30 DIAGNOSIS — R73.09 ELEVATED GLUCOSE: ICD-10-CM

## 2021-03-30 DIAGNOSIS — Z87.891 HISTORY OF SMOKING: ICD-10-CM

## 2021-03-30 LAB
ALBUMIN SERPL-MCNC: 4 G/DL (ref 3.5–5.2)
ALP BLD-CCNC: 135 U/L (ref 40–130)
ALT SERPL-CCNC: 58 U/L (ref 5–41)
ANION GAP SERPL CALCULATED.3IONS-SCNC: 10 MMOL/L (ref 7–19)
AST SERPL-CCNC: 73 U/L (ref 5–40)
BILIRUB SERPL-MCNC: 0.6 MG/DL (ref 0.2–1.2)
BUN BLDV-MCNC: 7 MG/DL (ref 6–20)
CALCIUM SERPL-MCNC: 9 MG/DL (ref 8.6–10)
CHLORIDE BLD-SCNC: 99 MMOL/L (ref 98–111)
CHOLESTEROL, FASTING: 121 MG/DL (ref 160–199)
CO2: 28 MMOL/L (ref 22–29)
CREAT SERPL-MCNC: 0.7 MG/DL (ref 0.5–1.2)
DISTANCE WALKED: 400 FT
GFR AFRICAN AMERICAN: >59
GFR NON-AFRICAN AMERICAN: >60
GLUCOSE BLD-MCNC: 113 MG/DL (ref 74–109)
HBA1C MFR BLD: 5.5 % (ref 4–6)
HCT VFR BLD CALC: 45.4 % (ref 42–52)
HDLC SERPL-MCNC: 49 MG/DL (ref 55–121)
HEMOGLOBIN: 15.3 G/DL (ref 14–18)
LDL CHOLESTEROL CALCULATED: 40 MG/DL
MCH RBC QN AUTO: 30.4 PG (ref 27–31)
MCHC RBC AUTO-ENTMCNC: 33.7 G/DL (ref 33–37)
MCV RBC AUTO: 90.1 FL (ref 80–94)
PDW BLD-RTO: 13.1 % (ref 11.5–14.5)
PLATELET # BLD: 171 K/UL (ref 130–400)
PMV BLD AUTO: 9.2 FL (ref 9.4–12.4)
POTASSIUM SERPL-SCNC: 4.1 MMOL/L (ref 3.5–5)
RBC # BLD: 5.04 M/UL (ref 4.7–6.1)
RHEUMATOID FACTOR: <10 IU/ML
SEDIMENTATION RATE, ERYTHROCYTE: 9 MM/HR (ref 0–10)
SODIUM BLD-SCNC: 137 MMOL/L (ref 136–145)
SPO2: 96 %
TOTAL PROTEIN: 7 G/DL (ref 6.6–8.7)
TRIGLYCERIDE, FASTING: 161 MG/DL (ref 0–149)
WBC # BLD: 8.5 K/UL (ref 4.8–10.8)

## 2021-03-30 PROCEDURE — 94618 PULMONARY STRESS TESTING: CPT | Performed by: INTERNAL MEDICINE

## 2021-03-30 PROCEDURE — G8427 DOCREV CUR MEDS BY ELIG CLIN: HCPCS | Performed by: INTERNAL MEDICINE

## 2021-03-30 PROCEDURE — 99204 OFFICE O/P NEW MOD 45 MIN: CPT | Performed by: INTERNAL MEDICINE

## 2021-03-30 PROCEDURE — G8484 FLU IMMUNIZE NO ADMIN: HCPCS | Performed by: INTERNAL MEDICINE

## 2021-03-30 PROCEDURE — G8417 CALC BMI ABV UP PARAM F/U: HCPCS | Performed by: INTERNAL MEDICINE

## 2021-03-30 PROCEDURE — 1036F TOBACCO NON-USER: CPT | Performed by: INTERNAL MEDICINE

## 2021-03-30 ASSESSMENT — ENCOUNTER SYMPTOMS
RHINORRHEA: 0
APNEA: 0
COUGH: 0
ABDOMINAL PAIN: 0
SHORTNESS OF BREATH: 1
WHEEZING: 1
ANAL BLEEDING: 0
BACK PAIN: 0
CHEST TIGHTNESS: 0
ABDOMINAL DISTENTION: 0

## 2021-03-30 NOTE — PROGRESS NOTES
Pulmonary and Sleep Medicine     Camelia Tellez (:  1975) is a 51 y.o. male,New patient, here for evaluation of the following chief complaint(s): Other (abnormal PFT) and Shortness of Breath      ASSESSMENT/PLAN:  1. Shortness of breath  -     6 Minute Walk Test  2. Restrictive lung disease  -     Sedimentation Rate; Future  -     VANDANA Screen with Reflex; Future  -     Rheumatoid Factor; Future  -     CT CHEST HIGH RESOLUTION; Future  3. Morbid obesity ([address]  4. Snoring  -     Ambulatory referral to Sleep Medicine  -     Baseline Diagnostic Sleep Study; Future  5. Non-restorative sleep  -     Ambulatory referral to Sleep Medicine  -     Baseline Diagnostic Sleep Study; Future  6. Hypersomnia  -     Ambulatory referral to Sleep Medicine  -     Baseline Diagnostic Sleep Study; Future  7. History of smoking. quit 2020  8. Obstructive sleep apnea syndrome  -     Ambulatory referral to Sleep Medicine  -     Baseline Diagnostic Sleep Study; Future  9. Mild intermittent asthma without complication      Shortness of breath is multifactorial and most likely secondary to morbid obesity. Cannot exclude an element of intrinsic lung disease. We will proceed with autoimmune and connective tissue disease work-up as above. Although it is most likely that his restriction on the pulmonary function study is secondary to his body habitus. He does have features of asthma although the spirometry was not diagnostic of COPD. He does wheeze and his wheezing gets better with the inhalers. He does have significant risk factors for obstructive sleep apnea which could be high risk condition his situation due to cardiovascular morbidities. We will proceed with a sleep study in the sleep lab. Discussed weight loss. No follow-ups on file.     SUBJECTIVE/OBJECTIVE:  He is here for evaluation of shortness of breath and abnormal pulmonary function test.  He underwent pulmonary function study for evaluation of shortness of breath. That showed I have total lung capacity of 50% of predicted. There was also evidence of reversible airway obstruction. He does admit to wheezing during sleep. He quit smoking in July 2020. He felt that his breathing got worse since then. He does admit to using inhalers. He feels they have helped him occasionally. He says he feels a rare need for the inhalers however. He is not on any maintenance inhaler at the time. He did have a sleep study when he was in his 25s. He was told he has sleep apnea. He could not tolerate headgear at that time. He admits to snoring and apneas. His sleep is nonrefreshing. He is sleepy and fatigued through the daytime. He does fall asleep in passive situations such as watching TV. Prior to Visit Medications    Medication Sig Taking? Authorizing Provider   colestipol (COLESTID) 1 g tablet TAKE 1 TABLET BY MOUTH TWICE DAILY. Yes SANDY Acosta   clonazePAM (KLONOPIN) 0.5 MG tablet  Yes Historical Provider, MD   PARoxetine (PAXIL) 10 MG tablet  Yes Historical Provider, MD   lisinopril (PRINIVIL;ZESTRIL) 20 MG tablet Take 1 tablet by mouth daily Yes SANDY Acosta   atorvastatin (LIPITOR) 80 MG tablet TAKE 1 TABLET BY MOUTH AT NIGHT. Yes SANDY Acosta   metoprolol tartrate (LOPRESSOR) 25 MG tablet Take 2 tablets by mouth 2 times daily Yes SANDY Harmon - NP   Misc. Devices (ROLLING WALKER/BURGUNDY) MISC Dx: gen weakness and fatigue use daily as directed Yes SANDY Acosta   ARIPiprazole (ABILIFY) 5 MG tablet  Yes Historical Provider, MD   lamoTRIgine (LAMICTAL) 100 MG tablet  Yes Historical Provider, MD   TRINTELLIX 10 MG TABS tablet  Yes Historical Provider, MD   traZODone (DESYREL) 150 MG tablet Take 150 mg by mouth nightly Yes Historical Provider, MD   HYDROcodone-acetaminophen (NORCO)  MG per tablet Take 1 tablet by mouth every 8 hours as needed for Pain.  Yes Historical Provider, MD        Review of Systems Constitutional: Negative for activity change, appetite change, chills, diaphoresis and fatigue. HENT: Negative for congestion, dental problem, drooling, ear discharge, postnasal drip and rhinorrhea. Eyes: Negative for visual disturbance. Respiratory: Positive for shortness of breath and wheezing. Negative for apnea, cough and chest tightness. Gastrointestinal: Negative for abdominal distention, abdominal pain and anal bleeding. Endocrine: Negative for cold intolerance, heat intolerance and polydipsia. Genitourinary: Negative for difficulty urinating, dysuria, enuresis and flank pain. Musculoskeletal: Negative for arthralgias, back pain and gait problem. Allergic/Immunologic: Negative for environmental allergies. Neurological: Negative for dizziness, facial asymmetry, light-headedness and headaches. Vitals:    03/30/21 1000   BP: (!) 140/80   Pulse: 93   Temp: 98.5 °F (36.9 °C)   SpO2: 98%     Physical Exam  Vitals signs reviewed. Constitutional:       Appearance: Normal appearance. Comments: Morbid obesity     HENT:      Head: Normocephalic and atraumatic. Nose: Nose normal.   Eyes:      Extraocular Movements: Extraocular movements intact. Conjunctiva/sclera: Conjunctivae normal.   Neck:      Musculoskeletal: Normal range of motion and neck supple. Cardiovascular:      Rate and Rhythm: Normal rate and regular rhythm. Heart sounds: No murmur. No friction rub. Pulmonary:      Effort: Pulmonary effort is normal. No respiratory distress. Breath sounds: Normal breath sounds. No stridor. No wheezing, rhonchi or rales. Abdominal:      General: There is no distension. Palpations: There is no mass. Tenderness: There is no abdominal tenderness. There is no guarding or rebound. Neurological:      Mental Status: He is alert and oriented to person, place, and time. An electronic signature was used to authenticate this note.     --Corrina CHOWDHURY MD Becky

## 2021-04-02 LAB — ANA IGG, ELISA: NORMAL

## 2021-04-08 ENCOUNTER — HOSPITAL ENCOUNTER (OUTPATIENT)
Dept: CT IMAGING | Age: 46
Discharge: HOME | End: 2021-04-08
Payer: MEDICARE

## 2021-04-08 DIAGNOSIS — J98.4 RESTRICTIVE LUNG DISEASE: ICD-10-CM

## 2021-04-08 PROCEDURE — 71250 CT THORAX DX C-: CPT

## 2021-04-12 RX ORDER — MONTELUKAST SODIUM 4 MG/1
TABLET, CHEWABLE ORAL
Qty: 60 TABLET | Refills: 0 | Status: SHIPPED | OUTPATIENT
Start: 2021-04-12 | End: 2021-05-10

## 2021-04-16 ENCOUNTER — VIRTUAL VISIT (OUTPATIENT)
Dept: PRIMARY CARE CLINIC | Age: 46
End: 2021-04-16
Payer: MEDICARE

## 2021-04-16 DIAGNOSIS — I10 ESSENTIAL HYPERTENSION: Primary | ICD-10-CM

## 2021-04-16 DIAGNOSIS — R94.2 ABNORMAL PFTS (PULMONARY FUNCTION TESTS): ICD-10-CM

## 2021-04-16 DIAGNOSIS — I10 WHITE COAT SYNDROME WITH DIAGNOSIS OF HYPERTENSION: ICD-10-CM

## 2021-04-16 DIAGNOSIS — E66.01 CLASS 3 SEVERE OBESITY DUE TO EXCESS CALORIES WITH SERIOUS COMORBIDITY AND BODY MASS INDEX (BMI) OF 45.0 TO 49.9 IN ADULT (HCC): ICD-10-CM

## 2021-04-16 PROCEDURE — 99214 OFFICE O/P EST MOD 30 MIN: CPT | Performed by: NURSE PRACTITIONER

## 2021-04-16 PROCEDURE — G8428 CUR MEDS NOT DOCUMENT: HCPCS | Performed by: NURSE PRACTITIONER

## 2021-04-16 RX ORDER — CLONIDINE HYDROCHLORIDE 0.1 MG/1
0.1 TABLET ORAL DAILY PRN
Qty: 30 TABLET | Refills: 3 | Status: SHIPPED | OUTPATIENT
Start: 2021-04-16

## 2021-04-16 ASSESSMENT — ENCOUNTER SYMPTOMS
SHORTNESS OF BREATH: 1
GASTROINTESTINAL NEGATIVE: 1
EYES NEGATIVE: 1

## 2021-04-16 NOTE — PROGRESS NOTES
[address] [address]     Phone:  [phone]  Fax:  [phone]      2021    TELEHEALTH EVALUATION -- Audio/Visual (During FRPQJ-82 public health emergency)    HPI:    Chief Complaint   Patient presents with    Hypertension         Ousmane Woodson (:  1975) has requested an audio/video evaluation for the following concern(s): This is a VV for follow up on HTN. He has been taking his readings at home and they have been doing well at 120/80. He is having issues with BP being elevated when he goes to a doctors office. He is also having follow up with pulmonary with a sleep study coming up next month as well. Review of Systems   Constitutional: Positive for fatigue. HENT: Negative. Eyes: Negative. Respiratory: Positive for shortness of breath (intermittent). Cardiovascular: Negative. Gastrointestinal: Negative. Endocrine: Negative. Genitourinary: Negative. Musculoskeletal: Negative. Skin: Negative. Neurological: Negative. Hematological: Negative. Psychiatric/Behavioral: Negative. Prior to Visit Medications    Medication Sig Taking? Authorizing Provider   cloNIDine (CATAPRES) 0.1 MG tablet Take 1 tablet by mouth daily as needed for High Blood Pressure (140/90) Yes SANDY Veronica   colestipol (COLESTID) 1 g tablet TAKE 1 TABLET BY MOUTH TWICE DAILY. SANDY Veronica   clonazePAM (KLONOPIN) 0.5 MG tablet   Historical Provider, MD   PARoxetine (PAXIL) 10 MG tablet   Historical Provider, MD   lisinopril (PRINIVIL;ZESTRIL) 20 MG tablet Take 1 tablet by mouth daily  SANDY Veronica   atorvastatin (LIPITOR) 80 MG tablet TAKE 1 TABLET BY MOUTH AT NIGHT. SANDY Veronica   metoprolol tartrate (LOPRESSOR) 25 MG tablet Take 2 tablets by mouth 2 times daily  SANDY Finch - NP   Misc.  Devices (ROLLING WALKER/BURGUNDY) MISC Dx: gen weakness and fatigue use daily as directed  SANDY Veronica ASSESSMENT/PLAN:  1. Essential hypertension    - cloNIDine (CATAPRES) 0.1 MG tablet; Take 1 tablet by mouth daily as needed for High Blood Pressure (140/90)  Dispense: 30 tablet; Refill: 3    2. White coat syndrome with diagnosis of hypertension    - cloNIDine (CATAPRES) 0.1 MG tablet; Take 1 tablet by mouth daily as needed for High Blood Pressure (140/90)  Dispense: 30 tablet; Refill: 3    3. Abnormal PFTs (pulmonary function tests)      4. Class 3 severe obesity due to excess calories with serious comorbidity and body mass index (BMI) of 45.0 to 49.9 in adult [facility])      Patient is to keep appointment with pulmonary as scheduled. Sleep study as well. Discussed weight loss. Starting clonidine as needed for elevated BP. May take prior to appointment with pain management as long as BP is elevated. Return in about 2 months (around 6/16/2021) for Office Visit, HTN, sleep study and pulm. An  electronic signature was used to authenticate this note. --SANDY Veronica on 4/16/2021 at 11:05 AM        Pursuant to the emergency declaration under the [facility] waiver authority and the [employer] Act, this Virtual  Visit was conducted, with patient's consent, to reduce the patient's risk of exposure to COVID-19 and provide continuity of care for an established patient. Services were provided through a video synchronous discussion virtually to substitute for in-person clinic visit.

## 2021-05-10 RX ORDER — MONTELUKAST SODIUM 4 MG/1
TABLET, CHEWABLE ORAL
Qty: 60 TABLET | Refills: 0 | Status: SHIPPED | OUTPATIENT
Start: 2021-05-10 | End: 2021-06-14

## 2021-05-13 ENCOUNTER — OFFICE VISIT (OUTPATIENT)
Dept: CARDIOLOGY CLINIC | Age: 46
End: 2021-05-13
Payer: MEDICARE

## 2021-05-13 VITALS
HEART RATE: 69 BPM | DIASTOLIC BLOOD PRESSURE: 78 MMHG | OXYGEN SATURATION: 96 % | WEIGHT: 315 LBS | SYSTOLIC BLOOD PRESSURE: 128 MMHG | HEIGHT: 74 IN | BODY MASS INDEX: 40.43 KG/M2

## 2021-05-13 DIAGNOSIS — I25.10 CORONARY ARTERY DISEASE INVOLVING NATIVE CORONARY ARTERY OF NATIVE HEART WITHOUT ANGINA PECTORIS: Primary | ICD-10-CM

## 2021-05-13 DIAGNOSIS — I10 ESSENTIAL HYPERTENSION: ICD-10-CM

## 2021-05-13 DIAGNOSIS — E78.5 HYPERLIPIDEMIA, UNSPECIFIED HYPERLIPIDEMIA TYPE: ICD-10-CM

## 2021-05-13 DIAGNOSIS — I45.6 WPW (WOLFF-PARKINSON-WHITE SYNDROME): ICD-10-CM

## 2021-05-13 PROCEDURE — G8427 DOCREV CUR MEDS BY ELIG CLIN: HCPCS | Performed by: NURSE PRACTITIONER

## 2021-05-13 PROCEDURE — 1036F TOBACCO NON-USER: CPT | Performed by: NURSE PRACTITIONER

## 2021-05-13 PROCEDURE — 99214 OFFICE O/P EST MOD 30 MIN: CPT | Performed by: NURSE PRACTITIONER

## 2021-05-13 PROCEDURE — G8417 CALC BMI ABV UP PARAM F/U: HCPCS | Performed by: NURSE PRACTITIONER

## 2021-05-13 ASSESSMENT — ENCOUNTER SYMPTOMS
CHEST TIGHTNESS: 0
SORE THROAT: 0
WHEEZING: 0
SHORTNESS OF BREATH: 1
COUGH: 0

## 2021-05-13 NOTE — PROGRESS NOTES
[facility] Cardiology   Established Patient Office Visit   [address] [facility]  [phone]        OFFICE VISIT:  2021    Lisa Ayala - : 1975    Reason For Visit:  Claudine Blanca is a 51 y.o. male who is here for Follow-up (Patient has ablation procedue scheduled on the  with  ), Hypertension, and Coronary Artery Disease    1. Coronary artery disease involving native coronary artery of native heart without angina pectoris    2. Essential hypertension    3. Hyperlipidemia, unspecified hyperlipidemia type    4. WPW (Abhilash-Parkinson-White syndrome)          Patient with a history of hypertension, hyperlipidemia, sleep apnea, WPW, and coronary artery disease. Patient had ER visit on 2021 for paroxysmal SVT. Pulse rate 206 bpm.  He was converted after 12 mg of adenosine. Patient was discharged on beta-blocker. Patient presented to clinic on 3/2/2021 for hospital follow-up. EKG showed sinus rhythm with a WPW pattern. Patient was referred to Dr. Maximo Alberts electrophysiologist.    Patient was seen by Dr. Maximo Alberts about possible ablation on 2021. Patient is scheduled for ablation on 2021 with Dr. Maximo Alberts in [place], [place]. DATA:    2020 2D ECHO   Normal left ventricular chamber size and systolic function. No regional wall motion abnormalities.   EF 65%.     2020 DSE   Dobutamine stress echocardiogram without clinical, electrocardiographic, or echocardiographic evidence of myocardial ischemia.     ZIO PATCH:     Predominantly sinus rhythm with rare PACs.  One SVT run 4 beats.  Rare PVCs.  No VT, no pauses, no high degree AV blocks, nor atrial fib noted.     Catheterization 2018 bare-metal stent and was on aspirin and Plavix for 30 days.     Subjective    Lisa Ayala is a 51 y.o. male [facility] the [email or web address]:    Patient Active Problem List    Diagnosis Date Noted    CAD in native artery 2018 Priority: High    Shortness of breath 03/30/2021    Restrictive lung disease 03/30/2021    Snoring 03/30/2021    Non-restorative sleep 03/30/2021    Hypersomnia 03/30/2021    Obstructive sleep apnea syndrome 03/30/2021    Mild intermittent asthma without complication 06/52/6169    Chronic obstructive pulmonary disease ([address] 08/14/2019    History of smoking 08/14/2019    Morbid obesity ([address] 07/30/2019    Leg pain, bilateral     Acute chest pain     Cigarette nicotine dependence without complication 70/77/7624    Bipolar depression ([address] 12/16/2016    S/P laparoscopic appendectomy 02/27/2014    History of colon polyps 05/17/2013    Depression     Hypertension     Anxiety      Current Outpatient Medications   Medication Sig Dispense Refill    colestipol (COLESTID) 1 g tablet TAKE 1 TABLET BY MOUTH TWICE DAILY. 60 tablet 0    cloNIDine (CATAPRES) 0.1 MG tablet Take 1 tablet by mouth daily as needed for High Blood Pressure (140/90) 30 tablet 3    clonazePAM (KLONOPIN) 0.5 MG tablet       PARoxetine (PAXIL) 10 MG tablet       lisinopril (PRINIVIL;ZESTRIL) 20 MG tablet Take 1 tablet by mouth daily 30 tablet 5    atorvastatin (LIPITOR) 80 MG tablet TAKE 1 TABLET BY MOUTH AT NIGHT. 30 tablet 5    metoprolol tartrate (LOPRESSOR) 25 MG tablet Take 2 tablets by mouth 2 times daily 180 tablet 3    Misc. Devices (ROLLING WALKER/BURGUNDY) MISC Dx: gen weakness and fatigue use daily as directed 1 each 0    ARIPiprazole (ABILIFY) 5 MG tablet       lamoTRIgine (LAMICTAL) 100 MG tablet       TRINTELLIX 10 MG TABS tablet       traZODone (DESYREL) 150 MG tablet Take 150 mg by mouth nightly      HYDROcodone-acetaminophen (NORCO)  MG per tablet Take 1 tablet by mouth every 8 hours as needed for Pain. No current facility-administered medications for this visit.       Allergies: Dye [gadolinium derivatives]  Past Medical History:   Diagnosis Date    Anxiety     Arthritis     CAD in native artery 3/6/2018    Chronic back pain     Depression     Hyperlipidemia     Hypertension     IBS (irritable bowel syndrome)     Mild intermittent asthma without complication     Panic disorder     at times afraid to go outside    Unspecified sleep apnea     bipap     Past Surgical History:   Procedure Laterality Date    ANUS SURGERY      APPENDECTOMY  2014    CHOLECYSTECTOMY  2009    COLONOSCOPY  2012        COLONOSCOPY  10/19/2017    Dr Sandee Frazier, Benign HP    COLONOSCOPY  2009    [facility]    ELECTROCONVULSIVE THERAPY N/A 2017    ELECTROCONVULSIVE THERAPY performed by Elham Amaral DO at [address]      umbilical     INGUINAL HERNIA REPAIR Right 1990    right ing with mesh    VASECTOMY       Family History   Problem Relation Age of Onset    Diabetes Mother     Cancer Mother         uterine CA    Depression Mother     Mental Illness Mother     Hypertension Mother    [employer] Other Mother         blood clots    Heart Disease Brother     Depression Brother     Mental Illness Brother     Hypertension Brother     Breast Cancer Maternal Grandmother     Cancer Maternal Grandmother         breast CA    Heart Attack Maternal Grandmother     Other Maternal Grandmother         blood clots     Social History     Tobacco Use    Smoking status: Former Smoker     Packs/day: 0.50     Years: 24.00     Pack years: 12.00     Types: Cigarettes     Quit date: 7/15/2020     Years since quittin.8    Smokeless tobacco: Never Used   Substance Use Topics    Alcohol use: No     Alcohol/week: 0.0 standard drinks          Review of Systems:    Review of Systems   Constitutional: Positive for fatigue. Negative for activity change, chills, diaphoresis and fever. HENT: Negative for congestion and sore throat. Respiratory: Positive for shortness of breath. Negative for cough, chest tightness and wheezing. Yes           3. Hypertension Well Controlled  blood pressure in the office today 128/78. O2 sat 96%. Patient is on clonidine 0.1 mg as needed. Lisinopril 20 mg daily. No Continue current medications:       Yes           4. Hyperlipidemia Stable  patient is on Lipitor 80 mg daily. No Continue current medications:       Yes     No orders of the defined types were placed in this encounter. No orders of the defined types were placed in this encounter. Discussed with patient and adult child. Return in about 3 months (around 8/13/2021) for Routine with me . I greatly appreciate the opportunity to meet Jason Ramsay and your confidence in allowing me to participate in his cardiovascular care. SANDY Alfaro NP  5/13/2021 2:59 PM CDT                    This dictation was generated by voice recognition computer software. Although all attempts are made to edit dictation for accuracy, there may be errors in the transcription that are not intended.

## 2021-05-17 ENCOUNTER — HOSPITAL ENCOUNTER (OUTPATIENT)
Dept: SLEEP CENTER | Age: 46
Discharge: HOME | End: 2021-05-19
Payer: MEDICARE

## 2021-05-17 PROCEDURE — 95810 POLYSOM 6/> YRS 4/> PARAM: CPT | Performed by: INTERNAL MEDICINE

## 2021-05-17 PROCEDURE — 95811 POLYSOM 6/>YRS CPAP 4/> PARM: CPT

## 2021-05-18 ENCOUNTER — TRANSCRIBE ORDERS (OUTPATIENT)
Dept: LAB | Facility: HOSPITAL | Age: 46
End: 2021-05-18

## 2021-05-18 ENCOUNTER — TELEPHONE (OUTPATIENT)
Dept: PULMONOLOGY | Age: 46
End: 2021-05-18

## 2021-05-18 DIAGNOSIS — Z01.818 PRE-OP TESTING: Primary | ICD-10-CM

## 2021-05-18 NOTE — PROGRESS NOTES
[place] [address]  [place], [address]  Phone [phone] Fax [phone]     Sleep Study Technician Review    Patient Name:  Clifton Boland  :   1975  Referring Provider: Eileen Green    Brief History:  Clifton Boland is a 51 y.o. male with a history of Snoring, Hypertension, Depression, Anxiety, PTSD who has been referred for a sleep study. He does have significant risk factors for obstructive sleep apnea which could be high risk condition his situation due to cardiovascular morbidities. Height: 6'2\"  Weight: 365 lbs  BMI: 46.86  Neck Circ: 22.5\"  MALLAMPATI: Type 4  ESS:      Type of Study: Split study  Time Stage Position Snore Hypopnea Obs Apnea Teddy Apnea PAP O2   2200 1 Right No Yes No No  RA   2300 2 Supine Yes Yes No No  RA   2400 Awake Right No No No No Cpap 4 RA   0100 2 Right No No No No Cpap 11 RA   0200 2 Right No No No No Cpap 11 RA   0300 Awake Supine No No No No Cpap 11 RA   0400 2 Right No No No No Bipap 18/14 RA   0445 2 Right No No No N Bipap 18/14 RA                Summary:Pt stated that he has had a sleep study in the past and was told he has WANG. Pt tried therapy at that time but could not tolerate. Pt has decided now to have another sleep study to see if his WANG has gotten worse. Pt stated that he does snore. Pt had multiple events with loud snores. Tech in to begin titration. Pt was switched to bipap due to unable to tolerate cpap therapy. Pt tolerated mask well. DME: [facility]     Final PAP settings: Bipap 18/14  Mask Type: Full Face  Mask: Vitera   Mask Size: Large    The study was reviewed briefly with Clifton Boland. He will be notified of the formal results and recommendations after the study is scored and interpreted. The report will be sent to his referring provider.     Technician: LIAN Cifuentes

## 2021-05-19 DIAGNOSIS — G47.33 OBSTRUCTIVE SLEEP APNEA SYNDROME: Primary | ICD-10-CM

## 2021-05-19 NOTE — PROCEDURES
index 11.0    11.0   RERA index 0.0    0.0   .3    122.2   RDI (AHI + RERA index) 119.3    122.2     Respiratory Event Summary Therapy    NREM REM Total   Hypopnea index 3.0 0.0 2.6   Apnea index 0.3 0.0 0.2   RERA index 0.0 0.0 0.0   AHI 3.3 0.0 2.8   RDI (AHI + RERA index) 3.3 0.0 2.8       Respiratory Events by Sleep Stage Diagnostic    OA Index CA Index MA Index H Index   R Index   NREM 5.9 5.1 0.0 108.3 0.0   REM                  Total 5.9 5.1 0.0 111.2 0.0     Respiratory Events by Sleep Stage Therapy    OA Index CA Index MA Index H Index   R Index   NREM 0.0 0.0 0.3 3.0 0.0   REM 0.0 0.0 0.0 0.0 0.0   Total 0.0 0.0 0.2 2.6 0.0     Respiratory Events by Body Position Diagnostic    OA Index CA Index MA Index H Index RERA  Index AHI RDI   Supine 22.8 0.0 0.0 132.9 0.0 155.7 155.7   R/Supine                        L/Supine                        Right 1.8 6.3 0.0 106.0 0.0 114.2 114.2   Left                        Prone                        R/Prone                        L/Prone                        Up                            Respiratory Events by Body Position Therapy    OA Index CA Index MA Index H Index RERA  Index AHI RDI   Supine 0.0 0.0 1.5 6.0 0.0 7.5 7.5   R/Supine                        L/Supine                        Right 0.0 0.0 0.0 1.9 0.0 1.9 1.9   Left                        Prone                        R/Prone                        L/Prone                        Up                            Oximetry Data Diagnostic Wake NREM REM TST   Average Saturation  92% 91%   % 91%   Desaturation Index (#/hour)  95.1    97.3   Desaturation Max Duration (sec)  46.0 0.0 46.0   Minimum O2 Saturation    83%     Oximetry Data Diagnostic Wake NREM REM TST   Average Saturation  93% 92% 94% 93%   Desaturation Index (#/hour)  27.9 1.7 25.7   Desaturation Max Duration (sec)  46.0 15.5 46.0   Minimum O2 Saturation    82%        Oximetry   Disribution  Diagnostic TOTAL %TIB   <90% (min) 22.9 18.0%   <85% Movements PLMS  PLMS with arousal   # of events 0 0 0   Index (#/hr TST) 0 0 0     PLM Data Diagnostic Leg Movements PLMS  PLMS with arousal   # of events 1 0 0   Index (#/hr TST) 0.2 0 0     Therapy Titration   IPAP EPAP TIB Sleep REM Apneas Hypopneas RERAs   Min     (min) (min) (min) CA# OA# MA# Index # Index # Index AHI RDI SpO2    4 4 42.9 0.0 0.0 0 0 0 0.0 0 0.0 0 0.0 0.0 0.0       6 6 12.0 11.0 0.0 0 0 0 0.0 3 16.4 0 0.0 16.4 16.4 90    8 8 5.0 5.0 0.0 0 0 0 0.0 2 24.0 0 0.0 24.0 24.0 91    9 9 3.8 3.8 0.0 0 0 0 0.0 0 0.0 0 0.0 0.0 0.0 88    10 10 5.8 5.3 0.0 0 0 0 0.0 1 11.3 0 0.0 11.3 11.3 85    11 11 134.0 131.5 35.5 0 0 0 0.0 1 0.5 0 0.0 0.5 0.5 89    12 12 4.1 4.1 0.0 0 0 0 0.0 0 0.0 0 0.0 0.0 0.0 90    13 13 3.8 3.8 0.0 0 0 0 0.0 0 0.0 0 0.0 0.0 0.0 88    14 14 8.4 7.9 0.0 0 0 0 0.0 0 0.0 0 0.0 0.0 0.0 83    15 15 11.5 10.5 0.0 0 0 0 0.0 3 17.1 0 0.0 17.1 17.1 83    16 16 3.4 3.4 0.0 0 0 1 17.6 0 0.0 0 0.0 17.6 17.6 85    17 17 3.8 3.8 0.0 0 0 0 0.0 1 15.8 0 0.0 15.8 15.8 86    18 14 63.1 63.1 0.5 0 0 0 0.0 0 0.0 0 0.0 0.0 0.0 89        Interpretation:  1. Severe obstructive sleep apnea treated adequately with BIPAP 18/14  2. Morbid obesity. 3. Severe snoring  4. Subjective hypersomnia. 5. Hypoxia during sleep. Recommendations:  1. BiPAP at 18/14 pressure. 2. Weight loss, exercise. 3. Avoid risky activity such as driving if sleepy. 4. Discuss sleep hygiene with the patient. Lisa Foley MD, [facility], [facility]              Note:   The interpreting physician named above, reviewed this record in its entirety, including sleep staging, EMG activity, EEG, EKG, breathing parameters, oxygen saturation, body position, and behavior unless otherwise noted. The interpretation is based on this information in addition to available clinical history and physical examination data.

## 2021-05-20 ENCOUNTER — LAB (OUTPATIENT)
Dept: LAB | Facility: HOSPITAL | Age: 46
End: 2021-05-20

## 2021-05-20 LAB — SARS-COV-2 ORF1AB RESP QL NAA+PROBE: NOT DETECTED

## 2021-05-20 PROCEDURE — C9803 HOPD COVID-19 SPEC COLLECT: HCPCS | Performed by: PAIN MEDICINE

## 2021-05-20 PROCEDURE — U0005 INFEC AGEN DETEC AMPLI PROBE: HCPCS | Performed by: PAIN MEDICINE

## 2021-05-20 PROCEDURE — U0004 COV-19 TEST NON-CDC HGH THRU: HCPCS | Performed by: PAIN MEDICINE

## 2021-05-26 ENCOUNTER — HOSPITAL ENCOUNTER (OUTPATIENT)
Dept: LAB | Age: 46
Discharge: HOME | End: 2021-05-26
Payer: MEDICARE

## 2021-05-26 LAB
ANION GAP SERPL CALCULATED.3IONS-SCNC: 9 MMOL/L (ref 7–19)
BASOPHILS ABSOLUTE: 0.1 K/UL (ref 0–0.2)
BASOPHILS RELATIVE PERCENT: 1 % (ref 0–1)
BUN BLDV-MCNC: 6 MG/DL (ref 6–20)
CALCIUM SERPL-MCNC: 8.9 MG/DL (ref 8.6–10)
CHLORIDE BLD-SCNC: 100 MMOL/L (ref 98–111)
CO2: 26 MMOL/L (ref 22–29)
CREAT SERPL-MCNC: 0.6 MG/DL (ref 0.5–1.2)
EOSINOPHILS ABSOLUTE: 0.12 K/UL (ref 0–0.6)
EOSINOPHILS RELATIVE PERCENT: 1 % (ref 0–5)
GFR AFRICAN AMERICAN: >59
GFR NON-AFRICAN AMERICAN: >60
GLUCOSE BLD-MCNC: 208 MG/DL (ref 74–109)
HCT VFR BLD CALC: 41.6 % (ref 42–52)
HEMOGLOBIN: 14.5 G/DL (ref 14–18)
IMMATURE GRANULOCYTES #: 0 K/UL
LYMPHOCYTES ABSOLUTE: 6.4 K/UL (ref 1.1–4.5)
LYMPHOCYTES RELATIVE PERCENT: 55 % (ref 20–40)
MCH RBC QN AUTO: 31.3 PG (ref 27–31)
MCHC RBC AUTO-ENTMCNC: 34.9 G/DL (ref 33–37)
MCV RBC AUTO: 89.8 FL (ref 80–94)
METAMYELOCYTES RELATIVE PERCENT: 1 %
MONOCYTES ABSOLUTE: 0.5 K/UL (ref 0–0.9)
MONOCYTES RELATIVE PERCENT: 4 % (ref 0–10)
NEUTROPHILS ABSOLUTE: 4.5 K/UL (ref 1.5–7.5)
NEUTROPHILS RELATIVE PERCENT: 38 % (ref 50–65)
PDW BLD-RTO: 13.2 % (ref 11.5–14.5)
PLATELET # BLD: 174 K/UL (ref 130–400)
PLATELET SLIDE REVIEW: ADEQUATE
PMV BLD AUTO: 9.5 FL (ref 9.4–12.4)
POTASSIUM SERPL-SCNC: 3.6 MMOL/L (ref 3.5–5)
RBC # BLD: 4.63 M/UL (ref 4.7–6.1)
RBC # BLD: NORMAL 10*6/UL
SODIUM BLD-SCNC: 135 MMOL/L (ref 136–145)
WBC # BLD: 11.6 K/UL (ref 4.8–10.8)

## 2021-05-26 PROCEDURE — 80048 BASIC METABOLIC PNL TOTAL CA: CPT

## 2021-05-26 PROCEDURE — 85025 COMPLETE CBC W/AUTO DIFF WBC: CPT

## 2021-05-26 PROCEDURE — 36415 COLL VENOUS BLD VENIPUNCTURE: CPT

## 2021-05-29 ENCOUNTER — APPOINTMENT (OUTPATIENT)
Dept: GENERAL RADIOLOGY | Age: 46
End: 2021-05-29
Payer: MEDICARE

## 2021-05-29 ENCOUNTER — HOSPITAL ENCOUNTER (EMERGENCY)
Age: 46
Discharge: ANOTHER HOSPITAL | End: 2021-05-29
Attending: EMERGENCY MEDICINE
Payer: MEDICARE

## 2021-05-29 VITALS
DIASTOLIC BLOOD PRESSURE: 80 MMHG | TEMPERATURE: 98 F | HEART RATE: 106 BPM | BODY MASS INDEX: 40.43 KG/M2 | SYSTOLIC BLOOD PRESSURE: 134 MMHG | OXYGEN SATURATION: 94 % | WEIGHT: 315 LBS | RESPIRATION RATE: 20 BRPM | HEIGHT: 74 IN

## 2021-05-29 DIAGNOSIS — R07.9 CHEST PAIN, UNSPECIFIED TYPE: ICD-10-CM

## 2021-05-29 DIAGNOSIS — R00.2 PALPITATIONS: Primary | ICD-10-CM

## 2021-05-29 LAB
ALBUMIN SERPL-MCNC: 3.6 G/DL (ref 3.5–5.2)
ALP BLD-CCNC: 130 U/L (ref 40–130)
ALT SERPL-CCNC: 41 U/L (ref 5–41)
ANION GAP SERPL CALCULATED.3IONS-SCNC: 3 MMOL/L (ref 7–19)
APTT: 29 SEC (ref 26–36.2)
AST SERPL-CCNC: 48 U/L (ref 5–40)
BASOPHILS ABSOLUTE: 0 K/UL (ref 0–0.2)
BASOPHILS RELATIVE PERCENT: 0.1 % (ref 0–1)
BILIRUB SERPL-MCNC: 1 MG/DL (ref 0.2–1.2)
BUN BLDV-MCNC: 8 MG/DL (ref 6–20)
CALCIUM SERPL-MCNC: 8.7 MG/DL (ref 8.6–10)
CHLORIDE BLD-SCNC: 97 MMOL/L (ref 98–111)
CO2: 34 MMOL/L (ref 22–29)
CREAT SERPL-MCNC: 0.8 MG/DL (ref 0.5–1.2)
EOSINOPHILS ABSOLUTE: 0.2 K/UL (ref 0–0.6)
EOSINOPHILS RELATIVE PERCENT: 2.8 % (ref 0–5)
GFR AFRICAN AMERICAN: >59
GFR NON-AFRICAN AMERICAN: >60
GLUCOSE BLD-MCNC: 130 MG/DL (ref 74–109)
HCT VFR BLD CALC: 40.7 % (ref 42–52)
HEMOGLOBIN: 14 G/DL (ref 14–18)
IMMATURE GRANULOCYTES #: 0 K/UL
INR BLD: 1.08 (ref 0.88–1.18)
LYMPHOCYTES ABSOLUTE: 1.4 K/UL (ref 1.1–4.5)
LYMPHOCYTES RELATIVE PERCENT: 19.3 % (ref 20–40)
MCH RBC QN AUTO: 30.9 PG (ref 27–31)
MCHC RBC AUTO-ENTMCNC: 34.4 G/DL (ref 33–37)
MCV RBC AUTO: 89.8 FL (ref 80–94)
MONOCYTES ABSOLUTE: 0.5 K/UL (ref 0–0.9)
MONOCYTES RELATIVE PERCENT: 7.1 % (ref 0–10)
NEUTROPHILS ABSOLUTE: 5.3 K/UL (ref 1.5–7.5)
NEUTROPHILS RELATIVE PERCENT: 70.4 % (ref 50–65)
PDW BLD-RTO: 13.1 % (ref 11.5–14.5)
PLATELET # BLD: 155 K/UL (ref 130–400)
PMV BLD AUTO: 8.8 FL (ref 9.4–12.4)
POTASSIUM SERPL-SCNC: 3.7 MMOL/L (ref 3.5–5)
PROTHROMBIN TIME: 13.9 SEC (ref 12–14.6)
RBC # BLD: 4.53 M/UL (ref 4.7–6.1)
SARS-COV-2, NAAT: NOT DETECTED
SODIUM BLD-SCNC: 134 MMOL/L (ref 136–145)
TOTAL CK: 115 U/L (ref 39–308)
TOTAL PROTEIN: 6.6 G/DL (ref 6.6–8.7)
TROPONIN: 0.22 NG/ML (ref 0–0.03)
WBC # BLD: 7.5 K/UL (ref 4.8–10.8)

## 2021-05-29 PROCEDURE — 2580000003 HC RX 258: Performed by: EMERGENCY MEDICINE

## 2021-05-29 PROCEDURE — 84484 ASSAY OF TROPONIN QUANT: CPT

## 2021-05-29 PROCEDURE — 85610 PROTHROMBIN TIME: CPT

## 2021-05-29 PROCEDURE — 36415 COLL VENOUS BLD VENIPUNCTURE: CPT

## 2021-05-29 PROCEDURE — 71045 X-RAY EXAM CHEST 1 VIEW: CPT

## 2021-05-29 PROCEDURE — 85730 THROMBOPLASTIN TIME PARTIAL: CPT

## 2021-05-29 PROCEDURE — 93005 ELECTROCARDIOGRAM TRACING: CPT | Performed by: EMERGENCY MEDICINE

## 2021-05-29 PROCEDURE — 85025 COMPLETE CBC W/AUTO DIFF WBC: CPT

## 2021-05-29 PROCEDURE — 82550 ASSAY OF CK (CPK): CPT

## 2021-05-29 PROCEDURE — 80053 COMPREHEN METABOLIC PANEL: CPT

## 2021-05-29 PROCEDURE — 87635 SARS-COV-2 COVID-19 AMP PRB: CPT

## 2021-05-29 PROCEDURE — 99285 EMERGENCY DEPT VISIT HI MDM: CPT

## 2021-05-29 RX ORDER — MORPHINE SULFATE 4 MG/ML
4 INJECTION, SOLUTION INTRAMUSCULAR; INTRAVENOUS ONCE
Status: DISCONTINUED | OUTPATIENT
Start: 2021-05-29 | End: 2021-05-29 | Stop reason: HOSPADM

## 2021-05-29 RX ORDER — ZIPRASIDONE HYDROCHLORIDE 20 MG/1
20 CAPSULE ORAL DAILY
COMMUNITY
End: 2021-10-04 | Stop reason: ALTCHOICE

## 2021-05-29 RX ORDER — SODIUM CHLORIDE, SODIUM LACTATE, POTASSIUM CHLORIDE, AND CALCIUM CHLORIDE .6; .31; .03; .02 G/100ML; G/100ML; G/100ML; G/100ML
1000 INJECTION, SOLUTION INTRAVENOUS ONCE
Status: COMPLETED | OUTPATIENT
Start: 2021-05-29 | End: 2021-05-29

## 2021-05-29 RX ORDER — ONDANSETRON 2 MG/ML
4 INJECTION INTRAMUSCULAR; INTRAVENOUS ONCE
Status: DISCONTINUED | OUTPATIENT
Start: 2021-05-29 | End: 2021-05-29 | Stop reason: HOSPADM

## 2021-05-29 RX ADMIN — SODIUM CHLORIDE, POTASSIUM CHLORIDE, SODIUM LACTATE AND CALCIUM CHLORIDE 1000 ML: 600; 310; 30; 20 INJECTION, SOLUTION INTRAVENOUS at 05:10

## 2021-05-29 ASSESSMENT — ENCOUNTER SYMPTOMS
TROUBLE SWALLOWING: 0
ABDOMINAL PAIN: 0
EYE REDNESS: 0
SORE THROAT: 0
VOMITING: 0
COUGH: 0
SHORTNESS OF BREATH: 0
SINUS PRESSURE: 0
CONSTIPATION: 0
ABDOMINAL DISTENTION: 0
PHOTOPHOBIA: 0
DIARRHEA: 0
WHEEZING: 0
NAUSEA: 0
CHEST TIGHTNESS: 0
BACK PAIN: 0
EYE PAIN: 0
COLOR CHANGE: 0

## 2021-05-29 ASSESSMENT — PAIN SCALES - GENERAL: PAINLEVEL_OUTOF10: 5

## 2021-05-29 NOTE — ED PROVIDER NOTES
[address] [facility] EMERGENCY DEPT  EMERGENCY DEPARTMENT ENCOUNTER      Pt Name: Jd Rodriguez  MRN: [id number]  [place] 1975  Date of evaluation: 5/29/2021  Provider: Tomy Roberts MD    [address] [place] [address]       Chief Complaint   Patient presents with    Palpitations    Tachycardia         HISTORY OF PRESENT ILLNESS   (Location/Symptom, Timing/Onset,Context/Setting, Quality, Duration, Modifying Factors, Severity)  Note limiting factors. Jd Rodriguez is a 51 y.o. male who presents to the emergency department for elevated heart rate. Pt had an ablation done for WPW. [facility] yesterday. About 1611-1035 last night he began to have palpations. He reports that he had some CP with the palpitations, but thought it was from the ablation. He describes it as tight. The tightness in his chest will come and go. He denies any SOB. He called the cardiologist around 0300 and told to come here. No n/v with the sxs. At onset the pain was 7/10 and currently 5/10. The pain is left middle w/o radiation. He did not take anything for the pain. NursingNotes were reviewed. REVIEW OF SYSTEMS    (2-9 systems for level 4, 10 or more for level 5)     Review of Systems   Constitutional: Negative for activity change, fatigue and fever. HENT: Negative for dental problem, ear pain, sinus pressure, sore throat and trouble swallowing. Eyes: Negative for photophobia, pain, redness and visual disturbance. Respiratory: Negative for cough, chest tightness, shortness of breath and wheezing. Cardiovascular: Positive for chest pain. Negative for palpitations and leg swelling. Gastrointestinal: Negative for abdominal distention, abdominal pain, constipation, diarrhea, nausea and vomiting. Genitourinary: Negative for difficulty urinating, dysuria, flank pain, frequency and urgency. Musculoskeletal: Negative for back pain, myalgias and neck pain. Skin: Negative for color change, rash and wound. Neurological: Negative for dizziness, weakness, light-headedness, numbness and headaches. Psychiatric/Behavioral: Negative for confusion, dysphoric mood and hallucinations. The patient is not nervous/anxious. PAST MEDICALHISTORY     Past Medical History:   Diagnosis Date    Anxiety     Arthritis     Bipolar 1 disorder ([address]     CAD in native artery 3/6/2018    Chronic back pain     COVID-19     Depression     Hyperlipidemia     Hypertension     IBS (irritable bowel syndrome)     Mild intermittent asthma without complication 4/16/5760    Morbidly obese (HCC)     Panic disorder     at times afraid to go outside    Unspecified sleep apnea     bipap         SURGICAL HISTORY       Past Surgical History:   Procedure Laterality Date    ANUS SURGERY      APPENDECTOMY  02/03/2014    CHOLECYSTECTOMY  01/01/2009    COLONOSCOPY  01/01/2012        COLONOSCOPY  10/19/2017    Dr Jc Riggs, Benign HP    COLONOSCOPY  04/29/2009    [facility]    ELECTROCONVULSIVE THERAPY N/A 03/08/2017    ELECTROCONVULSIVE THERAPY performed by Lina Araiza DO at [address] [place] [address]/30/9919    umbilical     INGUINAL HERNIA REPAIR Right 01/01/1990    right ing with mesh    VASECTOMY           CURRENT MEDICATIONS     Previous Medications    ATORVASTATIN (LIPITOR) 80 MG TABLET    TAKE 1 TABLET BY MOUTH AT NIGHT. CLONAZEPAM (KLONOPIN) 0.5 MG TABLET        CLONIDINE (CATAPRES) 0.1 MG TABLET    Take 1 tablet by mouth daily as needed for High Blood Pressure (140/90)    COLESTIPOL (COLESTID) 1 G TABLET    TAKE 1 TABLET BY MOUTH TWICE DAILY. HYDROCODONE-ACETAMINOPHEN (NORCO)  MG PER TABLET    Take 1 tablet by mouth every 8 hours as needed for Pain.     LAMOTRIGINE (LAMICTAL) 100 MG TABLET    2 times daily     LISINOPRIL (PRINIVIL;ZESTRIL) 20 MG TABLET    Take 1 tablet by mouth daily    METOPROLOL TARTRATE (LOPRESSOR) 25 MG TABLET    Take 2 tablets by mouth 2 times daily MISC. DEVICES (ROLLING WALKER/BURGUNDY) MISC    Dx: gen weakness and fatigue use daily as directed    PAROXETINE (PAXIL) 10 MG TABLET        TRAZODONE (DESYREL) 150 MG TABLET    Take 150 mg by mouth nightly    TRINTELLIX 10 MG TABS TABLET        ZIPRASIDONE (GEODON) 20 MG CAPSULE    Take 20 mg by mouth daily       ALLERGIES     Dye [gadolinium derivatives]    FAMILY HISTORY       Family History   Problem Relation Age of Onset    Diabetes Mother     Cancer Mother         uterine CA    Depression Mother     Mental Illness Mother     Hypertension Mother     Other Mother         blood clots    Heart Disease Brother     Depression Brother     Mental Illness Brother     Hypertension Brother     Breast Cancer Maternal Grandmother     Cancer Maternal Grandmother         breast CA    Heart Attack Maternal Grandmother     Other Maternal Grandmother         blood clots          SOCIAL HISTORY       Social History     Socioeconomic History    Marital status:      Spouse name: None    Number of children: None    Years of education: None    Highest education level: None   Occupational History    None   Tobacco Use    Smoking status: Former Smoker     Packs/day: 0.50     Years: 24.00     Pack years: 12.00     Types: Cigarettes     Quit date: 7/15/2020     Years since quittin.8    Smokeless tobacco: Never Used   Vaping Use    Vaping Use: Never used   Substance and Sexual Activity    Alcohol use: No     Alcohol/week: 0.0 standard drinks    Drug use: No    Sexual activity: Yes     Partners: Female   Other Topics Concern    None   Social History Narrative    None     Social Determinants of Health     Financial Resource Strain:     Difficulty of Paying Living Expenses:    Food Insecurity:     Worried About Running Out of Food in the Last Year:     Ran Out of Food in the Last Year:    Transportation Needs:     Lack of Transportation (Medical):      Lack of Transportation (Non-Medical): range of motion. Cervical back: Normal range of motion and neck supple. No muscular tenderness. Skin:     General: Skin is warm and dry. Capillary Refill: Capillary refill takes less than 2 seconds. Neurological:      General: No focal deficit present. Mental Status: He is alert and oriented to person, place, and time. Cranial Nerves: No cranial nerve deficit. Psychiatric:         Mood and Affect: Mood normal.         Behavior: Behavior normal.         Thought Content: Thought content normal.         DIAGNOSTIC RESULTS     EKG: All EKG's areinterpreted by the Emergency Department Physician who either signs or Co-signs this chart in the absence of a cardiologist.    Sinus tachycardia with a rate of 120,, no acute ST elevations or depressions. RADIOLOGY:  Non-plain film images such as CT, Ultrasound and MRI are read by the radiologist. Plain radiographic images are visualized and preliminarily interpreted bythe emergency physician with the below findings:    Normal cardiac silhouette, clear lung fields bilaterally, no pneumonia or pneumothorax.         XR CHEST PORTABLE    (Results Pending)           LABS:  Labs Reviewed   CBC WITH AUTO DIFFERENTIAL - Abnormal; Notable for the following components:       Result Value    RBC 4.53 (*)     Hematocrit 40.7 (*)     MPV 8.8 (*)     Neutrophils % 70.4 (*)     Lymphocytes % 19.3 (*)     All other components within normal limits   COMPREHENSIVE METABOLIC PANEL - Abnormal; Notable for the following components:    Sodium 134 (*)     Chloride 97 (*)     CO2 34 (*)     Anion Gap 3 (*)     Glucose 130 (*)     AST 48 (*)     All other components within normal limits   TROPONIN - Abnormal; Notable for the following components:    Troponin 0.22 (*)     All other components within normal limits    Narrative:     CALL  [facility] tel. ,  Chemistry results called to and read back by Pascale Thomas RN in ED, 05/29/2021  04:31, by [facility]   COVID-19, RAPID   PROTIME-INR APTT   CK       All other labs were within normal range or not returned as of this dictation. EMERGENCY DEPARTMENT COURSE and DIFFERENTIAL DIAGNOSIS/MDM:   Vitals:    Vitals:    05/29/21 0445 05/29/21 0517 05/29/21 0613 05/29/21 0631   BP: 124/81 129/87 132/78 134/80   Pulse: 108 106 107 106   Resp: 24 20 20 20   Temp:       SpO2: 93% 94% 93% 94%   Weight:       Height:           MDM  Number of Diagnoses or Management Options  Chest pain, unspecified type: new and requires workup  Palpitations: new and requires workup  Diagnosis management comments: Patient presented this evening for palpitations. He had had an ablation done yesterday by a cardiologist at [facility].  Cardiac enzyme was elevated this evening, but this is not unexpected secondary to the ablation. I discussed the case with Dr. Keerthi Ware who was the hospitalist on-call this evening at [facility]. Since it is a long weekend they would prefer to have the patient transferred down to them so that he can to be evaluated further by their cardiologist.  I discussed the case again with the patient and he was amenable to being transferred back to [facility] this evening. Patient Progress  Patient progress: stable      Reassessment      Medications   morphine injection 4 mg (has no administration in time range)   ondansetron (ZOFRAN) injection 4 mg (has no administration in time range)   lactated ringers bolus (0 mLs Intravenous Stopped 5/29/21 0630)       CONSULTS:  None:  Unless otherwise noted below, none     Procedures    FINAL IMPRESSION      1. Palpitations    2. Chest pain, unspecified type          DISPOSITION/PLAN   DISPOSITION Decision To Transfer 05/29/2021 06:31:56 AM      PATIENT REFERRED TO:  No follow-up provider specified.     DISCHARGE MEDICATIONS:  New Prescriptions    No medications on file          (Please note that portions of this note were completed with a voice recognition program.  Efforts were made to edit thedictations but occasionally words are mis-transcribed.)    Gerda Hampton MD (electronically signed)Emergency Physician         Nitza Gaona MD  05/29/21 4200

## 2021-05-29 NOTE — ED NOTES
Informed pt waiting [facility] cardiology to call back     Jo Teran, TOSHA  05/29/21 2076

## 2021-05-29 NOTE — ED NOTES
Attempted to start IV x 2; unsuccessful.  Another RN to assist.     Raquel Andersen, TOSHA  05/29/21 4324

## 2021-05-29 NOTE — ED NOTES
[facility] called back. Will accept pt. Waiting return call for instructions.   Dr. Marli Martínez informed pt     Ganga Rajput RN  05/29/21 9964

## 2021-05-29 NOTE — ED NOTES
[facility] returned called abd gave inform ation about transfer     Genella Halsted  05/29/21 3730

## 2021-06-01 LAB
EKG P AXIS: 54 DEGREES
EKG P-R INTERVAL: 136 MS
EKG Q-T INTERVAL: 328 MS
EKG QRS DURATION: 100 MS
EKG QTC CALCULATION (BAZETT): 433 MS
EKG T AXIS: 53 DEGREES

## 2021-06-01 PROCEDURE — 93010 ELECTROCARDIOGRAM REPORT: CPT | Performed by: INTERNAL MEDICINE

## 2021-06-08 ASSESSMENT — LIFESTYLE VARIABLES
HOW OFTEN DO YOU HAVE A DRINK CONTAINING ALCOHOL: NEVER
AUDIT-C TOTAL SCORE: INCOMPLETE
HOW OFTEN DO YOU HAVE A DRINK CONTAINING ALCOHOL: 0
AUDIT TOTAL SCORE: INCOMPLETE

## 2021-06-08 ASSESSMENT — PATIENT HEALTH QUESTIONNAIRE - PHQ9
SUM OF ALL RESPONSES TO PHQ QUESTIONS 1-9: 2
SUM OF ALL RESPONSES TO PHQ9 QUESTIONS 1 & 2: 2
1. LITTLE INTEREST OR PLEASURE IN DOING THINGS: 1
2. FEELING DOWN, DEPRESSED OR HOPELESS: 1

## 2021-06-14 ENCOUNTER — HOSPITAL ENCOUNTER (OUTPATIENT)
Dept: GENERAL RADIOLOGY | Age: 46
Discharge: HOME | End: 2021-06-14
Payer: MEDICARE

## 2021-06-14 ENCOUNTER — HOSPITAL ENCOUNTER (OUTPATIENT)
Dept: MRI IMAGING | Age: 46
Discharge: HOME | End: 2021-06-14
Payer: MEDICARE

## 2021-06-14 DIAGNOSIS — M47.816 LUMBAR FACET ARTHROPATHY: ICD-10-CM

## 2021-06-14 DIAGNOSIS — M51.37 DDD (DEGENERATIVE DISC DISEASE), LUMBOSACRAL: ICD-10-CM

## 2021-06-14 DIAGNOSIS — M51.37 DEGENERATION OF LUMBOSACRAL INTERVERTEBRAL DISC: ICD-10-CM

## 2021-06-14 PROCEDURE — 72148 MRI LUMBAR SPINE W/O DYE: CPT

## 2021-06-14 PROCEDURE — 72100 X-RAY EXAM L-S SPINE 2/3 VWS: CPT

## 2021-06-14 RX ORDER — MONTELUKAST SODIUM 4 MG/1
TABLET, CHEWABLE ORAL
Qty: 60 TABLET | Refills: 0 | Status: SHIPPED | OUTPATIENT
Start: 2021-06-14 | End: 2021-07-19

## 2021-06-15 ENCOUNTER — OFFICE VISIT (OUTPATIENT)
Dept: PULMONOLOGY | Age: 46
End: 2021-06-15
Payer: MEDICARE

## 2021-06-15 VITALS
OXYGEN SATURATION: 96 % | WEIGHT: 315 LBS | DIASTOLIC BLOOD PRESSURE: 70 MMHG | HEIGHT: 74 IN | TEMPERATURE: 97.2 F | BODY MASS INDEX: 40.43 KG/M2 | HEART RATE: 85 BPM | SYSTOLIC BLOOD PRESSURE: 130 MMHG

## 2021-06-15 DIAGNOSIS — G47.30 SEVERE SLEEP APNEA: Primary | ICD-10-CM

## 2021-06-15 DIAGNOSIS — J98.4 RESTRICTIVE LUNG DISEASE: ICD-10-CM

## 2021-06-15 DIAGNOSIS — G47.10 HYPERSOMNIA: ICD-10-CM

## 2021-06-15 DIAGNOSIS — E66.01 MORBID OBESITY (HCC): ICD-10-CM

## 2021-06-15 DIAGNOSIS — R06.02 SHORTNESS OF BREATH: ICD-10-CM

## 2021-06-15 PROCEDURE — G8427 DOCREV CUR MEDS BY ELIG CLIN: HCPCS | Performed by: INTERNAL MEDICINE

## 2021-06-15 PROCEDURE — G8417 CALC BMI ABV UP PARAM F/U: HCPCS | Performed by: INTERNAL MEDICINE

## 2021-06-15 PROCEDURE — 99214 OFFICE O/P EST MOD 30 MIN: CPT | Performed by: INTERNAL MEDICINE

## 2021-06-15 PROCEDURE — 1036F TOBACCO NON-USER: CPT | Performed by: INTERNAL MEDICINE

## 2021-06-15 ASSESSMENT — ENCOUNTER SYMPTOMS
COUGH: 0
ABDOMINAL PAIN: 0
ABDOMINAL DISTENTION: 0
CHEST TIGHTNESS: 0
BACK PAIN: 0
RHINORRHEA: 0
ANAL BLEEDING: 0
APNEA: 0
WHEEZING: 1
SHORTNESS OF BREATH: 1

## 2021-06-15 NOTE — PROGRESS NOTES
Pulmonary and Sleep Medicine     Jason Ramsay (:  1975) is a 51 y.o. male,Established patient, here for evaluation of the following chief complaint(s):  Follow-up and Results      ASSESSMENT/PLAN:  1. Severe sleep apnea  2. Restrictive lung disease. Due to obesity. 3. Morbid obesity ([address]  4. Hypersomnia  5. Shortness of breath      He does have severe obstructive sleep apnea. He did well on BiPAP. He is struggling with tolerating the pressures. We discussed humidification settings. We discussed the importance of weight loss with treatment of sleep apnea. We also discussed the importance of compliance with treatment of sleep apnea in view of underlying heart disease. Restrictive lung defect seen on pulmonary function testing is due to obesity. His autoimmune work-up and connective tissue disease work-up is negative. Dyspnea due to the above supportive care. Return in about 6 weeks (around 2021). SUBJECTIVE/OBJECTIVE:  He is here for follow-up on sleep apnea and restrictive lung defect. He had a sleep study that showed very severe obstructive sleep apnea with an apnea-hypopnea index of 122 events per hour. He did well on BiPAP at 18/14 pressure. He is struggling with the BiPAP pressure. He feels it is suffocating him. He is using humidifier. He says that if he uses BiPAP all night he runs out of water in the middle of the night. Prior to Visit Medications    Medication Sig Taking? Authorizing Provider   colestipol (COLESTID) 1 g tablet TAKE 1 TABLET BY MOUTH TWICE DAILY.  Yes SANDY Cooley   metoprolol tartrate (LOPRESSOR) 25 MG tablet TAKE 2 TABLETS BY MOUTH TWICE DAILY Yes SANDY Alfaro - FOX   ziprasidone (GEODON) 20 MG capsule Take 20 mg by mouth daily Yes Historical Provider, MD   cloNIDine (CATAPRES) 0.1 MG tablet Take 1 tablet by mouth daily as needed for High Blood Pressure (140/90) Yes SANDY Cooley   clonazePAM (KLONOPIN) 0.5 MG tablet Yes Historical Provider, MD   PARoxetine (PAXIL) 10 MG tablet  Yes Historical Provider, MD   lisinopril (PRINIVIL;ZESTRIL) 20 MG tablet Take 1 tablet by mouth daily Yes SANDY Junior   atorvastatin (LIPITOR) 80 MG tablet TAKE 1 TABLET BY MOUTH AT NIGHT. Yes SANDY Junior   Misc. Devices (ROLLING WALKER/BURGUNDY) MISC Dx: gen weakness and fatigue use daily as directed Yes SANDY Junior   lamoTRIgine (LAMICTAL) 100 MG tablet 2 times daily  Yes Historical Provider, MD   TRINTELLIX 10 MG TABS tablet  Yes Historical Provider, MD   traZODone (DESYREL) 150 MG tablet Take 150 mg by mouth nightly Yes Historical Provider, MD   HYDROcodone-acetaminophen (NORCO)  MG per tablet Take 1 tablet by mouth every 8 hours as needed for Pain. Yes Historical Provider, MD        Review of Systems   Constitutional: Negative for activity change, appetite change, chills, diaphoresis and fatigue. HENT: Negative for congestion, dental problem, drooling, ear discharge, postnasal drip and rhinorrhea. Eyes: Negative for visual disturbance. Respiratory: Positive for shortness of breath and wheezing. Negative for apnea, cough and chest tightness. Gastrointestinal: Negative for abdominal distention, abdominal pain and anal bleeding. Endocrine: Negative for cold intolerance, heat intolerance and polydipsia. Genitourinary: Negative for difficulty urinating, dysuria, enuresis and flank pain. Musculoskeletal: Negative for arthralgias, back pain and gait problem. Allergic/Immunologic: Negative for environmental allergies. Neurological: Negative for dizziness, facial asymmetry, light-headedness and headaches. Vitals:    06/15/21 1350   BP: 130/70   Pulse: 85   Temp: 97.2 °F (36.2 °C)   SpO2: 96%     Physical Exam  Vitals reviewed. Constitutional:       Appearance: Normal appearance. HENT:      Head: Normocephalic and atraumatic.       Nose: Nose normal.   Eyes:      Extraocular Movements: Extraocular movements intact. Conjunctiva/sclera: Conjunctivae normal.   Cardiovascular:      Rate and Rhythm: Normal rate and regular rhythm. Heart sounds: No murmur heard. No friction rub. Pulmonary:      Effort: Pulmonary effort is normal. No respiratory distress. Breath sounds: Normal breath sounds. No stridor. No wheezing, rhonchi or rales. Abdominal:      General: There is no distension. Palpations: There is no mass. Tenderness: There is no abdominal tenderness. There is no guarding or rebound. Musculoskeletal:      Cervical back: Normal range of motion and neck supple. Neurological:      Mental Status: He is alert and oriented to person, place, and time. An electronic signature was used to authenticate this note.     --Deric Fermin MD

## 2021-06-16 ENCOUNTER — OFFICE VISIT (OUTPATIENT)
Dept: PRIMARY CARE CLINIC | Age: 46
End: 2021-06-16
Payer: MEDICARE

## 2021-06-16 VITALS
HEART RATE: 61 BPM | WEIGHT: 315 LBS | SYSTOLIC BLOOD PRESSURE: 128 MMHG | DIASTOLIC BLOOD PRESSURE: 84 MMHG | RESPIRATION RATE: 18 BRPM | TEMPERATURE: 97.9 F | HEIGHT: 74 IN | BODY MASS INDEX: 40.43 KG/M2 | OXYGEN SATURATION: 98 %

## 2021-06-16 DIAGNOSIS — R19.7 DIARRHEA, UNSPECIFIED TYPE: ICD-10-CM

## 2021-06-16 DIAGNOSIS — Z13.6 SCREENING FOR CARDIOVASCULAR CONDITION: ICD-10-CM

## 2021-06-16 DIAGNOSIS — Z00.00 ROUTINE GENERAL MEDICAL EXAMINATION AT A HEALTH CARE FACILITY: Primary | ICD-10-CM

## 2021-06-16 DIAGNOSIS — Z71.89 ADVANCE CARE PLANNING: ICD-10-CM

## 2021-06-16 PROCEDURE — G0446 INTENS BEHAVE THER CARDIO DX: HCPCS | Performed by: NURSE PRACTITIONER

## 2021-06-16 PROCEDURE — 99497 ADVNCD CARE PLAN 30 MIN: CPT | Performed by: NURSE PRACTITIONER

## 2021-06-16 PROCEDURE — G0447 BEHAVIOR COUNSEL OBESITY 15M: HCPCS | Performed by: NURSE PRACTITIONER

## 2021-06-16 PROCEDURE — G0439 PPPS, SUBSEQ VISIT: HCPCS | Performed by: NURSE PRACTITIONER

## 2021-06-16 SDOH — ECONOMIC STABILITY: FOOD INSECURITY: WITHIN THE PAST 12 MONTHS, THE FOOD YOU BOUGHT JUST DIDN'T LAST AND YOU DIDN'T HAVE MONEY TO GET MORE.: NEVER TRUE

## 2021-06-16 SDOH — ECONOMIC STABILITY: FOOD INSECURITY: WITHIN THE PAST 12 MONTHS, YOU WORRIED THAT YOUR FOOD WOULD RUN OUT BEFORE YOU GOT MONEY TO BUY MORE.: NEVER TRUE

## 2021-06-16 ASSESSMENT — SOCIAL DETERMINANTS OF HEALTH (SDOH): HOW HARD IS IT FOR YOU TO PAY FOR THE VERY BASICS LIKE FOOD, HOUSING, MEDICAL CARE, AND HEATING?: NOT HARD AT ALL

## 2021-06-16 NOTE — PATIENT INSTRUCTIONS
Advance Directives: Care Instructions  Overview  An advance directive is a legal way to state your wishes at the end of your life. It tells your family and your doctor what to do if you can't say what you want. There are two main types of advance directives. You can change them any time your wishes change. Living will. This form tells your family and your doctor your wishes about life support and other treatment. The form is also called a declaration. Medical power of . This form lets you name a person to make treatment decisions for you when you can't speak for yourself. This person is called a health care agent (health care proxy, health care surrogate). The form is also called a durable power of  for health care. If you do not have an advance directive, decisions about your medical care may be made by a family member, or by a doctor or a  who doesn't know you. It may help to think of an advance directive as a gift to the people who care for you. If you have one, they won't have to make tough decisions by themselves. Follow-up care is a key part of your treatment and safety. Be sure to make and go to all appointments, and call your doctor if you are having problems. It's also a good idea to know your test results and keep a list of the medicines you take. What should you include in an advance directive? Many states have a unique advance directive form. (It may ask you to address specific issues.) Or you might use a universal form that's approved by many states. If your form doesn't tell you what to address, it may be hard to know what to include in your advance directive. Use the questions below to help you get started. · Who do you want to make decisions about your medical care if you are not able to? · What life-support measures do you want if you have a serious illness that gets worse over time or can't be cured? · What are you most afraid of that might happen? (Maybe you're afraid of having pain, losing your independence, or being kept alive by machines.)  · Where would you prefer to die? (Your home? A hospital? A nursing home?)  · Do you want to donate your organs when you die? · Do you want certain Gnosticism practices performed before you die? When should you call for help? Be sure to contact your doctor if you have any questions. Where can you learn more? Go to https://chpepiceweb.ThinkEco. org and sign in to your [employer] account. Enter R264 in the [employer] [facility] box to learn more about \"Advance Directives: Care Instructions. \"     If you do not have an account, please click on the \"Sign Up Now\" link. Current as of: March 17, 2021               Content Version: 12.9  © 5835-5938 Healthwise, [employer]. Care instructions adapted under license by [facility] ([facility]). If you have questions about a medical condition or this instruction, always ask your healthcare professional. [place] [address] any warranty or liability for your use of this information. Learning About Living Agata Nunez  What is a living will? A living will, also called a declaration, is a legal form. It tells your family and your doctor your wishes when you can't speak for yourself. It's used by the health professionals who will treat you as you near the end of your life or if you get seriously hurt or ill. If you put your wishes in writing, your loved ones and others will know what kind of care you want. They won't need to guess. This can ease your mind and be helpful to others. And you can change or cancel your living will at any time. A living will is not the same as an estate or property will. An estate will explains what you want to happen with your money and property after you die. How do you use it? A living will is used to describe the kinds of treatment or life support you want as you near the end of your life or if you get seriously hurt or ill. Keep these facts in mind about living glynn. · Your living will is used only if you can't speak or make decisions for yourself. Most often, one or more doctors must certify that you can't speak or decide for yourself before your living will takes effect. · If you get better and can speak for yourself again, you can accept or refuse any treatment. It doesn't matter what you said in your living will. · Some states may limit your right to refuse treatment in certain cases. For example, you may need to clearly state in your living will that you don't want artificial hydration and nutrition, such as being fed through a tube. Is a living will a legal document? A living will is a legal document. Each state has its own laws about living glynn. And a living will may be called something else in your state. Here are some things to know about living glynn. · You don't need an  to complete a living will. But legal advice can be helpful if your state's laws are unclear. It can also help if your health history is complicated or your family can't agree on what should be in your living will. · You can change your living will at any time. Some people find that their wishes about end-of-life care change as their health changes. If you make big changes to your living will, complete a new form. · If you move to another state, make sure that your living will is legal in the state where you now live. In most cases, doctors will respect your wishes even if you have a form from a different state. · You might use a universal form that has been approved by many states. This kind of form can sometimes be filled out and stored online. Your digital copy will then be available wherever you have a connection to the internet. The doctors and nurses who need to treat you can find it right away. · Your state may offer an online registry. This is another place where you can store your living will online.   · It's a good idea to get your living will notarized. This means using a person called a  to watch two people sign, or witness, your living will. What should you know when you create a living will? Here are some questions to ask yourself as you make your living will:  · Do you know enough about life support methods that might be used? If not, talk to your doctor so you know what might be done if you can't breathe on your own, your heart stops, or you can't swallow. · What things would you still want to be able to do after you receive life-support methods? Would you want to be able to walk? To speak? To eat on your own? To live without the help of machines? · Do you want certain Hinduism practices performed if you become very ill? · If you have a choice, where do you want to be cared for? In your home? At a hospital or nursing home? · If you have a choice at the end of your life, where would you prefer to die? At home? In a hospital or nursing home? Somewhere else? · Would you prefer to be buried or cremated? · Do you want your organs to be donated after you die? What should you do with your living will? · Make sure that your family members and your health care agent have copies of your living will (also called a declaration). · Give your doctor a copy of your living will. Ask him or her to keep it as part of your medical record. If you have more than one doctor, make sure that each one has a copy. · Put a copy of your living will where it can be easily found. For example, some people may put a copy on their refrigerator door. If you are using a digital copy, be sure your doctor, family members, and health care agent know how to find and access it. Where can you learn more? Go to https://Somnus Therapeuticspemichelineeb.Viaziz Scam. org and sign in to your [employer] account. Enter V256 in the [employer] box to learn more about \"Learning About Living Jermaine Hendrix. \"     If you do not have an account, please click on the \"Sign Up Now\" link. Current as of: March 17, 2021               Content Version: 12.9  © 2006-2021 Healthwise, [employer]. Care instructions adapted under license by [facility] ([facility]). If you have questions about a medical condition or this instruction, always ask your healthcare professional. [address] any warranty or liability for your use of this information. Body Mass Index: Care Instructions  Your Care Instructions     Body mass index (BMI) can help you see if your weight is raising your risk for health problems. It uses a formula to compare how much you weigh with how tall you are. · A BMI lower than 18.5 is considered underweight. · A BMI between 18.5 and 24.9 is considered healthy. · A BMI between 25 and 29.9 is considered overweight. A BMI of 30 or higher is considered obese. If your BMI is in the normal range, it means that you have a lower risk for weight-related health problems. If your BMI is in the overweight or obese range, you may be at increased risk for weight-related health problems, such as high blood pressure, heart disease, stroke, arthritis or joint pain, and diabetes. If your BMI is in the underweight range, you may be at increased risk for health problems such as fatigue, lower protection (immunity) against illness, muscle loss, bone loss, hair loss, and hormone problems. BMI is just one measure of your risk for weight-related health problems. You may be at higher risk for health problems if you are not active, you eat an unhealthy diet, or you drink too much alcohol or use tobacco products. Follow-up care is a key part of your treatment and safety. Be sure to make and go to all appointments, and call your doctor if you are having problems. It's also a good idea to know your test results and keep a list of the medicines you take. How can you care for yourself at home? · Practice healthy eating habits.  This includes eating plenty of fruits, vegetables, whole grains, lean protein, and low-fat dairy. · If your doctor recommends it, get more exercise. Walking is a good choice. Bit by bit, increase the amount you walk every day. Try for at least 30 minutes on most days of the week. · Do not smoke. Smoking can increase your risk for health problems. If you need help quitting, talk to your doctor about stop-smoking programs and medicines. These can increase your chances of quitting for good. · Limit alcohol to 2 drinks a day for men and 1 drink a day for women. Too much alcohol can cause health problems. If you have a BMI higher than 25  · Your doctor may do other tests to check your risk for weight-related health problems. This may include measuring the distance around your waist. A waist measurement of more than 40 inches in men or 35 inches in women can increase the risk of weight-related health problems. · Talk with your doctor about steps you can take to stay healthy or improve your health. You may need to make lifestyle changes to lose weight and stay healthy, such as changing your diet and getting regular exercise. If you have a BMI lower than 18.5  · Your doctor may do other tests to check your risk for health problems. · Talk with your doctor about steps you can take to stay healthy or improve your health. You may need to make lifestyle changes to gain or maintain weight and stay healthy, such as getting more healthy foods in your diet and doing exercises to build muscle. Where can you learn more? Go to https://sia.healthTapad. org and sign in to your [employer] account. Enter S176 in the [facility] box to learn more about \"Body Mass Index: Care Instructions. \"     If you do not have an account, please click on the \"Sign Up Now\" link. Current as of: March 17, 2021               Content Version: 12.9  © 4931-8825 Healthwise, Incorporated. Care instructions adapted under license by [facility] ([facility]).  If you have questions about a medical condition or this instruction, always ask your healthcare professional. [address] any warranty or liability for your use of this information. Eating Healthy Foods: Care Instructions  Your Care Instructions     Eating healthy foods can help lower your risk for disease. Healthy food gives you energy and keeps your heart strong, your brain active, your muscles working, and your bones strong. A healthy diet includes a variety of foods from the basic food groups: grains, vegetables, fruits, milk and milk products, and meat and beans. Some people may eat more of their favorite foods from only one food group and, as a result, miss getting the nutrients they need. So, it is important to pay attention not only to what you eat but also to what you are missing from your diet. You can eat a healthy, balanced diet by making a few small changes. Follow-up care is a key part of your treatment and safety. Be sure to make and go to all appointments, and call your doctor if you are having problems. It's also a good idea to know your test results and keep a list of the medicines you take. How can you care for yourself at home? Look at what you eat  · Keep a food diary for a week or two and record everything you eat or drink. Track the number of servings you eat from each food group. · For a balanced diet every day, eat a variety of:  ? 6 or more ounce-equivalents of grains, such as cereals, breads, crackers, rice, or pasta, every day. An ounce-equivalent is 1 slice of bread, 1 cup of ready-to-eat cereal, or ½ cup of cooked rice, cooked pasta, or cooked cereal.  ? 2½ cups of vegetables, especially:  § Dark-green vegetables such as broccoli and spinach. § Orange vegetables such as carrots and sweet potatoes. § Dry beans (such as frye and kidney beans) and peas (such as lentils). ? 2 cups of fresh, frozen, or canned fruit. A small apple or 1 banana or orange equals 1 cup.   ? 3 cups of nonfat or pizza with a whole wheat crust or grilled chicken (instead of sausage or pepperoni). ? Pasta with roasted vegetables, grilled chicken, or marinara sauce instead of cream sauce. ? A vegetable wrap or grilled chicken wrap. ? Broiled or poached food instead of fried or breaded items. Make healthy choices easy  · Buy packaged, prewashed, ready-to-eat fresh vegetables and fruits, such as baby carrots, salad mixes, and chopped or shredded broccoli and cauliflower. · Buy packaged, presliced fruits, such as melon or pineapple. · Choose 100% fruit or vegetable juice instead of soda. Limit juice intake to 4 to 6 oz (½ to ¾ cup) a day. · Blend low-fat yogurt, fruit juice, and canned or frozen fruit to make a smoothie for breakfast or a snack. Where can you learn more? Go to https://MiMediapeRayspan.Bell Boardz. org and sign in to your [employer] account. Enter D902 in the [employer] box to learn more about \"Eating Healthy Foods: Care Instructions. \"     If you do not have an account, please click on the \"Sign Up Now\" link. Current as of: December 17, 2020               Content Version: 12.9  © 0967-5636 Healthwise, Incorporated. Care instructions adapted under license by [facility] ([facility]). If you have questions about a medical condition or this instruction, always ask your healthcare professional. [place] [address] any warranty or liability for your use of this information. Personalized Preventive Plan for [facility] - 6/16/2021  Medicare offers a range of preventive health benefits. Some of the tests and screenings are paid in full while other may be subject to a deductible, co-insurance, and/or copay. Some of these benefits include a comprehensive review of your medical history including lifestyle, illnesses that may run in your family, and various assessments and screenings as appropriate.     After reviewing your medical record and screening and assessments performed today your provider may have ordered immunizations, labs, imaging, and/or referrals for you. A list of these orders (if applicable) as well as your Preventive Care list are included within your After Visit Summary for your review. Other Preventive Recommendations:    · A preventive eye exam performed by an eye specialist is recommended every 1-2 years to screen for glaucoma; cataracts, macular degeneration, and other eye disorders. · A preventive dental visit is recommended every 6 months. · Try to get at least 150 minutes of exercise per week or 10,000 steps per day on a pedometer . · Order or download the FREE \"Exercise & Physical Activity: Your Everyday Guide\" from [employer]. Call 4-104.615.1267 or search [employer] online. · You need 7814-6718 mg of calcium and 0810-9496 IU of vitamin D per day. It is possible to meet your calcium requirement with diet alone, but a vitamin D supplement is usually necessary to meet this goal.  · When exposed to the sun, use a sunscreen that protects against both UVA and UVB radiation with an SPF of 30 or greater. Reapply every 2 to 3 hours or after sweating, drying off with a towel, or swimming. · Always wear a seat belt when traveling in a car. Always wear a helmet when riding a bicycle or motorcycle.

## 2021-06-16 NOTE — PROGRESS NOTES
as needed for Pain.  Yes Historical Provider, MD         Past Medical History:   Diagnosis Date    Anxiety     Arthritis     B[facility] disorder [facility] [address]     CAD in native artery 3/6/2018    Chronic back pain     COVID-19     Depression     Hyperlipidemia     Hypertension     IBS (irritable bowel syndrome)     Mild intermittent asthma without complication 6/67/8077    Morbidly obese ([facility] [address]     Panic disorder     at times afraid to go outside    Unspecified sleep apnea     bipap       Past Surgical History:   Procedure Laterality Date    ANUS SURGERY      APPENDECTOMY  02/03/2014    CHOLECYSTECTOMY  01/01/2009    COLONOSCOPY  01/01/2012        COLONOSCOPY  10/19/2017    Dr Josh Valadez, Benign HP    COLONOSCOPY  04/29/2009    [place]    ELECTROCONVULSIVE THERAPY N/A 03/08/2017    ELECTROCONVULSIVE THERAPY performed by Ngoc Corral DO at [address]  98/78/0155    umbilical     INGUINAL HERNIA REPAIR Right 01/01/1990    right ing with mesh    VASECTOMY           Family History   Problem Relation Age of Onset    Diabetes Mother     Cancer Mother         uterine CA    Depression Mother     Mental Illness Mother     Hypertension Mother     Other Mother         blood clots    Heart Disease Brother     Depression Brother     Mental Illness Brother     Hypertension Brother     Breast Cancer Maternal Grandmother     Cancer Maternal Grandmother         breast CA    Heart Attack Maternal Grandmother     Other Maternal Grandmother         blood clots       CareTeam (Including outside providers/suppliers regularly involved in providing care):   Patient Care Team:  SANDY Ragland as PCP - General (Nurse Practitioner Family)  SANDY Ragland as PCP - [facility] Empaneled Provider  SANDY Alicia - CNP (Psychiatry)  Carina Salvador MD as Consulting Physician (Pulmonary Disease)    Wt Readings from Last 3 Encounters:   06/16/21 (!) 382 lb (173.3 kg)   06/15/21 (!) 377 lb (171 kg)   05/29/21 (!) 370 lb (167.8 kg)     Vitals:    06/16/21 1358   BP: 128/84   Pulse: 61   Resp: 18   Temp: 97.9 °F (36.6 °C)   TempSrc: Temporal   SpO2: 98%   Weight: (!) 382 lb (173.3 kg)   Height: 6' 2\" (1.88 m)     Body mass index is 49.05 kg/m². Based upon direct observation of the patient, evaluation of cognition reveals recent and remote memory intact. Patient's complete Health Risk Assessment and screening values have been reviewed and are found in Flowsheets. The following problems were reviewed today and where indicated follow up appointments were made and/or referrals ordered. Positive Risk Factor Screenings with Interventions:            General Health and ACP:  General  In general, how would you say your health is?: (!) Poor  In the past 7 days, have you experienced any of the following?  New or Increased Pain, New or Increased Fatigue, Loneliness, Social Isolation, Stress or Anger?: (!) Social Isolation  Do you get the social and emotional support that you need?: Yes  Do you have a Living Will?: (!) No  Advance Directives     Power of [address] [place] [address] Will ACP-Advance Directive ACP-Power of     Not on File Not on File Not on File Not on File      General Health Risk Interventions:  · No Living Will: Advance Care Planning addressed with patient today    Health Habits/Nutrition:  Health Habits/Nutrition  Do you exercise for at least 20 minutes 2-3 times per week?: (!) No  Have you lost any weight without trying in the past 3 months?: No  Do you eat only one meal per day?: No  Have you seen the dentist within the past year?: (!) No  Body mass index: (!) 49.04  Health Habits/Nutrition Interventions:  · Dental exam overdue:  patient encouraged to make appointment with his/her dentist    Hearing/Vision:  No exam data present  Hearing/Vision  Do you or your family notice any trouble with your hearing that hasn't been managed with hearing aids?: No  Do you have difficulty driving, watching TV, or doing any of your daily activities because of your eyesight?: (!) Yes  Have you had an eye exam within the past year?: (!) No  Hearing/Vision Interventions:  · Vision concerns:  patient encouraged to make appointment with his/her eye specialist    Safety:  Safety  Do you have working smoke detectors?: Yes  Have all throw rugs been removed or fastened?: (!) No  Do you have non-slip mats or surfaces in all bathtubs/showers?: (!) No  Do all of your stairways have a railing or banister?: (!) No  Are your doorways, halls and stairs free of clutter?: Yes  Do you always fasten your seatbelt when you are in a car?: Yes  Safety Interventions:  · Home safety tips provided    ADL:  ADLs  In the past 7 days, did you need help from others to perform any of the following everyday activities? Eating, dressing, grooming, bathing, toileting, or walking/balance?: (!) Walking/Balance  In the past 7 days, did you need help from others to take care of any of the following?  Laundry, housekeeping, banking/finances, shopping, telephone use, food preparation, transportation, or taking medications?: [employer], Housekeeping, Banking/Finances, Shopping, Food Preparation, Transportation, Taking Medications  ADL Interventions:  · Patient declines any further evaluation/treatment for this issue    Personalized Preventive Plan   Current Health Maintenance Status  Immunization History   Administered Date(s) Administered    Influenza 12/03/2013    Influenza Virus Vaccine 12/18/2014, 01/19/2016, 10/03/2020    Influenza, Quadv, IM, (6 mo and older Fluzone, Flulaval, Fluarix and 3 yrs and older Afluria) 10/09/2017, 10/31/2018    Influenza, Quadv, IM, PF (6 mo and older Fluzone, Flulaval, Fluarix, and 3 yrs and older Afluria) 10/17/2016, 12/17/2019    Pneumococcal Polysaccharide ([id number]) 01/19/2016    Tdap (Boostrix, Adacel) 07/17/2012        Health Maintenance   Topic Date Due    Hepatitis C screen  Never done    COVID-19 Vaccine (1) Never done    HIV screen  Never done    Annual Wellness Visit (AWV)  05/02/2021    Lipid screen  03/30/2022    Potassium monitoring  05/29/2022    Creatinine monitoring  05/29/2022    DTaP/Tdap/Td vaccine (2 - Td or Tdap) 07/17/2022    Diabetes screen  03/30/2024    Pneumococcal 0-64 years Vaccine (2 of 2) 03/17/2039    Flu vaccine  Completed    Hepatitis A vaccine  Aged Out    Hepatitis B vaccine  Aged Out    Hib vaccine  Aged Out    Meningococcal (ACWY) vaccine  Aged Out     Recommendations for [place] Due: see orders and patient instructions/AVS.  . Recommended screening schedule for the next 5-10 years is provided to the patient in written form: see Patient Emanuel Rodriguez was seen today for medicare awv. Diagnoses and all orders for this visit:    Advance care planning  -     DC ADVANCED CARE PLAN FACE TO FACE, [address] [[address]]    BMI 45.0-49.9, adult ([facility] [address]  -     DC Behavior  obesity 15m []    Screening for cardiovascular condition  -     DC Intens behave ther cardio dx, 15 minutes []    Routine general medical examination at a health care facility                   [address] [place] [address] Rd: Discussed the patients choices for care and treatment in case of a health event that adversely affects decision-making abilities. Also discussed the patients long-term treatment options. Reviewed the process of designating a Health Care Surrogate as defined by the state [place] [address] [facility] [address] Reviewed the [facility] Will Directive process and the kinds of life-sustaining treatments the patient would like to receive should they become terminally ill or permanently unconscious. Explained the state requirement to complete the forms in the presence of two eligible witnesses OR in the presence of a . Patient was asked to provide a copy of the signed forms to the practice office.   Time spent (minutes): 30 Obesity Counseling: Assessed behavioral health risks and factors affecting choice of behavior. Suggested weight control approaches, including dietary changes behavioral modification and follow up plan. Provided educational and support documentation. Time spent (minutes): 15    Cardiovascular Disease Risk Counseling: Assessed the patient's risk to develop cardiovascular disease and reviewed main risk factors. Reviewed steps to reduce disease risk including:   · Quitting tobacco use, reducing amount smoked, or not starting the habit  · Making healthy food choices  · Being physically active and gradualy increasing activity levels   · Reduce weight and determine a healthy BMI goal  · Monitor blood pressure and treat if higher than 140/90 mmHg  · Maintain blood total cholesterol levels under 5 mmol/l or 190 mg/dl  · Maintain LDL cholesterol levels under 3.0 mmol/l or 115 mg/dl   · Control blood glucose levels  · Consider taking aspirin (75 mg daily), once blood pressure is controlled   Provided a follow up plan. Time spent (minutes): 30        Referral to GI due to worsening diarrhea symptoms. Discussed it may be dumping syndrome.

## 2021-07-19 DIAGNOSIS — I25.10 CAD IN NATIVE ARTERY: ICD-10-CM

## 2021-07-19 RX ORDER — MONTELUKAST SODIUM 4 MG/1
TABLET, CHEWABLE ORAL
Qty: 60 TABLET | Refills: 0 | Status: SHIPPED | OUTPATIENT
Start: 2021-07-19 | End: 2021-08-16

## 2021-07-19 RX ORDER — ATORVASTATIN CALCIUM 80 MG/1
TABLET, FILM COATED ORAL
Qty: 30 TABLET | Refills: 0 | Status: SHIPPED | OUTPATIENT
Start: 2021-07-19 | End: 2021-09-20

## 2021-08-04 ENCOUNTER — OFFICE VISIT (OUTPATIENT)
Dept: PULMONOLOGY | Age: 46
End: 2021-08-04
Payer: MEDICARE

## 2021-08-04 VITALS
RESPIRATION RATE: 18 BRPM | TEMPERATURE: 98.2 F | WEIGHT: 315 LBS | HEIGHT: 74 IN | SYSTOLIC BLOOD PRESSURE: 124 MMHG | DIASTOLIC BLOOD PRESSURE: 80 MMHG | BODY MASS INDEX: 40.43 KG/M2 | HEART RATE: 85 BPM | OXYGEN SATURATION: 97 %

## 2021-08-04 DIAGNOSIS — G47.10 HYPERSOMNIA: ICD-10-CM

## 2021-08-04 DIAGNOSIS — G47.30 SEVERE SLEEP APNEA: Primary | ICD-10-CM

## 2021-08-04 DIAGNOSIS — R06.02 SHORTNESS OF BREATH: ICD-10-CM

## 2021-08-04 DIAGNOSIS — E66.01 MORBID OBESITY (HCC): ICD-10-CM

## 2021-08-04 PROCEDURE — 1036F TOBACCO NON-USER: CPT | Performed by: INTERNAL MEDICINE

## 2021-08-04 PROCEDURE — 99213 OFFICE O/P EST LOW 20 MIN: CPT | Performed by: INTERNAL MEDICINE

## 2021-08-04 PROCEDURE — G8417 CALC BMI ABV UP PARAM F/U: HCPCS | Performed by: INTERNAL MEDICINE

## 2021-08-04 PROCEDURE — G8427 DOCREV CUR MEDS BY ELIG CLIN: HCPCS | Performed by: INTERNAL MEDICINE

## 2021-08-04 ASSESSMENT — ENCOUNTER SYMPTOMS
COUGH: 0
ABDOMINAL DISTENTION: 0
CHEST TIGHTNESS: 0
BACK PAIN: 0
WHEEZING: 1
SHORTNESS OF BREATH: 1
APNEA: 0
RHINORRHEA: 0
ANAL BLEEDING: 0
ABDOMINAL PAIN: 0

## 2021-08-19 ENCOUNTER — TRANSCRIBE ORDERS (OUTPATIENT)
Dept: LAB | Facility: HOSPITAL | Age: 46
End: 2021-08-19

## 2021-08-19 DIAGNOSIS — Z01.818 PREOP TESTING: Primary | ICD-10-CM

## 2021-08-21 ENCOUNTER — LAB (OUTPATIENT)
Dept: LAB | Facility: HOSPITAL | Age: 46
End: 2021-08-21

## 2021-08-21 LAB — SARS-COV-2 ORF1AB RESP QL NAA+PROBE: NOT DETECTED

## 2021-08-21 PROCEDURE — U0005 INFEC AGEN DETEC AMPLI PROBE: HCPCS | Performed by: PAIN MEDICINE

## 2021-08-21 PROCEDURE — C9803 HOPD COVID-19 SPEC COLLECT: HCPCS | Performed by: PAIN MEDICINE

## 2021-08-21 PROCEDURE — U0004 COV-19 TEST NON-CDC HGH THRU: HCPCS | Performed by: PAIN MEDICINE

## 2021-08-23 RX ORDER — LISINOPRIL 20 MG/1
TABLET ORAL
Qty: 30 TABLET | Refills: 5 | Status: SHIPPED | OUTPATIENT
Start: 2021-08-23 | End: 2022-02-21

## 2021-09-12 ENCOUNTER — APPOINTMENT (OUTPATIENT)
Dept: GENERAL RADIOLOGY | Age: 46
End: 2021-09-12
Payer: MEDICARE

## 2021-09-12 ENCOUNTER — HOSPITAL ENCOUNTER (EMERGENCY)
Age: 46
Discharge: HOME | End: 2021-09-12
Attending: EMERGENCY MEDICINE
Payer: MEDICARE

## 2021-09-12 VITALS
SYSTOLIC BLOOD PRESSURE: 138 MMHG | HEART RATE: 74 BPM | TEMPERATURE: 98.7 F | RESPIRATION RATE: 17 BRPM | BODY MASS INDEX: 40.43 KG/M2 | WEIGHT: 315 LBS | HEIGHT: 74 IN | OXYGEN SATURATION: 94 % | DIASTOLIC BLOOD PRESSURE: 88 MMHG

## 2021-09-12 DIAGNOSIS — F41.9 ANXIETY DISORDER, UNSPECIFIED TYPE: ICD-10-CM

## 2021-09-12 DIAGNOSIS — R07.9 CHEST PAIN, UNSPECIFIED TYPE: Primary | ICD-10-CM

## 2021-09-12 LAB
ALBUMIN SERPL-MCNC: 3.9 G/DL (ref 3.5–5.2)
ALP BLD-CCNC: 144 U/L (ref 40–130)
ALT SERPL-CCNC: 54 U/L (ref 5–41)
ANION GAP SERPL CALCULATED.3IONS-SCNC: 13 MMOL/L (ref 7–19)
AST SERPL-CCNC: 69 U/L (ref 5–40)
BASOPHILS ABSOLUTE: 0.1 K/UL (ref 0–0.2)
BASOPHILS RELATIVE PERCENT: 0.7 % (ref 0–1)
BILIRUB SERPL-MCNC: 1 MG/DL (ref 0.2–1.2)
BUN BLDV-MCNC: 8 MG/DL (ref 6–20)
CALCIUM SERPL-MCNC: 9.4 MG/DL (ref 8.6–10)
CHLORIDE BLD-SCNC: 99 MMOL/L (ref 98–111)
CO2: 23 MMOL/L (ref 22–29)
CREAT SERPL-MCNC: 0.6 MG/DL (ref 0.5–1.2)
EOSINOPHILS ABSOLUTE: 0.1 K/UL (ref 0–0.6)
EOSINOPHILS RELATIVE PERCENT: 1.2 % (ref 0–5)
GFR AFRICAN AMERICAN: >59
GFR NON-AFRICAN AMERICAN: >60
GLUCOSE BLD-MCNC: 170 MG/DL (ref 74–109)
HCT VFR BLD CALC: 42.2 % (ref 42–52)
HEMOGLOBIN: 14.6 G/DL (ref 14–18)
IMMATURE GRANULOCYTES #: 0 K/UL
LYMPHOCYTES ABSOLUTE: 2.7 K/UL (ref 1.1–4.5)
LYMPHOCYTES RELATIVE PERCENT: 35.9 % (ref 20–40)
MAGNESIUM: 1.7 MG/DL (ref 1.6–2.6)
MCH RBC QN AUTO: 31.2 PG (ref 27–31)
MCHC RBC AUTO-ENTMCNC: 34.6 G/DL (ref 33–37)
MCV RBC AUTO: 90.2 FL (ref 80–94)
MONOCYTES ABSOLUTE: 0.5 K/UL (ref 0–0.9)
MONOCYTES RELATIVE PERCENT: 6.8 % (ref 0–10)
NEUTROPHILS ABSOLUTE: 4.2 K/UL (ref 1.5–7.5)
NEUTROPHILS RELATIVE PERCENT: 55 % (ref 50–65)
PDW BLD-RTO: 12.8 % (ref 11.5–14.5)
PLATELET # BLD: 151 K/UL (ref 130–400)
PMV BLD AUTO: 9 FL (ref 9.4–12.4)
POTASSIUM REFLEX MAGNESIUM: 3.1 MMOL/L (ref 3.5–5)
PRO-BNP: 14 PG/ML (ref 0–450)
RBC # BLD: 4.68 M/UL (ref 4.7–6.1)
SODIUM BLD-SCNC: 135 MMOL/L (ref 136–145)
TOTAL PROTEIN: 7 G/DL (ref 6.6–8.7)
TROPONIN: <0.01 NG/ML (ref 0–0.03)
TROPONIN: <0.01 NG/ML (ref 0–0.03)
WBC # BLD: 7.6 K/UL (ref 4.8–10.8)

## 2021-09-12 PROCEDURE — 93005 ELECTROCARDIOGRAM TRACING: CPT | Performed by: EMERGENCY MEDICINE

## 2021-09-12 PROCEDURE — 71045 X-RAY EXAM CHEST 1 VIEW: CPT

## 2021-09-12 PROCEDURE — 96374 THER/PROPH/DIAG INJ IV PUSH: CPT

## 2021-09-12 PROCEDURE — 96375 TX/PRO/DX INJ NEW DRUG ADDON: CPT

## 2021-09-12 PROCEDURE — 85025 COMPLETE CBC W/AUTO DIFF WBC: CPT

## 2021-09-12 PROCEDURE — 6370000000 HC RX 637 (ALT 250 FOR IP): Performed by: EMERGENCY MEDICINE

## 2021-09-12 PROCEDURE — 6360000002 HC RX W HCPCS: Performed by: EMERGENCY MEDICINE

## 2021-09-12 PROCEDURE — 83880 ASSAY OF NATRIURETIC PEPTIDE: CPT

## 2021-09-12 PROCEDURE — 83735 ASSAY OF MAGNESIUM: CPT

## 2021-09-12 PROCEDURE — 36415 COLL VENOUS BLD VENIPUNCTURE: CPT

## 2021-09-12 PROCEDURE — 80053 COMPREHEN METABOLIC PANEL: CPT

## 2021-09-12 PROCEDURE — 99285 EMERGENCY DEPT VISIT HI MDM: CPT

## 2021-09-12 PROCEDURE — 84484 ASSAY OF TROPONIN QUANT: CPT

## 2021-09-12 RX ORDER — ONDANSETRON 2 MG/ML
4 INJECTION INTRAMUSCULAR; INTRAVENOUS ONCE
Status: COMPLETED | OUTPATIENT
Start: 2021-09-12 | End: 2021-09-12

## 2021-09-12 RX ORDER — KETOROLAC TROMETHAMINE 30 MG/ML
15 INJECTION, SOLUTION INTRAMUSCULAR; INTRAVENOUS ONCE
Status: COMPLETED | OUTPATIENT
Start: 2021-09-12 | End: 2021-09-12

## 2021-09-12 RX ORDER — NITROGLYCERIN 0.4 MG/1
TABLET SUBLINGUAL
Qty: 25 TABLET | Refills: 0 | Status: ON HOLD | OUTPATIENT
Start: 2021-09-12 | End: 2021-11-12 | Stop reason: SDUPTHER

## 2021-09-12 RX ORDER — HYDROXYZINE HYDROCHLORIDE 25 MG/1
25 TABLET, FILM COATED ORAL EVERY 8 HOURS PRN
Qty: 30 TABLET | Refills: 0 | Status: SHIPPED | OUTPATIENT
Start: 2021-09-12 | End: 2021-09-22

## 2021-09-12 RX ORDER — ASPIRIN 81 MG/1
324 TABLET, CHEWABLE ORAL ONCE
Status: COMPLETED | OUTPATIENT
Start: 2021-09-12 | End: 2021-09-12

## 2021-09-12 RX ORDER — POTASSIUM CHLORIDE 20 MEQ/1
40 TABLET, EXTENDED RELEASE ORAL ONCE
Status: COMPLETED | OUTPATIENT
Start: 2021-09-12 | End: 2021-09-12

## 2021-09-12 RX ORDER — MORPHINE SULFATE 2 MG/ML
2 INJECTION, SOLUTION INTRAMUSCULAR; INTRAVENOUS ONCE
Status: COMPLETED | OUTPATIENT
Start: 2021-09-12 | End: 2021-09-12

## 2021-09-12 RX ADMIN — ONDANSETRON HYDROCHLORIDE 4 MG: 2 SOLUTION INTRAMUSCULAR; INTRAVENOUS at 20:05

## 2021-09-12 RX ADMIN — KETOROLAC TROMETHAMINE 15 MG: 30 INJECTION, SOLUTION INTRAMUSCULAR; INTRAVENOUS at 22:05

## 2021-09-12 RX ADMIN — LIDOCAINE HYDROCHLORIDE: 20 SOLUTION ORAL; TOPICAL at 22:49

## 2021-09-12 RX ADMIN — POTASSIUM CHLORIDE 40 MEQ: 1500 TABLET, EXTENDED RELEASE ORAL at 20:05

## 2021-09-12 RX ADMIN — MORPHINE SULFATE 2 MG: 2 INJECTION, SOLUTION INTRAMUSCULAR; INTRAVENOUS at 20:04

## 2021-09-12 RX ADMIN — ASPIRIN 324 MG: 81 TABLET, CHEWABLE ORAL at 19:12

## 2021-09-12 ASSESSMENT — PAIN DESCRIPTION - PAIN TYPE
TYPE: ACUTE PAIN
TYPE: ACUTE PAIN

## 2021-09-12 ASSESSMENT — ENCOUNTER SYMPTOMS
ABDOMINAL PAIN: 0
VOMITING: 0
VOICE CHANGE: 0
DIARRHEA: 0
SHORTNESS OF BREATH: 0
COUGH: 0
EYE PAIN: 0
RHINORRHEA: 0
EYE REDNESS: 0

## 2021-09-12 ASSESSMENT — PAIN SCALES - GENERAL
PAINLEVEL_OUTOF10: 6
PAINLEVEL_OUTOF10: 8
PAINLEVEL_OUTOF10: 7
PAINLEVEL_OUTOF10: 6
PAINLEVEL_OUTOF10: 8

## 2021-09-12 ASSESSMENT — PAIN DESCRIPTION - DESCRIPTORS
DESCRIPTORS: ACHING
DESCRIPTORS: ACHING

## 2021-09-12 ASSESSMENT — PAIN DESCRIPTION - FREQUENCY
FREQUENCY: CONTINUOUS
FREQUENCY: CONTINUOUS

## 2021-09-12 ASSESSMENT — PAIN DESCRIPTION - LOCATION
LOCATION: MEDIASTINUM
LOCATION: MEDIASTINUM

## 2021-09-12 ASSESSMENT — PAIN DESCRIPTION - DIRECTION: RADIATING_TOWARDS: BACK

## 2021-09-13 LAB
EKG P AXIS: 52 DEGREES
EKG P-R INTERVAL: 190 MS
EKG Q-T INTERVAL: 316 MS
EKG QRS DURATION: 118 MS
EKG QTC CALCULATION (BAZETT): 421 MS
EKG T AXIS: 37 DEGREES

## 2021-09-13 PROCEDURE — 93010 ELECTROCARDIOGRAM REPORT: CPT | Performed by: INTERNAL MEDICINE

## 2021-09-13 NOTE — ED PROVIDER NOTES
[facility] EMERGENCY DEPT  EMERGENCY DEPARTMENT ENCOUNTER      Pt Name: Billy Terry  MRN: [id number]  [place] 1975  Date of evaluation: 9/12/2021  Provider: Chacha Gray MD    CHIEF COMPLAINT       Chief Complaint   Patient presents with    Chest Pain         HISTORY OF PRESENT ILLNESS   (Location/Symptom, Timing/Onset,Context/Setting, Quality, Duration, Modifying Factors, Severity)  Note limiting factors. Billy Terry is a 51 y.o. male who presents to the emergency department with complaint of chest pain that started around 5:00 tonight. States this started while he was at rest relaxing after eating dinner. Pain is located in the central and left chest.  States he had some numbness and tingling down his left arm as well. Patient has a history of coronary artery disease as well as atrial fibrillation. States he recently had an ablation done. Had a negative stress test just under a year ago. Denies any palpitations, shortness of breath, or other symptoms associated with the episode of pain tonight. Has not had any leg swelling recently. Patient does state he was having significant anxiety associated with the episode tonight and has been having increased episodes of anxiety and panic attacks recently. He is on several different psychiatric medications    HPI    NursingNotes were reviewed. REVIEW OF SYSTEMS    (2-9 systems for level 4, 10 or more for level 5)     Review of Systems   Constitutional: Negative for fatigue and fever. HENT: Negative for congestion, rhinorrhea and voice change. Eyes: Negative for pain and redness. Respiratory: Negative for cough and shortness of breath. Cardiovascular: Positive for chest pain. Gastrointestinal: Negative for abdominal pain, diarrhea and vomiting. Endocrine: Negative. Genitourinary: Negative. Musculoskeletal: Negative for arthralgias and gait problem. Skin: Negative for rash and wound.    Neurological: Negative for weakness and headaches. Hematological: Negative. Psychiatric/Behavioral: Negative. All other systems reviewed and are negative. A complete review of systems was performed and is negative except as noted above in the HPI. PAST MEDICAL HISTORY     Past Medical History:   Diagnosis Date    Anxiety     Arthritis     Bipolar 1 disorder ([facility] [address]     CAD in native artery 3/6/2018    Chronic back pain     COVID-19     Depression     Hyperlipidemia     Hypertension     IBS (irritable bowel syndrome)     Mild intermittent asthma without complication 4/21/7628    Morbidly obese (HCC)     Panic disorder     at times afraid to go outside    Unspecified sleep apnea     bipap         SURGICAL HISTORY       Past Surgical History:   Procedure Laterality Date    ANUS SURGERY      APPENDECTOMY  02/03/2014    CHOLECYSTECTOMY  01/01/2009    COLONOSCOPY  01/01/2012        COLONOSCOPY  10/19/2017    Dr Emanuel Magdaleno, Benign HP    COLONOSCOPY  04/29/2009    [facility]    ELECTROCONVULSIVE THERAPY N/A 03/08/2017    ELECTROCONVULSIVE THERAPY performed by Alli Jones DO at [address]  39/00/1839    umbilical     INGUINAL HERNIA REPAIR Right 01/01/1990    right ing with mesh    VASECTOMY           CURRENT MEDICATIONS       Previous Medications    ATORVASTATIN (LIPITOR) 80 MG TABLET    TAKE 1 TABLET BY MOUTH AT NIGHT. CLONAZEPAM (KLONOPIN) 0.5 MG TABLET        CLONIDINE (CATAPRES) 0.1 MG TABLET    Take 1 tablet by mouth daily as needed for High Blood Pressure (140/90)    COLESTIPOL (COLESTID) 1 G TABLET    TAKE 1 TABLET BY MOUTH TWICE DAILY. HYDROCODONE-ACETAMINOPHEN (NORCO)  MG PER TABLET    Take 1 tablet by mouth every 8 hours as needed for Pain.     LAMOTRIGINE (LAMICTAL) 100 MG TABLET    2 times daily     LISINOPRIL (PRINIVIL;ZESTRIL) 20 MG TABLET    TAKE 1 TABLET BY MOUTH ONCE DAILY     METOPROLOL TARTRATE (LOPRESSOR) 25 MG TABLET    TAKE 2 TABLETS BY MOUTH TWICE DAILY     MISC.  DEVICES (ROLLING WALKER/BURGUNDY) MISC    Dx: gen weakness and fatigue use daily as directed    PAROXETINE (PAXIL) 10 MG TABLET        TRAZODONE (DESYREL) 150 MG TABLET    Take 150 mg by mouth nightly    TRINTELLIX 10 MG TABS TABLET        ZIPRASIDONE (GEODON) 20 MG CAPSULE    Take 20 mg by mouth daily       ALLERGIES     Dye [gadolinium derivatives]    FAMILY HISTORY       Family History   Problem Relation Age of Onset    Diabetes Mother     Cancer Mother         uterine CA    Depression Mother     Mental Illness Mother     Hypertension Mother     Other Mother         blood clots    Heart Disease Brother     Depression Brother     Mental Illness Brother     Hypertension Brother     Breast Cancer Maternal Grandmother     Cancer Maternal Grandmother         breast CA    Heart Attack Maternal Grandmother     Other Maternal Grandmother         blood clots          SOCIAL HISTORY       Social History     Socioeconomic History    Marital status:      Spouse name: Not on file    Number of children: Not on file    Years of education: Not on file    Highest education level: Not on file   Occupational History    Not on file   Tobacco Use    Smoking status: Former Smoker     Packs/day: 0.50     Years: 24.00     Pack years: 12.00     Types: Cigarettes     Quit date: 7/15/2020     Years since quittin.1    Smokeless tobacco: Never Used   Vaping Use    Vaping Use: Never used   Substance and Sexual Activity    Alcohol use: No     Alcohol/week: 0.0 standard drinks    Drug use: No    Sexual activity: Yes     Partners: Female   Other Topics Concern    Not on file   Social History Narrative    Not on file     Social Determinants of Health     Financial Resource Strain: Low Risk     Difficulty of Paying Living Expenses: Not hard at all   Food Insecurity: No Food Insecurity    Worried About Running Out of Food in the Last Year: Never true    [address] in the Last Year: wall: No tenderness. Abdominal:      General: There is no distension. Palpations: Abdomen is soft. Tenderness: There is no abdominal tenderness. There is no guarding or rebound. Musculoskeletal:         General: No deformity. Normal range of motion. Right lower leg: No edema. Left lower leg: No edema. Skin:     General: Skin is warm and dry. Coloration: Skin is not pale. Findings: No erythema. Neurological:      Mental Status: He is alert and oriented to person, place, and time. GCS: GCS eye subscore is 4. GCS verbal subscore is 5. GCS motor subscore is 6. Cranial Nerves: No cranial nerve deficit. Motor: No abnormal muscle tone. Coordination: Coordination normal.   Psychiatric:         Mood and Affect: Mood is anxious. Behavior: Behavior normal.         Judgment: Judgment normal.         DIAGNOSTIC RESULTS     EKG: All EKG's are interpreted by the Emergency Department Physician who either signs or Co-signs this chart in the absence of a cardiologist.        RADIOLOGY:   Non-plain film images such as CT, Ultrasound and MRI are read by the radiologist. Ct Speed images are visualized and preliminarily interpreted by the emergency physician with the below findings:        Interpretation per the Radiologist below, if available at the time of this note:    XR CHEST PORTABLE   Final Result   Impression: Shallow inspiration, no acute findings. Signed by Dr Maria Esther Christine.  Елена            ED BEDSIDE ULTRASOUND:   Performed by ED Physician - none    LABS:  Labs Reviewed   CBC WITH AUTO DIFFERENTIAL - Abnormal; Notable for the following components:       Result Value    RBC 4.68 (*)     MCH 31.2 (*)     MPV 9.0 (*)     All other components within normal limits   COMPREHENSIVE METABOLIC PANEL W/ REFLEX TO MG FOR LOW K - Abnormal; Notable for the following components:    Sodium 135 (*)     Potassium reflex Magnesium 3.1 (*)     Glucose 170 (*) Alkaline Phosphatase 144 (*)     ALT 54 (*)     AST 69 (*)     All other components within normal limits   TROPONIN   BRAIN NATRIURETIC PEPTIDE   MAGNESIUM   TROPONIN       All other labs were within normal range or not returned as of this dictation. Medications   aluminum & magnesium hydroxide-simethicone (MAALOX) 30 mL, lidocaine viscous hcl (XYLOCAINE) 5 mL (GI COCKTAIL) (has no administration in time range)   aspirin chewable tablet 324 mg (324 mg Oral Given 9/12/21 1912)   morphine (PF) injection 2 mg (2 mg IntraVENous Given 9/12/21 2004)   ondansetron (ZOFRAN) injection 4 mg (4 mg IntraVENous Given 9/12/21 2005)   potassium chloride (KLOR-CON M) extended release tablet 40 mEq (40 mEq Oral Given 9/12/21 2005)   ketorolac (TORADOL) injection 15 mg (15 mg IntraVENous Given 9/12/21 2205)       EMERGENCY DEPARTMENT COURSE and DIFFERENTIALDIAGNOSIS/MDM:   Vitals:    Vitals:    09/12/21 2026 09/12/21 2031 09/12/21 2130 09/12/21 2200   BP: (!) 151/105 128/79 138/76 138/88   Pulse: 102 90 95 74   Resp: 21 28 10 17   Temp:       TempSrc:       SpO2: 95% 95% 91% 94%   Weight:       Height:           [facility]    ED Course as of Sep 12 2242   Sun Sep 12, 2021   1901 EKG shows sinus rhythm with a rate of 120. Partial right bundle branch block present. No findings of acute ischemia or infarction. Normal intervals other than borderline QTC of 41. [INNA]   2011 Potassium(!): 3.1 [INNA]   2151 EKG without significant changes compared to priors. Patient still having pain at this time. Repeat troponin pending. [INNA]   2229 Pain is improved significantly since arrival here. Work-up shows no evidence of cardiac ischemia. No pleuritic component to the pain. [INNA]      ED Course User Index  [INNA] Jame Saleh MD   Evaluation and work-up here revealed no signs of emergent or life-threatening pathology that would necessitate admission for further work-up or management at this time.   Patient is felt to be stable for discharge home with return precautions for worsening of the condition or development of new concerning symptoms. Patient was encouraged to follow-up with their primary care doctor in the appropriate timeframe. Necessary prescriptions and information have been provided for treatment at home. Patient voices understanding and agreement with the plan. CONSULTS:  None    PROCEDURES:  Unless otherwise notedbelow, none     Procedures      FINAL IMPRESSION     1. Chest pain, unspecified type    2. Anxiety disorder, unspecified type          DISPOSITION/PLAN   DISPOSITION Decision To Discharge 09/12/2021 10:40:59 PM      PATIENT REFERRED TO:  Thu Carr EMERGENCY DEPT  [facility]  [phone]    If symptoms worsen    Natalia Koehler, [address] [place] [address]  [phone]    Schedule an appointment as soon as possible for a visit         DISCHARGE MEDICATIONS:  New Prescriptions    HYDROXYZINE (ATARAX) 25 MG TABLET    Take 1 tablet by mouth every 8 hours as needed for Anxiety    NITROGLYCERIN (NITROSTAT) 0.4 MG SL TABLET    up to max of 3 total doses. If no relief after 1 dose, call 911.           (Please note that portions of this note were completed with a voice recognition program.  Efforts were made to edit the dictations butoccasionally words are mis-transcribed.)    Bro Butler MD (electronically signed)  AttendingEmergency Physician          Bro Cherry MD  09/12/21 1229

## 2021-09-20 DIAGNOSIS — I25.10 CAD IN NATIVE ARTERY: ICD-10-CM

## 2021-09-20 RX ORDER — ATORVASTATIN CALCIUM 80 MG/1
TABLET, FILM COATED ORAL
Qty: 30 TABLET | Refills: 0 | Status: SHIPPED | OUTPATIENT
Start: 2021-09-20 | End: 2021-10-25

## 2021-09-20 NOTE — TELEPHONE ENCOUNTER
Jeffrey Rafael called to request a refill on his medication. Last office visit : 6/16/2021   Next office visit : 9/22/2021     Requested Prescriptions     Pending Prescriptions Disp Refills    atorvastatin (LIPITOR) 80 MG tablet [Pharmacy Med Name: ATORVASTATIN CALCIUM 80MG TABLET] 30 tablet 0     Sig: TAKE 1 TABLET BY MOUTH AT NIGHT.             Jt Arcos

## 2021-09-21 ENCOUNTER — OFFICE VISIT (OUTPATIENT)
Dept: CARDIOLOGY CLINIC | Age: 46
End: 2021-09-21
Payer: MEDICARE

## 2021-09-21 VITALS
BODY MASS INDEX: 40.43 KG/M2 | SYSTOLIC BLOOD PRESSURE: 134 MMHG | HEIGHT: 74 IN | HEART RATE: 85 BPM | WEIGHT: 315 LBS | DIASTOLIC BLOOD PRESSURE: 78 MMHG | OXYGEN SATURATION: 97 %

## 2021-09-21 DIAGNOSIS — I25.10 CORONARY ARTERY DISEASE INVOLVING NATIVE CORONARY ARTERY OF NATIVE HEART WITHOUT ANGINA PECTORIS: Primary | ICD-10-CM

## 2021-09-21 DIAGNOSIS — I10 ESSENTIAL HYPERTENSION: ICD-10-CM

## 2021-09-21 DIAGNOSIS — I45.6 WPW (WOLFF-PARKINSON-WHITE SYNDROME): ICD-10-CM

## 2021-09-21 PROCEDURE — 99214 OFFICE O/P EST MOD 30 MIN: CPT | Performed by: NURSE PRACTITIONER

## 2021-09-21 ASSESSMENT — ENCOUNTER SYMPTOMS
CHEST TIGHTNESS: 0
WHEEZING: 0
SHORTNESS OF BREATH: 0
COUGH: 0
SORE THROAT: 0

## 2021-09-21 NOTE — PROGRESS NOTES
[facility]   Established Patient Office Visit   [facility]. [address]  [phone]        OFFICE VISIT:  2021    Gauravmorgan Christie - : 1975    Reason For Visit:  Nithya Vogel is a 51 y.o. male who is here for 3 Month Follow-Up (No cardiac symptoms )    1. Coronary artery disease involving native coronary artery of native heart without angina pectoris    2. Essential hypertension    3. WPW (Abhilash-Parkinson-White syndrome)      Patient with a history of coronary artery disease, hypertension, hyperlipidemia, Abhilash-Parkinson-White syndrome and sleep apnea. Patient had ER visit on 2021 for paroxysmal SVT.  Pulse rate 206 bpm.  He was converted after 12 mg of adenosine.  Patient was discharged on beta-blocker.     Patient presented to clinic on 3/2/2021 for hospital follow-up. EKG showed sinus rhythm with a WPW pattern. Patient was referred to Dr. Ana María Donato electrophysiologist.     Patient was seen by Dr. Ana María Donato and underwent ablation on 2021 with Dr. Ana María Donato in [place], [place].        DATA:    2020 2D ECHO   Normal left ventricular chamber size and systolic function. No regional wall motion abnormalities.   EF 65%.     2020 DSE   Dobutamine stress echocardiogram without clinical, electrocardiographic, or echocardiographic evidence of myocardial ischemia.     ZIO PATCH:     Predominantly sinus rhythm with rare PACs.  One SVT run 4 beats.  Rare PVCs.  No VT, no pauses, no high degree AV blocks, nor atrial fib noted.     Catheterization 2018 bare-metal stent and was on aspirin and Plavix for 30 days.     Patient had ER visit on 2021 with complaints of chest pain. ER work-up unremarkable. Felt to be more anxiety disorder. Patient denies any further episodes of palpitations and/or chest discomfort. Patient to believes it was more anxiety related. He does have a follow-up appointment soon with Dr. Ana María Donato.         Subjective    Alonso Christie is a 51 y.o. male with the following history as recorded in [facility]:    Patient Active Problem List    Diagnosis Date Noted    CAD in native artery 03/06/2018    Shortness of breath 03/30/2021    Restrictive lung disease 03/30/2021    Snoring 03/30/2021    Non-restorative sleep 03/30/2021    Hypersomnia 03/30/2021    Severe sleep apnea 03/30/2021    Mild intermittent asthma without complication 16/42/9470    Chronic obstructive pulmonary disease ([facility] 08/14/2019    History of smoking 08/14/2019    Morbid obesity ([facility] 07/30/2019    Leg pain, bilateral     Acute chest pain     Cigarette nicotine dependence without complication 25/90/8232    Bipolar depression ([facility] 12/16/2016    S/P laparoscopic appendectomy 02/27/2014    History of colon polyps 05/17/2013    Depression     Hypertension     Anxiety      Current Outpatient Medications   Medication Sig Dispense Refill    atorvastatin (LIPITOR) 80 MG tablet TAKE 1 TABLET BY MOUTH AT NIGHT. 30 tablet 0    nitroGLYCERIN (NITROSTAT) 0.4 MG SL tablet up to max of 3 total doses. If no relief after 1 dose, call 911. 25 tablet 0    hydrOXYzine (ATARAX) 25 MG tablet Take 1 tablet by mouth every 8 hours as needed for Anxiety 30 tablet 0    metoprolol tartrate (LOPRESSOR) 25 MG tablet TAKE 2 TABLETS BY MOUTH TWICE DAILY  180 tablet 1    lisinopril (PRINIVIL;ZESTRIL) 20 MG tablet TAKE 1 TABLET BY MOUTH ONCE DAILY  30 tablet 5    colestipol (COLESTID) 1 g tablet TAKE 1 TABLET BY MOUTH TWICE DAILY. 60 tablet 5    ziprasidone (GEODON) 20 MG capsule Take 20 mg by mouth daily      cloNIDine (CATAPRES) 0.1 MG tablet Take 1 tablet by mouth daily as needed for High Blood Pressure (140/90) 30 tablet 3    clonazePAM (KLONOPIN) 0.5 MG tablet       PARoxetine (PAXIL) 10 MG tablet       Misc.  Devices (ROLLING WALKER/BURGUNDY) MISC Dx: gen weakness and fatigue use daily as directed 1 each 0    lamoTRIgine (LAMICTAL) 100 MG tablet 2 times daily       TRINTELLIX 10 MG TABS tablet       traZODone (DESYREL) 150 MG tablet Take 150 mg by mouth nightly       HYDROcodone-acetaminophen (NORCO)  MG per tablet Take 1 tablet by mouth every 8 hours as needed for Pain. No current facility-administered medications for this visit.      Allergies: Dye [gadolinium derivatives]  Past Medical History:   Diagnosis Date    Anxiety     Arthritis     Bipolar 1 disorder ([facility]     CAD in native artery 3/6/2018    Chronic back pain     COVID-19     Depression     Hyperlipidemia     Hypertension     IBS (irritable bowel syndrome)     Mild intermittent asthma without complication 9/13/6150    Morbidly obese (HCC)     Panic disorder     at times afraid to go outside    Unspecified sleep apnea     bipap     Past Surgical History:   Procedure Laterality Date    ANUS SURGERY      APPENDECTOMY  02/03/2014    CHOLECYSTECTOMY  01/01/2009    COLONOSCOPY  01/01/2012        COLONOSCOPY  10/19/2017    Dr Frankie Villagran, Benign HP    COLONOSCOPY  04/29/2009    [facility]    ELECTROCONVULSIVE THERAPY N/A 03/08/2017    ELECTROCONVULSIVE THERAPY performed by Janie Quintero DO at [address]  18/01/0409    umbilical     INGUINAL HERNIA REPAIR Right 01/01/1990    right ing with mesh    VASECTOMY       Family History   Problem Relation Age of Onset    Diabetes Mother     Cancer Mother         uterine CA    Depression Mother     Mental Illness Mother     Hypertension Mother    Lisa Roberson Other Mother         blood clots    Heart Disease Brother     Depression Brother     Mental Illness Brother     Hypertension Brother     Breast Cancer Maternal Grandmother     Cancer Maternal Grandmother         breast CA    Heart Attack Maternal Grandmother     Other Maternal Grandmother         blood clots     Social History     Tobacco Use    Smoking status: Former Smoker     Packs/day: 0.50     Years: 24.00     Pack years: 12.00     Types: Cigarettes Specific Problem / Diagnosis  Discussion and Data Reviewed Diagnostic Procedures Ordered Management Options Selected           1.   WPW/Tachycardia  Stable   Review and summation of old records:    Ablation 5/28/2021. Patient is on lisinopril 25 mg twice daily. Patient has upcoming follow-up appointment with Dr. Elvin Espinosa. No Continue current medications:     Yes           2. Coronary artery disease  Stable   No chest pain. Patient is on Lipitor, Lopressor. No Continue current medications:    Yes           3. Hypertension Stable  blood pressure in the office today 134/78. O2 sat 97%. Patient is on lisinopril 20 mg daily. Lopressor 25 mg twice daily. Clonidine 0.1 mg as needed. No Continue current medications: Yes                     No orders of the defined types were placed in this encounter. No orders of the defined types were placed in this encounter. Discussed with patient. Return in about 3 months (around 12/21/2021) for Follow up with me. I greatly appreciate the opportunity to meet Willy Corral and your confidence in allowing me to participate in his cardiovascular care. SANDY Duarte NP  9/21/2021 2:24 PM CDT                    This dictation was generated by voice recognition computer software. Although all attempts are made to edit dictation for accuracy, there may be errors in the transcription that are not intended.

## 2021-09-22 ENCOUNTER — OFFICE VISIT (OUTPATIENT)
Dept: PRIMARY CARE CLINIC | Age: 46
End: 2021-09-22
Payer: MEDICARE

## 2021-09-22 VITALS
SYSTOLIC BLOOD PRESSURE: 138 MMHG | TEMPERATURE: 97.6 F | HEIGHT: 77 IN | OXYGEN SATURATION: 97 % | DIASTOLIC BLOOD PRESSURE: 74 MMHG | HEART RATE: 81 BPM | WEIGHT: 315 LBS | BODY MASS INDEX: 37.19 KG/M2

## 2021-09-22 DIAGNOSIS — I10 ESSENTIAL HYPERTENSION: Primary | ICD-10-CM

## 2021-09-22 DIAGNOSIS — R19.7 DIARRHEA, UNSPECIFIED TYPE: ICD-10-CM

## 2021-09-22 DIAGNOSIS — F41.9 ANXIETY: ICD-10-CM

## 2021-09-22 DIAGNOSIS — I25.10 CAD IN NATIVE ARTERY: ICD-10-CM

## 2021-09-22 PROCEDURE — 99214 OFFICE O/P EST MOD 30 MIN: CPT | Performed by: NURSE PRACTITIONER

## 2021-09-22 ASSESSMENT — ENCOUNTER SYMPTOMS
RESPIRATORY NEGATIVE: 1
DIARRHEA: 1
EYES NEGATIVE: 1

## 2021-09-22 NOTE — PROGRESS NOTES
[address] [facility]  [address] [facility] [address] [place] [address]  Dept: [phone]  Dept Fax: [phone]  Loc: [phone]    Mayco Horvath is a 51 y.o. male who presents today for his medical conditions/complaints as noted below. Mayco Horvath is c/o of Hypertension      Chief Complaint   Patient presents with    Hypertension       HPI:     HPI   Patient is in office today to follow up on his hypertension. He is also complaining of more recent panic attacks. He did have a recent ER visit and was started on Vistaril for break through anxiety at night. He is still having diarrhea episodes as well. He has not had follow up with GI since his ablation. He was told that he needed a colonoscopy, but needed cardiac clearance. He had an ablation with Dr Emma Avalos since that last appointment.       Past Medical History:   Diagnosis Date    Anxiety     Arthritis     Bipolar 1 disorder ([facility]     CAD in native artery 3/6/2018    Chronic back pain     COVID-19     Depression     Hyperlipidemia     Hypertension     IBS (irritable bowel syndrome)     Mild intermittent asthma without complication 8/11/2210    Morbidly obese (HCC)     Panic disorder     at times afraid to go outside    Unspecified sleep apnea     bipap        Past Surgical History:   Procedure Laterality Date    ANUS SURGERY      APPENDECTOMY  02/03/2014    CHOLECYSTECTOMY  01/01/2009    COLONOSCOPY  01/01/2012        COLONOSCOPY  10/19/2017    Dr Gail Braxton, Benign HP    COLONOSCOPY  04/29/2009    [facility]    ELECTROCONVULSIVE THERAPY N/A 03/08/2017    ELECTROCONVULSIVE THERAPY performed by Miki Young DO at [address]  87/99/4683    umbilical     INGUINAL HERNIA REPAIR Right 01/01/1990    right ing with mesh    VASECTOMY         Social History     Tobacco Use    Smoking status: Former Smoker     Packs/day: 0.50     Years: 24.00     Pack years: 12.00     Types: Cigarettes     Quit date: 7/15/2020     Years since quittin.1    Smokeless tobacco: Never Used   Substance Use Topics    Alcohol use: No     Alcohol/week: 0.0 standard drinks        Current Outpatient Medications   Medication Sig Dispense Refill    atorvastatin (LIPITOR) 80 MG tablet TAKE 1 TABLET BY MOUTH AT NIGHT. 30 tablet 0    nitroGLYCERIN (NITROSTAT) 0.4 MG SL tablet up to max of 3 total doses. If no relief after 1 dose, call 911. 25 tablet 0    hydrOXYzine (ATARAX) 25 MG tablet Take 1 tablet by mouth every 8 hours as needed for Anxiety 30 tablet 0    metoprolol tartrate (LOPRESSOR) 25 MG tablet TAKE 2 TABLETS BY MOUTH TWICE DAILY  180 tablet 1    lisinopril (PRINIVIL;ZESTRIL) 20 MG tablet TAKE 1 TABLET BY MOUTH ONCE DAILY  30 tablet 5    colestipol (COLESTID) 1 g tablet TAKE 1 TABLET BY MOUTH TWICE DAILY. 60 tablet 5    ziprasidone (GEODON) 20 MG capsule Take 20 mg by mouth daily      cloNIDine (CATAPRES) 0.1 MG tablet Take 1 tablet by mouth daily as needed for High Blood Pressure (140/90) 30 tablet 3    clonazePAM (KLONOPIN) 0.5 MG tablet       PARoxetine (PAXIL) 10 MG tablet       Misc. Devices (ROLLING WALKER/BURGUNDY) MISC Dx: gen weakness and fatigue use daily as directed 1 each 0    lamoTRIgine (LAMICTAL) 100 MG tablet 2 times daily       HYDROcodone-acetaminophen (NORCO)  MG per tablet Take 1 tablet by mouth every 8 hours as needed for Pain. No current facility-administered medications for this visit.        Allergies   Allergen Reactions    Dye [Gadolinium Derivatives] Hives     Mild rash that last less than 2 hours after MRI or CT scans       Family History   Problem Relation Age of Onset    Diabetes Mother     Cancer Mother         uterine CA    Depression Mother     Mental Illness Mother     Hypertension Mother     Other Mother         blood clots    Heart Disease Brother     Depression Brother     Mental Illness Brother     Hypertension Brother     Breast Cancer Maternal Grandmother     Cancer Maternal Grandmother         breast CA    Heart Attack Maternal Grandmother     Other Maternal Grandmother         blood clots               Subjective:      Review of Systems   Constitutional: Negative. HENT: Negative. Eyes: Negative. Respiratory: Negative. Cardiovascular: Negative. Gastrointestinal: Positive for diarrhea. Endocrine: Negative. Genitourinary: Negative. Musculoskeletal: Negative. Skin: Negative. Neurological: Negative. Hematological: Negative. Psychiatric/Behavioral: The patient is nervous/anxious. Objective:     Physical Exam  Vitals and nursing note reviewed. Constitutional:       Appearance: Normal appearance. He is well-developed. Comments: obese   HENT:      Head: Normocephalic and atraumatic. Right Ear: Hearing, tympanic membrane, ear canal and external ear normal.      Left Ear: Hearing, tympanic membrane, ear canal and external ear normal.      Nose: Nose normal.      Mouth/Throat:      Pharynx: Uvula midline. Eyes:      General: Lids are normal.      Conjunctiva/sclera: Conjunctivae normal.      Pupils: Pupils are equal, round, and reactive to light. Neck:      Thyroid: No thyroid mass or thyromegaly. Trachea: Trachea normal.   Cardiovascular:      Rate and Rhythm: Normal rate and regular rhythm. Heart sounds: Normal heart sounds. Pulmonary:      Effort: Pulmonary effort is normal.      Breath sounds: Normal breath sounds. Abdominal:      General: Bowel sounds are normal.      Palpations: Abdomen is soft. Musculoskeletal:         General: Normal range of motion. Cervical back: Normal range of motion and neck supple. No tenderness. Thoracic back: Normal. No tenderness. Normal range of motion. Lumbar back: Normal. No tenderness. Normal range of motion. Skin:     General: Skin is warm and dry.    Neurological:      Mental Status: He

## 2021-10-08 ENCOUNTER — VIRTUAL VISIT (OUTPATIENT)
Dept: GASTROENTEROLOGY | Age: 46
End: 2021-10-08
Payer: MEDICARE

## 2021-10-08 DIAGNOSIS — Z91.89 AT RISK FOR COMPLICATION OF ANESTHESIA: ICD-10-CM

## 2021-10-08 DIAGNOSIS — K52.9 CHRONIC DIARRHEA: Primary | ICD-10-CM

## 2021-10-08 PROCEDURE — 99214 OFFICE O/P EST MOD 30 MIN: CPT | Performed by: NURSE PRACTITIONER

## 2021-10-08 ASSESSMENT — ENCOUNTER SYMPTOMS
ANAL BLEEDING: 0
RECTAL PAIN: 0
CONSTIPATION: 0
CHOKING: 0
DIARRHEA: 1
VOMITING: 0
BLOOD IN STOOL: 0
NAUSEA: 0
SHORTNESS OF BREATH: 0
COUGH: 0
ABDOMINAL PAIN: 0
TROUBLE SWALLOWING: 0
ABDOMINAL DISTENTION: 0

## 2021-10-08 NOTE — PROGRESS NOTES
10/8/2021    TELEHEALTH EVALUATION -- Audio/Visual (During AUBLN-07 public health emergency)    HPI:    Montel Ormond (:  1975) has requested an audio/video evaluation for the following concern(s):  Chief Complaint   Patient presents with    Diarrhea       Pt seen today for continued diarrhea. Last seen February. Colonoscopy was recommended at that time, but due to cardiac issues he was not able to have this completed. He is using Imodium and Colestipol daily. Diarrhea is daily. Occurs 10 times a day. Worse after eating. Denies any abdominal pain or blood in stool. Stools are watery. Rare days he will have no BM. Hx CAD  Hx cardiac ablation  Hx severe anxiety - requesting something for anxiety preop    Review of Systems   Constitutional: Negative for activity change, appetite change, fatigue, fever and unexpected weight change. HENT: Negative for trouble swallowing. Respiratory: Negative for cough, choking and shortness of breath. Cardiovascular: Negative for chest pain. Gastrointestinal: Positive for diarrhea. Negative for abdominal distention, abdominal pain, anal bleeding, blood in stool, constipation, nausea, rectal pain and vomiting. Allergic/Immunologic: Negative for food allergies. Psychiatric/Behavioral: The patient is nervous/anxious. All other systems reviewed and are negative. Prior to Visit Medications    Medication Sig Taking? Authorizing Provider   VORTIoxetine (TRINTELLIX) 10 MG TABS tablet 2 times daily  Historical Provider, MD   atorvastatin (LIPITOR) 80 MG tablet TAKE 1 TABLET BY MOUTH AT NIGHT. SANDY Rajput   nitroGLYCERIN (NITROSTAT) 0.4 MG SL tablet up to max of 3 total doses. If no relief after 1 dose, call 911.   Asad Olmos MD   metoprolol tartrate (LOPRESSOR) 25 MG tablet TAKE 2 TABLETS BY MOUTH TWICE DAILY   SANDY Carrion NP   lisinopril (PRINIVIL;ZESTRIL) 20 MG tablet TAKE 1 TABLET BY MOUTH ONCE DAILY   Amirah Dobbs APRN   cloNIDine (CATAPRES) 0.1 MG tablet Take 1 tablet by mouth daily as needed for High Blood Pressure (140/90)  Priscella Loud, APRN   clonazePAM (KLONOPIN) 0.5 MG tablet 3 times daily as needed. Historical Provider, MD   Misc. Devices (ROLLING WALKER/BURGUNDY) MISC Dx: gen weakness and fatigue use daily as directed  Priscella Loud, APRN   lamoTRIgine (LAMICTAL) 100 MG tablet 2 times daily   Historical Provider, MD   HYDROcodone-acetaminophen (NORCO)  MG per tablet Take 1 tablet by mouth every 8 hours as needed for Pain.   Historical Provider, MD       Social History     Tobacco Use    Smoking status: Former Smoker     Years: 24.00     Types: Cigarettes     Quit date: 7/15/2020     Years since quittin.2    Smokeless tobacco: Never Used   Vaping Use    Vaping Use: Never used   Substance Use Topics    Alcohol use: No     Alcohol/week: 0.0 standard drinks    Drug use: No        Allergies   Allergen Reactions    Dye [Gadolinium Derivatives] Hives     Mild rash that last less than 2 hours after MRI or CT scans   ,   Past Medical History:   Diagnosis Date    Anxiety     Arthritis     Bipolar 1 disorder ([address]     CAD in native artery 3/6/2018    Chronic back pain     COVID-19     Depression     Hyperlipidemia     Hypertension     IBS (irritable bowel syndrome)     Mild intermittent asthma without complication 3816    Morbidly obese ([address]     Panic disorder     at times afraid to go outside    Unspecified sleep apnea     bipap   ,   Past Surgical History:   Procedure Laterality Date    ANUS SURGERY      APPENDECTOMY  2014    CHOLECYSTECTOMY  2009    COLONOSCOPY  2012        COLONOSCOPY  10/19/2017    Dr Curt Singleton, Benign HP, 5 yr recall    COLONOSCOPY  2009    [facility]    ELECTROCONVULSIVE THERAPY N/A 2017    ELECTROCONVULSIVE THERAPY performed by Risa Persaud DO at [address] [facility] w/mesh    UMBILICAL HERNIA REPAIR  1990    VASECTOMY     ,   Social History     Tobacco Use    Smoking status: Former Smoker     Years: 24.00     Types: Cigarettes     Quit date: 7/15/2020     Years since quittin.2    Smokeless tobacco: Never Used   Vaping Use    Vaping Use: Never used   Substance Use Topics    Alcohol use: No     Alcohol/week: 0.0 standard drinks    Drug use: No   ,   Family History   Problem Relation Age of Onset    Diabetes Mother     Cancer Mother         uterine CA    Depression Mother     Mental Illness Mother     Hypertension Mother     Other Mother         blood clots    Heart Disease Brother     Depression Brother     Mental Illness Brother     Hypertension Brother     Breast Cancer Maternal Grandmother     Cancer Maternal Grandmother         breast CA    Heart Attack Maternal Grandmother     Other Maternal Grandmother         blood clots    Colon Cancer Neg Hx     Esophageal Cancer Neg Hx     Liver Cancer Neg Hx     Rectal Cancer Neg Hx     Stomach Cancer Neg Hx        PHYSICAL EXAMINATION:  [ INSTRUCTIONS:  \"[x]\" Indicates a positive item  \"[]\" Indicates a negative item  -- DELETE ALL ITEMS NOT EXAMINED]  Vital Signs: (As obtained by patient/caregiver or practitioner observation)    Blood pressure-  Heart rate-    Respiratory rate-    Temperature-  Pulse oximetry-     Constitutional: [x] Appears well-developed and well-nourished [x] No apparent distress      [] Abnormal-   Mental status  [x] Alert and awake  [x] Oriented to person/place/time [x]Able to follow commands      Eyes:  EOM    [x]  Normal  [] Abnormal-  Sclera  [x]  Normal  [] Abnormal -         Discharge [x]  None visible  [] Abnormal -    HENT:   [x] Normocephalic, atraumatic.   [] Abnormal   [x] Mouth/Throat: Mucous membranes are moist.     External Ears [x] Normal  [] Abnormal-     Neck: [x] No visualized mass     Pulmonary/Chest: [x] Respiratory effort normal.  [x] No visualized signs of difficulty breathing or respiratory distress        [] Abnormal-      Musculoskeletal:   [x] Normal gait with no signs of ataxia         [x] Normal range of motion of neck        [] Abnormal-       Neurological:        [x] No Facial Asymmetry (Cranial nerve 7 motor function) (limited exam to video visit)          [x] No gaze palsy        [] Abnormal-         Skin:        [x] No significant exanthematous lesions or discoloration noted on facial skin         [] Abnormal-            Psychiatric:       [x] Normal Affect [x] No Hallucinations        [] Abnormal-     Other pertinent observable physical exam findings-     ASSESSMENT/PLAN:  1. Chronic diarrhea  2. At risk for complication of anesthesia      - Schedule colonoscopy  - Obtain cardiac clearance prior to procedure   Instruct on bowel prep. Nothing to eat or drink after midnight the day of the exam.  Unable to drive for 24 hours after the procedure. No aspirin or nonsteroidal anti-inflammatories for 5 days before procedure. I have discussed the benefits, alternatives, and risks (including bleeding, perforation and death)  for pursuing Endoscopy (EGD/Colonscopy/EUS/ERCP) with the patient and they are willing to continue. We also discussed the need for anesthesia, IV access, proper dietary changes, medication changes if necessary, and need for bowel prep (if ordered) prior to their Endoscopic procedure. They are aware they must have someone accompany them to their scheduled procedure to drive them home - they agree to the above and are willing to continue. Return for procedure follow up or sooner if needed. Chris Shearer, was evaluated through a synchronous (real-time) audio-video encounter. The patient (or guardian if applicable) is aware that this is a billable service. Verbal consent to proceed has been obtained within the past 12 months.  The visit was conducted pursuant to the emergency declaration under the [address] Emergencies Act, [facility] waiver authority and the Coronavirus Preparedness and Response Supplemental Appropriations Act. Patient identification was verified, and a caregiver was present when appropriate. The patient was located in a state where the provider was credentialed to provide care. Total time spent on this encounter: Not billed by time    --SANDY Amaral NP on 10/8/2021 at 11:11 AM    An electronic signature was used to authenticate this note.

## 2021-10-08 NOTE — PATIENT INSTRUCTIONS
Schedule colonoscopy. No aspirin, ibuprofen, naproxen, fish oil or vitamin E for 5 days before procedure. Do not eat or drink after midnight the day of the procedure. Allowed medications can be taken with a small sip of water. Please review your prep instructions for allowed medications. You will not be able to drive for 24 hours after the procedure due to sedation. You must have a responsible adult, 25 year or older, to accompany you and drive you home the day of your procedure. If you are on blood thinners, clearance from the prescribing physician will be obtained before your procedure is scheduled. If it is determined it is not safe to hold these medications for a short time an alternative procedure for evaluation may be recommended. Risks of colonoscopy include, but are not limited to, perforation, bleeding, and infection, Risk of perforation and bleeding are increased if there is a polyp removed. Anesthesia risks will be reviewed with you before the procedure by a member of the anesthesia department. Your physician may also schedule a follow up appointment with the nurse practitioner to discuss pathology, symptoms or to check if you have had any problems related to your procedure. If you prefer not to return to the office after your procedure please discuss this with your physician on the day of your colonoscopy. The physician will talk with you and/or your family after the procedure is completed. Final recommendations are based on the pathologist report if biopsies or specimens are taken. If polyps are removed during the procedure they will be sent to a pathologist for analysis. Unless you have a follow up appointment scheduled, you will be notified by mail of the pathology results within 4 weeks. If you have not received results after 4 weeks you may call the office to obtain this information. For Colonoscopy:   You will be given specific directions regarding restrictions to diet and bowel prep instructions including laxatives. Please read these instructions one week prior to your scheduled procedure to ensure that you are prepared. If you have any questions regarding these instructions please call our office Mon through Fri from 8:00 am to 4:00 pm.     Follow prep instructions provided for bowel prep. Take all of the bowel prep as directed. If you are having problems with nausea, stop your prep for 30-45 min to allow the nausea to subside before resuming your prep. It is important to drink plenty of fluids throughout the day before taking your laxatives. This will help to protect your kidneys, prevent dehydration and maximize the effect of the bowel prep. Your diet before a colonoscopy bowel preparation is very important to ensure a successful colon exam. It is recommended to consider certain changes to your diet three to four days prior to the procedure. Remember that your bowels need to be completely empty for the exam.    What foods are good to eat? Cut down on heavy solid foods three to four days before the procedure and start introducing lighter meals to your diet. The following food suggestions are a good part of your diet before a colonoscopy bowel preparation.  Light meat that is easily digestible such as chicken (without the skin)    Potatoes without skin    Cheese    Eggs    A light meal of steamed white fish    Light clear soups    Foods and drinks to avoid  Avoid foods that contain too much fiber. Stay clear of dark colored beverages. They can stick to the walls of the digestive tract and make it difficult to differentiate from blood.  Some of these foods are:   Red meat, rice, nuts and vegetables    Milk, other milk based fluids and cream    Most fruit and puddings    Whole grain pasta    Cereals, bran and seeds    Colored beverages, especially those that are red or purple in color    Red colored Jell-O     On the day before the colonoscopy, continue to drink plenty of clear fluids. It is important   to keep yourself hydrated before the exam.     Please follow all instructions as provided for cleansing the bowel. Failure to have an adequately prepped colon may cause you to have incomplete exam with further testing required.      [email or web address]

## 2021-10-13 ENCOUNTER — TELEPHONE (OUTPATIENT)
Dept: CARDIOLOGY CLINIC | Age: 46
End: 2021-10-13

## 2021-10-13 NOTE — TELEPHONE ENCOUNTER
Date: 10-21-21    Cardiologist: Margarito    Procedure: Colonoscopy    Surgeon: Dr. Robert Major    Last Office Visit: 9-21-21    Reason for office visit and medical concerns addressed at this office visit: CAD, HTN, edwina-parkinson-white syndrome    Testing Performed and Date of Service:  EKG 9-12-21  Stress test 9-30-20    RCRI = 1 pt, low, 0.9%   METs 4    Current Medications: Trintellix, lipitor, nitro, lopressor, lisinopril, catapres, clonopin, lamictal, norco    Is the patient currently taking an anticoagulant? If so, what is the diagnosis the patient has been given to warrant the need for the anticoagulant?  NA    Additional Notes: Cardiac Risk Request

## 2021-10-14 ENCOUNTER — TELEPHONE (OUTPATIENT)
Dept: GASTROENTEROLOGY | Age: 46
End: 2021-10-14

## 2021-10-25 DIAGNOSIS — I25.10 CAD IN NATIVE ARTERY: ICD-10-CM

## 2021-10-25 RX ORDER — ATORVASTATIN CALCIUM 80 MG/1
TABLET, FILM COATED ORAL
Qty: 30 TABLET | Refills: 0 | Status: SHIPPED | OUTPATIENT
Start: 2021-10-25 | End: 2021-11-22

## 2021-11-11 ENCOUNTER — APPOINTMENT (OUTPATIENT)
Dept: GENERAL RADIOLOGY | Age: 46
End: 2021-11-11
Payer: MEDICARE

## 2021-11-11 ENCOUNTER — HOSPITAL ENCOUNTER (OUTPATIENT)
Age: 46
Setting detail: OBSERVATION
Discharge: HOME | End: 2021-11-12
Attending: EMERGENCY MEDICINE | Admitting: INTERNAL MEDICINE
Payer: MEDICARE

## 2021-11-11 DIAGNOSIS — I25.119 CORONARY ARTERY DISEASE INVOLVING NATIVE CORONARY ARTERY OF NATIVE HEART WITH ANGINA PECTORIS (HCC): ICD-10-CM

## 2021-11-11 DIAGNOSIS — R07.9 CHEST PAIN, UNSPECIFIED TYPE: Primary | ICD-10-CM

## 2021-11-11 LAB
ALBUMIN SERPL-MCNC: 3.8 G/DL (ref 3.5–5.2)
ALP BLD-CCNC: 137 U/L (ref 40–130)
ALT SERPL-CCNC: 31 U/L (ref 5–41)
ANION GAP SERPL CALCULATED.3IONS-SCNC: 11 MMOL/L (ref 7–19)
AST SERPL-CCNC: 56 U/L (ref 5–40)
BASOPHILS ABSOLUTE: 0.1 K/UL (ref 0–0.2)
BASOPHILS RELATIVE PERCENT: 0.7 % (ref 0–1)
BILIRUB SERPL-MCNC: 0.8 MG/DL (ref 0.2–1.2)
BUN BLDV-MCNC: 7 MG/DL (ref 6–20)
CALCIUM SERPL-MCNC: 9 MG/DL (ref 8.6–10)
CHLORIDE BLD-SCNC: 103 MMOL/L (ref 98–111)
CO2: 22 MMOL/L (ref 22–29)
CREAT SERPL-MCNC: 0.6 MG/DL (ref 0.5–1.2)
EOSINOPHILS ABSOLUTE: 0.1 K/UL (ref 0–0.6)
EOSINOPHILS RELATIVE PERCENT: 0.9 % (ref 0–5)
GFR AFRICAN AMERICAN: >59
GFR NON-AFRICAN AMERICAN: >60
GLUCOSE BLD-MCNC: 108 MG/DL (ref 74–109)
HCT VFR BLD CALC: 46.9 % (ref 42–52)
HEMOGLOBIN: 15.8 G/DL (ref 14–18)
IMMATURE GRANULOCYTES #: 0 K/UL
LIPASE: 19 U/L (ref 13–60)
LV EF: 58 %
LVEF MODALITY: NORMAL
LYMPHOCYTES ABSOLUTE: 3.7 K/UL (ref 1.1–4.5)
LYMPHOCYTES RELATIVE PERCENT: 36.2 % (ref 20–40)
MCH RBC QN AUTO: 31.6 PG (ref 27–31)
MCHC RBC AUTO-ENTMCNC: 33.7 G/DL (ref 33–37)
MCV RBC AUTO: 93.8 FL (ref 80–94)
MONOCYTES ABSOLUTE: 0.5 K/UL (ref 0–0.9)
MONOCYTES RELATIVE PERCENT: 5.1 % (ref 0–10)
NEUTROPHILS ABSOLUTE: 5.8 K/UL (ref 1.5–7.5)
NEUTROPHILS RELATIVE PERCENT: 56.8 % (ref 50–65)
PDW BLD-RTO: 13.3 % (ref 11.5–14.5)
PLATELET # BLD: 227 K/UL (ref 130–400)
PMV BLD AUTO: 8.9 FL (ref 9.4–12.4)
POTASSIUM REFLEX MAGNESIUM: 4.2 MMOL/L (ref 3.5–5)
PRO-BNP: 23 PG/ML (ref 0–450)
RBC # BLD: 5 M/UL (ref 4.7–6.1)
REASON FOR REJECTION: NORMAL
REJECTED TEST: NORMAL
SARS-COV-2, NAAT: NOT DETECTED
SODIUM BLD-SCNC: 136 MMOL/L (ref 136–145)
TOTAL PROTEIN: 6.7 G/DL (ref 6.6–8.7)
TROPONIN: <0.01 NG/ML (ref 0–0.03)
WBC # BLD: 10.2 K/UL (ref 4.8–10.8)

## 2021-11-11 PROCEDURE — G0378 HOSPITAL OBSERVATION PER HR: HCPCS

## 2021-11-11 PROCEDURE — 87635 SARS-COV-2 COVID-19 AMP PRB: CPT

## 2021-11-11 PROCEDURE — 6370000000 HC RX 637 (ALT 250 FOR IP): Performed by: NURSE PRACTITIONER

## 2021-11-11 PROCEDURE — 93571 IV DOP VEL&/PRESS C FLO 1ST: CPT | Performed by: INTERNAL MEDICINE

## 2021-11-11 PROCEDURE — 2500000003 HC RX 250 WO HCPCS

## 2021-11-11 PROCEDURE — 99152 MOD SED SAME PHYS/QHP 5/>YRS: CPT | Performed by: INTERNAL MEDICINE

## 2021-11-11 PROCEDURE — 6360000004 HC RX CONTRAST MEDICATION: Performed by: INTERNAL MEDICINE

## 2021-11-11 PROCEDURE — C1769 GUIDE WIRE: HCPCS

## 2021-11-11 PROCEDURE — C8929 TTE W OR WO FOL WCON,DOPPLER: HCPCS

## 2021-11-11 PROCEDURE — 6360000002 HC RX W HCPCS

## 2021-11-11 PROCEDURE — C1725 CATH, TRANSLUMIN NON-LASER: HCPCS

## 2021-11-11 PROCEDURE — 99152 MOD SED SAME PHYS/QHP 5/>YRS: CPT

## 2021-11-11 PROCEDURE — 2580000003 HC RX 258: Performed by: INTERNAL MEDICINE

## 2021-11-11 PROCEDURE — C1887 CATHETER, GUIDING: HCPCS

## 2021-11-11 PROCEDURE — 99153 MOD SED SAME PHYS/QHP EA: CPT

## 2021-11-11 PROCEDURE — C1894 INTRO/SHEATH, NON-LASER: HCPCS

## 2021-11-11 PROCEDURE — 2709999900 HC NON-CHARGEABLE SUPPLY

## 2021-11-11 PROCEDURE — 85025 COMPLETE CBC W/AUTO DIFF WBC: CPT

## 2021-11-11 PROCEDURE — 6370000000 HC RX 637 (ALT 250 FOR IP): Performed by: EMERGENCY MEDICINE

## 2021-11-11 PROCEDURE — 99283 EMERGENCY DEPT VISIT LOW MDM: CPT

## 2021-11-11 PROCEDURE — 99214 OFFICE O/P EST MOD 30 MIN: CPT | Performed by: INTERNAL MEDICINE

## 2021-11-11 PROCEDURE — 93458 L HRT ARTERY/VENTRICLE ANGIO: CPT | Performed by: INTERNAL MEDICINE

## 2021-11-11 PROCEDURE — 83690 ASSAY OF LIPASE: CPT

## 2021-11-11 PROCEDURE — 6360000002 HC RX W HCPCS: Performed by: NURSE PRACTITIONER

## 2021-11-11 PROCEDURE — 84484 ASSAY OF TROPONIN QUANT: CPT

## 2021-11-11 PROCEDURE — 93571 IV DOP VEL&/PRESS C FLO 1ST: CPT

## 2021-11-11 PROCEDURE — 6370000000 HC RX 637 (ALT 250 FOR IP)

## 2021-11-11 PROCEDURE — 96376 TX/PRO/DX INJ SAME DRUG ADON: CPT

## 2021-11-11 PROCEDURE — 6370000000 HC RX 637 (ALT 250 FOR IP): Performed by: INTERNAL MEDICINE

## 2021-11-11 PROCEDURE — 93458 L HRT ARTERY/VENTRICLE ANGIO: CPT

## 2021-11-11 PROCEDURE — 80053 COMPREHEN METABOLIC PANEL: CPT

## 2021-11-11 PROCEDURE — 94660 CPAP INITIATION&MGMT: CPT

## 2021-11-11 PROCEDURE — 96374 THER/PROPH/DIAG INJ IV PUSH: CPT

## 2021-11-11 PROCEDURE — 36415 COLL VENOUS BLD VENIPUNCTURE: CPT

## 2021-11-11 PROCEDURE — 71045 X-RAY EXAM CHEST 1 VIEW: CPT

## 2021-11-11 PROCEDURE — 83880 ASSAY OF NATRIURETIC PEPTIDE: CPT

## 2021-11-11 PROCEDURE — 93005 ELECTROCARDIOGRAM TRACING: CPT | Performed by: EMERGENCY MEDICINE

## 2021-11-11 RX ORDER — ONDANSETRON 2 MG/ML
4 INJECTION INTRAMUSCULAR; INTRAVENOUS EVERY 6 HOURS PRN
Status: DISCONTINUED | OUTPATIENT
Start: 2021-11-11 | End: 2021-11-12 | Stop reason: HOSPADM

## 2021-11-11 RX ORDER — MORPHINE SULFATE 2 MG/ML
2 INJECTION, SOLUTION INTRAMUSCULAR; INTRAVENOUS
Status: DISCONTINUED | OUTPATIENT
Start: 2021-11-11 | End: 2021-11-12 | Stop reason: HOSPADM

## 2021-11-11 RX ORDER — ATORVASTATIN CALCIUM 40 MG/1
40 TABLET, FILM COATED ORAL NIGHTLY
Status: DISCONTINUED | OUTPATIENT
Start: 2021-11-11 | End: 2021-11-11

## 2021-11-11 RX ORDER — LISINOPRIL 20 MG/1
20 TABLET ORAL DAILY
Status: DISCONTINUED | OUTPATIENT
Start: 2021-11-12 | End: 2021-11-12 | Stop reason: HOSPADM

## 2021-11-11 RX ORDER — SODIUM CHLORIDE 0.9 % (FLUSH) 0.9 %
10 SYRINGE (ML) INJECTION PRN
Status: DISCONTINUED | OUTPATIENT
Start: 2021-11-11 | End: 2021-11-12 | Stop reason: HOSPADM

## 2021-11-11 RX ORDER — SODIUM CHLORIDE 9 MG/ML
INJECTION, SOLUTION INTRAVENOUS CONTINUOUS
Status: DISCONTINUED | OUTPATIENT
Start: 2021-11-11 | End: 2021-11-12 | Stop reason: HOSPADM

## 2021-11-11 RX ORDER — ISOSORBIDE MONONITRATE 30 MG/1
60 TABLET, EXTENDED RELEASE ORAL 2 TIMES DAILY
Status: DISCONTINUED | OUTPATIENT
Start: 2021-11-12 | End: 2021-11-12 | Stop reason: HOSPADM

## 2021-11-11 RX ORDER — BUSPIRONE HYDROCHLORIDE 5 MG/1
TABLET ORAL
COMMUNITY
Start: 2021-10-18 | End: 2021-11-19 | Stop reason: SDUPTHER

## 2021-11-11 RX ORDER — SODIUM CHLORIDE 0.9 % (FLUSH) 0.9 %
5-40 SYRINGE (ML) INJECTION EVERY 12 HOURS SCHEDULED
Status: DISCONTINUED | OUTPATIENT
Start: 2021-11-11 | End: 2021-11-12 | Stop reason: HOSPADM

## 2021-11-11 RX ORDER — NITROGLYCERIN 0.4 MG/1
0.4 TABLET SUBLINGUAL EVERY 5 MIN PRN
Status: DISCONTINUED | OUTPATIENT
Start: 2021-11-11 | End: 2021-11-11 | Stop reason: SDUPTHER

## 2021-11-11 RX ORDER — HYDROCODONE BITARTRATE AND ACETAMINOPHEN 10; 325 MG/1; MG/1
1 TABLET ORAL EVERY 8 HOURS PRN
Status: DISCONTINUED | OUTPATIENT
Start: 2021-11-11 | End: 2021-11-12 | Stop reason: HOSPADM

## 2021-11-11 RX ORDER — ASPIRIN 81 MG/1
81 TABLET, CHEWABLE ORAL DAILY
Status: DISCONTINUED | OUTPATIENT
Start: 2021-11-12 | End: 2021-11-12 | Stop reason: HOSPADM

## 2021-11-11 RX ORDER — NITROGLYCERIN 0.4 MG/1
0.4 TABLET SUBLINGUAL EVERY 5 MIN PRN
Status: COMPLETED | OUTPATIENT
Start: 2021-11-11 | End: 2021-11-11

## 2021-11-11 RX ORDER — POLYETHYLENE GLYCOL 3350 17 G/17G
17 POWDER, FOR SOLUTION ORAL DAILY PRN
Status: DISCONTINUED | OUTPATIENT
Start: 2021-11-11 | End: 2021-11-12 | Stop reason: HOSPADM

## 2021-11-11 RX ORDER — BUSPIRONE HYDROCHLORIDE 5 MG/1
5 TABLET ORAL 3 TIMES DAILY
Status: DISCONTINUED | OUTPATIENT
Start: 2021-11-11 | End: 2021-11-12 | Stop reason: HOSPADM

## 2021-11-11 RX ORDER — ASPIRIN 81 MG/1
324 TABLET, CHEWABLE ORAL ONCE
Status: COMPLETED | OUTPATIENT
Start: 2021-11-11 | End: 2021-11-11

## 2021-11-11 RX ORDER — LAMOTRIGINE 100 MG/1
100 TABLET ORAL 2 TIMES DAILY
Status: DISCONTINUED | OUTPATIENT
Start: 2021-11-11 | End: 2021-11-12 | Stop reason: HOSPADM

## 2021-11-11 RX ORDER — ACETAMINOPHEN 650 MG/1
650 SUPPOSITORY RECTAL EVERY 6 HOURS PRN
Status: DISCONTINUED | OUTPATIENT
Start: 2021-11-11 | End: 2021-11-12 | Stop reason: HOSPADM

## 2021-11-11 RX ORDER — ONDANSETRON 4 MG/1
4 TABLET, ORALLY DISINTEGRATING ORAL EVERY 8 HOURS PRN
Status: DISCONTINUED | OUTPATIENT
Start: 2021-11-11 | End: 2021-11-12 | Stop reason: HOSPADM

## 2021-11-11 RX ORDER — ATORVASTATIN CALCIUM 80 MG/1
80 TABLET, FILM COATED ORAL NIGHTLY
Status: DISCONTINUED | OUTPATIENT
Start: 2021-11-11 | End: 2021-11-12 | Stop reason: HOSPADM

## 2021-11-11 RX ORDER — SODIUM CHLORIDE 9 MG/ML
25 INJECTION, SOLUTION INTRAVENOUS PRN
Status: DISCONTINUED | OUTPATIENT
Start: 2021-11-11 | End: 2021-11-12 | Stop reason: HOSPADM

## 2021-11-11 RX ORDER — ACETAMINOPHEN 325 MG/1
650 TABLET ORAL EVERY 6 HOURS PRN
Status: DISCONTINUED | OUTPATIENT
Start: 2021-11-11 | End: 2021-11-12 | Stop reason: HOSPADM

## 2021-11-11 RX ORDER — LORAZEPAM 0.5 MG/1
0.5 TABLET ORAL ONCE
Status: COMPLETED | OUTPATIENT
Start: 2021-11-11 | End: 2021-11-11

## 2021-11-11 RX ORDER — ISOSORBIDE MONONITRATE 30 MG/1
30 TABLET, EXTENDED RELEASE ORAL DAILY
Status: DISCONTINUED | OUTPATIENT
Start: 2021-11-11 | End: 2021-11-11

## 2021-11-11 RX ADMIN — PERFLUTREN 2.2 MG: 6.52 INJECTION, SUSPENSION INTRAVENOUS at 15:49

## 2021-11-11 RX ADMIN — MORPHINE SULFATE 2 MG: 2 INJECTION, SOLUTION INTRAMUSCULAR; INTRAVENOUS at 22:18

## 2021-11-11 RX ADMIN — NITROGLYCERIN 0.4 MG: 0.4 TABLET, ORALLY DISINTEGRATING SUBLINGUAL at 16:51

## 2021-11-11 RX ADMIN — LORAZEPAM 0.5 MG: 0.5 TABLET ORAL at 13:06

## 2021-11-11 RX ADMIN — ASPIRIN 324 MG: 81 TABLET, CHEWABLE ORAL at 08:44

## 2021-11-11 RX ADMIN — BUSPIRONE HYDROCHLORIDE 5 MG: 5 TABLET ORAL at 22:14

## 2021-11-11 RX ADMIN — SODIUM CHLORIDE: 9 INJECTION, SOLUTION INTRAVENOUS at 19:45

## 2021-11-11 RX ADMIN — NITROGLYCERIN 0.4 MG: 0.4 TABLET, ORALLY DISINTEGRATING SUBLINGUAL at 16:57

## 2021-11-11 RX ADMIN — ATORVASTATIN CALCIUM 80 MG: 80 TABLET, FILM COATED ORAL at 22:14

## 2021-11-11 RX ADMIN — IOPAMIDOL 260 ML: 612 INJECTION, SOLUTION INTRAVENOUS at 18:38

## 2021-11-11 RX ADMIN — HYDROCODONE BITARTRATE AND ACETAMINOPHEN 1 TABLET: 10; 325 TABLET ORAL at 23:54

## 2021-11-11 RX ADMIN — MORPHINE SULFATE 2 MG: 2 INJECTION, SOLUTION INTRAMUSCULAR; INTRAVENOUS at 14:05

## 2021-11-11 RX ADMIN — VORTIOXETINE 10 MG: 10 TABLET, FILM COATED ORAL at 22:14

## 2021-11-11 RX ADMIN — METOPROLOL TARTRATE 25 MG: 25 TABLET, FILM COATED ORAL at 22:14

## 2021-11-11 RX ADMIN — NITROGLYCERIN 0.4 MG: 0.4 TABLET, ORALLY DISINTEGRATING SUBLINGUAL at 22:13

## 2021-11-11 RX ADMIN — LAMOTRIGINE 100 MG: 100 TABLET ORAL at 22:14

## 2021-11-11 ASSESSMENT — PAIN SCALES - GENERAL
PAINLEVEL_OUTOF10: 4
PAINLEVEL_OUTOF10: 7
PAINLEVEL_OUTOF10: 7
PAINLEVEL_OUTOF10: 8

## 2021-11-11 ASSESSMENT — ENCOUNTER SYMPTOMS
VOMITING: 0
BLOOD IN STOOL: 0
CHEST TIGHTNESS: 1
PHOTOPHOBIA: 0
EYE PAIN: 0
SHORTNESS OF BREATH: 1
NAUSEA: 1
RHINORRHEA: 0
DIARRHEA: 1
DIARRHEA: 0
WHEEZING: 0
BACK PAIN: 0
SHORTNESS OF BREATH: 0
ABDOMINAL DISTENTION: 0
ABDOMINAL PAIN: 0
BACK PAIN: 1
VOICE CHANGE: 0
COUGH: 0
EYE REDNESS: 0
COLOR CHANGE: 0

## 2021-11-11 ASSESSMENT — PAIN DESCRIPTION - LOCATION
LOCATION: BACK;CHEST
LOCATION: CHEST

## 2021-11-11 ASSESSMENT — PAIN DESCRIPTION - PAIN TYPE
TYPE: ACUTE PAIN
TYPE: ACUTE PAIN

## 2021-11-11 ASSESSMENT — PAIN DESCRIPTION - ORIENTATION: ORIENTATION: MID;LEFT

## 2021-11-11 NOTE — CONSULTS
[facility]  Cardiology Consult      Requesting MD:  Winter Avalos MD   Admit Status:  Observation [104]       History obtained from:   ? Patient  ? Other (specify):     PROBLEM LIST:    Patient Active Problem List    Diagnosis Date Noted    CAD in native artery 03/06/2018     Priority: High    Chest pain 11/11/2021     Priority: Low    Shortness of breath 03/30/2021     Priority: Low    Restrictive lung disease 03/30/2021     Priority: Low    Snoring 03/30/2021     Priority: Low    Non-restorative sleep 03/30/2021     Priority: Low    Hypersomnia 03/30/2021     Priority: Low    Severe sleep apnea 03/30/2021     Priority: Low    Mild intermittent asthma without complication 41/23/7310     Priority: Low    Chronic obstructive pulmonary disease ([facility] 08/14/2019     Priority: Low    History of smoking 08/14/2019     Priority: Low    Morbid obesity ([facility] 07/30/2019     Priority: Low    Leg pain, bilateral      Priority: Low    Acute chest pain      Priority: Low    Cigarette nicotine dependence without complication 57/83/9816     Priority: Low    Bipolar depression ([facility] 12/16/2016     Priority: Low    S/P laparoscopic appendectomy 02/27/2014     Priority: Low    History of colon polyps 05/17/2013     Priority: Low    Depression      Priority: Low    Hypertension      Priority: Low    Anxiety      Priority: Low     1. Coronary artery disease, prior PCI to proximal RCA 3/6/2018 with BMS (3.0 x 18 mm), unsuccessful attempted OM2 (99%), residual diagonal proximal 60%, normal LV ejection fraction, negative DSE 9/30/2020.  2.  Morbid obesity. 3.  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  4.  Anxiety disorder/bipolar disorder. PRESENTATION: Lillian Lockett is a 51y.o. year old male who presents with recurrent chest pain that has been ongoing for the last 5 days. Predominantly at night and after meals.   Current symptom today was different and began this morning and has been persistent for the last 12 hours. No clear relieving or exacerbating factors. Troponins negative. proBNP 23. EKG shows sinus rhythm with no significant ST-T changes. REVIEW OF SYSTEMS:  Review of Systems   Constitutional: Negative for activity change, diaphoresis and fatigue. HENT: Negative for hearing loss, nosebleeds and tinnitus. Eyes: Negative for visual disturbance. Respiratory: Positive for chest tightness. Negative for cough, shortness of breath and wheezing. Cardiovascular: Positive for chest pain. Negative for palpitations and leg swelling. Gastrointestinal: Negative for abdominal distention, abdominal pain, blood in stool, diarrhea and vomiting. Endocrine: Negative for cold intolerance, heat intolerance, polydipsia, polyphagia and polyuria. Genitourinary: Negative for difficulty urinating, flank pain and hematuria. Musculoskeletal: Negative for arthralgias, back pain, joint swelling and myalgias. Skin: Negative for pallor and rash. Neurological: Negative for dizziness, seizures, syncope and headaches. Psychiatric/Behavioral: Negative for behavioral problems and dysphoric mood. The patient is not nervous/anxious.         Past Medical History:      Diagnosis Date    Anxiety     Arthritis     Bipolar 1 disorder ([address]     CAD in native artery 3/6/2018    Chronic back pain     COVID-19     Depression     Hyperlipidemia     Hypertension     IBS (irritable bowel syndrome)     Mild intermittent asthma without complication 8/07/6066    Morbidly obese (HCC)     Panic disorder     at times afraid to go outside    Unspecified sleep apnea     bipap       Past Surgical History:      Procedure Laterality Date    ANUS SURGERY      APPENDECTOMY  02/03/2014    CHOLECYSTECTOMY  01/01/2009    COLONOSCOPY  01/01/2012        COLONOSCOPY  10/19/2017    Dr Mortimer Primer, Benign HP, 5 yr recall    COLONOSCOPY  04/29/2009    [facility] Nurse ELECTROCONVULSIVE THERAPY N/A 2017    ELECTROCONVULSIVE THERAPY performed by Antoni Arshad DO at [address] Right 1990    w/mesh    UMBILICAL HERNIA REPAIR  1990    VASECTOMY         Allergies:  Dye [gadolinium derivatives]    Past Social History:  Social History     Socioeconomic History    Marital status:      Spouse name: Not on file    Number of children: Not on file    Years of education: Not on file    Highest education level: Not on file   Occupational History    Not on file   Tobacco Use    Smoking status: Current Every Day Smoker     Packs/day: 1.00     Years: 24.00     Pack years: 24.00     Types: Cigarettes     Last attempt to quit: 7/15/2020     Years since quittin.3    Smokeless tobacco: Never Used   Vaping Use    Vaping Use: Never used   Substance and Sexual Activity    Alcohol use: No     Alcohol/week: 0.0 standard drinks    Drug use: No    Sexual activity: Yes     Partners: Female   Other Topics Concern    Not on file   Social History Narrative    Not on file     Social Determinants of Health     Financial Resource Strain: Low Risk     Difficulty of Paying Living Expenses: Not hard at all   Food Insecurity: No Food Insecurity    Worried About Running Out of Food in the Last Year: Never true    Carlos of Food in the Last Year: Never true   Transportation Needs:     Lack of Transportation (Medical): Not on file    Lack of Transportation (Non-Medical):  Not on file   Physical Activity:     Days of Exercise per Week: Not on file    Minutes of Exercise per Session: Not on file   Stress:     Feeling of Stress : Not on file   Social Connections:     Frequency of Communication with Friends and Family: Not on file    Frequency of Social Gatherings with Friends and Family: Not on file    Attends Anabaptist Services: Not on file    Active Member of Clubs or Organizations: Not on file    Attends Club or Organization Meetings: Not on file    Marital Status: Not on file   Intimate Partner Violence:     Fear of Current or Ex-Partner: Not on file    Emotionally Abused: Not on file    Physically Abused: Not on file    Sexually Abused: Not on file   Housing Stability:     Unable to Pay for Housing in the Last Year: Not on file    Number of [place] in the Last Year: Not on file    Unstable Housing in the Last Year: Not on file       Family History:       Problem Relation Age of Onset    Diabetes Mother     Cancer Mother         uterine CA    Depression Mother     Mental Illness Mother     Hypertension Mother    [facility] Other Mother         blood clots    Heart Disease Brother     Depression Brother     Mental Illness Brother     Hypertension Brother     Breast Cancer Maternal Grandmother     Cancer Maternal Grandmother         breast CA    Heart Attack Maternal Grandmother     Other Maternal Grandmother         blood clots    Colon Cancer Neg Hx     Esophageal Cancer Neg Hx     Liver Cancer Neg Hx     Rectal Cancer Neg Hx     Stomach Cancer Neg Hx        Home Meds:  Prior to Admission medications    Medication Sig Start Date End Date Taking? Authorizing Provider   busPIRone (BUSPAR) 5 MG tablet TAKE 1 TABLET BY MOUTH THREE TIMES DAILY. 10/18/21  Yes Historical Provider, MD   atorvastatin (LIPITOR) 80 MG tablet TAKE 1 TABLET BY MOUTH AT NIGHT. 10/25/21  Yes SANDY Linder   VORTIoxetine (TRINTELLIX) 10 MG TABS tablet 2 times daily   Yes Historical Provider, MD   nitroGLYCERIN (NITROSTAT) 0.4 MG SL tablet up to max of 3 total doses.  If no relief after 1 dose, call 911. 9/12/21  Yes Shay Ramsey MD   metoprolol tartrate (LOPRESSOR) 25 MG tablet TAKE 2 TABLETS BY MOUTH TWICE DAILY  9/7/21  Yes SANDY Alexander NP   lisinopril (PRINIVIL;ZESTRIL) 20 MG tablet TAKE 1 TABLET BY MOUTH ONCE DAILY  8/23/21  Yes SANDY Linder   cloNIDine (CATAPRES) 0.1 MG tablet Take 1 tablet by mouth daily as needed for High Blood Pressure (140/90) 4/16/21  Yes SANDY Arellano   Misc. Devices [facility]) MISC Dx: gen weakness and fatigue use daily as directed 9/17/20  Yes SANDY Arellano   lamoTRIgine (LAMICTAL) 100 MG tablet 2 times daily  2/6/19  Yes Historical Provider, MD   HYDROcodone-acetaminophen (NORCO)  MG per tablet Take 1 tablet by mouth every 8 hours as needed for Pain. Yes Historical Provider, MD       Current Meds:   [START ON 11/12/2021] lisinopril  20 mg Oral Daily    metoprolol tartrate  25 mg Oral BID    sodium chloride flush  5-40 mL IntraVENous 2 times per day    [START ON 11/12/2021] aspirin  81 mg Oral Daily    enoxaparin  40 mg SubCUTAneous Daily    atorvastatin  80 mg Oral Nightly    busPIRone  5 mg Oral TID    lamoTRIgine  100 mg Oral BID    VORTIoxetine  10 mg Oral BID       Current Infused Meds:   sodium chloride         Physical Exam:  Vitals:    11/11/21 1412   BP: 131/74   Pulse: 62   Resp: 20   Temp: 97.3 °F (36.3 °C)   SpO2: 95%       Intake/Output Summary (Last 24 hours) at 11/11/2021 1605  Last data filed at 11/11/2021 1412  Gross per 24 hour   Intake 0 ml   Output --   Net 0 ml     Estimated body mass index is 46.22 kg/m² as calculated from the following:    Height as of this encounter: 6' 2\" (1.88 m). Weight as of this encounter: 360 lb (163.3 kg). Physical Exam  Constitutional:       Appearance: He is obese. Comments: Severe obesity   HENT:      Mouth/Throat:      Pharynx: No oropharyngeal exudate. Eyes:      General: No scleral icterus. Right eye: No discharge. Left eye: No discharge. Neck:      Thyroid: No thyromegaly. Vascular: No JVD. Cardiovascular:      Rate and Rhythm: Normal rate and regular rhythm. Heart sounds: No murmur heard. No friction rub. No gallop. Comments: No JVD  No edema  No significant systolic or diastolic murmurs noted  Pulmonary:      Effort: No respiratory distress.       Breath sounds: No stridor. No wheezing or rales. Comments: No chest wall tenderness  Abdominal:      General: Bowel sounds are normal. There is no distension. Palpations: Abdomen is soft. There is no mass. Tenderness: There is no abdominal tenderness. There is no guarding or rebound. Comments: No palpable organomegaly  No abdominal tenderness   Musculoskeletal:         General: No deformity. Skin:     General: Skin is warm. Coloration: Skin is not pale. Findings: No erythema or rash. Neurological:      Mental Status: He is alert and oriented to person, place, and time. Motor: No abnormal muscle tone. Coordination: Coordination normal.      Deep Tendon Reflexes: Reflexes normal.           Labs:  Recent Labs     11/11/21  0800   WBC 10.2   HGB 15.8          Recent Labs     11/11/21  0954      K 4.2      CO2 22   BUN 7   CREATININE 0.6   LABGLOM >60   CALCIUM 9.0       CK, CKMB, Troponin: @LABRCNT (CKTOTAL:3, CKMB:3, TROPONINI:3)@    Last 3 BNP:          IMAGING:  XR CHEST PORTABLE    Result Date: 11/11/2021  XR CHEST PORTABLE 11/11/2021 8:14 AM HISTORY: Chest pain  Technique: Single AP view of the chest COMPARISONS: Chest exam dated 9/12/2021 FINDINGS: No lung consolidation. No pleural effusion or pneumothorax. Cardiomediastinal silhouette and pulmonary vasculature are unremarkable. No acute bony abnormality.     Stable chest exam without acute process Signed by Dr Sandy Hart    ECHO 2D [address]    Result Date: 11/11/2021  Transthoracic Echocardiography Report (TTE)  Demographics   Patient Name  Jenn Gayle  Date of Study         11/11/2021                D   MRN           [id number]           Gender                Male   Date of Birth 1975       Room Number           PJC-7594   Age           51 year(s)   Height:       74 inches        Referring Physician   Janes Mccullough MD   Weight:       360.01 pounds    Sonographer           Rolo Holland Assessment and Plan: This is a 51y.o. year old male with past medical history of morbid obesity, anxiety/bipolar disorder, prior SVT/WPW ablation 1/2021 history of coronary artery disease with PCI to proximal RCA 3/6/2018 with bare-metal stent, unsuccessful attempt at OM2 with residual diagonal disease presenting with recurrent chest pain with prior negative stress test, no significant ST-T changes and negative troponins. 1.  Angiograms reviewed from 2018. Did have bare-metal stent to RCA with residual disease. Symptoms appear atypical and protracted without any myocardial injury. Still having chest pain by his account. We will proceed with angiography to delineate any new disease or progression of disease as etiology of patient's symptoms. 2.  Continue medical management with aspirin, statin therapy and beta-blocker. Will add Imdur 30 mg once daily. Risks, benefits, alternatives of cardiac catheterization/PCI discussed with the patient and full informed consent obtained.   Acceptable Mallampati score  Consent for moderate conscious sedation  ASA 3      Electronically signed by Swapna Moreno MD on 11/11/2021 at 4:05 PM

## 2021-11-11 NOTE — H&P
08-May-2020 03:57 126 [facility] - History & Physical      PCP: SANDY Weems    Date of Admission: 11/11/2021    Date of Service: 11/11/2021    Chief Complaint:  Chest pain    History Of Present Illness: The patient is a 51 y.o. male with history as outlined below who presented to [address] [place] [address] ED complaining of chest pain. Patient states he has panic attacks on a regular basis. He states that for the past 5 nights he has been experiencing pressure-like chest pain. He states the pain is midsternal to left-sided chest pain that radiates into the upper abdomen and left shoulder. He states he did not come to the emergency room for the first couple of days because he associated this with his panic disorder. However he states that this morning the pain come on without him being anxious. He states that the pain this morning was worse than it has been over the past 5 days. ED work-up was fairly unremarkable. However with patient's history of CAD and stent placement in the past patient was admitted for chest pain.      Past Medical History:        Diagnosis Date    Anxiety     Arthritis     Bipolar 1 disorder ([address]     CAD in native artery 3/6/2018    Chronic back pain     COVID-19     Depression     Hyperlipidemia     Hypertension     IBS (irritable bowel syndrome)     Mild intermittent asthma without complication 7/12/4043    Morbidly obese (HCC)     Panic disorder     at times afraid to go outside    Unspecified sleep apnea     bipap       Past Surgical History:        Procedure Laterality Date    ANUS SURGERY      APPENDECTOMY  02/03/2014    CHOLECYSTECTOMY  01/01/2009    COLONOSCOPY  01/01/2012        COLONOSCOPY  10/19/2017    Dr Kriste Skiff, Benign HP, 5 yr recall    [facility]  [facility]/29/2009    [facility]    ELECTROCONVULSIVE THERAPY N/A 03/08/2017    ELECTROCONVULSIVE THERAPY performed by Kiran Zamora DO at [address] [facility] 01/01/1990 w/mesh    UMBILICAL HERNIA REPAIR  09/01/1990    VASECTOMY         Home Medications:  Prior to Admission medications    Medication Sig Start Date End Date Taking? Authorizing Provider   busPIRone (BUSPAR) 5 MG tablet TAKE 1 TABLET BY MOUTH THREE TIMES DAILY. 10/18/21   Historical Provider, MD   atorvastatin (LIPITOR) 80 MG tablet TAKE 1 TABLET BY MOUTH AT NIGHT. 10/25/21   SANDY Edwards   VORTIoxetine (TRINTELLIX) 10 MG TABS tablet 2 times daily    Historical Provider, MD   nitroGLYCERIN (NITROSTAT) 0.4 MG SL tablet up to max of 3 total doses. If no relief after 1 dose, call 911. 9/12/21   Creig Scheuermann., MD   metoprolol tartrate (LOPRESSOR) 25 MG tablet TAKE 2 TABLETS BY MOUTH TWICE DAILY  9/7/21   SANYD Yap - NP   lisinopril (PRINIVIL;ZESTRIL) 20 MG tablet TAKE 1 TABLET BY MOUTH ONCE DAILY  8/23/21   SANDY Edwards   cloNIDine (CATAPRES) 0.1 MG tablet Take 1 tablet by mouth daily as needed for High Blood Pressure (140/90) 4/16/21   SANDY Edwards   clonazePAM (KLONOPIN) 0.5 MG tablet 3 times daily as needed. 1/8/21   Historical Provider, MD [employer] [facility] Dx: gen weakness and fatigue use daily as directed 9/17/20   SANDY Edwards   lamoTRIgine (LAMICTAL) 100 MG tablet 2 times daily  2/6/19   Historical Provider, MD   HYDROcodone-acetaminophen (NORCO)  MG per tablet Take 1 tablet by mouth every 8 hours as needed for Pain. Historical Provider, MD       Allergies:    Dye [gadolinium derivatives]    Social History:    The patient currently lives wife wife and son  Tobacco:   reports that he quit smoking about 15 months ago. His smoking use included cigarettes. He quit after 24.00 years of use. He has never used smokeless tobacco.  Alcohol:   reports no history of alcohol use.   Illicit Drugs: denies    Family History:      Problem Relation Age of Onset    Diabetes Mother     Cancer Mother         uterine CA    Depression Mother     Mental Illness Mother     Hypertension Mother    Ata Mcclellan Other Mother         blood clots    Heart Disease Brother     Depression Brother     Mental Illness Brother     Hypertension Brother     Breast Cancer Maternal Grandmother     Cancer Maternal Grandmother         breast CA    Heart Attack Maternal Grandmother     Other Maternal Grandmother         blood clots    Colon Cancer Neg Hx     Esophageal Cancer Neg Hx     Liver Cancer Neg Hx     Rectal Cancer Neg Hx     Stomach Cancer Neg Hx        Review of Systems:   Review of Systems   Constitutional: Negative for chills, fever and unexpected weight change. Eyes: Negative for photophobia and visual disturbance. Respiratory: Positive for chest tightness and shortness of breath. Negative for wheezing. Cardiovascular: Positive for chest pain. Negative for palpitations and leg swelling. Gastrointestinal: Positive for diarrhea and nausea. Negative for abdominal pain and vomiting. Endocrine: Negative for polyphagia and polyuria. Genitourinary: Negative for dysuria, frequency and urgency. Musculoskeletal: Positive for back pain (chronic). Skin: Negative for color change and wound. Neurological: Negative for dizziness, syncope and light-headedness. Psychiatric/Behavioral: Negative for agitation and confusion. 14 point review of systems is negative except as specifically addressed above. Physical Examination:  /74   Pulse 59   Resp 13   Ht 6' 2\" (1.88 m)   Wt (!) 360 lb (163.3 kg)   SpO2 93%   BMI 46.22 kg/m²   Physical Exam  Vitals reviewed. Constitutional:       Appearance: He is obese. HENT:      Head: Normocephalic. Mouth/Throat:      Mouth: Mucous membranes are moist.      Pharynx: Oropharynx is clear. Eyes:      Extraocular Movements: Extraocular movements intact. Conjunctiva/sclera: Conjunctivae normal.      Pupils: Pupils are equal, round, and reactive to light.    Cardiovascular:      Rate and Rhythm: Normal rate and regular rhythm. Pulses: Normal pulses. Heart sounds: Murmur heard. Pulmonary:      Effort: Pulmonary effort is normal.      Breath sounds: Normal breath sounds. Abdominal:      General: Bowel sounds are normal. There is no distension. Palpations: Abdomen is soft. Tenderness: There is no abdominal tenderness. There is no guarding. Musculoskeletal:         General: Normal range of motion. Cervical back: Normal range of motion and neck supple. Right lower leg: No edema. Left lower leg: No edema. Skin:     General: Skin is warm and dry. Capillary Refill: Capillary refill takes less than 2 seconds. Neurological:      General: No focal deficit present. Mental Status: He is alert and oriented to person, place, and time. Psychiatric:         Mood and Affect: Mood is anxious. Diagnostic Data:  CBC:  Recent Labs     11/11/21  0800   WBC 10.2   HGB 15.8   HCT 46.9        BMP:  Recent Labs     11/11/21  0954      K 4.2      CO2 22   BUN 7   CREATININE 0.6   CALCIUM 9.0     Recent Labs     11/11/21  0954   AST 56*   ALT 31   BILITOT 0.8   ALKPHOS 137*     Coag Panel: No results for input(s): INR, PROTIME, APTT in the last 72 hours. Cardiac Enzymes:   Recent Labs     11/11/21  0800   TROPONINI <0.01     ABGs:No results found for: PHART, PO2ART, JND6LXO  Urinalysis:  Lab Results   Component Value Date    NITRU NEGATIVE 02/03/2014    WBCUA 0 02/03/2014    BACTERIA NEGATIVE 02/03/2014    RBCUA 6 02/03/2014    GLUCOSEU NEGATIVE 02/03/2014     A1C: No results for input(s): LABA1C in the last 72 hours. ABG:No results for input(s): PHART, LCK1TUZ, PO2ART, JNH8ERH, BEART, HGBAE, U9WOKUCN, CARBOXHGBART in the last 72 hours.     XR CHEST PORTABLE    Result Date: 11/11/2021  XR CHEST PORTABLE 11/11/2021 8:14 AM HISTORY: Chest pain  Technique: Single AP view of the chest COMPARISONS: Chest exam dated 9/12/2021 FINDINGS: No lung consolidation. No pleural effusion or pneumothorax. Cardiomediastinal silhouette and pulmonary vasculature are unremarkable. No acute bony abnormality. Stable chest exam without acute process Signed by Dr Romayne Quint      Assessment/Plan:  Principal Problem:    Chest pain   -admit   -trend troponin   -echo   -consult cardiology   -nitro/morhpine for pain   -tele, ekg in am   -statin therapy   -ASA    Active Problems:    CAD in native artery   -continue current therapy      Depression   -noted, continue home medications      Hypertension   -noted   -monitor closely   -continue home medications      Anxiety   -noted   -continue home medications       Morbid obesity ([address]   -noted      Severe sleep apnea   -continue home CPAP therapy      Mild intermittent asthma without complication   -noted, monitor closely    Resolved Problems:    * No resolved hospital problems.  *       Signed:  SANDY Calabrese - CNP, 11/11/2021 1:19 PM 08-May-2020 03:58

## 2021-11-11 NOTE — ED PROVIDER NOTES
[facility] [address]      Pt Name: Nohemy Siddiqui  MRN: [id number]  [place] 1975  Date of evaluation: 11/11/2021  Provider: Loreto Contreras MD    [address] [place] [address]       Chief Complaint   Patient presents with    Chest Pain     x5 days at night, started this am         HISTORY OF PRESENT ILLNESS   (Location/Symptom, Timing/Onset,Context/Setting, Quality, Duration, Modifying Factors, Severity)  Note limiting factors. Nohemy Siddiqui is a 51 y.o. male who presents to the emergency department with complaint of chest pain. States he has been having pressure like chest pain over the last 5 days. Seems to mostly come on at night after he eats dinner and is about to lay down to go to bed. States this morning he woke up with more severe chest pain without any specific triggers. Patient has a history of known coronary artery disease with one prior bare-metal stent in 2019 as well as history of WPW and prior ablation. Denies any palpitations or shortness of breath. No associated leg swelling, cough, fever. Patient had normal stress echo in September of last year. Was seen here 2 months ago with similar symptoms and evaluation at that time was unremarkable. Has followed up with cardiology since then but had not been having chest pain until last few days. Patient does have a history of significant anxiety disorder and states that the episodes of pain over the last few nights have been associated with what he would almost described as panic attacks but unclear which may have been a cause and/or affect of the other. What really alarmed him was this morning he woke up and was having no anxiety at all and was having worse pain than he has been experiencing. HPI    NursingNotes were reviewed. REVIEW OF SYSTEMS    (2-9 systems for level 4, 10 or more for level 5)     Review of Systems   Constitutional: Negative for fatigue and fever.    HENT: Negative for congestion, rhinorrhea and voice change. Eyes: Negative for pain and redness. Respiratory: Negative for cough and shortness of breath. Cardiovascular: Positive for chest pain. Gastrointestinal: Negative for abdominal pain, diarrhea and vomiting. Endocrine: Negative. Genitourinary: Negative. Musculoskeletal: Negative for arthralgias and gait problem. Skin: Negative for rash and wound. Neurological: Negative for weakness and headaches. Hematological: Negative. Psychiatric/Behavioral: The patient is nervous/anxious. All other systems reviewed and are negative. A complete review of systems was performed and is negative except as noted above in the HPI. PAST MEDICAL HISTORY     Past Medical History:   Diagnosis Date    Anxiety     Arthritis     Bipolar 1 disorder ([facility] [address]     CAD in native artery 3/6/2018    Chronic back pain     COVID-19     Depression     Hyperlipidemia     Hypertension     IBS (irritable bowel syndrome)     Mild intermittent asthma without complication 7/82/6120    Morbidly obese (HCC)     Panic disorder     at times afraid to go outside    Unspecified sleep apnea     bipap         SURGICAL HISTORY       Past Surgical History:   Procedure Laterality Date    ANUS SURGERY      APPENDECTOMY  02/03/2014    CHOLECYSTECTOMY  01/01/2009    COLONOSCOPY  01/01/2012        COLONOSCOPY  10/19/2017    Dr Marta Mcmahan, Benign HP, 5 yr recall    [facility]  [facility]    ELECTROCONVULSIVE THERAPY N/A 03/08/2017    ELECTROCONVULSIVE THERAPY performed by Scotty Talavera DO at [address] [facility] 01/01/1990    w/mesh    UMBILICAL HERNIA REPAIR  09/01/1990    VASECTOMY           CURRENT MEDICATIONS       Current Discharge Medication List      CONTINUE these medications which have NOT CHANGED    Details   busPIRone (BUSPAR) 5 MG tablet TAKE 1 TABLET BY MOUTH THREE TIMES DAILY.       atorvastatin (LIPITOR) 80 MG tablet TAKE 1 TABLET BY MOUTH AT NIGHT. Qty: 30 tablet, Refills: 0    Associated Diagnoses: CAD in native artery      VORTIoxetine (TRINTELLIX) 10 MG TABS tablet 2 times daily      nitroGLYCERIN (NITROSTAT) 0.4 MG SL tablet up to max of 3 total doses. If no relief after 1 dose, call 911. Qty: 25 tablet, Refills: 0      metoprolol tartrate (LOPRESSOR) 25 MG tablet TAKE 2 TABLETS BY MOUTH TWICE DAILY   Qty: 180 tablet, Refills: 1      lisinopril (PRINIVIL;ZESTRIL) 20 MG tablet TAKE 1 TABLET BY MOUTH ONCE DAILY   Qty: 30 tablet, Refills: 5      cloNIDine (CATAPRES) 0.1 MG tablet Take 1 tablet by mouth daily as needed for High Blood Pressure (140/90)  Qty: 30 tablet, Refills: 3    Associated Diagnoses: Essential hypertension; White coat syndrome with diagnosis of hypertension      Misc. Devices (ROLLING WALKER/BURGUNDY) MISC Dx: gen weakness and fatigue use daily as directed  Qty: 1 each, Refills: 0      lamoTRIgine (LAMICTAL) 100 MG tablet 2 times daily       HYDROcodone-acetaminophen (NORCO)  MG per tablet Take 1 tablet by mouth every 8 hours as needed for Pain.              ALLERGIES     Dye [gadolinium derivatives]    FAMILY HISTORY       Family History   Problem Relation Age of Onset    Diabetes Mother    Freddy Garsia Cancer Mother         uterine CA    Depression Mother     Mental Illness Mother     Hypertension Mother     Other Mother         blood clots    Heart Disease Brother     Depression Brother     Mental Illness Brother     Hypertension Brother     Breast Cancer Maternal Grandmother     Cancer Maternal Grandmother         breast CA    Heart Attack Maternal Grandmother     Other Maternal Grandmother         blood clots    Colon Cancer Neg Hx     Esophageal Cancer Neg Hx     Liver Cancer Neg Hx     Rectal Cancer Neg Hx     Stomach Cancer Neg Hx           SOCIAL HISTORY       Social History     Socioeconomic History    Marital status:      Spouse name: None    Number of children: None  Years of education: None    Highest education level: None   Occupational History    None   Tobacco Use    Smoking status: Current Every Day Smoker     Packs/day: 1.00     Years: 24.00     Pack years: 24.00     Types: Cigarettes     Last attempt to quit: 7/15/2020     Years since quittin.3    Smokeless tobacco: Never Used   Vaping Use    Vaping Use: Never used   Substance and Sexual Activity    Alcohol use: No     Alcohol/week: 0.0 standard drinks    Drug use: No    Sexual activity: Yes     Partners: Female   Other Topics Concern    None   Social History Narrative    None     Social Determinants of Health     Financial Resource Strain: Low Risk     Difficulty of Paying Living Expenses: Not hard at all   Food Insecurity: No Food Insecurity    Worried About Running Out of Food in the Last Year: Never true    Carlos of Food in the Last Year: Never true   Transportation Needs:     Lack of Transportation (Medical): Not on file    Lack of Transportation (Non-Medical):  Not on file   Physical Activity:     Days of Exercise per Week: Not on file    Minutes of Exercise per Session: Not on file   Stress:     Feeling of Stress : Not on file   Social Connections:     Frequency of Communication with Friends and Family: Not on file    Frequency of Social Gatherings with Friends and Family: Not on file    Attends Sabianism Services: Not on file    Active Member of [address] [place] [address] or Organizations: Not on file    Attends Club or Organization Meetings: Not on file    Marital Status: Not on file   Intimate Partner Violence:     Fear of Current or Ex-Partner: Not on file    Emotionally Abused: Not on file    Physically Abused: Not on file    Sexually Abused: Not on file   Housing Stability:     Unable to Pay for Housing in the Last Year: Not on file    Number of [place] in the Last Year: Not on file    Unstable Housing in the Last Year: Not on file       SCREENINGS    Mabel Coma Scale  Eye Opening: Spontaneous  Best Verbal Response: Oriented  Best Motor Response: Obeys commands  [place] Coma Scale Score: 15        PHYSICAL EXAM    (up to 7 for level 4, 8 or more for level 5)     ED Triage Vitals   BP Temp Temp src Pulse Resp SpO2 Height Weight   11/11/21 0756 -- -- 11/11/21 0756 11/11/21 0756 11/11/21 0756 11/11/21 0800 11/11/21 0800   (!) 150/92   80 18 96 % 6' 2\" (1.88 m) (!) 360 lb (163.3 kg)       Physical Exam  Vitals and nursing note reviewed. Constitutional:       General: He is not in acute distress. Appearance: He is well-developed. He is obese. He is not diaphoretic. HENT:      Head: Normocephalic and atraumatic. Eyes:      General: No scleral icterus. Neck:      Vascular: No JVD. Cardiovascular:      Rate and Rhythm: Normal rate and regular rhythm. Pulses:           Radial pulses are 2+ on the right side and 2+ on the left side. Dorsalis pedis pulses are 2+ on the right side and 2+ on the left side. Heart sounds: Normal heart sounds. No murmur heard. No friction rub. No gallop. Pulmonary:      Effort: Pulmonary effort is normal. No accessory muscle usage or respiratory distress. Breath sounds: Normal breath sounds. No stridor. No decreased breath sounds, wheezing, rhonchi or rales. Chest:      Chest wall: No tenderness. Abdominal:      General: There is no distension. Palpations: Abdomen is soft. Tenderness: There is no abdominal tenderness. There is no guarding or rebound. Musculoskeletal:         General: No deformity. Normal range of motion. Right lower leg: No edema. Left lower leg: No edema. Skin:     General: Skin is warm and dry. Coloration: Skin is not pale. Findings: No erythema. Neurological:      Mental Status: He is alert and oriented to person, place, and time. GCS: GCS eye subscore is 4. GCS verbal subscore is 5. GCS motor subscore is 6. Cranial Nerves: No cranial nerve deficit.       Motor: No abnormal muscle tone. Coordination: Coordination normal.   Psychiatric:         Behavior: Behavior normal.         Judgment: Judgment normal.         DIAGNOSTIC RESULTS     EKG: All EKG's are interpreted by the Emergency Department Physician who either signs or Co-signs this chart in the absence of a cardiologist.    *  RADIOLOGY:   Non-plain film images such as CT, Ultrasound and MRI are read by the radiologist. Rutledge Freer images are visualized and preliminarily interpreted by the emergency physician with the below findings:      Interpretation per the Radiologist below, if available at the time of this note:    XR CHEST PORTABLE   Final Result   Stable chest exam without acute process   Signed by Dr Daniela Fortune            ED BEDSIDE ULTRASOUND:   Performed by ED Physician - none    LABS:  Labs Reviewed   CBC WITH AUTO DIFFERENTIAL - Abnormal; Notable for the following components:       Result Value    MCH 31.6 (*)     MPV 8.9 (*)     All other components within normal limits   COMPREHENSIVE METABOLIC PANEL W/ REFLEX TO MG FOR LOW K - Abnormal; Notable for the following components:    Alkaline Phosphatase 137 (*)     AST 56 (*)     All other components within normal limits   COVID-19, RAPID   TROPONIN   BRAIN NATRIURETIC PEPTIDE   SPECIMEN REJECTION   LIPASE   TROPONIN   TROPONIN       All other labs were within normal range or not returned as of this dictation.     Medications   nitroGLYCERIN (NITROSTAT) SL tablet 0.4 mg (has no administration in time range)   lisinopril (PRINIVIL;ZESTRIL) tablet 20 mg (has no administration in time range)   metoprolol tartrate (LOPRESSOR) tablet 25 mg (has no administration in time range)   sodium chloride flush 0.9 % injection 5-40 mL (has no administration in time range)   sodium chloride flush 0.9 % injection 10 mL (has no administration in time range)   0.9 % sodium chloride infusion (has no administration in time range)   ondansetron (ZOFRAN-ODT) disintegrating tablet 4 mg (has no administration in time range)     Or   ondansetron (ZOFRAN) injection 4 mg (has no administration in time range)   acetaminophen (TYLENOL) tablet 650 mg (has no administration in time range)     Or   acetaminophen (TYLENOL) suppository 650 mg (has no administration in time range)   polyethylene glycol (GLYCOLAX) packet 17 g (has no administration in time range)   aspirin chewable tablet 81 mg (has no administration in time range)   nitroGLYCERIN (NITROSTAT) SL tablet 0.4 mg (has no administration in time range)   enoxaparin (LOVENOX) injection 40 mg (has no administration in time range)   atorvastatin (LIPITOR) tablet 80 mg (has no administration in time range)   busPIRone (BUSPAR) tablet 5 mg (has no administration in time range)   HYDROcodone-acetaminophen (NORCO)  MG per tablet 1 tablet (has no administration in time range)   lamoTRIgine (LAMICTAL) tablet 100 mg (has no administration in time range)   VORTIoxetine (TRINTELLIX) tablet 10 mg (has no administration in time range)   morphine (PF) injection 2 mg (2 mg IntraVENous Given 11/11/21 1405)   aspirin chewable tablet 324 mg (324 mg Oral Given 11/11/21 0844)   LORazepam (ATIVAN) tablet 0.5 mg (0.5 mg Oral Given 11/11/21 1306)       EMERGENCY DEPARTMENT COURSE and DIFFERENTIALDIAGNOSIS/MDM:   Vitals:    Vitals:    11/11/21 1302 11/11/21 1326 11/11/21 1331 11/11/21 1412   BP: 123/74  115/71 131/74   Pulse: 59  65 62   Resp: 13  16 20   Temp:  98.4 °F (36.9 °C)  97.3 °F (36.3 °C)   TempSrc:    Temporal   SpO2: 93%  94% 95%   Weight:       Height:           MDM    ED Course as of 11/11/21 1456   Thu Nov 11, 2021   0807 EKG shows sinus rhythm with a rate of 84. No findings of acute ischemia or infarction. Normal intervals other than nonspecific interventricular conduction delay with QRS slightly prolonged at 120.  [INNA]      ED Course User Index  [INNA] Robbie Lopze MD     Based on the evaluation and work-up here patient is felt to require further monitoring, work-up, or treatment that is available in the emergency department. Case was discussed with hospitalist who agrees for observation or admission for further management. Treatment and stabilization as necessary were provided in the emergency department prior to transfer of care to the medicine service. CONSULTS:  IP CONSULT TO CARDIOLOGY    PROCEDURES:  Unless otherwise notedbelow, none     Procedures      FINAL IMPRESSION     1. Chest pain, unspecified type    2. Coronary artery disease involving native coronary artery of native heart with angina pectoris [facility])          DISPOSITION/PLAN   DISPOSITION Admitted 11/11/2021 12:20:22 PM      PATIENT REFERRED TO:  No follow-up provider specified.     DISCHARGE MEDICATIONS:  Current Discharge Medication List             (Please note that portions of this note were completed with a voice recognition program.  Efforts were made to edit the dictations butoccasionally words are mis-transcribed.)    Debbi Pappas MD (electronically signed)  AttendingEmerIzard County Medical Center Physician          Debbi Fuentes MD  11/11/21 0635

## 2021-11-12 VITALS
HEIGHT: 74 IN | SYSTOLIC BLOOD PRESSURE: 119 MMHG | BODY MASS INDEX: 40.43 KG/M2 | DIASTOLIC BLOOD PRESSURE: 80 MMHG | TEMPERATURE: 97.4 F | RESPIRATION RATE: 16 BRPM | WEIGHT: 315 LBS | OXYGEN SATURATION: 96 % | HEART RATE: 77 BPM

## 2021-11-12 LAB
ANION GAP SERPL CALCULATED.3IONS-SCNC: 11 MMOL/L (ref 7–19)
BASOPHILS ABSOLUTE: 0.1 K/UL (ref 0–0.2)
BASOPHILS RELATIVE PERCENT: 0.6 % (ref 0–1)
BUN BLDV-MCNC: 6 MG/DL (ref 6–20)
CALCIUM SERPL-MCNC: 8.9 MG/DL (ref 8.6–10)
CHLORIDE BLD-SCNC: 100 MMOL/L (ref 98–111)
CHOLESTEROL, TOTAL: 97 MG/DL (ref 160–199)
CO2: 25 MMOL/L (ref 22–29)
CREAT SERPL-MCNC: 0.6 MG/DL (ref 0.5–1.2)
EKG P AXIS: 28 DEGREES
EKG P AXIS: 56 DEGREES
EKG P-R INTERVAL: 144 MS
EKG P-R INTERVAL: 148 MS
EKG Q-T INTERVAL: 424 MS
EKG Q-T INTERVAL: 436 MS
EKG QRS DURATION: 100 MS
EKG QRS DURATION: 116 MS
EKG QTC CALCULATION (BAZETT): 441 MS
EKG QTC CALCULATION (BAZETT): 450 MS
EKG T AXIS: 33 DEGREES
EKG T AXIS: 34 DEGREES
EOSINOPHILS ABSOLUTE: 0.1 K/UL (ref 0–0.6)
EOSINOPHILS RELATIVE PERCENT: 1 % (ref 0–5)
GFR AFRICAN AMERICAN: >59
GFR NON-AFRICAN AMERICAN: >60
GLUCOSE BLD-MCNC: 92 MG/DL (ref 74–109)
HCT VFR BLD CALC: 41.8 % (ref 42–52)
HDLC SERPL-MCNC: 33 MG/DL (ref 55–121)
HEMOGLOBIN: 14 G/DL (ref 14–18)
IMMATURE GRANULOCYTES #: 0 K/UL
LDL CHOLESTEROL CALCULATED: 35 MG/DL
LYMPHOCYTES ABSOLUTE: 3.3 K/UL (ref 1.1–4.5)
LYMPHOCYTES RELATIVE PERCENT: 39.5 % (ref 20–40)
MCH RBC QN AUTO: 31.2 PG (ref 27–31)
MCHC RBC AUTO-ENTMCNC: 33.5 G/DL (ref 33–37)
MCV RBC AUTO: 93.1 FL (ref 80–94)
MONOCYTES ABSOLUTE: 0.5 K/UL (ref 0–0.9)
MONOCYTES RELATIVE PERCENT: 5.4 % (ref 0–10)
NEUTROPHILS ABSOLUTE: 4.4 K/UL (ref 1.5–7.5)
NEUTROPHILS RELATIVE PERCENT: 53.4 % (ref 50–65)
PDW BLD-RTO: 13.2 % (ref 11.5–14.5)
PLATELET # BLD: 188 K/UL (ref 130–400)
PMV BLD AUTO: 8.8 FL (ref 9.4–12.4)
POTASSIUM REFLEX MAGNESIUM: 3.8 MMOL/L (ref 3.5–5)
RBC # BLD: 4.49 M/UL (ref 4.7–6.1)
SODIUM BLD-SCNC: 136 MMOL/L (ref 136–145)
TRIGL SERPL-MCNC: 143 MG/DL (ref 0–149)
WBC # BLD: 8.3 K/UL (ref 4.8–10.8)

## 2021-11-12 PROCEDURE — 96372 THER/PROPH/DIAG INJ SC/IM: CPT

## 2021-11-12 PROCEDURE — 99214 OFFICE O/P EST MOD 30 MIN: CPT | Performed by: INTERNAL MEDICINE

## 2021-11-12 PROCEDURE — 2580000003 HC RX 258: Performed by: INTERNAL MEDICINE

## 2021-11-12 PROCEDURE — 93005 ELECTROCARDIOGRAM TRACING: CPT | Performed by: INTERNAL MEDICINE

## 2021-11-12 PROCEDURE — 93010 ELECTROCARDIOGRAM REPORT: CPT | Performed by: INTERNAL MEDICINE

## 2021-11-12 PROCEDURE — 6360000002 HC RX W HCPCS: Performed by: NURSE PRACTITIONER

## 2021-11-12 PROCEDURE — 85025 COMPLETE CBC W/AUTO DIFF WBC: CPT

## 2021-11-12 PROCEDURE — 36415 COLL VENOUS BLD VENIPUNCTURE: CPT

## 2021-11-12 PROCEDURE — 6370000000 HC RX 637 (ALT 250 FOR IP): Performed by: INTERNAL MEDICINE

## 2021-11-12 PROCEDURE — 80048 BASIC METABOLIC PNL TOTAL CA: CPT

## 2021-11-12 PROCEDURE — 80061 LIPID PANEL: CPT

## 2021-11-12 PROCEDURE — 96376 TX/PRO/DX INJ SAME DRUG ADON: CPT

## 2021-11-12 PROCEDURE — G0378 HOSPITAL OBSERVATION PER HR: HCPCS

## 2021-11-12 PROCEDURE — 6370000000 HC RX 637 (ALT 250 FOR IP): Performed by: NURSE PRACTITIONER

## 2021-11-12 PROCEDURE — 94660 CPAP INITIATION&MGMT: CPT

## 2021-11-12 PROCEDURE — 96375 TX/PRO/DX INJ NEW DRUG ADDON: CPT

## 2021-11-12 PROCEDURE — 6360000002 HC RX W HCPCS: Performed by: INTERNAL MEDICINE

## 2021-11-12 RX ORDER — NITROGLYCERIN 0.4 MG/1
TABLET SUBLINGUAL
Qty: 25 TABLET | Refills: 0 | Status: SHIPPED | OUTPATIENT
Start: 2021-11-12

## 2021-11-12 RX ORDER — ASPIRIN 81 MG/1
81 TABLET, CHEWABLE ORAL DAILY
Qty: 30 TABLET | Refills: 3 | Status: SHIPPED | OUTPATIENT
Start: 2021-11-13

## 2021-11-12 RX ORDER — ISOSORBIDE MONONITRATE 60 MG/1
60 TABLET, EXTENDED RELEASE ORAL 2 TIMES DAILY
Qty: 30 TABLET | Refills: 0 | Status: SHIPPED | OUTPATIENT
Start: 2021-11-12 | End: 2021-11-29 | Stop reason: SDUPTHER

## 2021-11-12 RX ADMIN — SODIUM CHLORIDE: 9 INJECTION, SOLUTION INTRAVENOUS at 03:12

## 2021-11-12 RX ADMIN — ENOXAPARIN SODIUM 40 MG: 100 INJECTION SUBCUTANEOUS at 08:39

## 2021-11-12 RX ADMIN — MORPHINE SULFATE 2 MG: 2 INJECTION, SOLUTION INTRAMUSCULAR; INTRAVENOUS at 03:11

## 2021-11-12 RX ADMIN — BUSPIRONE HYDROCHLORIDE 5 MG: 5 TABLET ORAL at 08:40

## 2021-11-12 RX ADMIN — ONDANSETRON 4 MG: 2 INJECTION INTRAMUSCULAR; INTRAVENOUS at 03:12

## 2021-11-12 RX ADMIN — ISOSORBIDE MONONITRATE 60 MG: 30 TABLET, EXTENDED RELEASE ORAL at 08:40

## 2021-11-12 RX ADMIN — SODIUM CHLORIDE, PRESERVATIVE FREE 10 ML: 5 INJECTION INTRAVENOUS at 08:41

## 2021-11-12 RX ADMIN — LAMOTRIGINE 100 MG: 100 TABLET ORAL at 08:40

## 2021-11-12 RX ADMIN — HYDROCODONE BITARTRATE AND ACETAMINOPHEN 1 TABLET: 10; 325 TABLET ORAL at 08:41

## 2021-11-12 RX ADMIN — ASPIRIN 81 MG: 81 TABLET, CHEWABLE ORAL at 08:41

## 2021-11-12 RX ADMIN — MORPHINE SULFATE 2 MG: 2 INJECTION, SOLUTION INTRAMUSCULAR; INTRAVENOUS at 05:48

## 2021-11-12 RX ADMIN — METOPROLOL TARTRATE 25 MG: 25 TABLET, FILM COATED ORAL at 08:41

## 2021-11-12 RX ADMIN — LISINOPRIL 20 MG: 20 TABLET ORAL at 08:40

## 2021-11-12 RX ADMIN — VORTIOXETINE 10 MG: 10 TABLET, FILM COATED ORAL at 08:40

## 2021-11-12 ASSESSMENT — PAIN SCALES - GENERAL
PAINLEVEL_OUTOF10: 8
PAINLEVEL_OUTOF10: 4
PAINLEVEL_OUTOF10: 4
PAINLEVEL_OUTOF10: 8
PAINLEVEL_OUTOF10: 8
PAINLEVEL_OUTOF10: 7
PAINLEVEL_OUTOF10: 8

## 2021-11-12 ASSESSMENT — PAIN DESCRIPTION - LOCATION: LOCATION: BACK;CHEST

## 2021-11-12 ASSESSMENT — PAIN DESCRIPTION - PAIN TYPE: TYPE: ACUTE PAIN

## 2021-11-12 NOTE — PROGRESS NOTES
Ok to [facility] from cardiology standpoint, per Dr Kassi Zheng.   Electronically signed by Jose Gallardo RN on 11/12/2021 at 12:13 PM

## 2021-11-12 NOTE — PROGRESS NOTES
Cardiac catheterization preliminary note:    LAD mid 55% stenosis. IFR equals 0.92 consistent with nonobstructive lesion. OM 2 proximal 99 to 100% stenosis with late filling. Previously noted in 2018 with unsuccessful attempt at wiring. Able to wire lesion but unable to advance any equipment. Further attempts deferred. RCA proximal stent widely patent. Normal LV systolic function. Plan: Medical management. Patient reassured as to findings with no significant obstructive lesions that would explain his new symptoms. Noted response to symptoms with IC nitroglycerin. Trial medical management with higher dose Imdur 60 mg twice daily if tolerates. If recurrent symptomatology consideration for adding Ranexa.

## 2021-11-12 NOTE — CARE COORDINATION
Initial CM Assessment    Initial Assessment Completed at bedside with:    [x]   Patient  []   Family/Caregiver/Guardian   []   Other:      Patient Contact Information:  [address] [place] [address] [phone] (home)   Above information verified? [x]   Yes  []   No    ADLS:    Uses a walker for long distance   Support System:    Spouse and son (15 yo)   Plan to return to current housing:   [x]   Yes  []   No    Transportation plan for Discharge:  Spouse     Do you have any unmet social needs that would keep you from returning home safely:  []   Yes  [x]   No              Unmet Social Needs Notes:       Had [address] prior to admission:    []   Yes  [x]   No    Oxygen Company:   BiPap supplied by [employer]    Currently ACTIVE with Home Health CARE:    []   Yes  [x]   No  []   Interested at discharge  [facility]:      Current PCP:  SADNY Mosley  PCP verified? [x]   Yes  []   No    Have you been vaccinated for COVID-19 (SARS-CoV-2)? [x]   Yes  []   No                   If so, when? Which :  [x]   evOLED  []   Moderna  []   [employer]  []   Other:       Pharmacy:    [facility], [id number] [facility], [address]  [address]  Phone: [phone] Fax: [phone]    Prefer to use Meds to Bed? []   Yes  [x]   No  Potential assistance purchasing medications?      []   Yes  [x]   No    Active with HD/PD prior to admission:           []   Yes  [x]   No  HD Center:       Financial:  Payor: Jean Marie Michaels / Plan: MEDICARE PART A AND B / Product Type: *No Product type* /     Pre-Cert required for SNF:   []   Yes  [x]   No    Patient Deficits:  []   Yes   [x]   No    If yes:  []   Confusion/Memory  []   Visual  []   Motor/Sensory         []   Right arm         []   Right leg         []   Left arm         []   Left leg  []   Language/Speech         []   Aphasia         []   Dysarthria         [] Swallow         Mabel Coma Scale  Eye Opening: Spontaneous  Best Verbal Response: Oriented  Best Motor Response: Obeys commands  Dexter Coma Scale Score: 15    Patient Deficit Notes:        Additional CM/SW Notes:       Magdy Lock and/or his family were provided with choice of provider:  [x]   Yes   []   No      Patient Admission Status:   Observation [104]    [address] Management  Electronically signed by Yandy Cook on 11/12/2021 at 10:22 AM

## 2021-11-12 NOTE — PROGRESS NOTES
Morbid obesity. 3.  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  4.  Anxiety disorder/bipolar disorder. Subjective:  Mr. Maximo Ha is doing better this morning. No recurrent symptoms reported. Objective:   /80   Pulse 77   Temp 97.4 °F (36.3 °C) (Oral)   Resp 16   Ht 6' 2\" (1.88 m)   Wt (!) 360 lb (163.3 kg)   SpO2 96%   BMI 46.22 kg/m²       Intake/Output Summary (Last 24 hours) at 11/12/2021 1239  Last data filed at 11/12/2021 1020  Gross per 24 hour   Intake 240 ml   Output --   Net 240 ml       Prior to Admission medications    Medication Sig Start Date End Date Taking? Authorizing Provider   isosorbide mononitrate (IMDUR) 60 MG extended release tablet Take 1 tablet by mouth 2 times daily 11/12/21  Yes Carlos Reese MD   aspirin 81 MG chewable tablet Take 1 tablet by mouth daily 11/13/21  Yes Carlos Reese MD   nitroGLYCERIN (NITROSTAT) 0.4 MG SL tablet up to max of 3 total doses. If no relief after 1 dose, call 911. 11/12/21  Yes Carlos Reese MD   busPIRone (BUSPAR) 5 MG tablet TAKE 1 TABLET BY MOUTH THREE TIMES DAILY. 10/18/21  Yes Historical Provider, MD   atorvastatin (LIPITOR) 80 MG tablet TAKE 1 TABLET BY MOUTH AT NIGHT. 10/25/21  Yes SANDY Whyte   VORTIoxetine (TRINTELLIX) 10 MG TABS tablet 2 times daily   Yes Historical Provider, MD   metoprolol tartrate (LOPRESSOR) 25 MG tablet TAKE 2 TABLETS BY MOUTH TWICE DAILY  9/7/21  Yes SANDY Carias - NP   lisinopril (PRINIVIL;ZESTRIL) 20 MG tablet TAKE 1 TABLET BY MOUTH ONCE DAILY  8/23/21  Yes SANDY Whyte   cloNIDine (CATAPRES) 0.1 MG tablet Take 1 tablet by mouth daily as needed for High Blood Pressure (140/90) 4/16/21  Yes SANDY Whyte   Misc.  Devices [facility]) MISC Dx: gen weakness and fatigue use daily as directed 9/17/20  Yes Melanie Salmons, APRN   lamoTRIgine (LAMICTAL) 100 MG tablet 2 times daily  2/6/19  Yes Historical Provider, MD   HYDROcodone-acetaminophen [facility] AND [facility]  MG per tablet Take 1 tablet by mouth every 8 hours as needed for Pain. Yes Historical Provider, MD        lisinopril  20 mg Oral Daily    metoprolol tartrate  25 mg Oral BID    sodium chloride flush  5-40 mL IntraVENous 2 times per day    aspirin  81 mg Oral Daily    enoxaparin  40 mg SubCUTAneous Daily    atorvastatin  80 mg Oral Nightly    busPIRone  5 mg Oral TID    lamoTRIgine  100 mg Oral BID    VORTIoxetine  10 mg Oral BID    isosorbide mononitrate  60 mg Oral BID       TELEMETRY: Sinus     Physical Exam:      Physical Exam  HENT:      Mouth/Throat:      Pharynx: No oropharyngeal exudate. Eyes:      General: No scleral icterus. Right eye: No discharge. Left eye: No discharge. Neck:      Thyroid: No thyromegaly. Vascular: No JVD. Cardiovascular:      Rate and Rhythm: Normal rate and regular rhythm. Heart sounds: No murmur heard. No friction rub. No gallop. Pulmonary:      Effort: No respiratory distress. Breath sounds: No stridor. No wheezing or rales. Abdominal:      General: Bowel sounds are normal. There is no distension. Palpations: Abdomen is soft. There is no mass. Tenderness: There is no abdominal tenderness. There is no guarding or rebound. Musculoskeletal:         General: No deformity. Skin:     General: Skin is warm. Coloration: Skin is not pale. Findings: No erythema or rash. Neurological:      Mental Status: He is alert and oriented to person, place, and time. Motor: No abnormal muscle tone.       Coordination: Coordination normal.      Deep Tendon Reflexes: Reflexes normal.                 Lab Data:  CBC:   Recent Labs     11/11/21  0800 11/12/21  0423   WBC 10.2 8.3   HGB 15.8 14.0   HCT 46.9 41.8*   MCV 93.8 93.1    188     BMP:   Recent Labs     11/11/21  0954 11/12/21  0423    136   K 4.2 3.8    100   CO2 22 25   BUN 7 6   CREATININE 0.6 0.6     LIVER PROFILE:   Recent Labs 11/11/21  0954   AST 56*   ALT 31   LIPASE 19   BILITOT 0.8   ALKPHOS 137*     PT/INR: No results for input(s): PROTIME, INR in the last 72 hours. APTT: No results for input(s): APTT in the last 72 hours. BNP:  No results for input(s): BNP in the last 72 hours. CK, CKMB, Troponin: @LABRCNT (CKTOTAL:3, CKMB:3, TROPONINI:3)@    IMAGING:  XR CHEST PORTABLE    Result Date: 11/11/2021  XR CHEST PORTABLE 11/11/2021 8:14 AM HISTORY: Chest pain  Technique: Single AP view of the chest COMPARISONS: Chest exam dated 9/12/2021 FINDINGS: No lung consolidation. No pleural effusion or pneumothorax. Cardiomediastinal silhouette and pulmonary vasculature are unremarkable. No acute bony abnormality. Stable chest exam without acute process Signed by Dr Sonal Kaminski    ECHO 2D [address]    Result Date: 11/11/2021  Transthoracic Echocardiography Report (TTE)  Demographics   Patient Name  Cole Cornejo  Date of Study         11/11/2021                D   MRN           [id number]           Gender                Male   Date of Birth 1975       Room Number           MHL-0416   Age           51 year(s)   Height:       74 inches        Referring Physician   Arlen Gomez MD   Weight:       360.01 pounds    Sonographer           Terese Champion [facility]   BSA:          2.79 m^2         Interpreting          Pj Gerard MD                                 Physician   BMI:          46.22 kg/m^2  Procedure Type of Study   TTE procedure:ECHO 2D W/DOPPLER/COLOR/CONTRAST. Study Location: Echo Lab Technical Quality: Limited visualization due to poor acoustical window. Patient Status: Inpatient Contrast Medium: Definity. Indications:Chest pain.   Conclusions   Summary  Technically difficult study due to poor acoustical window with some images  suboptimal  Normal left ventricular size and systolic function EF 55 to 60%  Normal LV wall thickness with preserved diastolic function  Normal left atrial size  Aortic valve appears to be tricuspid without evidence of stenosis or  insufficiency  Normally mobile mitral valve without regurgitation  No evidence of pulmonic stenosis or insufficiency  Normal right-sided chambers with preserved RV systolic function  No tricuspid regurgitation with which to estimate RVSP  Aortic root and ascending segments measure within normal limits  No significant pericardial effusion  Definity contrast utilized to better define endocardial borders   Signature   ----------------------------------------------------------------  Electronically signed by Shashank Zheng MD(Interpreting physician)  on 11/11/2021 04:00 PM  ----------------------------------------------------------------  Allergies   - Dye contrast media. M-Mode Measurements (cm)   LVIDd: 5.56 cm                         LVIDs: 3.63 cm  IVSd: 0.92 cm  LVPWd: 1.05 cm                         AO Root Dimension: 2.3 cm  Rt. Vent. Dimension: 3.07 cm           LA: 3.5 cm  % Ejection Fraction: 65 %              LVOT: 2.2 cm  Doppler Measurements:   AV Peak Velocity:119 cm/s           MV Peak E-Wave: 79.3 cm/s  AV Peak Gradient: 5.66 mmHg         MV Peak A-Wave: 67.3 cm/s                                      MV E/A Ratio: 1.18 %                                      MV Peak Gradient: 2.52 mmHg          Assessment and Plan: This is a 51y.o. year old male with past medical history of morbid obesity, anxiety/bipolar disorder, prior SVT/WPW ablation 1/2021 history of coronary artery disease with PCI to proximal RCA 3/6/2018 with bare-metal stent, unsuccessful attempt at OM2 with residual diagonal disease presenting with recurrent chest pain with prior negative stress test, no significant ST-T changes and negative troponins, cardiac catheterization 11/11/2021 with patent stent in RCA, nonobstructive mid LAD with unsuccessful attempt at OM2, normal EF. 1.  Continue medical management. No further intervention from cardiac viewpoint at this time.   Can be discharged and follow-up as an outpatient with cardiology.         Garret Gonsales MD, MD 11/12/2021 12:39 PM

## 2021-11-12 NOTE — DISCHARGE SUMMARY
[place], [place], [address]  DEPARTMENT OF HOSPITALIST MEDICINE    DISCHARGE SUMMARY:        Alan Dillard  :  1975  MRN:  [id number]    Admit date:  2021  Discharge date:  2021      Admitting Physician:  Dejon Norris MD    Advance Directive: Full Code    Consults Made:   IP CONSULT TO CARDIOLOGY      Primary Care Physician:  Lela Abarca, APRN    Discharge Diagnoses:  Principal Problem:    Chest pain  Active Problems:    CAD in native artery    Depression    Hypertension    Anxiety    Morbid obesity ([address]    Severe sleep apnea    Mild intermittent asthma without complication  Resolved Problems:    * No resolved hospital problems. *          Pertinent Labs:  CBC with DIFF:  Recent Labs     21  0800 21  0423   WBC 10.2 8.3   RBC 5.00 4.49*   HGB 15.8 14.0   HCT 46.9 41.8*   MCV 93.8 93.1   MCH 31.6* 31.2*   MCHC 33.7 33.5   RDW 13.3 13.2    188   MPV 8.9* 8.8*   NEUTOPHILPCT 56.8 53.4   LYMPHOPCT 36.2 39.5   MONOPCT 5.1 5.4   EOSRELPCT 0.9 1.0   BASOPCT 0.7 0.6   NEUTROABS 5.8 4.4   LYMPHSABS 3.7 3.3   MONOSABS 0.50 0.50   EOSABS 0.10 0.10   BASOSABS 0.10 0.10       CMP/BMP:  Recent Labs     21  0954 21  0423    136   K 4.2 3.8    100   CO2 22 25   ANIONGAP 11 11   GLUCOSE 108 92   BUN 7 6   CREATININE 0.6 0.6   LABGLOM >60 >60   CALCIUM 9.0 8.9   PROT 6.7  --    LABALBU 3.8  --    BILITOT 0.8  --    ALKPHOS 137*  --    ALT 31  --    AST 56*  --          CRP:  No results for input(s): CRP in the last 72 hours. Sed Rate:  No results for input(s): SEDRATE in the last 72 hours.       HgBA1c:  No components found for: HGBA1C  FLP:    Lab Results   Component Value Date    TRIG 143 2021    HDL 33 2021    LDLCALC 35 2021    LDLDIRECT 95 10/26/2016     TSH:    Lab Results   Component Value Date    TSH 3.310 2017     Troponin T:   Recent Labs     21  0800 21  1415 21  1923   TROPONINI <0.01 <0.01 <0.01     Pro-BNP: No results for input(s): BNP in the last 72 hours. INR: No results for input(s): INR in the last 72 hours. ABGs: No results found for: PHART, PO2ART, BOK3ZAR  UA:No results for input(s): NITRITE, COLORU, PHUR, LABCAST, WBCUA, RBCUA, MUCUS, TRICHOMONAS, YEAST, BACTERIA, CLARITYU, SPECGRAV, LEUKOCYTESUR, UROBILINOGEN, BILIRUBINUR, BLOODU, GLUCOSEU, AMORPHOUS in the last 72 hours. Invalid input(s): Khushi Deluca      Culture Results:    No results for input(s): CXSURG in the last 720 hours. Blood Culture Recent: No results for input(s): BC in the last 720 hours. Cultures:   No results for input(s): CULTURE in the last 72 hours. No results for input(s): BC, Atha Sandra in the last 72 hours. No results for input(s): CXSURG in the last 72 hours. No results for input(s): MG, PHOS in the last 72 hours. Recent Labs     11/11/21  0954   AST 56*   ALT 31   BILITOT 0.8   ALKPHOS 137*           Significant Diagnostic Studies:   XR CHEST PORTABLE    Result Date: 11/11/2021  XR CHEST PORTABLE 11/11/2021 8:14 AM HISTORY: Chest pain  Technique: Single AP view of the chest COMPARISONS: Chest exam dated 9/12/2021 FINDINGS: No lung consolidation. No pleural effusion or pneumothorax. Cardiomediastinal silhouette and pulmonary vasculature are unremarkable. No acute bony abnormality.     Stable chest exam without acute process Signed by Dr Jose Alvarenga    ECHO 2D [address]    Result Date: 11/11/2021  Transthoracic Echocardiography Report (TTE)  Demographics   Patient Name  Gerald Charles  Date of Study         11/11/2021                D   MRN           [id number]           Gender                Male   Date of Birth 1975       Room Number           MHL-0416   Age           51 year(s)   Height:       74 inches        Referring Physician   Rafi Goodwin MD   Weight:       360.01 pounds    Sonographer           Bethanne Cabot, RDCS   BSA:          2.79 m^2         Interpreting Abhilash Velez MD                                 Physician   BMI:          46.22 kg/m^2  Procedure Type of Study   TTE procedure:ECHO 2D W/DOPPLER/COLOR/CONTRAST. Study Location: Echo Lab Technical Quality: Limited visualization due to poor acoustical window. Patient Status: Inpatient Contrast Medium: Definity. Indications:Chest pain. Conclusions   Summary  Technically difficult study due to poor acoustical window with some images  suboptimal  Normal left ventricular size and systolic function EF 55 to 60%  Normal LV wall thickness with preserved diastolic function  Normal left atrial size  Aortic valve appears to be tricuspid without evidence of stenosis or  insufficiency  Normally mobile mitral valve without regurgitation  No evidence of pulmonic stenosis or insufficiency  Normal right-sided chambers with preserved RV systolic function  No tricuspid regurgitation with which to estimate RVSP  Aortic root and ascending segments measure within normal limits  No significant pericardial effusion  Definity contrast utilized to better define endocardial borders   Signature   ----------------------------------------------------------------  Electronically signed by Abhilash Velez MD(Interpreting physician)  on 11/11/2021 04:00 PM  ----------------------------------------------------------------  Allergies   - Dye contrast media. M-Mode Measurements (cm)   LVIDd: 5.56 cm                         LVIDs: 3.63 cm  IVSd: 0.92 cm  LVPWd: 1.05 cm                         AO Root Dimension: 2.3 cm  Rt. Vent.  Dimension: 3.07 cm           LA: 3.5 cm  % Ejection Fraction: 65 %              LVOT: 2.2 cm  Doppler Measurements:   AV Peak Velocity:119 cm/s           MV Peak E-Wave: 79.3 cm/s  AV Peak Gradient: 5.66 mmHg         MV Peak A-Wave: 67.3 cm/s                                      MV E/A Ratio: 1.18 %                                      MV Peak Gradient: 2.52 mmHg        2D echocardiogram-11/11/2021  Summary   Technically difficult study due to poor acoustical window with some images   suboptimal   Normal left ventricular size and systolic function EF 55 to 60%   Normal LV wall thickness with preserved diastolic function   Normal left atrial size   Aortic valve appears to be tricuspid without evidence of stenosis or   insufficiency   Normally mobile mitral valve without regurgitation   No evidence of pulmonic stenosis or insufficiency   Normal right-sided chambers with preserved RV systolic function   No tricuspid regurgitation with which to estimate RVSP   Aortic root and ascending segments measure within normal limits   No significant pericardial effusion   Definity contrast utilized to better define endocardial borders      Signature   ----------------------------------------------------------------   Electronically signed by Dain Runner MD(Interpreting physician)   on 11/11/2021 04:00 PM   ----------------------------------------------------------------        Hospital Course:   Mr. Wilmar Anton, a 19-year-old morbidly obese male, with a significant history of HTN, HLD, CAD (s/p prior stent placement), presenting to [facility] ED (11/11/2021) on account of 5-day history of persistent moderate to severe pressure-like left-sided to substernal chest pain, with radiation into left shoulder and upper abdomen. Patient placed in observation for further work-up and management. Chest pain / tightness /pressure  CAD   - Initial troponin negative X 3 sets   - Serial EKGs for chest pain   - Optimized medical management   --> Nitro glycerin sublingual when necessary for chest pain   - 2D Echocardiogram: Summary as noted above   - Continue to monitor on telemetry   -Seen by Cardiology-appreciate recommendations   Status post diagnostic cardiac catheterization (11/11/2021)  o Conclusions: Single-vessel, branch disease (known OM 2 short segment occlusion). Intermediate lesion in mid LAD, nonobstructive by iFR testing. Normal LV systolic function. Unsuccessful attempt at PCI to OM2 (successful wiring but inability to advance any low-profile balloons across lesion). Further attempts deferred. o Recommendations:  Medical management.  Trial of medical management with higher doses of isosorbide mononitrate 60 mg twice daily, as tolerated   If recurrent symptomology, consider adding Ranexa   Okay for discharge from cardiology standpoint.  Follow-up with cardiology outpatient       Physical Exam:  Vital Signs: BP (!) 106/59   Pulse 77   Temp 97.4 °F (36.3 °C) (Oral)   Resp 16   Ht 6' 2\" (1.88 m)   Wt (!) 360 lb (163.3 kg)   SpO2 96%   BMI 46.22 kg/m²   Physical Exam  Vitals and nursing note reviewed. Constitutional:       General: He is not in acute distress. Appearance: Normal appearance. He is obese. He is not ill-appearing, toxic-appearing or diaphoretic. HENT:      Head: Normocephalic and atraumatic. Right Ear: External ear normal.      Left Ear: External ear normal.      Nose: Nose normal. No congestion or rhinorrhea. Mouth/Throat:      Mouth: Mucous membranes are moist.      Pharynx: Oropharynx is clear. No oropharyngeal exudate or posterior oropharyngeal erythema. Eyes:      General: No scleral icterus. Right eye: No discharge. Left eye: No discharge. Extraocular Movements: Extraocular movements intact. Conjunctiva/sclera: Conjunctivae normal.      Pupils: Pupils are equal, round, and reactive to light. Cardiovascular:      Rate and Rhythm: Normal rate and regular rhythm. Pulses: Normal pulses. Heart sounds: Normal heart sounds. No murmur heard. No friction rub. No gallop. Pulmonary:      Effort: Pulmonary effort is normal. No respiratory distress. Breath sounds: Normal breath sounds. No stridor. No wheezing, rhonchi or rales. Chest:      Chest wall: No tenderness. Abdominal:      General: Bowel sounds are normal. There is no distension. Palpations: Abdomen is soft. Tenderness: There is no abdominal tenderness. There is no guarding or rebound. Musculoskeletal:         General: No swelling, tenderness, deformity or signs of injury. Normal range of motion. Cervical back: Normal range of motion and neck supple. No rigidity or tenderness. No muscular tenderness. Right lower leg: No edema. Left lower leg: No edema. Skin:     General: Skin is warm and dry. Capillary Refill: Capillary refill takes less than 2 seconds. Coloration: Skin is not jaundiced or pale. Findings: No bruising, erythema, lesion or rash. Neurological:      General: No focal deficit present. Mental Status: He is alert and oriented to person, place, and time. Cranial Nerves: No cranial nerve deficit. Sensory: No sensory deficit. Motor: No weakness. Coordination: Coordination normal.   Psychiatric:         Mood and Affect: Mood normal.         Behavior: Behavior normal.         Thought Content: Thought content normal.         Judgment: Judgment normal.         Discharge Medications:        Medication List      START taking these medications    aspirin 81 MG chewable tablet  Take 1 tablet by mouth daily  Start taking on: November 13, 2021     isosorbide mononitrate 60 MG extended release tablet  Commonly known as: IMDUR  Take 1 tablet by mouth 2 times daily        CONTINUE taking these medications    atorvastatin 80 MG tablet  Commonly known as: LIPITOR  TAKE 1 TABLET BY MOUTH AT NIGHT.      busPIRone 5 MG tablet  Commonly known as: BUSPAR     cloNIDine 0.1 MG tablet  Commonly known as: Catapres  Take 1 tablet by mouth daily as needed for High Blood Pressure (140/90)     HYDROcodone-acetaminophen  MG per tablet  Commonly known as: NORCO     lamoTRIgine 100 MG tablet  Commonly known as: LAMICTAL     lisinopril 20 MG tablet  Commonly known as: PRINIVIL;ZESTRIL  TAKE 1 TABLET BY MOUTH ONCE DAILY     metoprolol tartrate 25 MG tablet  Commonly known as: LOPRESSOR  TAKE 2 TABLETS BY MOUTH TWICE DAILY     nitroGLYCERIN 0.4 MG SL tablet  Commonly known as: Nitrostat  up to max of 3 total doses. If no relief after 1 dose, call 911. Rolling Walker/Yue Misc  Dx: gen weakness and fatigue use daily as directed     Trintellix 10 MG Tabs tablet  Generic drug: VORTIoxetine           Where to Get Your Medications      These medications were sent to [facility], [address] [facility], [address] [place] [address], [address] [id number]    Phone: [phone]   · aspirin 81 MG chewable tablet  · isosorbide mononitrate 60 MG extended release tablet  · nitroGLYCERIN 0.4 MG SL tablet         Discharge Instructions: Follow up with SANDY Silverman in 7 days. Take medications as directed. Resume activity as tolerated. Diet: ADULT DIET; Regular; Low Fat/Low Chol/High Fiber/2 gm Na        DISCHARGE STATUS:    Condition: Fair  Disposition: Patient is medically stable and will be discharged Home    Extended Emergency Contact Information  Primary Emergency Contact: Radha Fierro  Address: [address] Phone: [phone]  Work Phone: [phone]  Mobile Phone: [phone]  Relation: Spouse  Secondary Emergency Contact: Molina Coleman, [address] Phone: [phone]  Mobile Phone: [phone]  Relation: Other       Time Spent on discharge is  32 mins in the examination, evaluation, counseling and review of medications and discharge plan. Electronically signed by   Suzanna Gregory MD, MPH, MD,   Internal Medicine Hospitalist   11/12/2021 12:04 PM      Thank you SANDY Silverman for the opportunity to be involved in this patient's care. If you have any questions or concerns please feel free to contact me at [phone]        EMR Dragon/Transcription disclaimer:   Much of this encounter note is an electronic transcription/translation of spoken language to printed text. The electronic translation of spoken language may permit erroneous, or at times, nonsensical words or phrases to be inadvertently transcribed; although attempts have made to review the note for such errors, some may still exist.

## 2021-11-15 ENCOUNTER — TELEPHONE (OUTPATIENT)
Dept: PRIMARY CARE CLINIC | Age: 46
End: 2021-11-15

## 2021-11-15 NOTE — TELEPHONE ENCOUNTER
[facility] Trans[facility] Initial Follow Up Call    Outreach made within 2 business days of discharge: Yes    Patient: Lillian Lockett   Patient : 1975   MRN: [id number]    Reason for Admission: Chest pain,    CAD in native artery    Depression    Hypertension    Anxiety    Morbid obesity ([address]    Severe sleep apnea    Mild intermittent asthma without complication  Discharge Date: 21       Spoke with:Patient    Discharge department/facility: [facility]    TCM Interactive Patient Contact:  Was patient able to fill all prescriptions: Yes  Was patient instructed to bring all medications to the follow-up visit: Yes  Is patient taking all medications as directed in the discharge summary? Yes  Does patient understand their discharge instructions: Yes  Does patient have questions or concerns that need addressed prior to 7-14 day follow up office visit: no    Spoke with Pricilla Cockayne. He states he feels the same now as he did when he went to the ER. He has tried taking the Nitro but it does not resolve his chest pain. He feels that this may all be caused by his severe anxiety. He continues to take all meds as prescribed. I reassured him that they did a full cardiac workup including a cath, and they would not have released him if there was concern that he would have an MI. He agreed to call the office if he was having any other concerns prior to his appointment with PCP on the .      Scheduled appointment with PCP within 7-14 days    Follow Up  Future Appointments   Date Time Provider Praful Smith   2021 11:30 AM SANDY [facility]   2021  2:00 PM SANDY Yap NP Cardio [facility]   2022  2:30 PM SANDY Edwards [address]

## 2021-11-19 ENCOUNTER — TELEPHONE (OUTPATIENT)
Dept: PRIMARY CARE CLINIC | Age: 46
End: 2021-11-19

## 2021-11-19 ENCOUNTER — OFFICE VISIT (OUTPATIENT)
Dept: PRIMARY CARE CLINIC | Age: 46
End: 2021-11-19
Payer: MEDICARE

## 2021-11-19 VITALS
HEIGHT: 74 IN | DIASTOLIC BLOOD PRESSURE: 70 MMHG | HEART RATE: 67 BPM | WEIGHT: 315 LBS | TEMPERATURE: 98 F | SYSTOLIC BLOOD PRESSURE: 115 MMHG | BODY MASS INDEX: 40.43 KG/M2 | OXYGEN SATURATION: 97 %

## 2021-11-19 DIAGNOSIS — E55.9 VITAMIN D DEFICIENCY: ICD-10-CM

## 2021-11-19 DIAGNOSIS — Z09 HOSPITAL DISCHARGE FOLLOW-UP: Primary | ICD-10-CM

## 2021-11-19 DIAGNOSIS — Z79.899 DRUG THERAPY: ICD-10-CM

## 2021-11-19 DIAGNOSIS — F31.9 BIPOLAR DEPRESSION (HCC): ICD-10-CM

## 2021-11-19 LAB
ALCOHOL URINE: NORMAL
AMPHETAMINE SCREEN, URINE: NORMAL
BARBITURATE SCREEN, URINE: NORMAL
BENZODIAZEPINE SCREEN, URINE: NORMAL
BUPRENORPHINE URINE: NORMAL
COCAINE METABOLITE SCREEN URINE: NORMAL
FENTANYL SCREEN, URINE: NORMAL
GABAPENTIN SCREEN, URINE: NORMAL
MDMA URINE: NORMAL
METHADONE SCREEN, URINE: NORMAL
METHAMPHETAMINE, URINE: NORMAL
OPIATE SCREEN URINE: NORMAL
OXYCODONE SCREEN URINE: NORMAL
PHENCYCLIDINE SCREEN URINE: NORMAL
PROPOXYPHENE SCREEN, URINE: NORMAL
SYNTHETIC CANNABINOIDS(K2) SCREEN, URINE: NORMAL
THC SCREEN, URINE: NORMAL
TRAMADOL SCREEN URINE: NORMAL
TRICYCLIC ANTIDEPRESSANTS, UR: NORMAL
TSH REFLEX FT4: 1.02 UIU/ML (ref 0.35–5.5)
VITAMIN B-12: 374 PG/ML (ref 211–946)
VITAMIN D 25-HYDROXY: 12.1 NG/ML

## 2021-11-19 PROCEDURE — 90674 CCIIV4 VAC NO PRSV 0.5 ML IM: CPT | Performed by: NURSE PRACTITIONER

## 2021-11-19 PROCEDURE — 99496 TRANSJ CARE MGMT HIGH F2F 7D: CPT | Performed by: NURSE PRACTITIONER

## 2021-11-19 PROCEDURE — G0008 ADMIN INFLUENZA VIRUS VAC: HCPCS | Performed by: NURSE PRACTITIONER

## 2021-11-19 PROCEDURE — 1111F DSCHRG MED/CURRENT MED MERGE: CPT | Performed by: NURSE PRACTITIONER

## 2021-11-19 PROCEDURE — 80305 DRUG TEST PRSMV DIR OPT OBS: CPT | Performed by: NURSE PRACTITIONER

## 2021-11-19 RX ORDER — BUSPIRONE HYDROCHLORIDE 10 MG/1
10 TABLET ORAL 3 TIMES DAILY
Qty: 90 TABLET | Refills: 5 | Status: SHIPPED | OUTPATIENT
Start: 2021-11-19 | End: 2021-12-10 | Stop reason: SDUPTHER

## 2021-11-19 RX ORDER — DOXEPIN HYDROCHLORIDE 10 MG/1
10 CAPSULE ORAL NIGHTLY
COMMUNITY
End: 2022-06-03 | Stop reason: SDUPTHER

## 2021-11-19 RX ORDER — ZIPRASIDONE HYDROCHLORIDE 20 MG/1
20 CAPSULE ORAL NIGHTLY
COMMUNITY
End: 2022-01-05

## 2021-11-19 RX ORDER — DIAZEPAM 5 MG/1
5 TABLET ORAL DAILY PRN
Qty: 15 TABLET | Refills: 0 | Status: SHIPPED | OUTPATIENT
Start: 2021-11-19 | End: 2021-12-10 | Stop reason: SDUPTHER

## 2021-11-19 ASSESSMENT — PATIENT HEALTH QUESTIONNAIRE - PHQ9
7. TROUBLE CONCENTRATING ON THINGS, SUCH AS READING THE NEWSPAPER OR WATCHING TELEVISION: 3
3. TROUBLE FALLING OR STAYING ASLEEP: 3
2. FEELING DOWN, DEPRESSED OR HOPELESS: 2
SUM OF ALL RESPONSES TO PHQ9 QUESTIONS 1 & 2: 4
5. POOR APPETITE OR OVEREATING: 1
SUM OF ALL RESPONSES TO PHQ QUESTIONS 1-9: 20
SUM OF ALL RESPONSES TO PHQ QUESTIONS 1-9: 20
10. IF YOU CHECKED OFF ANY PROBLEMS, HOW DIFFICULT HAVE THESE PROBLEMS MADE IT FOR YOU TO DO YOUR WORK, TAKE CARE OF THINGS AT HOME, OR GET ALONG WITH OTHER PEOPLE: 3
1. LITTLE INTEREST OR PLEASURE IN DOING THINGS: 2
6. FEELING BAD ABOUT YOURSELF - OR THAT YOU ARE A FAILURE OR HAVE LET YOURSELF OR YOUR FAMILY DOWN: 3
SUM OF ALL RESPONSES TO PHQ QUESTIONS 1-9: 20
4. FEELING TIRED OR HAVING LITTLE ENERGY: 3
8. MOVING OR SPEAKING SO SLOWLY THAT OTHER PEOPLE COULD HAVE NOTICED. OR THE OPPOSITE, BEING SO FIGETY OR RESTLESS THAT YOU HAVE BEEN MOVING AROUND A LOT MORE THAN USUAL: 3
9. THOUGHTS THAT YOU WOULD BE BETTER OFF DEAD, OR OF HURTING YOURSELF: 0

## 2021-11-19 ASSESSMENT — ENCOUNTER SYMPTOMS
EYES NEGATIVE: 1
GASTROINTESTINAL NEGATIVE: 1
RESPIRATORY NEGATIVE: 1

## 2021-11-19 ASSESSMENT — COLUMBIA-SUICIDE SEVERITY RATING SCALE - C-SSRS
1. WITHIN THE PAST MONTH, HAVE YOU WISHED YOU WERE DEAD OR WISHED YOU COULD GO TO SLEEP AND NOT WAKE UP?: NO
6. HAVE YOU EVER DONE ANYTHING, STARTED TO DO ANYTHING, OR PREPARED TO DO ANYTHING TO END YOUR LIFE?: NO
2. HAVE YOU ACTUALLY HAD ANY THOUGHTS OF KILLING YOURSELF?: NO

## 2021-11-19 NOTE — TELEPHONE ENCOUNTER
Patient's wife called in about recent Rx for lurasidone (LATUDA) 20 MG TABS tablet. Stated that Rx went thru without the PA, and rather or not to start medication.  Also Wants to confirm that rx is for 1 20 MG tablet per day

## 2021-11-19 NOTE — PROGRESS NOTES
Post-Discharge Transitional Care Management Services or Hospital Follow Up      Nany Clements   YOB: 1975    Date of Office Visit:  11/19/2021  Date of Hospital Admission: 11/11/21  Date of Hospital Discharge: 11/12/21  Readmission Risk Score(high >=14%.  Medium >=10%):No data recorded    Care management risk score Rising risk (score 2-5) and Complex Care (Scores >=6): 6     Non face to face  following discharge, date last encounter closed (first attempt may have been earlier): 11/15/2021  2:41 PM 11/15/2021  2:41 PM    Call initiated 2 business days of discharge: Yes     Patient Active Problem List   Diagnosis    Depression    Hypertension    Anxiety    History of colon polyps    S/P laparoscopic appendectomy    Bipolar depression ([address]    Cigarette nicotine dependence without complication    Acute chest pain    Leg pain, bilateral    CAD in native artery    Morbid obesity ([address]    Chronic obstructive pulmonary disease ([facility]    History of smoking    Shortness of breath    Restrictive lung disease    Snoring    Non-restorative sleep    Hypersomnia    Severe sleep apnea    Mild intermittent asthma without complication    Chest pain       Allergies   Allergen Reactions    Dye [Gadolinium Derivatives] Hives     Mild rash that last less than 2 hours after MRI or CT scans       Medications listed as ordered at the time of discharge from hospital  @DISCHARGEMEDSLIST(<NOROUTINE> error)@      Medications marked \"taking\" at this time  Outpatient Medications Marked as Taking for the 11/19/21 encounter (Office Visit) with SANDY Hernadez   Medication Sig Dispense Refill    VORTIoxetine (TRINTELLIX) 10 MG TABS tablet 1.5 tablets daily 30 tablet 0    ziprasidone (GEODON) 20 MG capsule Take 20 mg by mouth nightly      doxepin (SINEQUAN) 10 MG capsule Take 10 mg by mouth nightly      busPIRone (BUSPAR) 10 MG tablet Take 1 tablet by mouth 3 times daily 90 tablet 5    lurasidone (LATUDA) 20 MG TABS tablet Take 1 tablet by mouth daily 60 tablet 1    diazePAM (VALIUM) 5 MG tablet Take 1 tablet by mouth daily as needed for Anxiety for up to 30 days. 15 tablet 0    isosorbide mononitrate (IMDUR) 60 MG extended release tablet Take 1 tablet by mouth 2 times daily 30 tablet 0    aspirin 81 MG chewable tablet Take 1 tablet by mouth daily 30 tablet 3    nitroGLYCERIN (NITROSTAT) 0.4 MG SL tablet up to max of 3 total doses. If no relief after 1 dose, call 911. 25 tablet 0    atorvastatin (LIPITOR) 80 MG tablet TAKE 1 TABLET BY MOUTH AT NIGHT. 30 tablet 0    metoprolol tartrate (LOPRESSOR) 25 MG tablet TAKE 2 TABLETS BY MOUTH TWICE DAILY  180 tablet 1    lisinopril (PRINIVIL;ZESTRIL) 20 MG tablet TAKE 1 TABLET BY MOUTH ONCE DAILY  30 tablet 5    cloNIDine (CATAPRES) 0.1 MG tablet Take 1 tablet by mouth daily as needed for High Blood Pressure (140/90) 30 tablet 3    Misc. Devices (ROLLING WALKER/BURGUNDY) MISC Dx: gen weakness and fatigue use daily as directed 1 each 0    lamoTRIgine (LAMICTAL) 100 MG tablet 2 times daily       HYDROcodone-acetaminophen (NORCO)  MG per tablet Take 1 tablet by mouth every 8 hours as needed for Pain. Medications patient taking as of now reconciled against medications ordered at time of hospital discharge: Yes    Chief Complaint   Patient presents with    Follow-Up from Hospital     chest pain    Anxiety       HPI    Inpatient course: Discharge summary reviewed- see chart. Interval history/Current status:     Pt is here for a hospital follow up. He was in the hospital for chest pain, anxiety and depression. PT states today that he is very anxious due to the fact that he is out of the house. He hasn't been out of the house since he was released from the hospital.   Patient had heart cath and was started on Imdur for non obstruction lesions. Depression screening was done and pt is depressed more than half the days.  He is struggling everyday he thinks though. He denies any thoughts of harming himself or others. Review of Systems   Constitutional: Negative. HENT: Negative. Eyes: Negative. Respiratory: Negative. Cardiovascular: Negative. Gastrointestinal: Negative. Endocrine: Negative. Genitourinary: Negative. Musculoskeletal: Negative. Skin: Negative. Neurological: Negative. Hematological: Negative. Psychiatric/Behavioral: Positive for decreased concentration and dysphoric mood. The patient is nervous/anxious. Vitals:    11/19/21 1128   BP: 115/70   Site: Left Upper Arm   Position: Sitting   Pulse: 67   Temp: 98 °F (36.7 °C)   SpO2: 97%   Weight: (!) 350 lb (158.8 kg)   Height: 6' 2\" (1.88 m)     Body mass index is 44.94 kg/m². Wt Readings from Last 3 Encounters:   11/19/21 (!) 350 lb (158.8 kg)   11/11/21 (!) 360 lb (163.3 kg)   09/22/21 (!) 367 lb (166.5 kg)     BP Readings from Last 3 Encounters:   11/19/21 115/70   11/12/21 119/80   09/22/21 138/74       Physical Exam  Vitals and nursing note reviewed. Constitutional:       Appearance: Normal appearance. He is well-developed. Comments: obese   HENT:      Head: Normocephalic and atraumatic. Right Ear: Hearing, tympanic membrane, ear canal and external ear normal.      Left Ear: Hearing, tympanic membrane, ear canal and external ear normal.      Nose: Nose normal.      Mouth/Throat:      Pharynx: Uvula midline. Eyes:      General: Lids are normal.      Conjunctiva/sclera: Conjunctivae normal.      Pupils: Pupils are equal, round, and reactive to light. Neck:      Thyroid: No thyroid mass or thyromegaly. Trachea: Trachea normal.   Cardiovascular:      Rate and Rhythm: Normal rate and regular rhythm. Heart sounds: Normal heart sounds. Pulmonary:      Effort: Pulmonary effort is normal.      Breath sounds: Normal breath sounds. Abdominal:      General: Bowel sounds are normal.      Palpations: Abdomen is soft. Musculoskeletal:         General: Normal range of motion. Cervical back: Normal range of motion and neck supple. No tenderness. Thoracic back: Normal. No tenderness. Normal range of motion. Lumbar back: Normal. No tenderness. Normal range of motion. Skin:     General: Skin is warm and dry. Neurological:      Mental Status: He is alert and oriented to person, place, and time. Psychiatric:         Speech: Speech normal.         Behavior: Behavior normal.         Thought Content: Thought content normal.         Judgment: Judgment normal.             Assessment/Plan:  1. Hospital discharge follow-up    - VA DISCHARGE MEDS RECONCILED W/ CURRENT OUTPATIENT MED LIST    2. Bipolar depression ([facility]    - Vitamin D 25 Hydroxy; Future  - Vitamin B12; Future  - TSH WITH REFLEX TO FT4; Future  - diazePAM (VALIUM) 5 MG tablet; Take 1 tablet by mouth daily as needed for Anxiety for up to 30 days. Dispense: 15 tablet; Refill: 0    3. Drug therapy    - POCT Rapid Drug Screen    4. Vitamin D deficiency    - Vitamin D 25 Hydroxy; Future        Medical Decision Making: high complexity      Follow up with cardiology on 12/21/21      Starting patient on [address] He is to pick this medication up from the pharmacy. We will plan to bring it in at the next visit. We will plan to decrease or wean off of Lamictal and start Latuda. Increasing BuSpar up to 10 mg with him to take 2 tablets in the a.m. and 1 tablet in the p.m. Valium sent into the pharmacy to take truly as needed for panic attacks. 15 tablets/month at this time. Doni Kaufman may be an option in the future for treatment option.

## 2021-11-22 DIAGNOSIS — I25.10 CAD IN NATIVE ARTERY: ICD-10-CM

## 2021-11-22 RX ORDER — ATORVASTATIN CALCIUM 80 MG/1
TABLET, FILM COATED ORAL
Qty: 30 TABLET | Refills: 2 | Status: SHIPPED | OUTPATIENT
Start: 2021-11-22 | End: 2022-02-21

## 2021-11-22 NOTE — TELEPHONE ENCOUNTER
Ana Sparrow called to request a refill on his medication.       Last office visit : 11/19/2021   Next office visit : 12/10/2021     Requested Prescriptions     Signed Prescriptions Disp Refills    atorvastatin (LIPITOR) 80 MG tablet 30 tablet 2     Sig: TAKE 1 TABLET BY MOUTH AT NIGHT     Authorizing Provider: Kemal Eden     Ordering User: Aieme Pretty MA

## 2021-11-26 ENCOUNTER — PATIENT MESSAGE (OUTPATIENT)
Dept: PRIMARY CARE CLINIC | Age: 46
End: 2021-11-26

## 2021-11-26 NOTE — TELEPHONE ENCOUNTER
From: Cherry Thompson  To: Akosua Winslow  Sent: 11/26/2021 9:27 AM CST  Subject: Need refill     Need a refill on isosorbide mononitrate 60 mg er. This was given when discharged from hospital. Please send to [facility].    Thank you

## 2021-11-29 RX ORDER — ISOSORBIDE MONONITRATE 60 MG/1
60 TABLET, EXTENDED RELEASE ORAL 2 TIMES DAILY
Qty: 30 TABLET | Refills: 0 | Status: SHIPPED | OUTPATIENT
Start: 2021-11-29 | End: 2021-12-13

## 2021-11-29 RX ORDER — LAMOTRIGINE 100 MG/1
TABLET ORAL
Qty: 180 TABLET | Refills: 2 | OUTPATIENT
Start: 2021-11-29

## 2021-11-29 RX ORDER — LAMOTRIGINE 100 MG/1
100 TABLET ORAL 2 TIMES DAILY
Qty: 30 TABLET | Refills: 0 | Status: SHIPPED | OUTPATIENT
Start: 2021-11-29 | End: 2022-03-16 | Stop reason: ALTCHOICE

## 2021-11-29 NOTE — TELEPHONE ENCOUNTER
Quanarian Gilbert called to request a refill on his medication.       Last office visit : 11/19/2021   Next office visit : 12/10/2021     Requested Prescriptions     Signed Prescriptions Disp Refills    lamoTRIgine (LAMICTAL) 100 MG tablet 30 tablet 0     Sig: Take 1 tablet by mouth 2 times daily     Authorizing Provider: Calixto Franco     Ordering User: Osbaldo Perdomo MA

## 2021-11-29 NOTE — TELEPHONE ENCOUNTER
Mane Botines called to request a refill on his medication. Last office visit : 11/19/2021   Next office visit : 12/10/2021     Requested Prescriptions     Refused Prescriptions Disp Refills    lamoTRIgine (LAMICTAL) 100 MG tablet [Pharmacy Med Name: LAMOTRIGINE 100MG TABLET] 180 tablet 2     Sig: TAKE 1 TABLET BY MOUTH TWICE DAILY.      Refused By: Teresa Szymanski     Reason for Refusal: Refill not appropriate            Mindy Urban MA

## 2021-11-30 ENCOUNTER — TELEPHONE (OUTPATIENT)
Dept: PRIMARY CARE CLINIC | Age: 46
End: 2021-11-30

## 2021-11-30 RX ORDER — ERGOCALCIFEROL 1.25 MG/1
50000 CAPSULE ORAL WEEKLY
Qty: 12 CAPSULE | Refills: 1 | Status: SHIPPED | OUTPATIENT
Start: 2021-11-30 | End: 2022-05-31

## 2021-11-30 NOTE — TELEPHONE ENCOUNTER
Called and left v/m on secured voicemail and also sent [email or web address] message. Vit d was sent in to pharmacy.

## 2021-12-10 ENCOUNTER — OFFICE VISIT (OUTPATIENT)
Dept: PRIMARY CARE CLINIC | Age: 46
End: 2021-12-10
Payer: MEDICARE

## 2021-12-10 VITALS
TEMPERATURE: 98.1 F | HEIGHT: 74 IN | SYSTOLIC BLOOD PRESSURE: 125 MMHG | HEART RATE: 80 BPM | OXYGEN SATURATION: 98 % | WEIGHT: 315 LBS | DIASTOLIC BLOOD PRESSURE: 65 MMHG | BODY MASS INDEX: 40.43 KG/M2

## 2021-12-10 DIAGNOSIS — F31.9 BIPOLAR DEPRESSION (HCC): Primary | ICD-10-CM

## 2021-12-10 DIAGNOSIS — F41.9 ANXIETY: ICD-10-CM

## 2021-12-10 PROCEDURE — G8482 FLU IMMUNIZE ORDER/ADMIN: HCPCS | Performed by: NURSE PRACTITIONER

## 2021-12-10 PROCEDURE — G8417 CALC BMI ABV UP PARAM F/U: HCPCS | Performed by: NURSE PRACTITIONER

## 2021-12-10 PROCEDURE — 4004F PT TOBACCO SCREEN RCVD TLK: CPT | Performed by: NURSE PRACTITIONER

## 2021-12-10 PROCEDURE — 99214 OFFICE O/P EST MOD 30 MIN: CPT | Performed by: NURSE PRACTITIONER

## 2021-12-10 PROCEDURE — G8427 DOCREV CUR MEDS BY ELIG CLIN: HCPCS | Performed by: NURSE PRACTITIONER

## 2021-12-10 RX ORDER — DIAZEPAM 5 MG/1
5 TABLET ORAL DAILY PRN
Qty: 30 TABLET | Refills: 0 | Status: SHIPPED | OUTPATIENT
Start: 2021-12-10 | End: 2022-01-09

## 2021-12-10 RX ORDER — PAROXETINE 10 MG/1
10 TABLET, FILM COATED ORAL EVERY MORNING
COMMUNITY
End: 2022-02-16

## 2021-12-10 RX ORDER — BUSPIRONE HYDROCHLORIDE 15 MG/1
15 TABLET ORAL 3 TIMES DAILY
Qty: 90 TABLET | Refills: 2 | Status: SHIPPED | OUTPATIENT
Start: 2021-12-10 | End: 2022-03-28

## 2021-12-10 ASSESSMENT — ENCOUNTER SYMPTOMS
GASTROINTESTINAL NEGATIVE: 1
EYES NEGATIVE: 1
RESPIRATORY NEGATIVE: 1

## 2021-12-10 NOTE — PATIENT INSTRUCTIONS
Weaning off Lamictal.  Decrease Lamictal to 100 mg daily for 7 days, then Lamictal 50 mg daily for 7 days then Lamictal 50 mg every other day for 2 weeks then stop. Start Latuda 20 mg daily now. Stop Geodon. Continue Doxepin. Continue Trintellix, continue Paxil. Continue Buspar and Valium.

## 2021-12-10 NOTE — PROGRESS NOTES
[address] [facility]  [address] [address] [address]  Dept: [phone]  Dept Fax: [phone]  Loc: [phone]    Sea Beckham is a 51 y.o. male who presents today for his medical conditions/complaints as noted below. Sea Beckham is c/o of Panic Attack and Discuss Medications      Chief Complaint   Patient presents with    Panic Attack    Discuss Medications       HPI:     HPI  Pt is here for panic attacks and increased anxiety. He has had at least 3-4 panic attacks the past few days. During last visit I did start the patient on Latuda and if insurance was to cover we are going to switch him off of Lamictal.  Patient did increase BuSpar up to 10 mg 2 tablets in a.m. 1 tablet in the p.m.       Past Medical History:   Diagnosis Date    Anxiety     Arthritis     Bipolar 1 disorder ([facility] [address]     CAD in native artery 3/6/2018    Chronic back pain     COVID-19     Depression     Hyperlipidemia     Hypertension     IBS (irritable bowel syndrome)     Mild intermittent asthma without complication 3/76/7326    Morbidly obese (HCC)     Panic disorder     at times afraid to go outside    Unspecified sleep apnea     bipap        Past Surgical History:   Procedure Laterality Date    ANUS SURGERY      APPENDECTOMY  02/03/2014    CHOLECYSTECTOMY  01/01/2009    COLONOSCOPY  01/01/2012        COLONOSCOPY  10/19/2017    Dr Curt Singleton, Benign HP, 5 yr recall    COLON[facility]  [facility]/29/2009    [facility]    ELECTROCONVULSIVE THERAPY N/A 03/08/2017    ELECTROCONVULSIVE THERAPY performed by Risa Persaud DO at [address] [facility] 01/01/1990    w/mesh    UMBILICAL HERNIA REPAIR  09/01/1990    VASECTOMY         Social History     Tobacco Use    Smoking status: Current Every Day Smoker     Packs/day: 1.00     Years: 24.00     Pack years: 24.00     Types: Cigarettes     Last attempt to quit: 7/15/2020 Years since quittin.4    Smokeless tobacco: Never Used   Substance Use Topics    Alcohol use: No     Alcohol/week: 0.0 standard drinks        Current Outpatient Medications   Medication Sig Dispense Refill    PARoxetine (PAXIL) 10 MG tablet Take 10 mg by mouth every morning      diazePAM (VALIUM) 5 MG tablet Take 1 tablet by mouth daily as needed for Anxiety for up to 30 days. 30 tablet 0    busPIRone (BUSPAR) 15 MG tablet Take 15 mg by mouth 3 times daily 90 tablet 2    vitamin D (ERGOCALCIFEROL) 1.25 MG (88681 UT) CAPS capsule Take 1 capsule by mouth once a week 12 capsule 1    isosorbide mononitrate (IMDUR) 60 MG extended release tablet Take 1 tablet by mouth 2 times daily 30 tablet 0    lamoTRIgine (LAMICTAL) 100 MG tablet Take 1 tablet by mouth 2 times daily 30 tablet 0    atorvastatin (LIPITOR) 80 MG tablet TAKE 1 TABLET BY MOUTH AT NIGHT 30 tablet 2    VORTIoxetine (TRINTELLIX) 10 MG TABS tablet 1.5 tablets daily 30 tablet 0    ziprasidone (GEODON) 20 MG capsule Take 20 mg by mouth nightly      doxepin (SINEQUAN) 10 MG capsule Take 10 mg by mouth nightly      lurasidone (LATUDA) 20 MG TABS tablet Take 1 tablet by mouth daily 60 tablet 1    aspirin 81 MG chewable tablet Take 1 tablet by mouth daily 30 tablet 3    nitroGLYCERIN (NITROSTAT) 0.4 MG SL tablet up to max of 3 total doses. If no relief after 1 dose, call 911. 25 tablet 0    metoprolol tartrate (LOPRESSOR) 25 MG tablet TAKE 2 TABLETS BY MOUTH TWICE DAILY  180 tablet 1    lisinopril (PRINIVIL;ZESTRIL) 20 MG tablet TAKE 1 TABLET BY MOUTH ONCE DAILY  30 tablet 5    cloNIDine (CATAPRES) 0.1 MG tablet Take 1 tablet by mouth daily as needed for High Blood Pressure (140/90) 30 tablet 3    Misc. Devices (ROLLING WALKER/BURGUNDY) MISC Dx: gen weakness and fatigue use daily as directed 1 each 0    HYDROcodone-acetaminophen (NORCO)  MG per tablet Take 1 tablet by mouth every 8 hours as needed for Pain.        No current facility-administered medications for this visit. Allergies   Allergen Reactions    Dye [Gadolinium Derivatives] Hives     Mild rash that last less than 2 hours after MRI or CT scans       Family History   Problem Relation Age of Onset    Diabetes Mother     Cancer Mother         uterine CA    Depression Mother     Mental Illness Mother     Hypertension Mother    Hellen Haji Other Mother         blood clots    Heart Disease Brother     Depression Brother     Mental Illness Brother     Hypertension Brother     Breast Cancer Maternal Grandmother     Cancer Maternal Grandmother         breast CA    Heart Attack Maternal Grandmother     Other Maternal Grandmother         blood clots    Colon Cancer Neg Hx     Esophageal Cancer Neg Hx     Liver Cancer Neg Hx     Rectal Cancer Neg Hx     Stomach Cancer Neg Hx                Subjective:      Review of Systems   Constitutional: Negative. HENT: Negative. Eyes: Negative. Respiratory: Negative. Cardiovascular: Negative. Gastrointestinal: Negative. Endocrine: Negative. Genitourinary: Negative. Musculoskeletal: Negative. Skin: Negative. Neurological: Negative. Hematological: Negative. Psychiatric/Behavioral: Positive for decreased concentration, dysphoric mood and sleep disturbance. The patient is nervous/anxious. Objective:     Physical Exam  Vitals and nursing note reviewed. Constitutional:       Appearance: Normal appearance. He is well-developed. Comments: obese   HENT:      Head: Normocephalic and atraumatic. Right Ear: Hearing, tympanic membrane, ear canal and external ear normal.      Left Ear: Hearing, tympanic membrane, ear canal and external ear normal.      Nose: Nose normal.      Mouth/Throat:      Pharynx: Uvula midline. Eyes:      General: Lids are normal.      Conjunctiva/sclera: Conjunctivae normal.      Pupils: Pupils are equal, round, and reactive to light.    Neck:      Thyroid: No thyroid counseling and coordinating care for a total time of 34 mins face to face. Return in about 4 weeks (around 1/7/2022) for Medication Follow Up - 30 mins. No orders of the defined types were placed in this encounter. Orders Placed This Encounter   Medications    diazePAM (VALIUM) 5 MG tablet     Sig: Take 1 tablet by mouth daily as needed for Anxiety for up to 30 days. Dispense:  30 tablet     Refill:  0    busPIRone (BUSPAR) 15 MG tablet     Sig: Take 15 mg by mouth 3 times daily     Dispense:  90 tablet     Refill:  2            Patient offered educational handouts and has had all questions answered. Patient voices understanding and agrees to plans along with risks and benefits of plan. Patient is instructed to continue prior meds, diet, and exercise plans as instructed. Patient agrees to follow up as instructed and sooner if needed. Patient agrees to go to ER if condition becomes emergent. EMR Dragon/transcription disclaimer: Some of this encounter note is an electronic transcription/translation of spoken language to printed text. The electronic translation of spoken language may permit erroneous, or at times, nonsensical words or phrases to be inadvertently transcribed.  Although I have reviewed the note for such errors, some may still exist.    Electronically signed by SANDY Silverman on 12/10/2021 at 3:25 PM

## 2021-12-13 RX ORDER — ISOSORBIDE MONONITRATE 60 MG/1
60 TABLET, EXTENDED RELEASE ORAL 2 TIMES DAILY
Qty: 30 TABLET | Refills: 0 | Status: SHIPPED | OUTPATIENT
Start: 2021-12-13 | End: 2022-01-19

## 2022-01-05 ENCOUNTER — OFFICE VISIT (OUTPATIENT)
Dept: CARDIOLOGY CLINIC | Age: 47
End: 2022-01-05
Payer: MEDICARE

## 2022-01-05 VITALS
OXYGEN SATURATION: 95 % | WEIGHT: 315 LBS | DIASTOLIC BLOOD PRESSURE: 82 MMHG | HEIGHT: 74 IN | SYSTOLIC BLOOD PRESSURE: 112 MMHG | HEART RATE: 87 BPM | BODY MASS INDEX: 40.43 KG/M2

## 2022-01-05 DIAGNOSIS — I25.10 CORONARY ARTERY DISEASE INVOLVING NATIVE CORONARY ARTERY OF NATIVE HEART WITHOUT ANGINA PECTORIS: Primary | ICD-10-CM

## 2022-01-05 DIAGNOSIS — I47.1 SVT (SUPRAVENTRICULAR TACHYCARDIA) (HCC): ICD-10-CM

## 2022-01-05 DIAGNOSIS — I10 ESSENTIAL HYPERTENSION: ICD-10-CM

## 2022-01-05 PROBLEM — I47.10 SVT (SUPRAVENTRICULAR TACHYCARDIA): Status: ACTIVE | Noted: 2022-01-05

## 2022-01-05 PROCEDURE — 99214 OFFICE O/P EST MOD 30 MIN: CPT | Performed by: NURSE PRACTITIONER

## 2022-01-05 PROCEDURE — 4004F PT TOBACCO SCREEN RCVD TLK: CPT | Performed by: NURSE PRACTITIONER

## 2022-01-05 PROCEDURE — G8417 CALC BMI ABV UP PARAM F/U: HCPCS | Performed by: NURSE PRACTITIONER

## 2022-01-05 PROCEDURE — G8427 DOCREV CUR MEDS BY ELIG CLIN: HCPCS | Performed by: NURSE PRACTITIONER

## 2022-01-05 PROCEDURE — G8482 FLU IMMUNIZE ORDER/ADMIN: HCPCS | Performed by: NURSE PRACTITIONER

## 2022-01-05 ASSESSMENT — ENCOUNTER SYMPTOMS
CHEST TIGHTNESS: 0
SORE THROAT: 0
WHEEZING: 0
SHORTNESS OF BREATH: 0
COUGH: 0

## 2022-01-05 NOTE — PROGRESS NOTES
[facility]   Established Patient Office Visit   [address] [facility]  [phone]        OFFICE VISIT:  2022    Susan Carbone - : 1975    Reason For Visit:  Parish Moran is a 51 y.o. male who is here for Follow-Up from Hospital    1. Coronary artery disease involving native coronary artery of native heart without angina pectoris    2. SVT (supraventricular tachycardia) ([address]    3. Essential hypertension         Patient with a history of coronary artery disease, hypertension, hyperlipidemia, Abhilash-Parkinson-White syndrome and sleep apnea.     Patient had ER visit on 2021 for paroxysmal SVT.  Pulse rate 206 bpm.  He was converted after 12 mg of adenosine.  Patient was discharged on beta-blocker.     Patient presented to clinic on 3/2/2021 for hospital follow-up.  EKG showed sinus rhythm with a WPW pattern.  Patient was referred to Dr. Keaton Carbone electrophysiologist.     Patient was seen by Dr. Keaton Carbone and underwent ablation on 2021 with Dr. Keaton Carbone in [place], [place].        Patient had a hospitalization on 2021 with persistent moderate to severe pressure-like left-sided to substernal chest discomfort. He underwent a 2D echo that showed  Technically difficult study due to poor acoustical window with some images suboptimal. Normal left ventricular size and systolic function EF 55 to 60%. Normal LV wall thickness with preserved diastolic function. Normal left atrial size   Aortic valve appears to be tricuspid without evidence of stenosis or insufficiency. Normally mobile mitral valve without regurgitation. No evidence of pulmonic stenosis or insufficiency   Normal right-sided chambers with preserved RV systolic function. No tricuspid regurgitation with which to estimate RVSP   Aortic root and ascending segments measure within normal limits. No significant pericardial effusion. Definity contrast utilized to better define endocardial borders    Work-up included negative troponins x3. Cardiology was consulted and patient underwent cardiac catheterization on 11/11/2021  ? Single-vessel, branch disease (known OM 2 short segment occlusion). Intermediate lesion in mid LAD, nonobstructive by iFR testing. Normal LV systolic function. Unsuccessful attempt at PCI to OM2 (successful wiring but inability to advance any low-profile balloons across lesion). Further attempts deferred.   ? Recommendations:  Medical management. Patient presents to clinic today for follow-up. Denies any chest pain, pressure or tightness. There is no shortness of breath, orthopnea or PND. Patient denies any lightheadedness, dizziness or syncope. Patient thinks possibly his chest discomfort that he was having in November may be from panic attacks. He is on medication for that and is having no issues.         PRIOR DATA:    9/30/2020 2D ECHO   Normal left ventricular chamber size and systolic function. No regional wall motion abnormalities.   EF 65%.     9/30/2020 DSE   Dobutamine stress echocardiogram without clinical, electrocardiographic, or echocardiographic evidence of myocardial ischemia.     ZIO PATCH:     Predominantly sinus rhythm with rare PACs.  One SVT run 4 beats.  Rare PVCs.  No VT, no pauses, no high degree AV blocks, nor atrial fib noted.     Catheterization March 2018 bare-metal stent and was on aspirin and Plavix for 30 days.             Janet Chavez is a 51 y.o. male with the following history as recorded in [employer]:    Patient Active Problem List    Diagnosis Date Noted    CAD in native artery 03/06/2018    SVT (supraventricular tachycardia) ([facility] [address] 01/05/2022    Chest pain 11/11/2021    Shortness of breath 03/30/2021    Restrictive lung disease 03/30/2021    Snoring 03/30/2021    Non-restorative sleep 03/30/2021    Hypersomnia 03/30/2021    Severe sleep apnea 03/30/2021    Mild intermittent asthma without complication 17/87/6517    Chronic obstructive pulmonary disease ([facility] 08/14/2019    History of smoking 08/14/2019    Morbid obesity ([facility] 07/30/2019    Leg pain, bilateral     Acute chest pain     Cigarette nicotine dependence without complication 07/50/7169    Bipolar depression ([facility] 12/16/2016    S/P laparoscopic appendectomy 02/27/2014    History of colon polyps 05/17/2013    Depression     Hypertension     Anxiety      Current Outpatient Medications   Medication Sig Dispense Refill    metoprolol tartrate (LOPRESSOR) 25 MG tablet TAKE 2 TABLETS BY MOUTH TWICE DAILY 180 tablet 1    isosorbide mononitrate (IMDUR) 60 MG extended release tablet TAKE 1 TABLET BY MOUTH 2 TIMES DAILY 30 tablet 0    PARoxetine (PAXIL) 10 MG tablet Take 10 mg by mouth every morning      diazePAM (VALIUM) 5 MG tablet Take 1 tablet by mouth daily as needed for Anxiety for up to 30 days. 30 tablet 0    busPIRone (BUSPAR) 15 MG tablet Take 15 mg by mouth 3 times daily 90 tablet 2    vitamin D (ERGOCALCIFEROL) 1.25 MG (58628 UT) CAPS capsule Take 1 capsule by mouth once a week 12 capsule 1    lamoTRIgine (LAMICTAL) 100 MG tablet Take 1 tablet by mouth 2 times daily 30 tablet 0    atorvastatin (LIPITOR) 80 MG tablet TAKE 1 TABLET BY MOUTH AT NIGHT 30 tablet 2    VORTIoxetine (TRINTELLIX) 10 MG TABS tablet 1.5 tablets daily 30 tablet 0    doxepin (SINEQUAN) 10 MG capsule Take 10 mg by mouth nightly      lurasidone (LATUDA) 20 MG TABS tablet Take 1 tablet by mouth daily 60 tablet 1    aspirin 81 MG chewable tablet Take 1 tablet by mouth daily 30 tablet 3    nitroGLYCERIN (NITROSTAT) 0.4 MG SL tablet up to max of 3 total doses. If no relief after 1 dose, call 911. 25 tablet 0    lisinopril (PRINIVIL;ZESTRIL) 20 MG tablet TAKE 1 TABLET BY MOUTH ONCE DAILY  30 tablet 5    cloNIDine (CATAPRES) 0.1 MG tablet Take 1 tablet by mouth daily as needed for High Blood Pressure (140/90) 30 tablet 3    Misc.  Devices (ROLLING WALKER/BURGUNDY) MISC Dx: gen weakness and fatigue use daily as directed 1 each 0    HYDROcodone-acetaminophen (NORCO)  MG per tablet Take 1 tablet by mouth every 8 hours as needed for Pain. No current facility-administered medications for this visit.      Allergies: Dye [gadolinium derivatives]  Past Medical History:   Diagnosis Date    Anxiety     Arthritis     B[facility] 1 disorder ([facility] [address]     CAD in native artery 3/6/2018    Chronic back pain     COVID-19     Depression     Hyperlipidemia     Hypertension     IBS (irritable bowel syndrome)     Mild intermittent asthma without complication 0/83/7257    Morbidly obese ([facility] [address]     Panic disorder     at times afraid to go outside    Unspecified sleep apnea     bipap     Past Surgical History:   Procedure Laterality Date    ANUS SURGERY      APPENDECTOMY  02/03/2014    CHOLECYSTECTOMY  01/01/2009    COLONOSCOPY  01/01/2012        COLONOSCOPY  10/19/2017    Dr Ralph Reyes, Benign HP, 5 yr recall    COL[facility]  04/29/2009    [facility]    ELECTROCONVULSIVE THERAPY N/A 03/08/2017    ELECTROCONVULSIVE THERAPY performed by [employer] at [address] [facility] 01/01/1990    w/mesh    UMBILICAL HERNIA REPAIR  09/01/1990    VASECTOMY       Family History   Problem Relation Age of Onset    Diabetes Mother     Cancer Mother         uterine CA    Depression Mother     Mental Illness Mother     Hypertension Mother     Other Mother         blood clots    Heart Disease Brother     Depression Brother     Mental Illness Brother     Hypertension Brother     Breast Cancer Maternal Grandmother     Cancer Maternal Grandmother         breast CA    Heart Attack Maternal Grandmother     Other Maternal Grandmother         blood clots    Colon Cancer Neg Hx     Esophageal Cancer Neg Hx     Liver Cancer Neg Hx     Rectal Cancer Neg Hx     Stomach Cancer Neg Hx      Social History     Tobacco Use    Smoking status: Current Every Day Smoker     Packs/day: 1.00     Years: 24.00     Pack years: 24.00     Types: Cigarettes     Last attempt to quit: 7/15/2020     Years since quittin.4    Smokeless tobacco: Never Used   Substance Use Topics    Alcohol use: No     Alcohol/week: 0.0 standard drinks          Review of Systems:    Review of Systems   Constitutional: Negative for activity change, chills, diaphoresis, fatigue and fever. HENT: Negative for congestion and sore throat. Respiratory: Negative for cough, chest tightness, shortness of breath and wheezing. Cardiovascular: Negative for chest pain, palpitations and leg swelling. Neurological: Negative for dizziness, syncope, light-headedness and headaches. Psychiatric/Behavioral: Negative for confusion. The patient is not nervous/anxious. Objective:    /82   Pulse 87   Ht 6' 2\" (1.88 m)   Wt (!) 363 lb (164.7 kg)   SpO2 95%   BMI 46.61 kg/m²     GENERAL - well developed and well nourished, conversant  HEENT   PERRLA, Hearing appears normal, gentleman lids are normal.  External inspection of ears and nose appear normal.  NECK - no thyromegaly, no JVD, trachea is in the midline  CARDIOVASCULAR  PMI is in the mid line clavicular position, Normal S1 and S2 with no systolic murmur. No S3 or S4    PULMONARY  no respiratory distress. No wheezes or rales. Lungs are clear to ausculation, normal respiratory effort. ABDOMEN   soft, non tender, no rebound  MUSCULOSKELETAL   range of motion of the upper and lower extermites appears normal and equal and is without pain   EXTREMITIES - no significant edema   NEUROLOGIC  gait and station are normal  SKIN - turgor is normal, can warm and dry.   PSYCHIATRIC - normal mood and affect, alert and orientated x 3,      ASSESSMENT:    ALL THE CARDIOLOGY PROBLEMS ARE LISTED ABOVE; HOWEVER, THE FOLLOWING SPECIFIC CARDIAC PROBLEMS / CONDITIONS WERE ADDRESSED AND TREATED DURING THE OFFICE VISIT TODAY: MEDICAL DECISION MAKING             Cardiac Specific Problem / Diagnosis  Discussion and Data Reviewed Diagnostic Procedures Ordered Management Options Selected           1. CAD  Stable   Review and summation of old records:    No chest pain. Patient is on aspirin, Lipitor, Imdur and Lopressor. No Continue current medications:     Yes           2. Hypertension  Well Controlled   Blood pressure in the office today 112/82. O2 sat 95%. Patient is on clonidine 0.1 mg daily as needed lisinopril 20 mg daily. Lopressor 25 mg twice daily. No Continue current medications:    Yes           3. SVT Well Controlled  patient is on Lopressor 25 mg twice daily. No Continue current medications: Yes                     No orders of the defined types were placed in this encounter. No orders of the defined types were placed in this encounter. Discussed with patient. Return in about 6 months (around 7/5/2022) for ROutine with me. I greatly appreciate the opportunity to meet Vivi Aase and your confidence in allowing me to participate in his cardiovascular care. SANDY Chavira NP  1/5/2022 12:58 PM CST                    This dictation was generated by voice recognition computer software. Although all attempts are made to edit dictation for accuracy, there may be errors in the transcription that are not intended.

## 2022-01-12 ENCOUNTER — OFFICE VISIT (OUTPATIENT)
Dept: PRIMARY CARE CLINIC | Age: 47
End: 2022-01-12
Payer: MEDICARE

## 2022-01-12 VITALS
HEIGHT: 74 IN | DIASTOLIC BLOOD PRESSURE: 85 MMHG | WEIGHT: 315 LBS | TEMPERATURE: 97.8 F | BODY MASS INDEX: 40.43 KG/M2 | HEART RATE: 71 BPM | SYSTOLIC BLOOD PRESSURE: 130 MMHG | OXYGEN SATURATION: 98 %

## 2022-01-12 DIAGNOSIS — F41.9 ANXIETY: ICD-10-CM

## 2022-01-12 DIAGNOSIS — F31.9 BIPOLAR DEPRESSION (HCC): Primary | ICD-10-CM

## 2022-01-12 PROCEDURE — G8417 CALC BMI ABV UP PARAM F/U: HCPCS | Performed by: NURSE PRACTITIONER

## 2022-01-12 PROCEDURE — G8482 FLU IMMUNIZE ORDER/ADMIN: HCPCS | Performed by: NURSE PRACTITIONER

## 2022-01-12 PROCEDURE — 4004F PT TOBACCO SCREEN RCVD TLK: CPT | Performed by: NURSE PRACTITIONER

## 2022-01-12 PROCEDURE — 99214 OFFICE O/P EST MOD 30 MIN: CPT | Performed by: NURSE PRACTITIONER

## 2022-01-12 PROCEDURE — G8427 DOCREV CUR MEDS BY ELIG CLIN: HCPCS | Performed by: NURSE PRACTITIONER

## 2022-01-12 RX ORDER — ALPRAZOLAM 0.5 MG/1
0.5 TABLET ORAL DAILY PRN
Qty: 30 TABLET | Refills: 0 | Status: SHIPPED | OUTPATIENT
Start: 2022-01-12 | End: 2022-02-16 | Stop reason: SDUPTHER

## 2022-01-12 NOTE — PROGRESS NOTES
[address] [facility] CARE  [address] [address] [address]  Dept: [phone]  Dept Fax: [phone]  Loc: [phone]    Ruby Jo is a 51 y.o. male who presents today for his medical conditions/complaints as noted below. Ruby Jo is c/o of Anxiety and Panic Attack (2-3 a wk)      Chief Complaint   Patient presents with    Anxiety    Panic Attack     2-3 a wk       HPI:     HPI  Patient is here for follow up on bipolar depression. Anxiety and panic attacks are worsening. He has been taking his meds as prescribed. He feels like the valium is not working at all.       Past Medical History:   Diagnosis Date    Anxiety     Arthritis     Bipolar 1 disorder ([address]     CAD in native artery 3/6/2018    Chronic back pain     COVID-19     Depression     Hyperlipidemia     Hypertension     IBS (irritable bowel syndrome)     Mild intermittent asthma without complication 2091    Morbidly obese (HCC)     Panic disorder     at times afraid to go outside    Unspecified sleep apnea     bipap        Past Surgical History:   Procedure Laterality Date    ANUS SURGERY      APPENDECTOMY  2014    CHOLECYSTECTOMY  2009    COLONOSCOPY  2012        COLONOSCOPY  10/19/2017    Dr Jermaine Pope, Benign HP, 5 yr recall    COLONOSCOPY  2009    [facility]    ELECTROCONVULSIVE THERAPY N/A 2017    ELECTROCONVULSIVE THERAPY performed by Tim Pierson DO at [address] [facility] 1990    w/mesh    UMBILICAL HERNIA REPAIR  1990    VASECTOMY         Social History     Tobacco Use    Smoking status: Current Every Day Smoker     Packs/day: 1.00     Years: 24.00     Pack years: 24.00     Types: Cigarettes     Last attempt to quit: 7/15/2020     Years since quittin.5    Smokeless tobacco: Never Used   Substance Use Topics    Alcohol use: No     Alcohol/week: 0.0 Assessment/Plan:  1  Encounter for immunization  - performed in office  - influenza vaccine, [id number], quadrivalent, recombinant, PF, 0 5 mL, for patients 18 yr+ (FLUBLOK)    2  Type 2 diabetes mellitus without complication, without long-term current use of insulin ([address] )  - patient advised to restart his diabetic diet efforts, with a goal of 15-20 lb of weight loss at this time  - will carefully ease back on to metformin as it is the first-line drug of choice for diabetes, and will slowly titrate up to decrease gastric upset  - POCT hemoglobin A1c - 10 5 in 2018, today 9 6  - metoprolol succinate (TOPROL-XL) 25 mg 24 hr tablet; Take 1 tablet (25 mg total) by mouth daily  Dispense: 30 tablet; Refill: 2  - metFORMIN (GLUCOPHAGE) 500 mg tablet; 1 tab daily x1 week, then 1 tab in am & 1 tab in pm x3 weeks  Dispense: 50 tablet; Refill: 0  - Lipid panel; Future  - Comprehensive metabolic panel; Future    3  Hyperlipidemia  - will call patient with results, may need statin medication moving forward  - Lipid panel; Future  - Comprehensive metabolic panel; Future  - patient call with any questions or concerns  ___________________________________________________________  Subjective:    Patient ID: Lisa Dougherty is a 47 y o  male  HPI  Lisa Dougherty is a 79-year-old  male here today for elevated blood pressures at home, after running out of his current medication metoprolol  He also has not been compliant with his type 2 diabetes, and has not had a recent A1c  In the past he was on metformin, but secondary to significant GI upset patient discontinued his medication  He is here today with his wife  He admits remorse, and would like to get back in control of his health  States in the past he was very good about his diabetic diet, but has not been for many months  At this time he is also requesting blood work, specifically a cholesterol panel    States many years ago he was on a medication for his cholesterol, but it was discontinued by the doctor  He remembered having weight loss at that time, but has had weight gain since then  He reports he Wants a flu vaccine today  The following portions of the patient's history were reviewed and updated as appropriate: allergies, current medications and problem list     Review of Systems   Constitutional: Negative for chills and fever  HENT: Negative for congestion and sore throat  Respiratory: Negative for cough and shortness of breath  Cardiovascular: Negative for chest pain and palpitations  Gastrointestinal: Negative for diarrhea, nausea and vomiting  Objective:  Vitals:    02/06/20 1921   BP: 148/90   SpO2: 99%      Physical Exam   Constitutional: He is oriented to person, place, and time  He appears well-developed and well-nourished  Obese   HENT:   Head: Normocephalic and atraumatic  Eyes: Right eye exhibits no discharge  Left eye exhibits no discharge  No scleral icterus  Neck: Normal range of motion  Cardiovascular: Normal rate, regular rhythm, normal heart sounds and intact distal pulses  Exam reveals no friction rub  No murmur heard  Pulmonary/Chest: Effort normal and breath sounds normal  No stridor  No respiratory distress  Neurological: He is alert and oriented to person, place, and time  Skin: Skin is warm and dry  Psychiatric: He has a normal mood and affect  His behavior is normal    Pleasant   Vitals reviewed  Portions of the record may have been created with voice recognition software  Occasional wrong word or "sound alike" substitutions may have occurred due to the inherent limitations of voice recognition software  Please review the chart carefully and recognize, using context, where substitutions/typographical errors may have occurred  standard drinks        Current Outpatient Medications   Medication Sig Dispense Refill    cariprazine hcl (VRAYLAR) 1.5 MG capsule Take 1 capsule by mouth daily 30 capsule 5    ALPRAZolam (XANAX) 0.5 MG tablet Take 1 tablet by mouth daily as needed for Anxiety for up to 30 days. 30 tablet 0    metoprolol tartrate (LOPRESSOR) 25 MG tablet TAKE 2 TABLETS BY MOUTH TWICE DAILY 180 tablet 1    isosorbide mononitrate (IMDUR) 60 MG extended release tablet TAKE 1 TABLET BY MOUTH 2 TIMES DAILY 30 tablet 0    PARoxetine (PAXIL) 10 MG tablet Take 10 mg by mouth every morning      busPIRone (BUSPAR) 15 MG tablet Take 15 mg by mouth 3 times daily 90 tablet 2    vitamin D (ERGOCALCIFEROL) 1.25 MG (25298 UT) CAPS capsule Take 1 capsule by mouth once a week 12 capsule 1    lamoTRIgine (LAMICTAL) 100 MG tablet Take 1 tablet by mouth 2 times daily 30 tablet 0    atorvastatin (LIPITOR) 80 MG tablet TAKE 1 TABLET BY MOUTH AT NIGHT 30 tablet 2    VORTIoxetine (TRINTELLIX) 10 MG TABS tablet 1.5 tablets daily 30 tablet 0    doxepin (SINEQUAN) 10 MG capsule Take 10 mg by mouth nightly      lurasidone (LATUDA) 20 MG TABS tablet Take 1 tablet by mouth daily 60 tablet 1    aspirin 81 MG chewable tablet Take 1 tablet by mouth daily 30 tablet 3    nitroGLYCERIN (NITROSTAT) 0.4 MG SL tablet up to max of 3 total doses. If no relief after 1 dose, call 911. 25 tablet 0    lisinopril (PRINIVIL;ZESTRIL) 20 MG tablet TAKE 1 TABLET BY MOUTH ONCE DAILY  30 tablet 5    cloNIDine (CATAPRES) 0.1 MG tablet Take 1 tablet by mouth daily as needed for High Blood Pressure (140/90) 30 tablet 3    Misc. Devices (ROLLING WALKER/BURGUNDY) MISC Dx: gen weakness and fatigue use daily as directed 1 each 0    HYDROcodone-acetaminophen (NORCO)  MG per tablet Take 1 tablet by mouth every 8 hours as needed for Pain. No current facility-administered medications for this visit.        Allergies   Allergen Reactions    Dye [Gadolinium Derivatives] Hives     Mild rash that last less than 2 hours after MRI or CT scans       Family History   Problem Relation Age of Onset    Diabetes Mother     Cancer Mother         uterine CA    Depression Mother     Mental Illness Mother     Hypertension Mother    Erwin Cheema Other Mother         blood clots    Heart Disease Brother     Depression Brother     Mental Illness Brother     Hypertension Brother     Breast Cancer Maternal Grandmother     Cancer Maternal Grandmother         breast CA    Heart Attack Maternal Grandmother     Other Maternal Grandmother         blood clots    Colon Cancer Neg Hx     Esophageal Cancer Neg Hx     Liver Cancer Neg Hx     Rectal Cancer Neg Hx     Stomach Cancer Neg Hx                Subjective:      Review of Systems   Constitutional: Negative. HENT: Negative. Eyes: Negative. Respiratory: Negative. Cardiovascular: Negative. Gastrointestinal: Negative. Endocrine: Negative. Genitourinary: Negative. Musculoskeletal: Negative. Skin: Negative. Neurological: Negative. Hematological: Negative. Psychiatric/Behavioral: Positive for sleep disturbance. The patient is nervous/anxious. Objective:     Physical Exam  Vitals and nursing note reviewed. Constitutional:       Appearance: Normal appearance. He is well-developed. Comments: obese   HENT:      Head: Normocephalic and atraumatic. Right Ear: Hearing, tympanic membrane, ear canal and external ear normal.      Left Ear: Hearing, tympanic membrane, ear canal and external ear normal.      Nose: Nose normal.      Mouth/Throat:      Pharynx: Uvula midline. Eyes:      General: Lids are normal.      Conjunctiva/sclera: Conjunctivae normal.      Pupils: Pupils are equal, round, and reactive to light. Neck:      Thyroid: No thyroid mass or thyromegaly. Trachea: Trachea normal.   Cardiovascular:      Rate and Rhythm: Normal rate and regular rhythm.       Heart sounds: Normal heart sounds. Pulmonary:      Effort: Pulmonary effort is normal.      Breath sounds: Normal breath sounds. Abdominal:      General: Bowel sounds are normal.      Palpations: Abdomen is soft. Musculoskeletal:         General: Normal range of motion. Cervical back: Normal range of motion and neck supple. No tenderness. Thoracic back: Normal. No tenderness. Normal range of motion. Lumbar back: Normal. No tenderness. Normal range of motion. Skin:     General: Skin is warm and dry. Neurological:      Mental Status: He is alert and oriented to person, place, and time. Psychiatric:         Speech: Speech normal.         Behavior: Behavior normal.         Thought Content: Thought content normal.         Judgment: Judgment normal.         /85 (Site: Right Upper Arm, Position: Sitting)   Pulse 71   Temp 97.8 °F (36.6 °C)   Ht 6' 2\" (1.88 m)   Wt (!) 359 lb (162.8 kg)   SpO2 98%   BMI 46.09 kg/m²     Assessment:      Diagnosis Orders   1. Bipolar depression ([facility] [address]  ALPRAZolam Elana Mora) 0.5 MG tablet    [facility], [address] [place]   2. [address] [facility], [address] [place]   3. Body mass index (BMI) 45.0-49.9, adult ([facility]         No results found for this visit on 01/12/22. Plan:     Referral to Palomo Arita for counseling. Will cahnge from Valium to Xanax. Starting Pablo Ramos to see if this will help with bipolar issues as well. More than 50% of the time was spent counseling and coordinating care for a total time of 35 mins face to face. Return in about 5 weeks (around 2/16/2022) for Anxiety.     Orders Placed This Encounter   Procedures   [address] [facility]     Referral Priority:   Routine     Referral Type:   Eval and Treat     Referral Reason:   Specialty Services Required     Referred to Provider:   Palomo Arita LCSW     Requested Specialty:   Licensed Clinical      Number of Visits Requested:   1       Orders Placed This Encounter   Medications    cariprazine hcl (VRAYLAR) 1.5 MG capsule     Sig: Take 1 capsule by mouth daily     Dispense:  30 capsule     Refill:  5    ALPRAZolam (XANAX) 0.5 MG tablet     Sig: Take 1 tablet by mouth daily as needed for Anxiety for up to 30 days. Dispense:  30 tablet     Refill:  0            Patient offered educational handouts and has had all questions answered. Patient voices understanding and agrees to plans along with risks and benefits of plan. Patient is instructed to continue prior meds, diet, and exercise plans as instructed. Patient agrees to follow up as instructed and sooner if needed. Patient agrees to go to ER if condition becomes emergent. EMR Dragon/transcription disclaimer: Some of this encounter note is an electronic transcription/translation of spoken language to printed text. The electronic translation of spoken language may permit erroneous, or at times, nonsensical words or phrases to be inadvertently transcribed.  Although I have reviewed the note for such errors, some may still exist.    Electronically signed by SANDY Edgar on 1/18/2022 at 12:28 PM

## 2022-01-12 NOTE — PATIENT INSTRUCTIONS
Startinging Vraylar 1.5 mg daily. Will wean off of Trintellix. Decrease down to 10 mg daily for 5 days and the 5 mg daily for 5 days and then stop. Changing Valium to Xanax. Please take 1/2 tablet at first to see if this will help with symptoms. If not then can increase to 1 full tablet as needed daily. Referral to Rick Scanlon for counseling as well as med changes.

## 2022-01-18 ASSESSMENT — ENCOUNTER SYMPTOMS
EYES NEGATIVE: 1
RESPIRATORY NEGATIVE: 1
GASTROINTESTINAL NEGATIVE: 1

## 2022-01-19 RX ORDER — ISOSORBIDE MONONITRATE 60 MG/1
60 TABLET, EXTENDED RELEASE ORAL 2 TIMES DAILY
Qty: 60 TABLET | Refills: 0 | Status: SHIPPED | OUTPATIENT
Start: 2022-01-19 | End: 2022-02-16

## 2022-02-01 ENCOUNTER — OFFICE VISIT (OUTPATIENT)
Dept: PSYCHOLOGY | Age: 47
End: 2022-02-01
Payer: MEDICARE

## 2022-02-01 DIAGNOSIS — F41.9 ANXIETY: ICD-10-CM

## 2022-02-01 DIAGNOSIS — F31.9 BIPOLAR DEPRESSION (HCC): Primary | ICD-10-CM

## 2022-02-01 PROCEDURE — 90791 PSYCH DIAGNOSTIC EVALUATION: CPT | Performed by: SOCIAL WORKER

## 2022-02-01 PROCEDURE — 4004F PT TOBACCO SCREEN RCVD TLK: CPT | Performed by: SOCIAL WORKER

## 2022-02-01 ASSESSMENT — PATIENT HEALTH QUESTIONNAIRE - PHQ9
2. FEELING DOWN, DEPRESSED OR HOPELESS: 2
6. FEELING BAD ABOUT YOURSELF - OR THAT YOU ARE A FAILURE OR HAVE LET YOURSELF OR YOUR FAMILY DOWN: 3
SUM OF ALL RESPONSES TO PHQ QUESTIONS 1-9: 20
10. IF YOU CHECKED OFF ANY PROBLEMS, HOW DIFFICULT HAVE THESE PROBLEMS MADE IT FOR YOU TO DO YOUR WORK, TAKE CARE OF THINGS AT HOME, OR GET ALONG WITH OTHER PEOPLE: 3
9. THOUGHTS THAT YOU WOULD BE BETTER OFF DEAD, OR OF HURTING YOURSELF: 1
8. MOVING OR SPEAKING SO SLOWLY THAT OTHER PEOPLE COULD HAVE NOTICED. OR THE OPPOSITE, BEING SO FIGETY OR RESTLESS THAT YOU HAVE BEEN MOVING AROUND A LOT MORE THAN USUAL: 2
5. POOR APPETITE OR OVEREATING: 0
SUM OF ALL RESPONSES TO PHQ QUESTIONS 1-9: 20
SUM OF ALL RESPONSES TO PHQ9 QUESTIONS 1 & 2: 5
SUM OF ALL RESPONSES TO PHQ QUESTIONS 1-9: 19
7. TROUBLE CONCENTRATING ON THINGS, SUCH AS READING THE NEWSPAPER OR WATCHING TELEVISION: 3
SUM OF ALL RESPONSES TO PHQ QUESTIONS 1-9: 20
3. TROUBLE FALLING OR STAYING ASLEEP: 3
1. LITTLE INTEREST OR PLEASURE IN DOING THINGS: 3
4. FEELING TIRED OR HAVING LITTLE ENERGY: 3

## 2022-02-01 ASSESSMENT — COLUMBIA-SUICIDE SEVERITY RATING SCALE - C-SSRS
2. HAVE YOU ACTUALLY HAD ANY THOUGHTS OF KILLING YOURSELF?: NO
6. HAVE YOU EVER DONE ANYTHING, STARTED TO DO ANYTHING, OR PREPARED TO DO ANYTHING TO END YOUR LIFE?: NO
1. WITHIN THE PAST MONTH, HAVE YOU WISHED YOU WERE DEAD OR WISHED YOU COULD GO TO SLEEP AND NOT WAKE UP?: YES

## 2022-02-01 ASSESSMENT — ANXIETY QUESTIONNAIRES
5. BEING SO RESTLESS THAT IT IS HARD TO SIT STILL: 3-NEARLY EVERY DAY
1. FEELING NERVOUS, ANXIOUS, OR ON EDGE: 3-NEARLY EVERY DAY
2. NOT BEING ABLE TO STOP OR CONTROL WORRYING: 3-NEARLY EVERY DAY
3. WORRYING TOO MUCH ABOUT DIFFERENT THINGS: 3-NEARLY EVERY DAY
7. FEELING AFRAID AS IF SOMETHING AWFUL MIGHT HAPPEN: 3-NEARLY EVERY DAY
GAD7 TOTAL SCORE: 20
4. TROUBLE RELAXING: 3-NEARLY EVERY DAY
6. BECOMING EASILY ANNOYED OR IRRITABLE: 2-OVER HALF THE DAYS

## 2022-02-01 NOTE — PROGRESS NOTES
Behavioral Health Consultation  Judit Ambreen MSW, LCSW  2/1/2022  1:55 PM             Time spent with Patient: 45 minutes  This is patient's first  [facility] appointment. Reason for Consult:    Chief Complaint   Patient presents with    New Patient    Depression    Anxiety     Referring Provider: SANDY Harry  [address] [place]    Pt provided informed consent for the behavioral health program. Discussed with patient model of service to include the limits of confidentiality (i.e. abuse reporting, suicide intervention, etc.) and short-term intervention focused approach. Advised patient/parent to guard AVS and file at home to protect private information. Advised patient/parent to only hand in excuse if needed and not AVS.  Pt indicated understanding. Feedback given to PCP. S:  Patient reports problems with feeling depressed and anxious. Pt reported he has panic attack and the anxiety comes from no where. Pt reported he has chronic back pain and he has no motivation. Discussed cognitive distortions, untwisting cognitive distortions, 20/10 method and house cleaning tips. O:  MSE:    Mood    Anxious  Depressed  Affect    depressed affect  Appetite normal  Sleep disturbance Yes  Fatigue Yes  Loss of pleasure Yes  Attention/Concentration    intact  Morbid ideation No  Suicide Assessment    no suicidal ideation      History:    Medications:   Current Outpatient Medications   Medication Sig Dispense Refill    isosorbide mononitrate (IMDUR) 60 MG extended release tablet TAKE 1 TABLET BY MOUTH 2 TIMES DAILY 60 tablet 0    cariprazine hcl (VRAYLAR) 1.5 MG capsule Take 1 capsule by mouth daily 30 capsule 5    ALPRAZolam (XANAX) 0.5 MG tablet Take 1 tablet by mouth daily as needed for Anxiety for up to 30 days.  30 tablet 0    metoprolol tartrate (LOPRESSOR) 25 MG tablet TAKE 2 TABLETS BY MOUTH TWICE DAILY 180 tablet 1    PARoxetine (PAXIL) 10 MG tablet Take 10 mg by mouth every morning      busPIRone (BUSPAR) 15 MG tablet Take 15 mg by mouth 3 times daily 90 tablet 2    vitamin D (ERGOCALCIFEROL) 1.25 MG (97435 UT) CAPS capsule Take 1 capsule by mouth once a week 12 capsule 1    lamoTRIgine (LAMICTAL) 100 MG tablet Take 1 tablet by mouth 2 times daily 30 tablet 0    atorvastatin (LIPITOR) 80 MG tablet TAKE 1 TABLET BY MOUTH AT NIGHT 30 tablet 2    VORTIoxetine (TRINTELLIX) 10 MG TABS tablet 1.5 tablets daily 30 tablet 0    doxepin (SINEQUAN) 10 MG capsule Take 10 mg by mouth nightly      lurasidone (LATUDA) 20 MG TABS tablet Take 1 tablet by mouth daily 60 tablet 1    aspirin 81 MG chewable tablet Take 1 tablet by mouth daily 30 tablet 3    nitroGLYCERIN (NITROSTAT) 0.4 MG SL tablet up to max of 3 total doses. If no relief after 1 dose, call 911. 25 tablet 0    lisinopril (PRINIVIL;ZESTRIL) 20 MG tablet TAKE 1 TABLET BY MOUTH ONCE DAILY  30 tablet 5    cloNIDine (CATAPRES) 0.1 MG tablet Take 1 tablet by mouth daily as needed for High Blood Pressure (140/90) 30 tablet 3    Misc. Devices (ROLLING WALKER/BURGUNDY) MISC Dx: gen weakness and fatigue use daily as directed 1 each 0    HYDROcodone-acetaminophen (NORCO)  MG per tablet Take 1 tablet by mouth every 8 hours as needed for Pain. No current facility-administered medications for this visit.        Social History:   Social History     Socioeconomic History    Marital status:      Spouse name: Not on file    Number of children: Not on file    Years of education: Not on file    Highest education level: Not on file   Occupational History    Not on file   Tobacco Use    Smoking status: Current Every Day Smoker     Packs/day: 1.00     Years: 24.00     Pack years: 24.00     Types: Cigarettes     Last attempt to quit: 7/15/2020     Years since quittin.5    Smokeless tobacco: Never Used   Vaping Use    Vaping Use: Never used   Substance and Sexual Activity    Alcohol use: No     Alcohol/week: 0.0 standard drinks    Drug use: No    Sexual activity: Yes     Partners: Female   Other Topics Concern    Not on file   Social History Narrative    Not on file     Social Determinants of Health     Financial Resource Strain: Low Risk     Difficulty of Paying Living Expenses: Not hard at all   Food Insecurity: No Food Insecurity    Worried About Running Out of Food in the Last Year: Never true    [address] in the Last Year: Never true   Transportation Needs:     Lack of Transportation (Medical): Not on file    Lack of Transportation (Non-Medical): Not on file   Physical Activity:     Days of Exercise per Week: Not on file    Minutes of Exercise per Session: Not on file   Stress:     Feeling of Stress : Not on file   Social Connections:     Frequency of Communication with Friends and Family: Not on file    Frequency of Social Gatherings with Friends and Family: Not on file    Attends [employer] Services: Not on file    Active Member of [address] [place] [address] or Organizations: Not on file    Attends Club or Organization Meetings: Not on file    Marital Status: Not on file   Intimate Partner Violence:     Fear of Current or Ex-Partner: Not on file    Emotionally Abused: Not on file    Physically Abused: Not on file    Sexually Abused: Not on file   Housing Stability:     Unable to Pay for Housing in the Last Year: Not on file    Number of [place] in the Last Year: Not on file    Unstable Housing in the Last Year: Not on file       TOBACCO:   reports that he has been smoking cigarettes. He has a 24.00 pack-year smoking history. He has never used smokeless tobacco.  ETOH:   reports no history of alcohol use.     Family History:   Family History   Problem Relation Age of Onset    Diabetes Mother     Cancer Mother         uterine CA    Depression Mother     Mental Illness Mother     Hypertension Mother     Other Mother         blood clots    Heart Disease Brother     Depression Brother     Mental Illness Brother     Hypertension Brother     Breast Cancer Maternal Grandmother     Cancer Maternal Grandmother         breast CA    Heart Attack Maternal Grandmother     Other Maternal Grandmother         blood clots    Colon Cancer Neg Hx     Esophageal Cancer Neg Hx     Liver Cancer Neg Hx     Rectal Cancer Neg Hx     Stomach Cancer Neg Hx          A:  Patient presents for consult due to problems with depression and anxiety. Continued consultation is clinically/medically necessary to support in learning new skills and build confidence to deal better with these issues. Patient response to consults, finds new strategies helpful. PHQ Scores 2/1/2022 11/19/2021 6/8/2021 5/1/2020 3/6/2020 3/14/2018 3/12/2018   PHQ2 Score 5 4 2 0 4 3 4   PHQ9 Score 20 20 2 0 15 10 11     Interpretation of Total Score Depression Severity: 1-4 = Minimal depression, 5-9 = Mild depression, 10-14 = Moderate depression, 15-19 = Moderately severe depression, 20-27 = Severe depression    MARY-7  Feeling nervous, anxious, or on edge: 3-Nearly every day  Not able to stop or control worrying: 3-Nearly every day  Worrying too much about different things: 3-Nearly every day  Trouble relaxing: 3-Nearly every day  Being so restless that it's hard to sit still: 3-Nearly every day  Becoming easily annoyed or irritable: 2-Over half the days  Feeling afraid as if something awful might happen: 3-Nearly every day  MARY-7 Total Score: 20    MARY-7 score interpretation:  0-4 Subclinical, 5-9 Mild, 10-14 Moderate, 15-21 Severe    Diagnosis:    1. Bipolar depression ([facility] [address]    2.  Anxiety          Diagnosis Date    Anxiety     Arthritis     Bipolar 1 disorder ([facility] [address]     CAD in native artery 3/6/2018    Chronic back pain     COVID-19     Depression     Hyperlipidemia     Hypertension     IBS (irritable bowel syndrome)     Mild intermittent asthma without complication 5/95/0666    Morbidly obese (HCC)     Panic disorder     at times afraid to go outside    Unspecified sleep apnea     bipap         Plan:  Pt interventions:  Trained in strategies for increasing balanced thinking, Provided education, Discussed self-care (sleep, nutrition, rewarding activities, social support, exercise), Established rapport, [place]-setting to identify pt's primary goals for [employer] [facility] visit / overall health, Supportive techniques, Emphasized self-care as important for managing overall health and Identified maladaptive thoughts      Pt Behavioral Change Plan:    See patient instructions.

## 2022-02-01 NOTE — PATIENT INSTRUCTIONS
interpret events in terms of standards that are unrealistic-for example, you focus primarily on others who do better than you and find yourself inferior in the comparison. \"She's more successful than I am\" or \"Others did better than I did on the test.\"    13. Regret orientation: You focus on the idea that you could have done better in the past, rather on what you can do better now. \"I could have had a better job if I had tried\". (best friends with the Shoulds)     15. What if?: You keep asking a series of questions about \"What if\" something happens and fail to be satisfied with any of the answers. \"Yeah, but what if I get anxious? Or what if I can't catch my breath? \"    15. Emotional reasoning: You let your feelings guide your interpretation of reality-for example, \"I feel depressed, therefore my marriage is not working out. \"    16. Inability to disconfirm: You reject any evidence or arguments that might contradict your negative thoughts. For example, when you have the thought \"I'm unlovable\", you reject as irrelevant any evidence that people like you. Consequently, your thought cannot be refuted. \"That's not the real issue. There are deeper problems. There are other factors. \"    17. Judgment Focus: You view yourself, others and events in terms of evaluations of good, bad or superior-inferior, rather than simply describing, accepting, or understanding. You are continually measuring yourself and others according to arbitrary standards, finding that you and others fall short. You are focused on the judgments of others as well as your own judgments of yourself. \"I didn't perform well in college\" or \"If I take up tennis, I won't do well\" or \"Look how successful she is. I'm not successful\". 15 Ways to Untwist Your Thinking    1. Identify the Distortions. Write down the negative thought and the distortions in each negative thought using the Cognitive Distortions Checklist.    2. The Straight-Forward Approach. Substitute a more positive and realistic thought. 3. The Cost-Benefit Analysis. List the advantages and disadvantages of a negative feeling, thought, belief or behavior. 4. Examine the Evidence. Instead of assuming that a negative thought is true, examine the actual evidence for it. 5. The Survey Method. Do a survey to find out if your thoughts and attitudes are realistic. 6. The Experimental Technique. Do an experiment to test the validity of your negative thought. 7. The Double-Standard Technique. Talk to yourself in the same compassionate way you might talk to a dear friend who was upset. 8. The Pleasure Predicting Method. Predict how satisfying activities will be from 0% to 100%. Record how satisfying they turn out. 9. The Vertical Arrow Technique. Draw a vertical arrow under your negative thought and ask why it would be upsetting if it were true. 10. Thinking in Shade of Gray. Instead of thinking about your problems in black-or-white categories, evaluate things in shades of gray. 11. Define Terms. When you label yourself as \"inferior\" or \"a loser,\" ask yourself what you mean by these labels. 12. Be Specific. Stick with reality and avoid judgments about reality. 13. The Semantic Method. Substitute language that is less emotionally loaded (useful for \"should\" statements and labeling). 14. Re-Attribution. Instead of blaming yourself for a problem, think about all the factors that may have contributed to it. 15. The Acceptance Paradox. Instead of defending yourself against your own self-criticisms, find truth in them and accept them. Why the 20/10 method can change the way you clean your house  Your house will never stay clean with those marathon tidying sessions. Try this instead.     Jan. 25, 2018, 10:26 AM CST / Updated Jan. 27, 2018, 10:23 AM CST   By [address] busy, things pile up until you can't stand it or all of a sudden you have company coming over [place] and you have to do something about it right now. Then you go into a mad dash to clean everything. And you hate every minute of it. Does this approach to cleaning house sound familiar? You're not alone. Most people want to clean as infrequently as possible, so that translates to 'I'm going to do everything all at once,' says Maria De Jesus Chavez of Unf*ck Your Habitat: You're Better Than Your Mess. The thing is, marathon cleaning is the worst possible approach. But why? Once you're done with that marathon, sure your house is clean, but you're exhausted and probably frustrated, Misty Simons tells [facility] What's worse, you are now associating having a clean home with all of the stress that comes with marathon cleaning. [While] it might be a good time to say 'keep up with it now,' in the back of your mind you say 'that was terrible,' so you have all these negative associations with cleaning.  And these sentiments likely echo earlier associations, she says. How many of us when we're kids, you have this trashed room and cleaning it is a punishment?   No wonder the cycle continues to repeat itself. Our Notions of Housekeeping May Be Unrealistic    Unfortunately, Misty Simons says, conventional housekeeping systems are often written by and for naturally tidy people and people who don't necessarily know how to reach someone who isn't, she says. What's more, they often make assumptions about the people using them. Curly An own their house and live there with a nuclear family. [The woman] might stay at home  there's often a very traditional aspect to it, she says. While Misty Simons doesn't want to exclude anyone who matches that profile, Beverley Ball are so many people who don't fit into many of those boxes, she says, [place] you're reading something that says 'Tuesday you do xyz,' and you're like, 'I'm at work till 6 and when I get home I'm exhausted.' There are a lot of ways people are living their lives.  For me 'habitat' is where you are, whether that's a shared room or a dorm or a whole house to yourself. That's your space and you deserve to love it. My goal is that every one of these people see there is a way to conquer your mess.   And the way to do that, she says, comes down to a few key concepts. The 20/10 rule    Start breaking yourself of the marathon habit, Kanika Tripathi says, by training yourself to do short bursts of cleaning followed by a break. Set a timer. When it goes off, you stop and do something else. Sit down with cup of tea or take the dog for a walk or mess around on the internet. If necessary you get right back to cleaning [when the break is over].  That could be 20 minutes and 10 minutes, or five and 45, whatever it takes. Ignore the big picture    The whole house is a mess? Forget about it. What can you do right now? I try to train people to refocus on the small things and how much of a difference they make when you're doing them regularly and over time, Kanika Tripathi says. A lot of people look at the big picture and get overwhelmed. It's a matter of shifting focus from 'this entire place is a disaster' to 'those are dishes, I can deal with those.' It's a concrete, small thing you can deal with. This is a way for people to say 'I can. '    Make your bed    And those small things can start with just making your bed. Daniella's a huge fan of the childhood chore. The reason that's a good idea is it's a very small time investment, she says, but it immediately makes your bedroom look  and neater. Even if you've got the floordrobe (aka a heap of clothes on the floor), if you've got a made bed it pulls things together a little bit. I know I'm calmer if I'm looking at a made bed rather than a messy one.   Better still, it's easily repeatable so as you build that habit it becomes easier to build on and get new habits.     Put it away, not down    There's nothing new about this one, but for good reason. One of those new habits you build can be putting things away  straightaway. How much of our mess can be just averted entirely by putting it away and not down? Haseeb Tolliver says. It's another small time investment, just a couple minutes, she says, that can reap big rewards. Do you know where your laundry is? When you wash things there are three steps, Rakan Santizo says. Her rallying cry, often seen in her social media challenges is wash, dry, and put it away. Everyone forgets to put it away, she says, so we end up with the [facility].  If you've done a load of laundry or washed the dishes, it's not done until they're put away, she says. It's all part of training yourself to create a habit of putting things away before they can make a mess.     Bribe as needed    Still can't quite get going? How about a little carrot on the stick? I'm 100 percent in favor of bribing yourself, Rakan Santizo says. It's absolutely the most effective way of getting yourself to do something.  Whether that's a cup of special coffee or the next episode of whatever podcast or Netflix series you're bingeing, it's a thing you love that you can enjoy without guilt if you'll just do this kind of unpleasant thing. 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 The overwhelming response is, 'OMG, that looks like my house,' she says. At this point we're really only seeing very, very carefully curated snapshots of people's lives, and that includes her own, she says. If I'm taking a picture I'm clearing everything out of the background. If you went to wide lens you'd be horrified.   The danger here, she says, is that by constantly comparing our everyday reality to what other people are choosing to present it's always going to make us feel inadequate because you think everyone else has it together.  In reality, she reminds visitors to her blog  who are all enthusiastically supportive of one another Eli Sandifer are many, many many more people like us than there are like them.     Now what? If you're raring to go, there's no time like the present. Go clear off a counter or a tabletop, Daniella says, whichever one stresses you out most. (And then maybe go have that reward!)    [employer]/better/pop-culture/why-20-10-method-will-change-way-you-clean-your-[email or web address]#anchor-OurNotionsofHousekeepingMayBeUnrealistic      How to Keep a House Clean Every Day of the Week  July 20, 2018   [facility]    Keeping your home tidy might seem like an impossible taskbut it doesnt have to be. We put together some of the best tips so you and your family can learn how to keep a house clean every day of the week. The average American spends one hour a day cleaning their house, according to the [employer]. Between work, errands, parenthood and the chaos of everyday life, learning how to keep a house clean can be one of the toughest lessons we learn. While it sometimes seems impossible to manage everything on your plate, there are some quick and easy tips to help you get your house (and your life) in order. Read on for everything you need to know about how to keep a house clean. How to Keep a House Clean Tip #1: Put everything away after use. This might seem like an obvious tip, but failing to put away your belongings is the main culprit of untidiness. As you move from one room to another, do a quick scan to see if theres anything that you can take with you. Go out of your way to make sure that anything you wear, use or move ends up where it belongs. Its easy to leave a pair of shoes near the front door, a few shirts on your bed and some dishes in the sink until tomorrow. Remember, those tiny piles can quickly turn into big messes.   If youre lacking space, consider some DIY closet organization ideas like tension rods and shower curtain hooks. Use over-the-door organizers to extend existing spaces in your bedrooms, bathrooms and arellano closets. [place] your smaller belongings with decorative baskets and organize your paper items with a . And remember: it takes much less time to put away your belongings each time than to frantically run around looking for them when you need them again! How to Keep a House Clean Tip #2: Do one room at a time. The easiest way to keep your entire house clean is to tackle one room at a time. By splitting up the cleaning by room (and by day), you can accomplish a little bit at a time instead of tackling every task at once. Heres how to keep a house clean by focusing on one room at a time:  Bathroom  From toothpaste dribbles and mirror stains to wet floors and shower grime, the bathroom will endure a variety of messes throughout the week. Because we constantly use it each day, its easiest to clean up after yourself instead of waiting a few weeks later for a bigger mess. Areas to focus on for a clean bathroom:   Clean the sink    Scrub the shower, tub and toilet    Remove mirror spots    Mop the floors (dont forget those corners)  Looking for a natural, easy-to-make  for your home? DIY  are a cost-effective way to keep your house clean. Try this simple DIY  recipe:   2 cups water    ¼ cup baking soda    2 tsp. dishwashing liquid    3 tbsp. white vinegar    10 drops essential oil  After you use the sink or step out of the shower, take your natural  and spray down the surface. Run the water again to wash all the  off. Voilàyouve prolonged your time until you have to do a deep clean in your shower! The essential oil will also keep the room smelling fresh for several days. For best results, ask everyone in your household to make this a habit. Bedroom  The key to keeping bedrooms clean is all about storage. If you have proper places for all of your clothes and accessories, youre more likely to keep your personal space clean. If youve noticed that you dont seem to have enough space, invest in storage containers or baskets that can fit under your bed. Having a complete change of linens under your bed also makes it easier to change them while your other set is being washed. Picking the right nightstand helps to keep your room more organized as well. Store Deep [place], books and magazines in the drawers. If its large enough, it can double as a work desk. To keep your bedroom clean, make a habit of these steps:   Make your bed    Fold blankets and throws    Put away clutter in appropriate locations    Dust desks, nightstands and shelves    Vacuum floor and area rugs  Kitchen  Dishes tend to be the culprit in a messy kitchen. Have family members who like to let dishes soak?  Simply prepare one side of your sink with water and few drops of dish detergent. Throughout the day, add dishes to the soapy side and it will lift most of the grease and food off of the dishes. By the time you wash the dishes or put them in the , theyll be clean. The kitchen is a great place to apply the 20-minutes-per-day rule. Spend a few extra minutes cleaning your kitchen after each meal, and youll never have to deal with a huge mess in one of the most important rooms in your home. Focus on these areas when it comes to cleaning your kitchen:   Put away dishesalways have an empty sink!  Clean countertops   [employer] your pantry and refrigerator    Sweep and mop the floor    Use steel  for appliances  Living Room  The living room receives some of the most traffic in the house. If youre not careful, it can easily become littered with an assortment of everyday items. Make sure you place your items where they belong so your living room can leave a great first impression on your family and friends.   Simple steps to regularly take for a clean living room:   Clear the room of any clutter (toys, games, books)    Fluff pillows and fold blankets/throws    Dust mantel, coffee tables and end tables    Vacuum floors and couches (especially if you have pets!)  Storage solutions like these will keep this area of your house clean:  Shoe rack. If your living room is carpeted, the constant foot traffic can wear your carpet down significantly. Make sure you have a place for your family and guests to store their shoes before they tromp dirt and grass through your clean home. Hidden storage. If you dont have a home for items like toys, books and games, vertical shelving can help display your things in an organized way. Storage ottomans are also perfect for minimizing clutter and storing things out of sight. How to Keep a [employer] Tip #3: Stick to a schedule. Its one thing to give each room a thorough clean, but how do you make sure your house stays clutter-free every day of the week? Believe it or not, easily: a cleaning schedule. Create a list to keep on your fridge, your wall or your desk to help keep you and your household organized. Your schedule should include not just what needs to be done, but when it needs to be done. By splitting your small household tasks into daily, weekly and even monthly routines, you can keep your house in perfect shape year round. How to Keep a [employer] Daily  Most people wait until their house is messy to start cleaning. The trick is to put in a small effort every day to keep your house as tidy as possible. These daily tasks are small but effective reminders of how to keep a house clean. Six easy ways to go to sleep with a tidy home each night:  Make the bed. The best way to start your day is by making your bed. Simply making your bed each day will have a jacob effect, allowing you to keep everything else neat and tidy. Clean as you cook.  As you learn how to keep a house clean, pay close attention to clutter in the kitchen. Throw out scraps and empty packages as you use them. Wash pots and utensils while dinner roasts in the oven. Make sure dishes are put away before sitting down after meals. Cleaning as you go saves time and keeps your kitchen in tip-top shape. Grab as you go. Make it a mission to minimize clutter by picking up your belongings whenever you leave a room. Bring a pair of shoes with you when you go upstairs,  the coffee cup on the counter and take dirty laundry with you on your trip downstairs. Wipe up messes as they happen. Try not to leave any spills or small messes unattended. Take a few minutes to wipe them up with a damp cloth so youre not dealing with set-in stains at the end of the week. Sort the mail. We receive mail every day, and most of that mail turns out to be junk. Instead of letting it pile up in your mailbox or on the counter, sort it the second you walk in the door. Place bills, coupons and personal correspondence in their appropriate places the moment you bring in the mail and recycle junk mail. Sweep the kitchen floor. The kitchen often sees more traffic than the rest of the house, meaning the floor collects a lot of dirt and debris. Spend a few minutes each day sweeping the floor, and you wont see dirt being dragged through the house all week long. How to Keep a House Clean Weekly  Never underestimate the power of a weekly cleaning! No matter how successful you are with the small daily tasks, youll still have a few 20-minute tasks to complete once a week. While a weekly cleaning schedule isnt necessarily a one-size-fits-all solution, its an easy way to carve out one room at a time. As long as you follow a routine that permits you to focus on one major section of your home each day, youll never feel overwhelmed. Cleaning your home will practically take care of itself!   Example weekly cleaning schedule:  Monday: Laundry and dusting  Tuesday: Bathrooms and vacuuming  Wednesday: Living room and mopping  Thursday: Bedrooms  Friday: Kitchen  Saturday: Organizational and miscellaneous tasks    Aside from consistent daily and weekly cleanings, you should also keep monthly and quarterly cleanings in mind. This could include mattresses, lint lines, air filters, blinds and more. By starting with your smaller tasks, you can fill in the gaps with these larger, less frequent tasks. How to Keep a House Clean Tip #4: Believe in a power clean.   Nobody likes to go to sleep knowing their house is a mess. By spending 10-15 minutes on a nightly power cleanup, you can prevent piles of clutter from forming in your home. If you can involve your family members, the more the merrier! Put on a timer, have some fun with it and focus on your top cleaning priorities. Focus on the items that pose the biggest mess (whether its your kitchen, kids bedroom or foyer) first. Then chip away at the areas that see the most traffic. Here are some riley areas to keep in mind:   Shoes in the entryway   [employer] in the sink    Items on the bathroom counter    Coffee table clutter    Toys on the living room floor  Think youve mastered how to keep a house clean? Figuring out how to keep a house clean may seem like one of lifes many mysteries. With a little time and effort, you can easily create great cleaning habits to keep your nest tidy year round. How do you keep your house clean? What are some of your most useful tips? Let us know in the comments below!

## 2022-02-16 ENCOUNTER — OFFICE VISIT (OUTPATIENT)
Dept: PRIMARY CARE CLINIC | Age: 47
End: 2022-02-16
Payer: MEDICARE

## 2022-02-16 VITALS
HEIGHT: 74 IN | SYSTOLIC BLOOD PRESSURE: 125 MMHG | BODY MASS INDEX: 40.43 KG/M2 | TEMPERATURE: 97.6 F | DIASTOLIC BLOOD PRESSURE: 75 MMHG | OXYGEN SATURATION: 97 % | HEART RATE: 100 BPM | WEIGHT: 315 LBS

## 2022-02-16 DIAGNOSIS — F31.9 BIPOLAR DEPRESSION (HCC): Primary | ICD-10-CM

## 2022-02-16 DIAGNOSIS — F41.9 ANXIETY: ICD-10-CM

## 2022-02-16 DIAGNOSIS — I25.119 CORONARY ARTERY DISEASE INVOLVING NATIVE CORONARY ARTERY OF NATIVE HEART WITH ANGINA PECTORIS (HCC): ICD-10-CM

## 2022-02-16 DIAGNOSIS — J44.9 CHRONIC OBSTRUCTIVE PULMONARY DISEASE, UNSPECIFIED COPD TYPE (HCC): ICD-10-CM

## 2022-02-16 DIAGNOSIS — F41.0 PANIC ATTACK: ICD-10-CM

## 2022-02-16 PROCEDURE — 99214 OFFICE O/P EST MOD 30 MIN: CPT | Performed by: NURSE PRACTITIONER

## 2022-02-16 PROCEDURE — G8417 CALC BMI ABV UP PARAM F/U: HCPCS | Performed by: NURSE PRACTITIONER

## 2022-02-16 PROCEDURE — 3023F SPIROM DOC REV: CPT | Performed by: NURSE PRACTITIONER

## 2022-02-16 PROCEDURE — G8427 DOCREV CUR MEDS BY ELIG CLIN: HCPCS | Performed by: NURSE PRACTITIONER

## 2022-02-16 PROCEDURE — 4004F PT TOBACCO SCREEN RCVD TLK: CPT | Performed by: NURSE PRACTITIONER

## 2022-02-16 PROCEDURE — G8482 FLU IMMUNIZE ORDER/ADMIN: HCPCS | Performed by: NURSE PRACTITIONER

## 2022-02-16 RX ORDER — ISOSORBIDE MONONITRATE 60 MG/1
60 TABLET, EXTENDED RELEASE ORAL 2 TIMES DAILY
Qty: 60 TABLET | Refills: 3 | Status: SHIPPED | OUTPATIENT
Start: 2022-02-16 | End: 2022-06-28

## 2022-02-16 RX ORDER — ALPRAZOLAM 0.5 MG/1
0.5 TABLET ORAL DAILY PRN
Qty: 30 TABLET | Refills: 0 | Status: SHIPPED | OUTPATIENT
Start: 2022-02-16 | End: 2022-03-16 | Stop reason: SDUPTHER

## 2022-02-16 ASSESSMENT — ENCOUNTER SYMPTOMS
GASTROINTESTINAL NEGATIVE: 1
EYES NEGATIVE: 1
RESPIRATORY NEGATIVE: 1

## 2022-02-16 NOTE — PATIENT INSTRUCTIONS
Will continue current regimen for this month. Next month will plan on decreasing Trintellix and hopefully stopping the following month.

## 2022-02-16 NOTE — TELEPHONE ENCOUNTER
Harjinder Fried called to request a refill on his medication.       Last office visit : 1/12/2022   Next office visit : 2/16/2022     Requested Prescriptions     Pending Prescriptions Disp Refills    isosorbide mononitrate (IMDUR) 60 MG extended release tablet [Pharmacy Med Name: ISOSORBIDE MONONITRATE ER 60MG TABLET EXTENDED RELEASE 24 HOUR] 60 tablet 0     Sig: TAKE 1 TABLET BY MOUTH 2 TIMES DAILY            Tomeka Diallo MA

## 2022-02-16 NOTE — PROGRESS NOTES
[address] [facility]  [address] [address] [address]  Dept: [phone]  Dept Fax: [phone]  Loc: [phone]    Aster Santos is a 51 y.o. male who presents today for his medical conditions/complaints as noted below. Aster Santos is c/o of Anxiety and Panic Attack      Chief Complaint   Patient presents with    Anxiety    Panic Attack       HPI:     HPI   Patient here for follow up on anxiety, bipolar/depression. He is currently having panic attack in office, but prior to this point he has been doing well. He has been taking Buspar 2 tabs in the AM and feels like it was too much. He has only taken 1 tablet today and feels better. Is only taking Xanax 1 tab as needed for panic attacks.      Past Medical History:   Diagnosis Date    Anxiety     Arthritis     Bipolar 1 disorder ([address]     CAD in native artery 3/6/2018    Chronic back pain     COVID-19     Depression     Hyperlipidemia     Hypertension     IBS (irritable bowel syndrome)     Mild intermittent asthma without complication 7/93/5710    Morbidly obese (HCC)     Panic disorder     at times afraid to go outside    Unspecified sleep apnea     bipap        Past Surgical History:   Procedure Laterality Date    ANUS SURGERY      APPENDECTOMY  02/03/2014    CHOLECYSTECTOMY  01/01/2009    COLONOSCOPY  01/01/2012        COLONOSCOPY  10/19/2017    Dr Jose Fontenot, Benign HP, 5 yr recall    COLON[facility]  04/29/2009    [facility]    ELECTROCONVULSIVE THERAPY N/A 03/08/2017    ELECTROCONVULSIVE THERAPY performed by Favian Toure DO at [address] [facility] 01/01/1990    w/mesh    UMBILICAL HERNIA REPAIR  09/01/1990    VASECTOMY         Social History     Tobacco Use    Smoking status: Current Every Day Smoker     Packs/day: 1.00     Years: 24.00     Pack years: 24.00     Types: Cigarettes     Last attempt to quit: 7/15/2020 Years since quittin.5    Smokeless tobacco: Never Used   Substance Use Topics    Alcohol use: No     Alcohol/week: 0.0 standard drinks        Current Outpatient Medications   Medication Sig Dispense Refill    isosorbide mononitrate (IMDUR) 60 MG extended release tablet TAKE 1 TABLET BY MOUTH 2 TIMES DAILY 60 tablet 0    cariprazine hcl (VRAYLAR) 1.5 MG capsule Take 1 capsule by mouth daily 30 capsule 5    metoprolol tartrate (LOPRESSOR) 25 MG tablet TAKE 2 TABLETS BY MOUTH TWICE DAILY 180 tablet 1    busPIRone (BUSPAR) 15 MG tablet Take 15 mg by mouth 3 times daily 90 tablet 2    vitamin D (ERGOCALCIFEROL) 1.25 MG (30610 UT) CAPS capsule Take 1 capsule by mouth once a week 12 capsule 1    lamoTRIgine (LAMICTAL) 100 MG tablet Take 1 tablet by mouth 2 times daily 30 tablet 0    atorvastatin (LIPITOR) 80 MG tablet TAKE 1 TABLET BY MOUTH AT NIGHT 30 tablet 2    VORTIoxetine (TRINTELLIX) 10 MG TABS tablet 1.5 tablets daily 30 tablet 0    doxepin (SINEQUAN) 10 MG capsule Take 10 mg by mouth nightly      lurasidone (LATUDA) 20 MG TABS tablet Take 1 tablet by mouth daily 60 tablet 1    aspirin 81 MG chewable tablet Take 1 tablet by mouth daily 30 tablet 3    nitroGLYCERIN (NITROSTAT) 0.4 MG SL tablet up to max of 3 total doses. If no relief after 1 dose, call 911. 25 tablet 0    lisinopril (PRINIVIL;ZESTRIL) 20 MG tablet TAKE 1 TABLET BY MOUTH ONCE DAILY  30 tablet 5    cloNIDine (CATAPRES) 0.1 MG tablet Take 1 tablet by mouth daily as needed for High Blood Pressure (140/90) 30 tablet 3    Misc. Devices (ROLLING WALKER/BURGUNDY) MISC Dx: gen weakness and fatigue use daily as directed 1 each 0    HYDROcodone-acetaminophen (NORCO)  MG per tablet Take 1 tablet by mouth every 8 hours as needed for Pain. No current facility-administered medications for this visit.        Allergies   Allergen Reactions    Dye [Gadolinium Derivatives] Hives     Mild rash that last less than 2 hours after MRI or CT side.        Posterior tibial pulses are 2+ on the right side and 2+ on the left side. Heart sounds: Normal heart sounds. Pulmonary:      Effort: Pulmonary effort is normal.      Breath sounds: Normal breath sounds and air entry. Abdominal:      General: Bowel sounds are normal.      Palpations: Abdomen is soft. Musculoskeletal:      Cervical back: Full passive range of motion without pain, normal range of motion and neck supple. Thoracic back: No tenderness. Normal range of motion. Lumbar back: No tenderness. Normal range of motion. Lymphadenopathy:      Cervical: No cervical adenopathy. Skin:     General: Skin is warm and dry. Capillary Refill: Capillary refill takes less than 2 seconds. Neurological:      General: No focal deficit present. Mental Status: He is alert and oriented to person, place, and time. Mental status is at baseline. Coordination: Coordination is intact. Psychiatric:         Mood and Affect: Mood normal.         Speech: Speech normal.         Behavior: Behavior normal.         Thought Content: Thought content normal.         Cognition and Memory: Cognition and memory normal.         Judgment: Judgment normal.         /75 (Site: Left Upper Arm)   Pulse 100   Temp 97.6 °F (36.4 °C)   Ht 6' 2\" (1.88 m)   Wt (!) 368 lb (166.9 kg)   SpO2 97%   BMI 47.25 kg/m²     Assessment:      Diagnosis Orders   1. Bipolar depression ([address]     2. Chronic obstructive pulmonary disease, unspecified COPD type ([address] Stable    3. Coronary artery disease involving native coronary artery of native heart with angina pectoris ([facility] Stable        No results found for this visit on 02/16/22. Plan: Will continue current regimen. Bipolar /depression/anxiety has improved. He is to keep counseling appointment as well. Will plan to decrease Trintellix at follow up visit. May need to increase Xanax to 1 mg tablet in the future if panic attacks worsen.    Patient had panic attack in office today, but was able to talk himself down without medications. More than 50% of the time was spent counseling and coordinating care for a total time of 34 mins face to face. Return in about 4 weeks (around 3/16/2022) for med follow up wtih decrease on Trintellix. No orders of the defined types were placed in this encounter. No orders of the defined types were placed in this encounter. Patient offered educational handouts and has had all questions answered. Patient voices understanding and agrees to plans along with risks and benefits of plan. Patient is instructed to continue prior meds, diet, and exercise plans as instructed. Patient agrees to follow up as instructed and sooner if needed. Patient agrees to go to ER if condition becomes emergent. EMR Dragon/transcription disclaimer: Some of this encounter note is an electronic transcription/translation of spoken language to printed text. The electronic translation of spoken language may permit erroneous, or at times, nonsensical words or phrases to be inadvertently transcribed.  Although I have reviewed the note for such errors, some may still exist.    Electronically signed by SANDY Hager on 2/16/2022 at 2:02 PM

## 2022-02-21 DIAGNOSIS — I25.10 CAD IN NATIVE ARTERY: ICD-10-CM

## 2022-02-21 RX ORDER — LISINOPRIL 20 MG/1
TABLET ORAL
Qty: 60 TABLET | Refills: 2 | Status: SHIPPED | OUTPATIENT
Start: 2022-02-21 | End: 2022-06-07 | Stop reason: SDUPTHER

## 2022-02-21 RX ORDER — ATORVASTATIN CALCIUM 80 MG/1
TABLET, FILM COATED ORAL
Qty: 30 TABLET | Refills: 2 | Status: SHIPPED | OUTPATIENT
Start: 2022-02-21 | End: 2022-05-31

## 2022-03-01 ENCOUNTER — TELEMEDICINE (OUTPATIENT)
Dept: PSYCHOLOGY | Age: 47
End: 2022-03-01
Payer: MEDICARE

## 2022-03-01 DIAGNOSIS — F31.9 BIPOLAR DEPRESSION (HCC): Primary | ICD-10-CM

## 2022-03-01 DIAGNOSIS — F41.9 ANXIETY: ICD-10-CM

## 2022-03-01 PROCEDURE — 90832 PSYTX W PT 30 MINUTES: CPT | Performed by: SOCIAL WORKER

## 2022-03-01 PROCEDURE — 4004F PT TOBACCO SCREEN RCVD TLK: CPT | Performed by: SOCIAL WORKER

## 2022-03-01 ASSESSMENT — PATIENT HEALTH QUESTIONNAIRE - PHQ9
9. THOUGHTS THAT YOU WOULD BE BETTER OFF DEAD, OR OF HURTING YOURSELF: 1
8. MOVING OR SPEAKING SO SLOWLY THAT OTHER PEOPLE COULD HAVE NOTICED. OR THE OPPOSITE, BEING SO FIGETY OR RESTLESS THAT YOU HAVE BEEN MOVING AROUND A LOT MORE THAN USUAL: 1
3. TROUBLE FALLING OR STAYING ASLEEP: 3
SUM OF ALL RESPONSES TO PHQ QUESTIONS 1-9: 16
2. FEELING DOWN, DEPRESSED OR HOPELESS: 3
SUM OF ALL RESPONSES TO PHQ QUESTIONS 1-9: 15
5. POOR APPETITE OR OVEREATING: 0
SUM OF ALL RESPONSES TO PHQ9 QUESTIONS 1 & 2: 6
6. FEELING BAD ABOUT YOURSELF - OR THAT YOU ARE A FAILURE OR HAVE LET YOURSELF OR YOUR FAMILY DOWN: 3
SUM OF ALL RESPONSES TO PHQ QUESTIONS 1-9: 16
7. TROUBLE CONCENTRATING ON THINGS, SUCH AS READING THE NEWSPAPER OR WATCHING TELEVISION: 1
1. LITTLE INTEREST OR PLEASURE IN DOING THINGS: 3
10. IF YOU CHECKED OFF ANY PROBLEMS, HOW DIFFICULT HAVE THESE PROBLEMS MADE IT FOR YOU TO DO YOUR WORK, TAKE CARE OF THINGS AT HOME, OR GET ALONG WITH OTHER PEOPLE: 2
SUM OF ALL RESPONSES TO PHQ QUESTIONS 1-9: 16
4. FEELING TIRED OR HAVING LITTLE ENERGY: 1

## 2022-03-01 ASSESSMENT — ANXIETY QUESTIONNAIRES
6. BECOMING EASILY ANNOYED OR IRRITABLE: 3-NEARLY EVERY DAY
4. TROUBLE RELAXING: 3-NEARLY EVERY DAY
2. NOT BEING ABLE TO STOP OR CONTROL WORRYING: 3-NEARLY EVERY DAY
1. FEELING NERVOUS, ANXIOUS, OR ON EDGE: 2-OVER HALF THE DAYS
7. FEELING AFRAID AS IF SOMETHING AWFUL MIGHT HAPPEN: 1-SEVERAL DAYS
5. BEING SO RESTLESS THAT IT IS HARD TO SIT STILL: 1-SEVERAL DAYS
3. WORRYING TOO MUCH ABOUT DIFFERENT THINGS: 3-NEARLY EVERY DAY
GAD7 TOTAL SCORE: 16

## 2022-03-01 ASSESSMENT — COLUMBIA-SUICIDE SEVERITY RATING SCALE - C-SSRS
2. HAVE YOU ACTUALLY HAD ANY THOUGHTS OF KILLING YOURSELF?: NO
1. WITHIN THE PAST MONTH, HAVE YOU WISHED YOU WERE DEAD OR WISHED YOU COULD GO TO SLEEP AND NOT WAKE UP?: YES
6. HAVE YOU EVER DONE ANYTHING, STARTED TO DO ANYTHING, OR PREPARED TO DO ANYTHING TO END YOUR LIFE?: NO

## 2022-03-01 NOTE — PATIENT INSTRUCTIONS
If you receive a survey after visiting one of our offices, please take time to share your experience about your office visit. These surveys are confidential and no health information about you is shared. We highly welcome your feedback to continuously improve our services for you. Cleo Ruiz, [phone] and choose the option for behavioral health  You can also send her a message on My Chart. Be aware that all my messages are linked to her.

## 2022-03-01 NOTE — PROGRESS NOTES
Talita Barr, was evaluated through a synchronous (real-time) audio-video encounter. The patient (or guardian if applicable) is aware that this is a billable service. Verbal consent to proceed has been obtained within the past 12 months. The visit was conducted pursuant to the emergency declaration under the [facility], [address] [place] [address] authority and the [employer] and [employer] General Act. Patient identification was verified, and a caregiver was present when appropriate. The patient was located in a state where the provider was credentialed to provide care. Total time spent for this encounter: 20 minutes    --Agustina Ramsay LCSW on 3/1/2022 at 12:03 PM    An electronic signature was used to authenticate this note. Behavioral Health Consultation  Agustina ALTAMIRANO LCSW  3/1/2022  11:50AM      Time spent with Patient: 20 minutes  This is patient's second  [facility] appointment. Reason for Consult:    Chief Complaint   Patient presents with    Follow-up    Depression    Anxiety     Referring Provider: No referring provider defined for this encounter. Feedback given to PCP. S:  Patient reports problems with feeling depressed and anxious. \"I don't mean to be disrespectful, but I've been doing this talk for years, it just isn't helpful. \" Pt would like to call back for an appointment when he wants therapy, however is not interested at this time. Addressed current and underlying issues, explored and released associated emotions, explored new ways to deal and cope with these problems. O:  MSE:    Mood    Anxious  Depressed  Affect    depressed affect  Appetite normal  Sleep disturbance Yes  Fatigue Yes  Loss of pleasure Yes  Attention/Concentration    intact  Morbid ideation No  Suicide Assessment    no suicidal ideation        A:  Patient presents for consult due to problems with depression and anxiety.     [facility] Scores 3/1/2022 thoughts      Pt Behavioral Change Plan:  See Pt Instructions

## 2022-03-08 RX ORDER — LURASIDONE HYDROCHLORIDE 20 MG/1
TABLET, FILM COATED ORAL
Qty: 60 TABLET | Refills: 1 | Status: SHIPPED | OUTPATIENT
Start: 2022-03-08 | End: 2022-08-16

## 2022-03-15 RX ORDER — MONTELUKAST SODIUM 4 MG/1
TABLET, CHEWABLE ORAL
Qty: 60 TABLET | Refills: 5 | Status: SHIPPED | OUTPATIENT
Start: 2022-03-15 | End: 2022-09-27

## 2022-03-16 ENCOUNTER — OFFICE VISIT (OUTPATIENT)
Dept: PRIMARY CARE CLINIC | Age: 47
End: 2022-03-16
Payer: MEDICARE

## 2022-03-16 VITALS
BODY MASS INDEX: 40.43 KG/M2 | TEMPERATURE: 98.3 F | HEART RATE: 100 BPM | OXYGEN SATURATION: 96 % | WEIGHT: 315 LBS | SYSTOLIC BLOOD PRESSURE: 128 MMHG | HEIGHT: 74 IN | DIASTOLIC BLOOD PRESSURE: 85 MMHG

## 2022-03-16 DIAGNOSIS — F31.9 BIPOLAR DEPRESSION (HCC): Primary | ICD-10-CM

## 2022-03-16 DIAGNOSIS — F41.9 ANXIETY: ICD-10-CM

## 2022-03-16 PROCEDURE — G8482 FLU IMMUNIZE ORDER/ADMIN: HCPCS | Performed by: NURSE PRACTITIONER

## 2022-03-16 PROCEDURE — 4004F PT TOBACCO SCREEN RCVD TLK: CPT | Performed by: NURSE PRACTITIONER

## 2022-03-16 PROCEDURE — 91300 COVID-19, PFIZER PURPLE TOP, DILUTE FOR USE, 12+ YRS, 30MCG/0.3ML DOSE: CPT | Performed by: NURSE PRACTITIONER

## 2022-03-16 PROCEDURE — G8417 CALC BMI ABV UP PARAM F/U: HCPCS | Performed by: NURSE PRACTITIONER

## 2022-03-16 PROCEDURE — G8427 DOCREV CUR MEDS BY ELIG CLIN: HCPCS | Performed by: NURSE PRACTITIONER

## 2022-03-16 PROCEDURE — 0004A COVID-19, PFIZER PURPLE TOP, DILUTE FOR USE, 12+ YRS, 30MCG/0.3ML DOSE: CPT | Performed by: NURSE PRACTITIONER

## 2022-03-16 PROCEDURE — 99213 OFFICE O/P EST LOW 20 MIN: CPT | Performed by: NURSE PRACTITIONER

## 2022-03-16 RX ORDER — ALPRAZOLAM 0.5 MG/1
0.5 TABLET ORAL DAILY PRN
Qty: 30 TABLET | Refills: 1 | Status: SHIPPED | OUTPATIENT
Start: 2022-03-16 | End: 2022-05-05

## 2022-03-16 ASSESSMENT — PATIENT HEALTH QUESTIONNAIRE - PHQ9
SUM OF ALL RESPONSES TO PHQ QUESTIONS 1-9: 16
5. POOR APPETITE OR OVEREATING: 2
SUM OF ALL RESPONSES TO PHQ9 QUESTIONS 1 & 2: 4
1. LITTLE INTEREST OR PLEASURE IN DOING THINGS: 2
6. FEELING BAD ABOUT YOURSELF - OR THAT YOU ARE A FAILURE OR HAVE LET YOURSELF OR YOUR FAMILY DOWN: 2
9. THOUGHTS THAT YOU WOULD BE BETTER OFF DEAD, OR OF HURTING YOURSELF: 0
SUM OF ALL RESPONSES TO PHQ QUESTIONS 1-9: 16
SUM OF ALL RESPONSES TO PHQ QUESTIONS 1-9: 16
4. FEELING TIRED OR HAVING LITTLE ENERGY: 2
10. IF YOU CHECKED OFF ANY PROBLEMS, HOW DIFFICULT HAVE THESE PROBLEMS MADE IT FOR YOU TO DO YOUR WORK, TAKE CARE OF THINGS AT HOME, OR GET ALONG WITH OTHER PEOPLE: 0
8. MOVING OR SPEAKING SO SLOWLY THAT OTHER PEOPLE COULD HAVE NOTICED. OR THE OPPOSITE, BEING SO FIGETY OR RESTLESS THAT YOU HAVE BEEN MOVING AROUND A LOT MORE THAN USUAL: 2
SUM OF ALL RESPONSES TO PHQ QUESTIONS 1-9: 16
7. TROUBLE CONCENTRATING ON THINGS, SUCH AS READING THE NEWSPAPER OR WATCHING TELEVISION: 2
3. TROUBLE FALLING OR STAYING ASLEEP: 2
2. FEELING DOWN, DEPRESSED OR HOPELESS: 2

## 2022-03-16 ASSESSMENT — ENCOUNTER SYMPTOMS
RESPIRATORY NEGATIVE: 1
GASTROINTESTINAL NEGATIVE: 1
EYES NEGATIVE: 1

## 2022-03-16 NOTE — PROGRESS NOTES
[address] [facility]  [address] [address] [place] [address]  Dept: [phone]  Dept Fax: [phone]  Loc: [phone]    Luisito Salazar is a 51 y.o. male who presents today for his medical conditions/complaints as noted below. Luisito Salazar is c/o of Anxiety, Depression, and Panic Attack (less than usual)      Chief Complaint   Patient presents with    Anxiety    Depression    Panic Attack     less than usual       HPI:     HPI  Patient is here for anxiety and depression follow up. He states that he is having less panic attacks, but more overall anxiety. He is currently on [place]. He has been taking Buspar 1 tab in AM and 2 tablets in the PM.    He feels like the severe anxiety, but not panic attack, hits when he has to get out and do something. He has stopped the Trintellix this past month. Patient is also taking in Doxepin 10 mg at night. Patient is also asking about COVID booster and if it is recommended.      Past Medical History:   Diagnosis Date    Anxiety     Arthritis     Bipolar 1 disorder ([address]     CAD in native artery 3/6/2018    Chronic back pain     COVID-19     Depression     Hyperlipidemia     Hypertension     IBS (irritable bowel syndrome)     Mild intermittent asthma without complication 7/71/7506    Morbidly obese (HCC)     Panic disorder     at times afraid to go outside    Unspecified sleep apnea     bipap        Past Surgical History:   Procedure Laterality Date    ANUS SURGERY      APPENDECTOMY  02/03/2014    CHOLECYSTECTOMY  01/01/2009    COLONOSCOPY  01/01/2012        COLONOSCOPY  10/19/2017    Dr Dumont [place], Benign HP, 5 yr recall    COL[facility]  [facility]/29/2009    [facility]    ELECTROCONVULSIVE THERAPY N/A 03/08/2017    ELECTROCONVULSIVE THERAPY performed by Dmitriy Diaz DO at [address] [facility] 01/01/1990    w/mesh    UMBILICAL HERNIA REPAIR  1990    VASECTOMY         Social History     Tobacco Use    Smoking status: Current Every Day Smoker     Packs/day: 1.00     Years: 24.00     Pack years: 24.00     Types: Cigarettes     Last attempt to quit: 7/15/2020     Years since quittin.6    Smokeless tobacco: Never Used   Substance Use Topics    Alcohol use: No     Alcohol/week: 0.0 standard drinks        Current Outpatient Medications   Medication Sig Dispense Refill    cariprazine hcl (VRAYLAR) 3 MG CAPS capsule Take 1 capsule by mouth daily 30 capsule 1    ALPRAZolam (XANAX) 0.5 MG tablet Take 1 tablet by mouth daily as needed for Anxiety for up to 30 days. 30 tablet 1    metoprolol tartrate (LOPRESSOR) 25 MG tablet TAKE 2 TABLETS BY MOUTH TWICE DAILY 180 tablet 1    colestipol (COLESTID) 1 g tablet TAKE 1 TABLET BY MOUTH TWICE DAILY. 60 tablet 5    LATUDA 20 MG TABS tablet TAKE 1 TABLET BY MOUTH DAILY 60 tablet 1    atorvastatin (LIPITOR) 80 MG tablet TAKE 1 TABLET BY MOUTH AT NIGHT 30 tablet 2    lisinopril (PRINIVIL;ZESTRIL) 20 MG tablet TAKE 1 TABLET BY MOUTH ONCE DAILY 60 tablet 2    isosorbide mononitrate (IMDUR) 60 MG extended release tablet TAKE 1 TABLET BY MOUTH 2 TIMES DAILY 60 tablet 3    busPIRone (BUSPAR) 15 MG tablet Take 15 mg by mouth 3 times daily 90 tablet 2    vitamin D (ERGOCALCIFEROL) 1.25 MG (04355 UT) CAPS capsule Take 1 capsule by mouth once a week 12 capsule 1    doxepin (SINEQUAN) 10 MG capsule Take 10 mg by mouth nightly      aspirin 81 MG chewable tablet Take 1 tablet by mouth daily 30 tablet 3    nitroGLYCERIN (NITROSTAT) 0.4 MG SL tablet up to max of 3 total doses. If no relief after 1 dose, call 911. 25 tablet 0    cloNIDine (CATAPRES) 0.1 MG tablet Take 1 tablet by mouth daily as needed for High Blood Pressure (140/90) 30 tablet 3    Misc.  Devices (ROLLING WALKER/BURGUNDY) MISC Dx: gen weakness and fatigue use daily as directed 1 each 0    HYDROcodone-acetaminophen (NORCO)  MG per tablet Take 1 tablet by mouth every 8 hours as needed for Pain. No current facility-administered medications for this visit. Allergies   Allergen Reactions    Dye [Gadolinium Derivatives] Hives     Mild rash that last less than 2 hours after MRI or CT scans       Family History   Problem Relation Age of Onset    Diabetes Mother     Cancer Mother         uterine CA    Depression Mother     Mental Illness Mother     Hypertension Mother    Natalya Clifton Other Mother         blood clots    Heart Disease Brother     Depression Brother     Mental Illness Brother     Hypertension Brother     Breast Cancer Maternal Grandmother     Cancer Maternal Grandmother         breast CA    Heart Attack Maternal Grandmother     Other Maternal Grandmother         blood clots    Colon Cancer Neg Hx     Esophageal Cancer Neg Hx     Liver Cancer Neg Hx     Rectal Cancer Neg Hx     Stomach Cancer Neg Hx                Subjective:      Review of Systems   Constitutional: Negative. HENT: Negative. Eyes: Negative. Respiratory: Negative. Cardiovascular: Negative. Gastrointestinal: Negative. Endocrine: Negative. Genitourinary: Negative. Musculoskeletal: Negative. Skin: Negative. Neurological: Negative. Hematological: Negative. Psychiatric/Behavioral: The patient is nervous/anxious. Objective:     Physical Exam  Vitals and nursing note reviewed. Constitutional:       Appearance: Normal appearance. He is well-developed. Comments: obese   HENT:      Head: Normocephalic and atraumatic. Right Ear: Hearing, tympanic membrane, ear canal and external ear normal.      Left Ear: Hearing, tympanic membrane, ear canal and external ear normal.      Nose: Nose normal.      Mouth/Throat:      Pharynx: Uvula midline. Eyes:      General: Lids are normal.      Conjunctiva/sclera: Conjunctivae normal.      Pupils: Pupils are equal, round, and reactive to light.    Neck:      Thyroid: No thyroid mass or thyromegaly. Trachea: Trachea normal.   Cardiovascular:      Rate and Rhythm: Normal rate and regular rhythm. Heart sounds: Normal heart sounds. Pulmonary:      Effort: Pulmonary effort is normal.      Breath sounds: Normal breath sounds. Abdominal:      General: Bowel sounds are normal.      Palpations: Abdomen is soft. Musculoskeletal:         General: Normal range of motion. Cervical back: Normal range of motion and neck supple. No tenderness. Thoracic back: Normal. No tenderness. Normal range of motion. Lumbar back: Normal. No tenderness. Normal range of motion. Skin:     General: Skin is warm and dry. Neurological:      Mental Status: He is alert and oriented to person, place, and time. Psychiatric:         Speech: Speech normal.         Behavior: Behavior normal.         Thought Content: Thought content normal.         Judgment: Judgment normal.         /85 (Site: Left Upper Arm)   Pulse 100   Temp 98.3 °F (36.8 °C)   Ht 6' 2\" (1.88 m)   Wt (!) 365 lb (165.6 kg)   SpO2 96%   BMI 46.86 kg/m²     Assessment:      Diagnosis Orders   1. Bipolar depression (HCC)  cariprazine hcl (VRAYLAR) 3 MG CAPS capsule    ALPRAZolam (XANAX) 0.5 MG tablet   2. Anxiety  cariprazine hcl (VRAYLAR) 3 MG CAPS capsule       No results found for this visit on 03/16/22. Plan:     1. Bipolar depression ([address]  Trintellix has been stopped. Increasing Vraylar to see if this will help with increased mood swings. Continue Xanax as prescribed. - cariprazine hcl (VRAYLAR) 3 MG CAPS capsule; Take 1 capsule by mouth daily  Dispense: 30 capsule; Refill: 1  - ALPRAZolam (XANAX) 0.5 MG tablet; Take 1 tablet by mouth daily as needed for Anxiety for up to 30 days. Dispense: 30 tablet; Refill: 1    2. Anxiety    - cariprazine hcl (VRAYLAR) 3 MG CAPS capsule; Take 1 capsule by mouth daily  Dispense: 30 capsule;  Refill: 1       Return in about 5 weeks (around 4/20/2022) for Medication Follow Up, Anxiety. Orders Placed This Encounter   Procedures    COVID-19, Pfizer Purple top, DILUTE for use, 12+ yrs, 30mcg/0.3mL dose       Orders Placed This Encounter   Medications    cariprazine hcl (VRAYLAR) 3 MG CAPS capsule     Sig: Take 1 capsule by mouth daily     Dispense:  30 capsule     Refill:  1    ALPRAZolam (XANAX) 0.5 MG tablet     Sig: Take 1 tablet by mouth daily as needed for Anxiety for up to 30 days. Dispense:  30 tablet     Refill:  1            Patient offered educational handouts and has had all questions answered. Patient voices understanding and agrees to plans along with risks and benefits of plan. Patient is instructed to continue prior meds, diet, and exercise plans as instructed. Patient agrees to follow up as instructed and sooner if needed. Patient agrees to go to ER if condition becomes emergent. EMR Dragon/transcription disclaimer: Some of this encounter note is an electronic transcription/translation of spoken language to printed text. The electronic translation of spoken language may permit erroneous, or at times, nonsensical words or phrases to be inadvertently transcribed.  Although I have reviewed the note for such errors, some may still exist.    Electronically signed by SANDY Hendricks on 3/16/2022 at 2:44 PM

## 2022-03-28 DIAGNOSIS — F41.9 ANXIETY: ICD-10-CM

## 2022-03-28 RX ORDER — BUSPIRONE HYDROCHLORIDE 15 MG/1
TABLET ORAL
Qty: 90 TABLET | Refills: 2 | Status: SHIPPED | OUTPATIENT
Start: 2022-03-28 | End: 2022-07-17

## 2022-04-22 ENCOUNTER — OFFICE VISIT (OUTPATIENT)
Dept: PRIMARY CARE CLINIC | Age: 47
End: 2022-04-22
Payer: MEDICARE

## 2022-04-22 VITALS
BODY MASS INDEX: 40.43 KG/M2 | HEIGHT: 74 IN | TEMPERATURE: 97 F | DIASTOLIC BLOOD PRESSURE: 70 MMHG | HEART RATE: 89 BPM | SYSTOLIC BLOOD PRESSURE: 110 MMHG | RESPIRATION RATE: 18 BRPM | WEIGHT: 315 LBS | OXYGEN SATURATION: 97 %

## 2022-04-22 DIAGNOSIS — F31.9 BIPOLAR DEPRESSION (HCC): Primary | ICD-10-CM

## 2022-04-22 DIAGNOSIS — E78.2 MIXED HYPERLIPIDEMIA: ICD-10-CM

## 2022-04-22 DIAGNOSIS — R73.09 ELEVATED GLUCOSE: ICD-10-CM

## 2022-04-22 DIAGNOSIS — I10 ESSENTIAL HYPERTENSION: ICD-10-CM

## 2022-04-22 DIAGNOSIS — F41.9 ANXIETY: ICD-10-CM

## 2022-04-22 DIAGNOSIS — E55.9 VITAMIN D DEFICIENCY: ICD-10-CM

## 2022-04-22 DIAGNOSIS — I25.10 CAD IN NATIVE ARTERY: ICD-10-CM

## 2022-04-22 PROCEDURE — G8427 DOCREV CUR MEDS BY ELIG CLIN: HCPCS | Performed by: NURSE PRACTITIONER

## 2022-04-22 PROCEDURE — 4004F PT TOBACCO SCREEN RCVD TLK: CPT | Performed by: NURSE PRACTITIONER

## 2022-04-22 PROCEDURE — 99214 OFFICE O/P EST MOD 30 MIN: CPT | Performed by: NURSE PRACTITIONER

## 2022-04-22 PROCEDURE — G8417 CALC BMI ABV UP PARAM F/U: HCPCS | Performed by: NURSE PRACTITIONER

## 2022-04-22 ASSESSMENT — ENCOUNTER SYMPTOMS
EYES NEGATIVE: 1
RESPIRATORY NEGATIVE: 1
GASTROINTESTINAL NEGATIVE: 1

## 2022-04-22 ASSESSMENT — PATIENT HEALTH QUESTIONNAIRE - PHQ9
8. MOVING OR SPEAKING SO SLOWLY THAT OTHER PEOPLE COULD HAVE NOTICED. OR THE OPPOSITE, BEING SO FIGETY OR RESTLESS THAT YOU HAVE BEEN MOVING AROUND A LOT MORE THAN USUAL: 0
SUM OF ALL RESPONSES TO PHQ QUESTIONS 1-9: 0
5. POOR APPETITE OR OVEREATING: 0
9. THOUGHTS THAT YOU WOULD BE BETTER OFF DEAD, OR OF HURTING YOURSELF: 0
SUM OF ALL RESPONSES TO PHQ QUESTIONS 1-9: 0
SUM OF ALL RESPONSES TO PHQ QUESTIONS 1-9: 0
6. FEELING BAD ABOUT YOURSELF - OR THAT YOU ARE A FAILURE OR HAVE LET YOURSELF OR YOUR FAMILY DOWN: 0
SUM OF ALL RESPONSES TO PHQ9 QUESTIONS 1 & 2: 0
2. FEELING DOWN, DEPRESSED OR HOPELESS: 0
1. LITTLE INTEREST OR PLEASURE IN DOING THINGS: 0
7. TROUBLE CONCENTRATING ON THINGS, SUCH AS READING THE NEWSPAPER OR WATCHING TELEVISION: 0
3. TROUBLE FALLING OR STAYING ASLEEP: 0
10. IF YOU CHECKED OFF ANY PROBLEMS, HOW DIFFICULT HAVE THESE PROBLEMS MADE IT FOR YOU TO DO YOUR WORK, TAKE CARE OF THINGS AT HOME, OR GET ALONG WITH OTHER PEOPLE: 0
4. FEELING TIRED OR HAVING LITTLE ENERGY: 0
SUM OF ALL RESPONSES TO PHQ QUESTIONS 1-9: 0
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
## 2022-05-04 DIAGNOSIS — F31.9 BIPOLAR DEPRESSION (HCC): ICD-10-CM

## 2022-05-05 RX ORDER — ALPRAZOLAM 0.5 MG/1
0.5 TABLET ORAL DAILY PRN
Qty: 30 TABLET | Refills: 1 | Status: SHIPPED | OUTPATIENT
Start: 2022-05-13 | End: 2022-06-12

## 2022-05-05 NOTE — TELEPHONE ENCOUNTER
Phi Lam called to request a refill on his medication. Last office visit : 4/22/2022   Next office visit : Visit date not found     Last UDS:   Amphetamine Screen, Urine   Date Value Ref Range Status   11/19/2021 -  Final     Barbiturate Screen, Urine   Date Value Ref Range Status   11/19/2021 -  Final     Benzodiazepine Screen, Urine   Date Value Ref Range Status   11/19/2021 +  Final     Buprenorphine Urine   Date Value Ref Range Status   11/19/2021 -  Final     Cocaine Metabolite Screen, Urine   Date Value Ref Range Status   11/19/2021 -  Final     Gabapentin Screen, Urine   Date Value Ref Range Status   11/19/2021 -  Final     MDMA, Urine   Date Value Ref Range Status   11/19/2021 -  Final     Methamphetamine, Urine   Date Value Ref Range Status   11/19/2021 -  Final     Opiate Scrn, Ur   Date Value Ref Range Status   11/19/2021 +  Final     Oxycodone Screen, Ur   Date Value Ref Range Status   11/19/2021 -  Final     PCP Screen, Urine   Date Value Ref Range Status   11/19/2021 -  Final     Propoxyphene Screen, Urine   Date Value Ref Range Status   11/19/2021 -  Final     THC Screen, Urine   Date Value Ref Range Status   11/19/2021 -  Final     Tricyclic Antidepressants, Urine   Date Value Ref Range Status   11/19/2021 -  Final       Last Bhavesh Banda: new one pending  Medication Contract: [id number]   Last Fill: 3-16 with 1    Requested Prescriptions     Pending Prescriptions Disp Refills    ALPRAZolam (XANAX) 0.5 MG tablet [Pharmacy Med Name: ALPRAZOLAM 0.5MG TABLET] 30 tablet 1     Sig: TAKE 1 TABLET BY MOUTH DAILY AS NEEDED FOR ANXIETY FOR UP TO 30 DAYS. Please approve or refuse this medication.    Mal Santa LPN

## 2022-05-31 DIAGNOSIS — F31.9 BIPOLAR DEPRESSION (HCC): ICD-10-CM

## 2022-05-31 DIAGNOSIS — I25.10 CAD IN NATIVE ARTERY: ICD-10-CM

## 2022-05-31 DIAGNOSIS — F41.9 ANXIETY: ICD-10-CM

## 2022-05-31 RX ORDER — ATORVASTATIN CALCIUM 80 MG/1
TABLET, FILM COATED ORAL
Qty: 30 TABLET | Refills: 2 | Status: SHIPPED | OUTPATIENT
Start: 2022-05-31 | End: 2022-09-07

## 2022-05-31 RX ORDER — CARIPRAZINE 3 MG/1
CAPSULE, GELATIN COATED ORAL
Qty: 30 CAPSULE | Refills: 1 | Status: SHIPPED | OUTPATIENT
Start: 2022-05-31 | End: 2022-07-26

## 2022-05-31 RX ORDER — ERGOCALCIFEROL 1.25 MG/1
50000 CAPSULE ORAL WEEKLY
Qty: 12 CAPSULE | Refills: 1 | Status: SHIPPED | OUTPATIENT
Start: 2022-05-31

## 2022-06-02 RX ORDER — DOXEPIN HYDROCHLORIDE 10 MG/1
CAPSULE ORAL
Qty: 60 CAPSULE | Refills: 3 | OUTPATIENT
Start: 2022-06-02

## 2022-06-04 RX ORDER — DOXEPIN HYDROCHLORIDE 10 MG/1
CAPSULE ORAL
Qty: 60 CAPSULE | Refills: 3 | OUTPATIENT
Start: 2022-06-04

## 2022-06-07 ENCOUNTER — OFFICE VISIT (OUTPATIENT)
Dept: FAMILY MEDICINE CLINIC | Age: 47
End: 2022-06-07
Payer: MEDICARE

## 2022-06-07 VITALS
WEIGHT: 315 LBS | OXYGEN SATURATION: 99 % | BODY MASS INDEX: 46.22 KG/M2 | HEART RATE: 97 BPM | DIASTOLIC BLOOD PRESSURE: 82 MMHG | TEMPERATURE: 98.7 F | SYSTOLIC BLOOD PRESSURE: 150 MMHG

## 2022-06-07 DIAGNOSIS — F31.9 BIPOLAR DEPRESSION (HCC): ICD-10-CM

## 2022-06-07 DIAGNOSIS — I10 PRIMARY HYPERTENSION: Primary | ICD-10-CM

## 2022-06-07 PROCEDURE — 4004F PT TOBACCO SCREEN RCVD TLK: CPT | Performed by: NURSE PRACTITIONER

## 2022-06-07 PROCEDURE — G8427 DOCREV CUR MEDS BY ELIG CLIN: HCPCS | Performed by: NURSE PRACTITIONER

## 2022-06-07 PROCEDURE — 99214 OFFICE O/P EST MOD 30 MIN: CPT | Performed by: NURSE PRACTITIONER

## 2022-06-07 PROCEDURE — G8417 CALC BMI ABV UP PARAM F/U: HCPCS | Performed by: NURSE PRACTITIONER

## 2022-06-07 RX ORDER — LISINOPRIL 40 MG/1
40 TABLET ORAL DAILY
Qty: 30 TABLET | Refills: 3 | Status: SHIPPED | OUTPATIENT
Start: 2022-06-07 | End: 2022-10-18

## 2022-06-07 NOTE — PROGRESS NOTES
[facility]  [facility]  [place]  [address] [address]  Dept: [phone]  Dept Fax: [phone]  Loc: [phone]    Lurena Carrel is a 49 y.o. male who presents today for his medical conditions/complaints as noted below. Lurena Carrel is c/o of Hypertension (can't keep bp under 150)      Chief Complaint   Patient presents with    Hypertension     can't keep bp under 150       HPI:     HPI  Patient is here with complaints of elevated blood pressure readings. He has not been able to keep blood pressure systolically under 447. He does feel as though his anxiety has been worsening with the elevated blood pressure readings. He denies chest pain or shortness of breath.     Past Medical History:   Diagnosis Date    Anxiety     Arthritis     Bipolar 1 disorder ([address]     CAD in native artery 3/6/2018    Chronic back pain     COVID-19     Depression     Hyperlipidemia     Hypertension     IBS (irritable bowel syndrome)     Mild intermittent asthma without complication     Morbidly obese (HCC)     Panic disorder     at times afraid to go outside    Unspecified sleep apnea     bipap        Past Surgical History:   Procedure Laterality Date    ANUS SURGERY      APPENDECTOMY  2014    CHOLECYSTECTOMY  2009    COLONOSCOPY  2012        COLONOSCOPY  10/19/2017    Dr Lissett Braswell, Benign HP, 5 yr recall    COLONOSCOPY  2009    [facility]    ELECTROCONVULSIVE THERAPY N/A 2017    ELECTROCONVULSIVE THERAPY performed by Eris eDla Cruz DO at [address] [facility] 1990    w/mesh    UMBILICAL HERNIA REPAIR  1990    VASECTOMY         Social History     Tobacco Use    Smoking status: Current Every Day Smoker     Packs/day: 1.00     Years: 24.00     Pack years: 24.00     Types: Cigarettes     Last attempt to quit: 7/15/2020     Years since quittin.9    Smokeless tobacco: Never Used   Substance Use Topics    Alcohol use: No     Alcohol/week: 0.0 standard drinks        Current Outpatient Medications   Medication Sig Dispense Refill    lisinopril (PRINIVIL;ZESTRIL) 40 MG tablet Take 1 tablet by mouth daily 30 tablet 3    doxepin (SINEQUAN) 10 MG capsule Take 1 capsule by mouth nightly 30 capsule 5    VRAYLAR 3 MG CAPS capsule TAKE 1 CAPSULE BY MOUTH DAILY 30 capsule 1    atorvastatin (LIPITOR) 80 MG tablet TAKE 1 TABLET BY MOUTH AT NIGHT 30 tablet 2    vitamin D (ERGOCALCIFEROL) 1.25 MG (58522 UT) CAPS capsule TAKE 1 CAPSULE BY MOUTH ONCE A WEEK 12 capsule 1    busPIRone (BUSPAR) 15 MG tablet TAKE 1 TABLET BY MOUTH THREE TIMES DAILY. 90 tablet 2    colestipol (COLESTID) 1 g tablet TAKE 1 TABLET BY MOUTH TWICE DAILY. 60 tablet 5    LATUDA 20 MG TABS tablet TAKE 1 TABLET BY MOUTH DAILY 60 tablet 1    isosorbide mononitrate (IMDUR) 60 MG extended release tablet TAKE 1 TABLET BY MOUTH 2 TIMES DAILY 60 tablet 3    aspirin 81 MG chewable tablet Take 1 tablet by mouth daily 30 tablet 3    nitroGLYCERIN (NITROSTAT) 0.4 MG SL tablet up to max of 3 total doses. If no relief after 1 dose, call 911. 25 tablet 0    cloNIDine (CATAPRES) 0.1 MG tablet Take 1 tablet by mouth daily as needed for High Blood Pressure (140/90) 30 tablet 3    Misc. Devices (ROLLING WALKER/BURGUNDY) MISC Dx: gen weakness and fatigue use daily as directed 1 each 0    HYDROcodone-acetaminophen (NORCO)  MG per tablet Take 1 tablet by mouth every 8 hours as needed for Pain.  metoprolol tartrate (LOPRESSOR) 25 MG tablet TAKE 2 TABLETS BY MOUTH TWICE DAILY 180 tablet 1     No current facility-administered medications for this visit.        Allergies   Allergen Reactions    Dye [Gadolinium Derivatives] Hives     Mild rash that last less than 2 hours after MRI or CT scans       Family History   Problem Relation Age of Onset    Diabetes Mother     Cancer Mother         uterine CA    Depression Mother     Mental Illness Mother     Hypertension Mother    Tanisha Rivera Other Mother         blood clots    Heart Disease Brother     Depression Brother     Mental Illness Brother     Hypertension Brother     Breast Cancer Maternal Grandmother     Cancer Maternal Grandmother         breast CA    Heart Attack Maternal Grandmother     Other Maternal Grandmother         blood clots    Colon Cancer Neg Hx     Esophageal Cancer Neg Hx     Liver Cancer Neg Hx     Rectal Cancer Neg Hx     Stomach Cancer Neg Hx                Subjective:      Review of Systems   Constitutional: Negative. HENT: Negative. Eyes: Negative. Respiratory: Negative. Cardiovascular: Negative. Gastrointestinal: Negative. Endocrine: Negative. Genitourinary: Negative. Musculoskeletal: Negative. Skin: Negative. Neurological: Negative. Hematological: Negative. Psychiatric/Behavioral: Negative. Objective:     Physical Exam  Vitals and nursing note reviewed. Constitutional:       Appearance: Normal appearance. He is well-developed. Comments: obese   HENT:      Head: Normocephalic and atraumatic. Right Ear: Hearing, tympanic membrane, ear canal and external ear normal.      Left Ear: Hearing, tympanic membrane, ear canal and external ear normal.      Nose: Nose normal.      Mouth/Throat:      Pharynx: Uvula midline. Eyes:      General: Lids are normal.      Conjunctiva/sclera: Conjunctivae normal.      Pupils: Pupils are equal, round, and reactive to light. Neck:      Thyroid: No thyroid mass or thyromegaly. Trachea: Trachea normal.   Cardiovascular:      Rate and Rhythm: Normal rate and regular rhythm. Heart sounds: Normal heart sounds. Pulmonary:      Effort: Pulmonary effort is normal.      Breath sounds: Normal breath sounds. Abdominal:      General: Bowel sounds are normal.      Palpations: Abdomen is soft. Musculoskeletal:         General: Normal range of motion. Cervical back: Normal range of motion and neck supple. No tenderness. Thoracic back: Normal. No tenderness. Normal range of motion. Lumbar back: Normal. No tenderness. Normal range of motion. Skin:     General: Skin is warm and dry. Neurological:      Mental Status: He is alert and oriented to person, place, and time. Psychiatric:         Speech: Speech normal.         Behavior: Behavior normal.         Thought Content: Thought content normal.         Judgment: Judgment normal.         BP (!) 150/82 (Site: Left Upper Arm, Position: Sitting, Cuff Size: Large Adult)   Pulse 97   Temp 98.7 °F (37.1 °C) (Temporal)   Wt (!) 360 lb (163.3 kg)   SpO2 99%   BMI 46.22 kg/m²     Assessment:      Diagnosis Orders   1. Primary hypertension  lisinopril (PRINIVIL;ZESTRIL) 40 MG tablet   2. Bipolar depression ([facility] [address]         No results found for this visit on 06/07/22. Plan:     1. Primary hypertension  I am increasing lisinopril up to 40 mg daily. He is to continue to monitor blood pressure at home as discussed. I am having him return approximately 3 weeks to monitor start of this or increase of this medication.    - lisinopril (PRINIVIL;ZESTRIL) 40 MG tablet; Take 1 tablet by mouth daily  Dispense: 30 tablet; Refill: 3    2. Bipolar depression ([address]  Bipolar depression is currently stable with current regimen. We will continue to monitor worsening anxiety although I believe once blood pressure has improved anxiety will improve as well. Return in about 3 weeks (around 6/28/2022) for Medication Follow Up, HTN. No orders of the defined types were placed in this encounter. Orders Placed This Encounter   Medications    lisinopril (PRINIVIL;ZESTRIL) 40 MG tablet     Sig: Take 1 tablet by mouth daily     Dispense:  30 tablet     Refill:  3            Patient offered educational handouts and has had all questions answered.   Patient voices understanding and agrees to plans along with risks and benefits of plan. Patient is instructed to continue prior meds, diet, and exercise plans as instructed. Patient agrees to follow up as instructed and sooner if needed. Patient agrees to go to ER if condition becomes emergent. EMR Dragon/transcription disclaimer: Some of this encounter note is an electronic transcription/translation of spoken language to printed text. The electronic translation of spoken language may permit erroneous, or at times, nonsensical words or phrases to be inadvertently transcribed.  Although I have reviewed the note for such errors, some may still exist.    Electronically signed by SANDY Sparrow on 6/14/2022 at 5:11 PM

## 2022-06-14 ASSESSMENT — ENCOUNTER SYMPTOMS
RESPIRATORY NEGATIVE: 1
EYES NEGATIVE: 1
GASTROINTESTINAL NEGATIVE: 1

## 2022-06-24 DIAGNOSIS — F41.9 ANXIETY: ICD-10-CM

## 2022-06-24 DIAGNOSIS — F31.9 BIPOLAR DEPRESSION (HCC): ICD-10-CM

## 2022-06-24 DIAGNOSIS — I25.10 CAD IN NATIVE ARTERY: ICD-10-CM

## 2022-06-24 DIAGNOSIS — E78.2 MIXED HYPERLIPIDEMIA: ICD-10-CM

## 2022-06-24 DIAGNOSIS — R73.09 ELEVATED GLUCOSE: ICD-10-CM

## 2022-06-24 DIAGNOSIS — E55.9 VITAMIN D DEFICIENCY: ICD-10-CM

## 2022-06-24 DIAGNOSIS — I10 ESSENTIAL HYPERTENSION: ICD-10-CM

## 2022-06-24 LAB
ALBUMIN SERPL-MCNC: 4 G/DL (ref 3.5–5.2)
ALP BLD-CCNC: 132 U/L (ref 40–130)
ALT SERPL-CCNC: 37 U/L (ref 5–41)
ANION GAP SERPL CALCULATED.3IONS-SCNC: 13 MMOL/L (ref 7–19)
AST SERPL-CCNC: 54 U/L (ref 5–40)
BILIRUB SERPL-MCNC: 0.8 MG/DL (ref 0.2–1.2)
BUN BLDV-MCNC: 7 MG/DL (ref 6–20)
CALCIUM SERPL-MCNC: 9.3 MG/DL (ref 8.6–10)
CHLORIDE BLD-SCNC: 96 MMOL/L (ref 98–111)
CHOLESTEROL, FASTING: 107 MG/DL (ref 160–199)
CO2: 27 MMOL/L (ref 22–29)
CREAT SERPL-MCNC: 0.7 MG/DL (ref 0.5–1.2)
GFR AFRICAN AMERICAN: >59
GFR NON-AFRICAN AMERICAN: >60
GLUCOSE BLD-MCNC: 104 MG/DL (ref 74–109)
HBA1C MFR BLD: 5.8 % (ref 4–6)
HCT VFR BLD CALC: 47.6 % (ref 42–52)
HDLC SERPL-MCNC: 47 MG/DL (ref 55–121)
HEMOGLOBIN: 15.6 G/DL (ref 14–18)
LDL CHOLESTEROL CALCULATED: 26 MG/DL
MCH RBC QN AUTO: 30.8 PG (ref 27–31)
MCHC RBC AUTO-ENTMCNC: 32.8 G/DL (ref 33–37)
MCV RBC AUTO: 93.9 FL (ref 80–94)
PDW BLD-RTO: 13 % (ref 11.5–14.5)
PLATELET # BLD: 236 K/UL (ref 130–400)
PMV BLD AUTO: 9.3 FL (ref 9.4–12.4)
POTASSIUM SERPL-SCNC: 4.1 MMOL/L (ref 3.5–5)
RBC # BLD: 5.07 M/UL (ref 4.7–6.1)
SODIUM BLD-SCNC: 136 MMOL/L (ref 136–145)
TOTAL PROTEIN: 7.3 G/DL (ref 6.6–8.7)
TRIGLYCERIDE, FASTING: 170 MG/DL (ref 0–149)
TSH REFLEX FT4: 1.02 UIU/ML (ref 0.35–5.5)
VITAMIN D 25-HYDROXY: 33.9 NG/ML
WBC # BLD: 14.4 K/UL (ref 4.8–10.8)

## 2022-06-28 ENCOUNTER — OFFICE VISIT (OUTPATIENT)
Dept: FAMILY MEDICINE CLINIC | Age: 47
End: 2022-06-28
Payer: MEDICARE

## 2022-06-28 VITALS
BODY MASS INDEX: 46.22 KG/M2 | OXYGEN SATURATION: 96 % | SYSTOLIC BLOOD PRESSURE: 126 MMHG | TEMPERATURE: 97.2 F | WEIGHT: 315 LBS | HEART RATE: 100 BPM | DIASTOLIC BLOOD PRESSURE: 80 MMHG

## 2022-06-28 DIAGNOSIS — D72.829 LEUKOCYTOSIS, UNSPECIFIED TYPE: ICD-10-CM

## 2022-06-28 DIAGNOSIS — I10 PRIMARY HYPERTENSION: Primary | ICD-10-CM

## 2022-06-28 PROCEDURE — G8427 DOCREV CUR MEDS BY ELIG CLIN: HCPCS | Performed by: NURSE PRACTITIONER

## 2022-06-28 PROCEDURE — G8417 CALC BMI ABV UP PARAM F/U: HCPCS | Performed by: NURSE PRACTITIONER

## 2022-06-28 PROCEDURE — 99213 OFFICE O/P EST LOW 20 MIN: CPT | Performed by: NURSE PRACTITIONER

## 2022-06-28 PROCEDURE — 4004F PT TOBACCO SCREEN RCVD TLK: CPT | Performed by: NURSE PRACTITIONER

## 2022-06-28 RX ORDER — ISOSORBIDE MONONITRATE 60 MG/1
TABLET, EXTENDED RELEASE ORAL
Qty: 60 TABLET | Refills: 3 | Status: SHIPPED | OUTPATIENT
Start: 2022-06-28 | End: 2022-10-25

## 2022-06-28 SDOH — ECONOMIC STABILITY: FOOD INSECURITY: WITHIN THE PAST 12 MONTHS, YOU WORRIED THAT YOUR FOOD WOULD RUN OUT BEFORE YOU GOT MONEY TO BUY MORE.: NEVER TRUE

## 2022-06-28 SDOH — ECONOMIC STABILITY: FOOD INSECURITY: WITHIN THE PAST 12 MONTHS, THE FOOD YOU BOUGHT JUST DIDN'T LAST AND YOU DIDN'T HAVE MONEY TO GET MORE.: NEVER TRUE

## 2022-06-28 ASSESSMENT — SOCIAL DETERMINANTS OF HEALTH (SDOH): HOW HARD IS IT FOR YOU TO PAY FOR THE VERY BASICS LIKE FOOD, HOUSING, MEDICAL CARE, AND HEATING?: NOT HARD AT ALL

## 2022-06-28 NOTE — PROGRESS NOTES
[facility] PHYSICIAN SERVICES  [facility]  [place]  [address] [address]  Dept: [phone]  Dept Fax: [phone]  Loc: [phone]    Hossein Espinoza is a 49 y.o. male who presents today for his medical conditions/complaints as noted below. Hossein Espinoza is c/o of Follow-up and Hypertension      Chief Complaint   Patient presents with    Follow-up    Hypertension       HPI:     HPI   Patient is following up on hypertension and increase of lisinopril from last visit. Patient has been monitoring blood pressure at home and overall readings have improved. Patient did have labs completed earlier this past week and was noted to have slightly increased white count. Patient has had increased anxiety related to this elevated reading.     Past Medical History:   Diagnosis Date    Anxiety     Arthritis     Bipolar 1 disorder ([address]     CAD in native artery 3/6/2018    Chronic back pain     COVID-19     Depression     Hyperlipidemia     Hypertension     IBS (irritable bowel syndrome)     Mild intermittent asthma without complication 1/75/4093    Morbidly obese (HCC)     Panic disorder     at times afraid to go outside    Unspecified sleep apnea     bipap        Past Surgical History:   Procedure Laterality Date    ANUS SURGERY      APPENDECTOMY  02/03/2014    CHOLECYSTECTOMY  01/01/2009    COLONOSCOPY  01/01/2012        COLONOSCOPY  10/19/2017    Dr Oskar Warren, Benign HP, 5 yr recall    COLONOSCOPY  04/29/2009    [facility]    ELECTROCONVULSIVE THERAPY N/A 03/08/2017    ELECTROCONVULSIVE THERAPY performed by Chanell Zhao DO at [address] [facility] 01/01/1990    w/mesh    UMBILICAL HERNIA REPAIR  09/01/1990    VASECTOMY         Social History     Tobacco Use    Smoking status: Current Every Day Smoker     Packs/day: 1.00     Years: 24.00     Pack years: 24.00     Types: Cigarettes     Last attempt to quit: 7/15/2020 Years since quittin.9    Smokeless tobacco: Never Used   Substance Use Topics    Alcohol use: No     Alcohol/week: 0.0 standard drinks        Current Outpatient Medications   Medication Sig Dispense Refill    isosorbide mononitrate (IMDUR) 60 MG extended release tablet TAKE 1 TABLET BY MOUTH TWICE DAILY. 60 tablet 3    metoprolol tartrate (LOPRESSOR) 25 MG tablet TAKE 2 TABLETS BY MOUTH TWICE DAILY 180 tablet 1    lisinopril (PRINIVIL;ZESTRIL) 40 MG tablet Take 1 tablet by mouth daily 30 tablet 3    doxepin (SINEQUAN) 10 MG capsule Take 1 capsule by mouth nightly 30 capsule 5    VRAYLAR 3 MG CAPS capsule TAKE 1 CAPSULE BY MOUTH DAILY 30 capsule 1    atorvastatin (LIPITOR) 80 MG tablet TAKE 1 TABLET BY MOUTH AT NIGHT 30 tablet 2    vitamin D (ERGOCALCIFEROL) 1.25 MG (57043 UT) CAPS capsule TAKE 1 CAPSULE BY MOUTH ONCE A WEEK 12 capsule 1    busPIRone (BUSPAR) 15 MG tablet TAKE 1 TABLET BY MOUTH THREE TIMES DAILY. 90 tablet 2    colestipol (COLESTID) 1 g tablet TAKE 1 TABLET BY MOUTH TWICE DAILY. 60 tablet 5    LATUDA 20 MG TABS tablet TAKE 1 TABLET BY MOUTH DAILY 60 tablet 1    aspirin 81 MG chewable tablet Take 1 tablet by mouth daily 30 tablet 3    nitroGLYCERIN (NITROSTAT) 0.4 MG SL tablet up to max of 3 total doses. If no relief after 1 dose, call 911. 25 tablet 0    cloNIDine (CATAPRES) 0.1 MG tablet Take 1 tablet by mouth daily as needed for High Blood Pressure (140/90) 30 tablet 3    Misc. Devices (ROLLING WALKER/BURGUNDY) MISC Dx: gen weakness and fatigue use daily as directed 1 each 0    HYDROcodone-acetaminophen (NORCO)  MG per tablet Take 1 tablet by mouth every 8 hours as needed for Pain. No current facility-administered medications for this visit.        Allergies   Allergen Reactions    Dye [Gadolinium Derivatives] Hives     Mild rash that last less than 2 hours after MRI or CT scans       Family History   Problem Relation Age of Onset    Diabetes Mother     Cancer Mother         uterine CA    Depression Mother     Mental Illness Mother     Hypertension Mother    [facility] Other Mother         blood clots    Heart Disease Brother     Depression Brother     Mental Illness Brother     Hypertension Brother     Breast Cancer Maternal Grandmother     Cancer Maternal Grandmother         breast CA    Heart Attack Maternal Grandmother     Other Maternal Grandmother         blood clots    Colon Cancer Neg Hx     Esophageal Cancer Neg Hx     Liver Cancer Neg Hx     Rectal Cancer Neg Hx     Stomach Cancer Neg Hx                Subjective:      Review of Systems   Constitutional: Negative. HENT: Negative. Eyes: Negative. Respiratory: Negative. Cardiovascular: Negative. Gastrointestinal: Negative. Endocrine: Negative. Genitourinary: Negative. Musculoskeletal: Negative. Skin: Negative. Neurological: Negative. Hematological: Negative. Psychiatric/Behavioral: The patient is nervous/anxious. Objective:     Physical Exam  Vitals and nursing note reviewed. Constitutional:       Appearance: Normal appearance. He is well-developed. Comments: obese   HENT:      Head: Normocephalic and atraumatic. Right Ear: Hearing, tympanic membrane, ear canal and external ear normal.      Left Ear: Hearing, tympanic membrane, ear canal and external ear normal.      Nose: Nose normal.      Mouth/Throat:      Pharynx: Uvula midline. Eyes:      General: Lids are normal.      Conjunctiva/sclera: Conjunctivae normal.      Pupils: Pupils are equal, round, and reactive to light. Neck:      Thyroid: No thyroid mass or thyromegaly. Trachea: Trachea normal.   Cardiovascular:      Rate and Rhythm: Normal rate and regular rhythm. Heart sounds: Normal heart sounds. Pulmonary:      Effort: Pulmonary effort is normal.      Breath sounds: Normal breath sounds. Abdominal:      General: Bowel sounds are normal.      Palpations: Abdomen is soft. Musculoskeletal:         General: Normal range of motion. Cervical back: Normal range of motion and neck supple. No tenderness. Thoracic back: Normal. No tenderness. Normal range of motion. Lumbar back: Normal. No tenderness. Normal range of motion. Skin:     General: Skin is warm and dry. Neurological:      Mental Status: He is alert and oriented to person, place, and time. Psychiatric:         Speech: Speech normal.         Behavior: Behavior normal.         Thought Content: Thought content normal.         Judgment: Judgment normal.         /80 (Site: Left Upper Arm)   Pulse 100   Temp 97.2 °F (36.2 °C)   Wt (!) 360 lb (163.3 kg)   SpO2 96%   BMI 46.22 kg/m²     Assessment:      Diagnosis Orders   1. Primary hypertension     2. Leukocytosis, unspecified type  CBC with Auto Differential       No results found for this visit on 06/28/22. Plan:     1. Primary hypertension  Lisinopril is working well will continue current regimen. 2. Leukocytosis, unspecified type  Recheck CBC in 1 week. - CBC with Auto Differential; Future       Return in about 6 weeks (around 8/9/2022) for Follow up chronic conditions. Orders Placed This Encounter   Procedures    CBC with Auto Differential     Standing Status:   Future     Standing Expiration Date:   6/28/2023       No orders of the defined types were placed in this encounter. Patient offered educational handouts and has had all questions answered. Patient voices understanding and agrees to plans along with risks and benefits of plan. Patient is instructed to continue prior meds, diet, and exercise plans as instructed. Patient agrees to follow up as instructed and sooner if needed. Patient agrees to go to ER if condition becomes emergent. EMR Dragon/transcription disclaimer: Some of this encounter note is an electronic transcription/translation of spoken language to printed text.  The electronic translation of spoken language may permit erroneous, or at times, nonsensical words or phrases to be inadvertently transcribed.  Although I have reviewed the note for such errors, some may still exist.    Electronically signed by SANDY Arnold on 6/29/2022 at 3:34 PM

## 2022-06-29 ASSESSMENT — ENCOUNTER SYMPTOMS
GASTROINTESTINAL NEGATIVE: 1
EYES NEGATIVE: 1
RESPIRATORY NEGATIVE: 1

## 2022-07-05 ENCOUNTER — OFFICE VISIT (OUTPATIENT)
Dept: CARDIOLOGY CLINIC | Age: 47
End: 2022-07-05
Payer: MEDICARE

## 2022-07-05 VITALS
DIASTOLIC BLOOD PRESSURE: 86 MMHG | WEIGHT: 315 LBS | SYSTOLIC BLOOD PRESSURE: 134 MMHG | HEIGHT: 74 IN | HEART RATE: 90 BPM | OXYGEN SATURATION: 97 % | BODY MASS INDEX: 40.43 KG/M2

## 2022-07-05 DIAGNOSIS — I25.10 CORONARY ARTERY DISEASE INVOLVING NATIVE CORONARY ARTERY OF NATIVE HEART WITHOUT ANGINA PECTORIS: Primary | ICD-10-CM

## 2022-07-05 DIAGNOSIS — I10 ESSENTIAL HYPERTENSION: ICD-10-CM

## 2022-07-05 DIAGNOSIS — I47.1 SVT (SUPRAVENTRICULAR TACHYCARDIA) (HCC): ICD-10-CM

## 2022-07-05 PROCEDURE — G8417 CALC BMI ABV UP PARAM F/U: HCPCS | Performed by: NURSE PRACTITIONER

## 2022-07-05 PROCEDURE — 99214 OFFICE O/P EST MOD 30 MIN: CPT | Performed by: NURSE PRACTITIONER

## 2022-07-05 PROCEDURE — 4004F PT TOBACCO SCREEN RCVD TLK: CPT | Performed by: NURSE PRACTITIONER

## 2022-07-05 PROCEDURE — G8427 DOCREV CUR MEDS BY ELIG CLIN: HCPCS | Performed by: NURSE PRACTITIONER

## 2022-07-05 RX ORDER — ALPRAZOLAM 0.5 MG/1
0.5 TABLET ORAL NIGHTLY PRN
COMMUNITY
End: 2022-07-12

## 2022-07-05 ASSESSMENT — ENCOUNTER SYMPTOMS
CHEST TIGHTNESS: 0
SORE THROAT: 0
WHEEZING: 0
SHORTNESS OF BREATH: 0
COUGH: 0

## 2022-07-05 NOTE — PROGRESS NOTES
[facility] Cardiology   Established Patient Office Visit   Camilo Fernandez. [address]  [phone]        OFFICE VISIT:  2022    Ivone Singleton - : 1975    Reason For Visit:  Mary Wolfe is a 49 y.o. male who is here for 6 Month Follow-Up    1. Coronary artery disease involving native coronary artery of native heart without angina pectoris    2. Essential hypertension    3. SVT (supraventricular tachycardia) ([address]      Patient with a history of coronary artery disease, hypertension, hyperlipidemia, Abhilash-Parkinson-White syndrome and sleep apnea.     Patient had ER visit on 2021 for paroxysmal SVT.  Pulse rate 206 bpm.  He was converted after 12 mg of adenosine.  Patient was discharged on beta-blocker. Patient was seen by Dr. Mark Wilson on 2021 with Dr. Fredy Wilder in [place], [place]. Patient had a hospitalization on 2021 with persistent moderate to severe pressure-like left-sided to substernal chest discomfort. He underwent a 2D echo that showed  Technically difficult study due to poor acoustical window with some images suboptimal. Normal left ventricular size and systolic function EF 55 to 60%. Normal LV wall thickness with preserved diastolic function. Normal left atrial size   Aortic valve appears to be tricuspid without evidence of stenosis or insufficiency. Normally mobile mitral valve without regurgitation. No evidence of pulmonic stenosis or insufficiency   Normal right-sided chambers with preserved RV systolic function. No tricuspid regurgitation with which to estimate RVSP   Aortic root and ascending segments measure within normal limits. No significant pericardial effusion. Definity contrast utilized to better define endocardial borders     Work-up included negative troponins x3. Cardiology was consulted and patient underwent cardiac catheterization on 2021  ?  Single-vessel, branch disease (known OM 2 short segment occlusion). Intermediate lesion in mid LAD, nonobstructive by iFR testing. Normal LV systolic function. Unsuccessful attempt at PCI to OM2 (successful wiring but inability to advance any low-profile balloons across lesion). Further attempts deferred.   ? Recommendations:  Medical management.      Catheterization March 2018 bare-metal stent and was on aspirin and Plavix for 30 days.     Patient presents to clinic today for 6-month follow-up. Patient denies any chest pain, pressure or tightness. There is no shortness of breath, orthopnea or PND. Patient denies any lightheadedness, dizziness or syncope. Janet Connell is a 49 y.o. male with the following history as recorded in [facility]:    Patient Active Problem List    Diagnosis Date Noted    Coronary artery disease involving native coronary artery of native heart with angina pectoris ([facility] 03/06/2018    SVT (supraventricular tachycardia) ([facility] 01/05/2022    Chest pain 11/11/2021    Shortness of breath 03/30/2021    Restrictive lung disease 03/30/2021    Snoring 03/30/2021    Non-restorative sleep 03/30/2021    Hypersomnia 03/30/2021    Severe sleep apnea 03/30/2021    Mild intermittent asthma without complication 99/39/2463    Chronic obstructive pulmonary disease ([facility] 08/14/2019    History of smoking 08/14/2019    Morbid obesity ([facility] 07/30/2019    Leg pain, bilateral     Acute chest pain     Cigarette nicotine dependence without complication 48/51/0031    Bipolar depression ([facility] 12/16/2016    S/P laparoscopic appendectomy 02/27/2014    History of colon polyps 05/17/2013    Depression     Hypertension     Anxiety      Current Outpatient Medications   Medication Sig Dispense Refill    ALPRAZolam (XANAX) 0.5 MG tablet Take 0.5 mg by mouth nightly as needed for Sleep.  isosorbide mononitrate (IMDUR) 60 MG extended release tablet TAKE 1 TABLET BY MOUTH TWICE DAILY.  60 tablet 3    metoprolol tartrate (LOPRESSOR) 25 MG tablet TAKE 2 TABLETS BY MOUTH TWICE DAILY 180 tablet 1    lisinopril (PRINIVIL;ZESTRIL) 40 MG tablet Take 1 tablet by mouth daily 30 tablet 3    doxepin (SINEQUAN) 10 MG capsule Take 1 capsule by mouth nightly 30 capsule 5    VRAYLAR 3 MG CAPS capsule TAKE 1 CAPSULE BY MOUTH DAILY 30 capsule 1    atorvastatin (LIPITOR) 80 MG tablet TAKE 1 TABLET BY MOUTH AT NIGHT 30 tablet 2    vitamin D (ERGOCALCIFEROL) 1.25 MG (58486 UT) CAPS capsule TAKE 1 CAPSULE BY MOUTH ONCE A WEEK 12 capsule 1    busPIRone (BUSPAR) 15 MG tablet TAKE 1 TABLET BY MOUTH THREE TIMES DAILY. 90 tablet 2    colestipol (COLESTID) 1 g tablet TAKE 1 TABLET BY MOUTH TWICE DAILY. 60 tablet 5    LATUDA 20 MG TABS tablet TAKE 1 TABLET BY MOUTH DAILY 60 tablet 1    aspirin 81 MG chewable tablet Take 1 tablet by mouth daily 30 tablet 3    nitroGLYCERIN (NITROSTAT) 0.4 MG SL tablet up to max of 3 total doses. If no relief after 1 dose, call 911. 25 tablet 0    cloNIDine (CATAPRES) 0.1 MG tablet Take 1 tablet by mouth daily as needed for High Blood Pressure (140/90) 30 tablet 3    Misc. Devices (ROLLING WALKER/BURGUNDY) MISC Dx: gen weakness and fatigue use daily as directed 1 each 0    HYDROcodone-acetaminophen (NORCO)  MG per tablet Take 1 tablet by mouth every 8 hours as needed for Pain. No current facility-administered medications for this visit.      Allergies: Dye [gadolinium derivatives]  Past Medical History:   Diagnosis Date    Anxiety     Arthritis     Bipolar 1 disorder ([facility]     CAD in native artery 3/6/2018    Chronic back pain     COVID-19     Depression     Hyperlipidemia     Hypertension     IBS (irritable bowel syndrome)     Mild intermittent asthma without complication 0/58/5030    Morbidly obese (HCC)     Panic disorder     at times afraid to go outside    Unspecified sleep apnea     bipap     Past Surgical History:   Procedure Laterality Date    ANUS SURGERY      APPENDECTOMY  02/03/2014    CHOLECYSTECTOMY  2009    COLONOSCOPY  2012        COLONOSCOPY  10/19/2017    Dr Curt Singleton, Benign HP, 5 yr recall    COLONOSCOPY  2009    [facility] ELECTROCONVULSIVE THERAPY N/A 2017    ELECTROCONVULSIVE THERAPY performed by Risa Persaud DO at [address] 1990    w/mesh    UMBILICAL HERNIA REPAIR  1990    VASECTOMY       Family History   Problem Relation Age of Onset    Diabetes Mother     Cancer Mother         uterine CA    Depression Mother     Mental Illness Mother     Hypertension Mother     Other Mother         blood clots    Heart Disease Brother     Depression Brother     Mental Illness Brother     Hypertension Brother     Breast Cancer Maternal Grandmother     Cancer Maternal Grandmother         breast CA    Heart Attack Maternal Grandmother     Other Maternal Grandmother         blood clots    Colon Cancer Neg Hx     Esophageal Cancer Neg Hx     Liver Cancer Neg Hx     Rectal Cancer Neg Hx     Stomach Cancer Neg Hx      Social History     Tobacco Use    Smoking status: Current Every Day Smoker     Packs/day: 1.00     Years: 24.00     Pack years: 24.00     Types: Cigarettes     Last attempt to quit: 7/15/2020     Years since quittin.9    Smokeless tobacco: Never Used   Substance Use Topics    Alcohol use: No     Alcohol/week: 0.0 standard drinks          Review of Systems:    Review of Systems   Constitutional: Negative for activity change, chills, diaphoresis, fatigue and fever. HENT: Negative for congestion and sore throat. Respiratory: Negative for cough, chest tightness, shortness of breath and wheezing. Cardiovascular: Negative for chest pain, palpitations and leg swelling. Neurological: Negative for dizziness, syncope, light-headedness and headaches. Psychiatric/Behavioral: Negative for confusion. The patient is not nervous/anxious.          Objective:    BP 134/86   Pulse 90   Ht 6' 2\" (1.88 m)   Wt (!) 353 lb (160.1 kg)   SpO2 97%   BMI 45.32 kg/m²     GENERAL - well developed and well nourished, conversant  HEENT -  PERRLA, Hearing appears normal, gentleman lids are normal.  External inspection of ears and nose appear normal.  NECK - no thyromegaly, no JVD, trachea is in the midline  CARDIOVASCULAR - PMI is in the mid line clavicular position, Normal S1 and S2 with nosystolic murmur. No S3 or S4    PULMONARY - no respiratory distress. No wheezes or rales. Lungs are clear to ausculation, normal respiratory effort. ABDOMEN  - soft, non tender, no rebound  MUSCULOSKELETAL  - range of motion of the upper and lower extermites appears normal and equal and is without pain   EXTREMITIES - no significant edema   NEUROLOGIC - gait and station are normal  SKIN - turgor is normal, can warm and dry. PSYCHIATRIC - normal mood and affect, alert and orientated x 3,      ASSESSMENT:    ALL THE CARDIOLOGY PROBLEMS ARE LISTED ABOVE; HOWEVER, THE FOLLOWING SPECIFIC CARDIAC PROBLEMS / CONDITIONS WERE ADDRESSED AND TREATED DURING THE OFFICE VISIT TODAY:                                                                                            MEDICAL DECISION MAKING             Cardiac Specific Problem / Diagnosis  Discussion and Data Reviewed Diagnostic Procedures Ordered Management Options Selected           1. CAD  Stable   Review and summation of old records:    No chest pain. Patient is on aspirin, Lipitor, Imdur and Lopressor. No Continue current medications:     Yes           2. Hypertension  Stable   Blood pressure in the office today 134/86. O2 sat 97%. Patient is on lisinopril 40 mg daily. Lopressor 25 mg twice daily. Clonidine 0.1 mg as needed No Continue current medications:    Yes           3. History of SVT ablation Well Controlled  no recurrence. Patient is on Lopressor 25 mg twice daily. No Continue current medications:        Yes                     No

## 2022-07-11 DIAGNOSIS — F41.9 ANXIETY: Primary | ICD-10-CM

## 2022-07-12 RX ORDER — ALPRAZOLAM 0.5 MG/1
TABLET ORAL
Qty: 30 TABLET | Refills: 1 | Status: SHIPPED | OUTPATIENT
Start: 2022-07-12 | End: 2022-09-06 | Stop reason: SDUPTHER

## 2022-07-15 DIAGNOSIS — F41.9 ANXIETY: ICD-10-CM

## 2022-07-17 RX ORDER — BUSPIRONE HYDROCHLORIDE 15 MG/1
TABLET ORAL
Qty: 90 TABLET | Refills: 2 | Status: SHIPPED | OUTPATIENT
Start: 2022-07-17 | End: 2022-10-18

## 2022-07-25 DIAGNOSIS — F31.9 BIPOLAR DEPRESSION (HCC): ICD-10-CM

## 2022-07-25 DIAGNOSIS — F41.9 ANXIETY: ICD-10-CM

## 2022-07-26 RX ORDER — CARIPRAZINE 3 MG/1
CAPSULE, GELATIN COATED ORAL
Qty: 30 CAPSULE | Refills: 1 | Status: SHIPPED | OUTPATIENT
Start: 2022-07-26 | End: 2022-09-27

## 2022-08-16 RX ORDER — LURASIDONE HYDROCHLORIDE 20 MG/1
TABLET, FILM COATED ORAL
Qty: 60 TABLET | Refills: 1 | Status: SHIPPED | OUTPATIENT
Start: 2022-08-16

## 2022-09-06 ENCOUNTER — OFFICE VISIT (OUTPATIENT)
Dept: FAMILY MEDICINE CLINIC | Age: 47
End: 2022-09-06
Payer: MEDICARE

## 2022-09-06 VITALS
OXYGEN SATURATION: 96 % | TEMPERATURE: 97 F | HEART RATE: 88 BPM | WEIGHT: 315 LBS | DIASTOLIC BLOOD PRESSURE: 72 MMHG | SYSTOLIC BLOOD PRESSURE: 114 MMHG | BODY MASS INDEX: 46.61 KG/M2

## 2022-09-06 DIAGNOSIS — F41.9 ANXIETY: Primary | ICD-10-CM

## 2022-09-06 DIAGNOSIS — I10 PRIMARY HYPERTENSION: ICD-10-CM

## 2022-09-06 DIAGNOSIS — F31.9 BIPOLAR DEPRESSION (HCC): ICD-10-CM

## 2022-09-06 DIAGNOSIS — D72.829 LEUKOCYTOSIS, UNSPECIFIED TYPE: ICD-10-CM

## 2022-09-06 LAB
BASOPHILS ABSOLUTE: 0.1 K/UL (ref 0–0.2)
BASOPHILS RELATIVE PERCENT: 0.7 % (ref 0–1)
EOSINOPHILS ABSOLUTE: 0.1 K/UL (ref 0–0.6)
EOSINOPHILS RELATIVE PERCENT: 1 % (ref 0–5)
HCT VFR BLD CALC: 48 % (ref 42–52)
HEMOGLOBIN: 15.5 G/DL (ref 14–18)
IMMATURE GRANULOCYTES #: 0 K/UL
LYMPHOCYTES ABSOLUTE: 4.6 K/UL (ref 1.1–4.5)
LYMPHOCYTES RELATIVE PERCENT: 40.4 % (ref 20–40)
MCH RBC QN AUTO: 31.4 PG (ref 27–31)
MCHC RBC AUTO-ENTMCNC: 32.3 G/DL (ref 33–37)
MCV RBC AUTO: 97.2 FL (ref 80–94)
MONOCYTES ABSOLUTE: 0.6 K/UL (ref 0–0.9)
MONOCYTES RELATIVE PERCENT: 5.2 % (ref 0–10)
NEUTROPHILS ABSOLUTE: 6 K/UL (ref 1.5–7.5)
NEUTROPHILS RELATIVE PERCENT: 52.4 % (ref 50–65)
PDW BLD-RTO: 13.7 % (ref 11.5–14.5)
PLATELET # BLD: 219 K/UL (ref 130–400)
PMV BLD AUTO: 9.6 FL (ref 9.4–12.4)
RBC # BLD: 4.94 M/UL (ref 4.7–6.1)
WBC # BLD: 11.4 K/UL (ref 4.8–10.8)

## 2022-09-06 PROCEDURE — 4004F PT TOBACCO SCREEN RCVD TLK: CPT | Performed by: NURSE PRACTITIONER

## 2022-09-06 PROCEDURE — 99214 OFFICE O/P EST MOD 30 MIN: CPT | Performed by: NURSE PRACTITIONER

## 2022-09-06 PROCEDURE — G8427 DOCREV CUR MEDS BY ELIG CLIN: HCPCS | Performed by: NURSE PRACTITIONER

## 2022-09-06 PROCEDURE — G8417 CALC BMI ABV UP PARAM F/U: HCPCS | Performed by: NURSE PRACTITIONER

## 2022-09-06 RX ORDER — ALPRAZOLAM 0.5 MG/1
TABLET ORAL
Qty: 45 TABLET | Refills: 0 | Status: SHIPPED | OUTPATIENT
Start: 2022-09-06 | End: 2022-10-11

## 2022-09-06 NOTE — PROGRESS NOTES
[facility] PHYSICIAN SERVICES  [facility]  [place]  [address] [address]  Dept: [phone]  Dept Fax: [phone]  Loc: [phone]    Kimberly Paula is a 49 y.o. male who presents today for his medical conditions/complaints as noted below. Kimberly Paula is c/o of Follow-up and Medication Check      Chief Complaint   Patient presents with    Follow-up    Medication Check       HPI:     HPI  Patient is here for a check on HTN and anxiety/depression. He reports that the current regimen is working well. He is still having some situational anxiety, but feels like it is stable. He also has had some depression type symptoms break through, but again reports it is situational and is not wanting to adjust meds yet. Patient has been monitoring blood pressure at home and it has been doing well since start of medications.      Past Medical History:   Diagnosis Date    Anxiety     Arthritis     Bipolar 1 disorder ([facility] [address]     CAD in native artery 3/6/2018    Chronic back pain     COVID-19     Depression     Hyperlipidemia     Hypertension     IBS (irritable bowel syndrome)     Mild intermittent asthma without complication 7/56/5032    Morbidly obese (HCC)     Panic disorder     at times afraid to go outside    Unspecified sleep apnea     bipap        Past Surgical History:   Procedure Laterality Date    ANUS SURGERY      APPENDECTOMY  02/03/2014    CHOLECYSTECTOMY  01/01/2009    COLONOSCOPY  01/01/2012        COLONOSCOPY  10/19/2017    Dr Bro Newman, Benign HP, 5 yr recall    COLONOSCOPY  04/29/2009    [facility]    ELECTROCONVULSIVE THERAPY N/A 03/08/2017    ELECTROCONVULSIVE THERAPY performed by Norberto Kraus DO at [address] [place] [address] 01/01/1990    w/mesh    UMBILICAL HERNIA REPAIR  09/01/1990    VASECTOMY         Social History     Tobacco Use    Smoking status: Every Day     Packs/day: 1.00     Years: 24.00     Pack years: 24.00     Types: Cigarettes     Last attempt to quit: 7/15/2020     Years since quittin.1    Smokeless tobacco: Never   Substance Use Topics    Alcohol use: No     Alcohol/week: 0.0 standard drinks        Current Outpatient Medications   Medication Sig Dispense Refill    ALPRAZolam (XANAX) 0.5 MG tablet TAKE 1 TABLET BY MOUTH BID AS NEEDED FOR ANXIETY 45 tablet 0    LATUDA 20 MG TABS tablet TAKE 1 TABLET BY MOUTH DAILY 60 tablet 1    metoprolol tartrate (LOPRESSOR) 25 MG tablet TAKE 2 TABLETS BY MOUTH TWICE DAILY 180 tablet 1    VRAYLAR 3 MG CAPS capsule TAKE 1 CAPSULE BY MOUTH DAILY 30 capsule 1    busPIRone (BUSPAR) 15 MG tablet TAKE 1 TABLET BY MOUTH THREE TIMES DAILY. 90 tablet 2    isosorbide mononitrate (IMDUR) 60 MG extended release tablet TAKE 1 TABLET BY MOUTH TWICE DAILY. 60 tablet 3    lisinopril (PRINIVIL;ZESTRIL) 40 MG tablet Take 1 tablet by mouth daily 30 tablet 3    doxepin (SINEQUAN) 10 MG capsule Take 1 capsule by mouth nightly 30 capsule 5    atorvastatin (LIPITOR) 80 MG tablet TAKE 1 TABLET BY MOUTH AT NIGHT 30 tablet 2    vitamin D (ERGOCALCIFEROL) 1.25 MG (86627 UT) CAPS capsule TAKE 1 CAPSULE BY MOUTH ONCE A WEEK 12 capsule 1    colestipol (COLESTID) 1 g tablet TAKE 1 TABLET BY MOUTH TWICE DAILY. 60 tablet 5    aspirin 81 MG chewable tablet Take 1 tablet by mouth daily 30 tablet 3    nitroGLYCERIN (NITROSTAT) 0.4 MG SL tablet up to max of 3 total doses. If no relief after 1 dose, call 911. 25 tablet 0    cloNIDine (CATAPRES) 0.1 MG tablet Take 1 tablet by mouth daily as needed for High Blood Pressure (140/90) 30 tablet 3    Misc. Devices (ROLLING WALKER/BURGUNDY) MISC Dx: gen weakness and fatigue use daily as directed 1 each 0    HYDROcodone-acetaminophen (NORCO)  MG per tablet Take 1 tablet by mouth every 8 hours as needed for Pain. No current facility-administered medications for this visit.        Allergies   Allergen Reactions    Dye [Gadolinium Derivatives] Hives     Mild rash that last 2+ on the left side. Posterior tibial pulses are 2+ on the right side and 2+ on the left side. Heart sounds: Normal heart sounds. Pulmonary:      Effort: Pulmonary effort is normal.      Breath sounds: Normal breath sounds and air entry. Abdominal:      General: Bowel sounds are normal.      Palpations: Abdomen is soft. Musculoskeletal:      Cervical back: Full passive range of motion without pain, normal range of motion and neck supple. Thoracic back: No tenderness. Normal range of motion. Lumbar back: No tenderness. Normal range of motion. Lymphadenopathy:      Cervical: No cervical adenopathy. Skin:     General: Skin is warm and dry. Capillary Refill: Capillary refill takes less than 2 seconds. Neurological:      General: No focal deficit present. Mental Status: He is alert and oriented to person, place, and time. Mental status is at baseline. Coordination: Coordination is intact. Psychiatric:         Mood and Affect: Mood normal.         Speech: Speech normal.         Behavior: Behavior normal.         Thought Content: Thought content normal.         Cognition and Memory: Cognition and memory normal.         Judgment: Judgment normal.       /72 (Site: Left Upper Arm)   Pulse 88   Temp 97 °F (36.1 °C)   Wt (!) 363 lb (164.7 kg)   SpO2 96%   BMI 46.61 kg/m²     Assessment:      Diagnosis Orders   1. Anxiety  ALPRAZolam (XANAX) 0.5 MG tablet      2. Primary hypertension        3. [facility]            No results found for this visit on 09/06/22. Plan:     1. Anxiety  Increase xanax up to 45 tabs per month. - ALPRAZolam (XANAX) 0.5 MG tablet; TAKE 1 TABLET BY MOUTH BID AS NEEDED FOR ANXIETY  Dispense: 45 tablet; Refill: 0    2. Primary hypertension  Continue current regimen of lisinopril. 3. Bipolar depression ([facility] [address]  Stable. Return in about 4 months (around 1/6/2023), or if symptoms worsen or fail to improve.     No orders of the defined types were placed in this encounter. Orders Placed This Encounter   Medications    ALPRAZolam (XANAX) 0.5 MG tablet     Sig: TAKE 1 TABLET BY MOUTH BID AS NEEDED FOR ANXIETY     Dispense:  45 tablet     Refill:  0              Patient offered educational handouts and has had all questions answered. Patient voices understanding and agrees to plans along with risks and benefits of plan. Patient is instructed to continue prior meds, diet, and exercise plans as instructed. Patient agrees to follow up as instructed and sooner if needed. Patient agrees to go to ER if condition becomes emergent. EMR Dragon/transcription disclaimer: Some of this encounter note is an electronic transcription/translation of spoken language to printed text. The electronic translation of spoken language may permit erroneous, or at times, nonsensical words or phrases to be inadvertently transcribed.  Although I have reviewed the note for such errors, some may still exist.    Electronically signed by SANDY Aldridge on 9/7/2022 at 9:38 AM

## 2022-09-07 DIAGNOSIS — D72.829 LEUKOCYTOSIS, UNSPECIFIED TYPE: Primary | ICD-10-CM

## 2022-09-07 DIAGNOSIS — I25.10 CAD IN NATIVE ARTERY: ICD-10-CM

## 2022-09-07 RX ORDER — ATORVASTATIN CALCIUM 80 MG/1
TABLET, FILM COATED ORAL
Qty: 30 TABLET | Refills: 2 | Status: SHIPPED | OUTPATIENT
Start: 2022-09-07

## 2022-09-07 ASSESSMENT — ENCOUNTER SYMPTOMS
EYES NEGATIVE: 1
RESPIRATORY NEGATIVE: 1
GASTROINTESTINAL NEGATIVE: 1

## 2022-09-27 DIAGNOSIS — F31.9 BIPOLAR DEPRESSION (HCC): ICD-10-CM

## 2022-09-27 DIAGNOSIS — F41.9 ANXIETY: ICD-10-CM

## 2022-09-27 RX ORDER — CARIPRAZINE 3 MG/1
CAPSULE, GELATIN COATED ORAL
Qty: 30 CAPSULE | Refills: 1 | Status: SHIPPED | OUTPATIENT
Start: 2022-09-27

## 2022-09-27 RX ORDER — MONTELUKAST SODIUM 4 MG/1
TABLET, CHEWABLE ORAL
Qty: 60 TABLET | Refills: 5 | Status: SHIPPED | OUTPATIENT
Start: 2022-09-27

## 2022-10-07 DIAGNOSIS — F41.9 ANXIETY: ICD-10-CM

## 2022-10-10 NOTE — TELEPHONE ENCOUNTER
Alli Queen called to request a refill on his medication. Last office visit : 9/6/2022   Next office visit : 12/6/2022     Last UDS:   Amphetamine Screen, Urine   Date Value Ref Range Status   11/19/2021 -  Final     Barbiturate Screen, Urine   Date Value Ref Range Status   11/19/2021 -  Final     Benzodiazepine Screen, Urine   Date Value Ref Range Status   11/19/2021 +  Final     Buprenorphine Urine   Date Value Ref Range Status   11/19/2021 -  Final     Cocaine Metabolite Screen, Urine   Date Value Ref Range Status   11/19/2021 -  Final     Gabapentin Screen, Urine   Date Value Ref Range Status   11/19/2021 -  Final     MDMA, Urine   Date Value Ref Range Status   11/19/2021 -  Final     Methamphetamine, Urine   Date Value Ref Range Status   11/19/2021 -  Final     Opiate Scrn, Ur   Date Value Ref Range Status   11/19/2021 +  Final     Oxycodone Screen, Ur   Date Value Ref Range Status   11/19/2021 -  Final     PCP Screen, Urine   Date Value Ref Range Status   11/19/2021 -  Final     Propoxyphene Screen, Urine   Date Value Ref Range Status   11/19/2021 -  Final     THC Screen, Urine   Date Value Ref Range Status   11/19/2021 -  Final     Tricyclic Antidepressants, Urine   Date Value Ref Range Status   11/19/2021 -  Final       Last Nilesh Makua: 10/10/22  Medication Contract: 7/29/16   Last Fill: 9/9/22    Requested Prescriptions     Pending Prescriptions Disp Refills    ALPRAZolam (XANAX) 0.5 MG tablet [Pharmacy Med Name: ALPRAZOLAM 0.5MG TABLET] 45 tablet 0     Sig: TAKE 1 TABLET BY MOUTH TWICE DAILY AS NEEDED FOR ANXIETY         Please approve or refuse this medication.    Michael Hill MA

## 2022-10-11 RX ORDER — ALPRAZOLAM 0.5 MG/1
TABLET ORAL
Qty: 45 TABLET | Refills: 1 | Status: SHIPPED | OUTPATIENT
Start: 2022-10-11 | End: 2022-11-11

## 2022-10-17 DIAGNOSIS — F41.9 ANXIETY: ICD-10-CM

## 2022-10-17 DIAGNOSIS — I10 PRIMARY HYPERTENSION: ICD-10-CM

## 2022-10-17 NOTE — TELEPHONE ENCOUNTER
Tawanna Kehr called to request a refill on his medication. Last office visit : 4/22/2022   Next office visit : Visit date not found     Requested Prescriptions     Pending Prescriptions Disp Refills    busPIRone (BUSPAR) 15 MG tablet [Pharmacy Med Name: BUSPIRONE HCL 15MG TABLET] 90 tablet 2     Sig: TAKE 1 TABLET BY MOUTH THREE TIMES DAILY.     lisinopril (PRINIVIL;ZESTRIL) 40 MG tablet [Pharmacy Med Name: LISINOPRIL 40MG TABLET] 30 tablet 3     Sig: TAKE 1 TABLET BY MOUTH DAILY            Fransisca Goel MA

## 2022-10-18 RX ORDER — LISINOPRIL 40 MG/1
40 TABLET ORAL DAILY
Qty: 30 TABLET | Refills: 3 | Status: SHIPPED | OUTPATIENT
Start: 2022-10-18

## 2022-10-18 RX ORDER — BUSPIRONE HYDROCHLORIDE 15 MG/1
TABLET ORAL
Qty: 90 TABLET | Refills: 2 | Status: SHIPPED | OUTPATIENT
Start: 2022-10-18

## 2022-10-24 NOTE — TELEPHONE ENCOUNTER
Daryl Seb called to request a refill on his medication. Last office visit : 4/22/2022   Next office visit : Visit date not found     Requested Prescriptions     Pending Prescriptions Disp Refills    isosorbide mononitrate (IMDUR) 60 MG extended release tablet [Pharmacy Med Name: ISOSORBIDE MONONITRATE ER 60MG TABLET EXTENDED RELEASE 24 HOUR] 60 tablet 3     Sig: TAKE 1 TABLET BY MOUTH TWICE DAILY.             Car Blas [place]

## 2022-10-25 RX ORDER — ISOSORBIDE MONONITRATE 60 MG/1
TABLET, EXTENDED RELEASE ORAL
Qty: 60 TABLET | Refills: 3 | Status: SHIPPED | OUTPATIENT
Start: 2022-10-25

## 2022-11-07 DIAGNOSIS — I25.10 CAD IN NATIVE ARTERY: ICD-10-CM

## 2022-11-08 RX ORDER — ATORVASTATIN CALCIUM 80 MG/1
TABLET, FILM COATED ORAL
Qty: 30 TABLET | Refills: 2 | Status: SHIPPED | OUTPATIENT
Start: 2022-11-08

## 2022-11-18 DIAGNOSIS — F31.9 BIPOLAR DEPRESSION (HCC): ICD-10-CM

## 2022-11-18 DIAGNOSIS — F41.9 ANXIETY: ICD-10-CM

## 2022-11-18 RX ORDER — CARIPRAZINE 3 MG/1
CAPSULE, GELATIN COATED ORAL
Qty: 30 CAPSULE | Refills: 1 | Status: SHIPPED | OUTPATIENT
Start: 2022-11-18

## 2022-11-18 RX ORDER — DOXEPIN HYDROCHLORIDE 10 MG/1
10 CAPSULE ORAL NIGHTLY
Qty: 30 CAPSULE | Refills: 5 | Status: SHIPPED | OUTPATIENT
Start: 2022-11-18

## 2022-11-18 NOTE — TELEPHONE ENCOUNTER
Jignesh Fonseca called requesting a refill of the below medication which has been pended for you:     Requested Prescriptions     Pending Prescriptions Disp Refills    doxepin (SINEQUAN) 10 MG capsule [Pharmacy Med Name: DOXEPIN HYDROCHLORIDE 10MG CAPSULE] 30 capsule 5     Sig: TAKE 1 CAPSULE BY MOUTH NIGHTLY    VRAYLAR 3 MG CAPS capsule [Pharmacy Med Name: Mar Bhaskar 30 capsule 1     Sig: TAKE 1 CAPSULE BY MOUTH DAILY       Last Appointment Date: 9/6/2022  Next Appointment Date: 12/6/2022    Allergies   Allergen Reactions    Dye [Gadolinium Derivatives] Hives     Mild rash that last less than 2 hours after MRI or CT scans

## 2022-11-23 ENCOUNTER — TELEPHONE (OUTPATIENT)
Dept: GASTROENTEROLOGY | Facility: CLINIC | Age: 47
End: 2022-11-23

## 2022-11-23 NOTE — TELEPHONE ENCOUNTER
I have sent 2 letters to pt re: recall colon and have had no response. I called and spoke to him about it-he tells me has been having a lot of issues with anxiety and panic attacks. He tells me he can't even go anywhere due to those issues. He is working with PCP to get this under control and apologized for not calling us sooner. I am going to remove him from my list and he is going to call back when he is ready to proceed.

## 2022-11-29 RX ORDER — ERGOCALCIFEROL 1.25 MG/1
50000 CAPSULE ORAL WEEKLY
Qty: 12 CAPSULE | Refills: 1 | Status: SHIPPED | OUTPATIENT
Start: 2022-11-29

## 2022-12-06 ENCOUNTER — TELEMEDICINE (OUTPATIENT)
Dept: FAMILY MEDICINE CLINIC | Age: 47
End: 2022-12-06
Payer: MEDICARE

## 2022-12-06 DIAGNOSIS — F31.9 BIPOLAR DEPRESSION (HCC): Primary | ICD-10-CM

## 2022-12-06 DIAGNOSIS — F41.9 ANXIETY: ICD-10-CM

## 2022-12-06 PROCEDURE — G8427 DOCREV CUR MEDS BY ELIG CLIN: HCPCS | Performed by: NURSE PRACTITIONER

## 2022-12-06 PROCEDURE — 99214 OFFICE O/P EST MOD 30 MIN: CPT | Performed by: NURSE PRACTITIONER

## 2022-12-06 RX ORDER — ALPRAZOLAM 0.5 MG/1
TABLET ORAL
Qty: 50 TABLET | Refills: 1 | Status: SHIPPED | OUTPATIENT
Start: 2022-12-06 | End: 2023-01-06

## 2022-12-06 ASSESSMENT — PATIENT HEALTH QUESTIONNAIRE - PHQ9
SUM OF ALL RESPONSES TO PHQ QUESTIONS 1-9: 0
1. LITTLE INTEREST OR PLEASURE IN DOING THINGS: 0
2. FEELING DOWN, DEPRESSED OR HOPELESS: 0
SUM OF ALL RESPONSES TO PHQ QUESTIONS 1-9: 0
SUM OF ALL RESPONSES TO PHQ9 QUESTIONS 1 & 2: 0
SUM OF ALL RESPONSES TO PHQ QUESTIONS 1-9: 0
SUM OF ALL RESPONSES TO PHQ QUESTIONS 1-9: 0

## 2022-12-06 ASSESSMENT — ENCOUNTER SYMPTOMS
RESPIRATORY NEGATIVE: 1
EYES NEGATIVE: 1
GASTROINTESTINAL NEGATIVE: 1

## 2022-12-06 NOTE — PROGRESS NOTES
[address] [place] [address]     Phone:  [phone]  Fax:  [phone]      2022    TELEHEALTH EVALUATION -- Audio/Visual (During EKBTE-36 public health emergency)    HPI:    Chief Complaint   Patient presents with    Follow-up Chronic Condition         Danny Patten (:  1975) has requested an audio/video evaluation for the following concern(s): This is a VV for routine follow up of chronic conditions including anxiety. He has no new complaints today to discuss. Patient feels like his anxiety is stable, but there are days that it will have break through episodes. Review of Systems   Constitutional: Negative. HENT: Negative. Eyes: Negative. Respiratory: Negative. Cardiovascular: Negative. Gastrointestinal: Negative. Endocrine: Negative. Genitourinary: Negative. Musculoskeletal: Negative. Skin: Negative. Neurological: Negative. Hematological: Negative. Psychiatric/Behavioral:  The patient is nervous/anxious. Prior to Visit Medications    Medication Sig Taking? Authorizing Provider   ALPRAZolam (XANAX) 0.5 MG tablet TAKE 1 TABLET BY MOUTH TWICE DAILY AS NEEDED FOR ANXIETY Yes SANDY Hunter   vitamin D (ERGOCALCIFEROL) 1.25 MG (45473 UT) CAPS capsule TAKE 1 CAPSULE BY MOUTH ONCE A WEEK Yes SANDY Hunter   doxepin (SINEQUAN) 10 MG capsule TAKE 1 CAPSULE BY MOUTH NIGHTLY Yes SANDY Hunter   VRAYLAR 3 MG CAPS capsule TAKE 1 CAPSULE BY MOUTH DAILY Yes SANDY Hunter   atorvastatin (LIPITOR) 80 MG tablet TAKE 1 TABLET BY MOUTH AT NIGHT Yes SANDY Hunter   isosorbide mononitrate (IMDUR) 60 MG extended release tablet TAKE 1 TABLET BY MOUTH TWICE DAILY. Yes SANDY Hunter   busPIRone (BUSPAR) 15 MG tablet TAKE 1 TABLET BY MOUTH THREE TIMES DAILY.  Yes SANDY Hunter   lisinopril (PRINIVIL;ZESTRIL) 40 MG tablet TAKE 1 TABLET BY MOUTH DAILY Yes SANDY Hunter   colestipol (COLESTID) 1 g tablet TAKE 1 TABLET BY MOUTH TWICE DAILY. Yes SANDY Gonzalez   LATUDA 20 MG TABS tablet TAKE 1 TABLET BY MOUTH DAILY Yes SANDY Gonzalez   metoprolol tartrate (LOPRESSOR) 25 MG tablet TAKE 2 TABLETS BY MOUTH TWICE DAILY Yes SANDY Richards - NP   aspirin 81 MG chewable tablet Take 1 tablet by mouth daily Yes Vish Moreno MD   nitroGLYCERIN (NITROSTAT) 0.4 MG SL tablet up to max of 3 total doses. If no relief after 1 dose, call 911. Yes Vish Moreno MD   cloNIDine (CATAPRES) 0.1 MG tablet Take 1 tablet by mouth daily as needed for High Blood Pressure (140/90) Yes SANDY Gonzalez   Misc. Devices (ROLLING WALKER/BURGUNDY) MISC Dx: gen weakness and fatigue use daily as directed Yes SANDY Gonzalez   HYDROcodone-acetaminophen (NORCO)  MG per tablet Take 1 tablet by mouth every 8 hours as needed for Pain.  Yes Historical Provider, MD       Social History     Tobacco Use    Smoking status: Every Day     Packs/day: 1.00     Years: 24.00     Pack years: 24.00     Types: Cigarettes     Last attempt to quit: 7/15/2020     Years since quittin.3    Smokeless tobacco: Never   Vaping Use    Vaping Use: Never used   Substance Use Topics    Alcohol use: No     Alcohol/week: 0.0 standard drinks    Drug use: No        Allergies   Allergen Reactions    Dye [Gadolinium Derivatives] Hives     Mild rash that last less than 2 hours after MRI or CT scans   ,   Past Medical History:   Diagnosis Date    Anxiety     Arthritis     Bipolar 1 disorder (HCC)     CAD in native artery 3/6/2018    Chronic back pain     COVID-19     Depression     Hyperlipidemia     Hypertension     IBS (irritable bowel syndrome)     Mild intermittent asthma without complication     Morbidly obese (HCC)     Panic disorder     at times afraid to go outside    Unspecified sleep apnea     bipap   ,   Past Surgical History:   Procedure Laterality Date    ANUS SURGERY      APPENDECTOMY  2014    CHOLECYSTECTOMY  2009 COLONOSCOPY  01/01/2012        COLONOSCOPY  10/19/2017    Dr Laura Go, Benign HP, 5 yr recall    COLONOSCOPY  04/29/2009    [facility]    ELECTROCONVULSIVE THERAPY N/A 03/08/2017    ELECTROCONVULSIVE THERAPY performed by Linda Evans DO at [address] [place] [address] Right 01/01/1990    w/mesh    UMBILICAL HERNIA REPAIR  09/01/1990    VASECTOMY     ,   Family History   Problem Relation Age of Onset    Diabetes Mother     Cancer Mother         uterine CA    Depression Mother     Mental Illness Mother     Hypertension Mother     Other Mother         blood clots    Heart Disease Brother     Depression Brother     Mental Illness Brother     Hypertension Brother     Breast Cancer Maternal Grandmother     Cancer Maternal Grandmother         breast CA    Heart Attack Maternal Grandmother     Other Maternal Grandmother         blood clots    Colon Cancer Neg Hx     Esophageal Cancer Neg Hx     Liver Cancer Neg Hx     Rectal Cancer Neg Hx     Stomach Cancer Neg Hx        PHYSICAL EXAMINATION:  [ INSTRUCTIONS:  \"[x]\" Indicates a positive item  \"[]\" Indicates a negative item  -- DELETE ALL ITEMS NOT EXAMINED]  Vital Signs: (As obtained by patient/caregiver at home)        Constitutional: [x] Appears well-developed and well-nourished [x] No apparent distress      [] Abnormal   Mental status  [x] Alert and awake  [x] Oriented to person/place/time [x]Able to follow commands        Eyes:  EOM    [x]  Normal  [] Abnormal-  Sclera  [x]  Normal  [] Abnormal -         Discharge []  None visible  [] Abnormal -    HENT:   [x] Normocephalic, atraumatic.   [] Abnormal   [x] Mouth/Throat: Mucous membranes are moist.     External Ears [x] Normal  [] Abnormal-    Neck: [x] No visualized mass     Pulmonary/Chest: [x] Respiratory effort normal.  [x] No visualized signs of difficulty breathing or respiratory distress        [] Abnormal      Musculoskeletal:   [x] Normal gait with no signs of ataxia         [x] Normal range of motion of neck        [] Abnormal       Neurological:        [x] No Facial Asymmetry (Cranial nerve 7 motor function) (limited exam to video visit)          [] No gaze palsy        [] Abnormal         Skin:        [x] No significant exanthematous lesions or discoloration noted on facial skin         [] Abnormal            Psychiatric:       [x] Normal Affect [] Abnormal        [] No Hallucinations    Other pertinent observable physical exam findings:-    Due to this being a TeleHealth encounter, evaluation of the following organ systems is limited: Vitals/Constitutional/EENT/Resp/CV/GI//MS/Neuro/Skin/Heme-Lymph-Imm. ASSESSMENT/PLAN:  1. Bipolar depression ([facility] [address]  Discussed getting a brighter light in home to help with this gloomy time outside. Increase xanax to help with breakthrough anxiety episodes. 2. Anxiety    - ALPRAZolam (XANAX) 0.5 MG tablet; TAKE 1 TABLET BY MOUTH TWICE DAILY AS NEEDED FOR ANXIETY  Dispense: 50 tablet; Refill: 1    Follow up in 1 month with repeat labs. Will need to check all routine except lipid and UDS. Return if symptoms worsen or fail to improve. An  electronic signature was used to authenticate this note. --SANDY Garza on 12/6/2022 at 3:11 PM        Pursuant to the emergency declaration under the [facility], [facility] waiver authority and the [employer] Act, this Virtual  Visit was conducted, with patient's consent, to reduce the patient's risk of exposure to COVID-19 and provide continuity of care for an established patient. Services were provided through a video synchronous discussion virtually to substitute for in-person clinic visit.

## 2022-12-12 NOTE — TELEPHONE ENCOUNTER
Gab Magana called requesting a refill of the below medication which has been pended for you:     Requested Prescriptions     Pending Prescriptions Disp Refills    LATUDA 20 MG TABS tablet [Pharmacy Med Name: LATUDA 20MG TABLET] 60 tablet 1     Sig: TAKE 1 TABLET BY MOUTH DAILY       Last Appointment Date: 12/6/2022  Next Appointment Date: Visit date not found    Allergies   Allergen Reactions    Dye [Gadolinium Derivatives] Hives     Mild rash that last less than 2 hours after MRI or CT scans

## 2022-12-13 RX ORDER — LURASIDONE HYDROCHLORIDE 20 MG/1
TABLET, FILM COATED ORAL
Qty: 60 TABLET | Refills: 1 | Status: SHIPPED | OUTPATIENT
Start: 2022-12-13

## 2023-01-13 ENCOUNTER — OFFICE VISIT (OUTPATIENT)
Dept: FAMILY MEDICINE CLINIC | Age: 48
End: 2023-01-13
Payer: MEDICARE

## 2023-01-13 VITALS
TEMPERATURE: 98 F | HEIGHT: 74 IN | OXYGEN SATURATION: 100 % | HEART RATE: 100 BPM | DIASTOLIC BLOOD PRESSURE: 82 MMHG | WEIGHT: 315 LBS | BODY MASS INDEX: 40.43 KG/M2 | SYSTOLIC BLOOD PRESSURE: 130 MMHG

## 2023-01-13 DIAGNOSIS — E55.9 VITAMIN D DEFICIENCY: ICD-10-CM

## 2023-01-13 DIAGNOSIS — F31.9 BIPOLAR DEPRESSION (HCC): ICD-10-CM

## 2023-01-13 DIAGNOSIS — J44.9 CHRONIC OBSTRUCTIVE PULMONARY DISEASE, UNSPECIFIED COPD TYPE (HCC): ICD-10-CM

## 2023-01-13 DIAGNOSIS — I25.119 CORONARY ARTERY DISEASE INVOLVING NATIVE CORONARY ARTERY OF NATIVE HEART WITH ANGINA PECTORIS (HCC): ICD-10-CM

## 2023-01-13 DIAGNOSIS — Z23 IMMUNIZATION DUE: ICD-10-CM

## 2023-01-13 DIAGNOSIS — I47.1 SVT (SUPRAVENTRICULAR TACHYCARDIA) (HCC): ICD-10-CM

## 2023-01-13 DIAGNOSIS — D72.829 LEUKOCYTOSIS, UNSPECIFIED TYPE: ICD-10-CM

## 2023-01-13 DIAGNOSIS — F31.9 BIPOLAR DEPRESSION (HCC): Primary | ICD-10-CM

## 2023-01-13 DIAGNOSIS — F41.9 ANXIETY: ICD-10-CM

## 2023-01-13 DIAGNOSIS — Z51.81 MEDICATION MONITORING ENCOUNTER: ICD-10-CM

## 2023-01-13 LAB
ALBUMIN SERPL-MCNC: 4 G/DL (ref 3.5–5.2)
ALCOHOL URINE: NORMAL
ALP BLD-CCNC: 151 U/L (ref 40–130)
ALT SERPL-CCNC: 30 U/L (ref 5–41)
AMPHETAMINE SCREEN, URINE: NORMAL
ANION GAP SERPL CALCULATED.3IONS-SCNC: 11 MMOL/L (ref 7–19)
AST SERPL-CCNC: 35 U/L (ref 5–40)
ATYPICAL LYMPHOCYTE RELATIVE PERCENT: 19 % (ref 0–8)
BANDED NEUTROPHILS RELATIVE PERCENT: 1 % (ref 0–5)
BARBITURATE SCREEN, URINE: NORMAL
BASOPHILS ABSOLUTE: 0 K/UL (ref 0–0.2)
BASOPHILS RELATIVE PERCENT: 0 % (ref 0–1)
BENZODIAZEPINE SCREEN, URINE: NORMAL
BILIRUB SERPL-MCNC: 0.7 MG/DL (ref 0.2–1.2)
BUN BLDV-MCNC: 8 MG/DL (ref 6–20)
BUPRENORPHINE URINE: NORMAL
CALCIUM SERPL-MCNC: 9.3 MG/DL (ref 8.6–10)
CHLORIDE BLD-SCNC: 95 MMOL/L (ref 98–111)
CO2: 26 MMOL/L (ref 22–29)
COCAINE METABOLITE SCREEN URINE: NORMAL
CREAT SERPL-MCNC: 0.7 MG/DL (ref 0.5–1.2)
EOSINOPHILS ABSOLUTE: 0.29 K/UL (ref 0–0.6)
EOSINOPHILS RELATIVE PERCENT: 2 % (ref 0–5)
FENTANYL SCREEN, URINE: NORMAL
GABAPENTIN SCREEN, URINE: NORMAL
GFR SERPL CREATININE-BSD FRML MDRD: >60 ML/MIN/{1.73_M2}
GLUCOSE BLD-MCNC: 91 MG/DL (ref 74–109)
HCT VFR BLD CALC: 48.2 % (ref 42–52)
HEMOGLOBIN: 16.2 G/DL (ref 14–18)
IMMATURE GRANULOCYTES #: 0.1 K/UL
LYMPHOCYTES ABSOLUTE: 5.9 K/UL (ref 1.1–4.5)
LYMPHOCYTES RELATIVE PERCENT: 22 % (ref 20–40)
MCH RBC QN AUTO: 31.3 PG (ref 27–31)
MCHC RBC AUTO-ENTMCNC: 33.6 G/DL (ref 33–37)
MCV RBC AUTO: 93.1 FL (ref 80–94)
MDMA URINE: NORMAL
METHADONE SCREEN, URINE: NORMAL
METHAMPHETAMINE, URINE: NORMAL
MONOCYTES ABSOLUTE: 0.7 K/UL (ref 0–0.9)
MONOCYTES RELATIVE PERCENT: 5 % (ref 0–10)
MYELOCYTE PERCENT: 1 %
NEUTROPHILS ABSOLUTE: 7.5 K/UL (ref 1.5–7.5)
NEUTROPHILS RELATIVE PERCENT: 50 % (ref 50–65)
OPIATE SCREEN URINE: NORMAL
OXYCODONE SCREEN URINE: NORMAL
PDW BLD-RTO: 13.1 % (ref 11.5–14.5)
PHENCYCLIDINE SCREEN URINE: NORMAL
PLATELET # BLD: 204 K/UL (ref 130–400)
PLATELET SLIDE REVIEW: ADEQUATE
PMV BLD AUTO: 9.2 FL (ref 9.4–12.4)
POTASSIUM SERPL-SCNC: 3.8 MMOL/L (ref 3.5–5)
PROPOXYPHENE SCREEN, URINE: NORMAL
RBC # BLD: 5.18 M/UL (ref 4.7–6.1)
RBC # BLD: NORMAL 10*6/UL
SODIUM BLD-SCNC: 132 MMOL/L (ref 136–145)
SYNTHETIC CANNABINOIDS(K2) SCREEN, URINE: NORMAL
THC SCREEN, URINE: NORMAL
TOTAL PROTEIN: 7.5 G/DL (ref 6.6–8.7)
TRAMADOL SCREEN URINE: NORMAL
TRICYCLIC ANTIDEPRESSANTS, UR: NORMAL
VITAMIN D 25-HYDROXY: 53 NG/ML
WBC # BLD: 14.5 K/UL (ref 4.8–10.8)

## 2023-01-13 PROCEDURE — 99214 OFFICE O/P EST MOD 30 MIN: CPT | Performed by: NURSE PRACTITIONER

## 2023-01-13 PROCEDURE — 3074F SYST BP LT 130 MM HG: CPT | Performed by: NURSE PRACTITIONER

## 2023-01-13 PROCEDURE — G0008 ADMIN INFLUENZA VIRUS VAC: HCPCS | Performed by: NURSE PRACTITIONER

## 2023-01-13 PROCEDURE — 3078F DIAST BP <80 MM HG: CPT | Performed by: NURSE PRACTITIONER

## 2023-01-13 PROCEDURE — 80305 DRUG TEST PRSMV DIR OPT OBS: CPT | Performed by: NURSE PRACTITIONER

## 2023-01-13 PROCEDURE — 90674 CCIIV4 VAC NO PRSV 0.5 ML IM: CPT | Performed by: NURSE PRACTITIONER

## 2023-01-13 ASSESSMENT — PATIENT HEALTH QUESTIONNAIRE - PHQ9
9. THOUGHTS THAT YOU WOULD BE BETTER OFF DEAD, OR OF HURTING YOURSELF: 0
SUM OF ALL RESPONSES TO PHQ QUESTIONS 1-9: 0
3. TROUBLE FALLING OR STAYING ASLEEP: 0
10. IF YOU CHECKED OFF ANY PROBLEMS, HOW DIFFICULT HAVE THESE PROBLEMS MADE IT FOR YOU TO DO YOUR WORK, TAKE CARE OF THINGS AT HOME, OR GET ALONG WITH OTHER PEOPLE: 0
6. FEELING BAD ABOUT YOURSELF - OR THAT YOU ARE A FAILURE OR HAVE LET YOURSELF OR YOUR FAMILY DOWN: 0
4. FEELING TIRED OR HAVING LITTLE ENERGY: 0
SUM OF ALL RESPONSES TO PHQ QUESTIONS 1-9: 0
1. LITTLE INTEREST OR PLEASURE IN DOING THINGS: 0
SUM OF ALL RESPONSES TO PHQ QUESTIONS 1-9: 0
7. TROUBLE CONCENTRATING ON THINGS, SUCH AS READING THE NEWSPAPER OR WATCHING TELEVISION: 0
SUM OF ALL RESPONSES TO PHQ9 QUESTIONS 1 & 2: 0
5. POOR APPETITE OR OVEREATING: 0
8. MOVING OR SPEAKING SO SLOWLY THAT OTHER PEOPLE COULD HAVE NOTICED. OR THE OPPOSITE, BEING SO FIGETY OR RESTLESS THAT YOU HAVE BEEN MOVING AROUND A LOT MORE THAN USUAL: 0
2. FEELING DOWN, DEPRESSED OR HOPELESS: 0
SUM OF ALL RESPONSES TO PHQ QUESTIONS 1-9: 0

## 2023-01-13 NOTE — PROGRESS NOTES
[facility] PHYSICIAN SERVICES  [facility] [place]  [address] [address]  Dept: [phone]  Dept Fax: [phone]  Loc: [phone]    Bean Lowry is a 49 y.o. male who presents today for his medical conditions/complaints as noted below. Bean Lowry is c/o of Follow-up (Medications)      Chief Complaint   Patient presents with    Follow-up     Medications       HPI:     HPI  Patient presents today for routine medication follow up. He states that overall he is doing well. No refills needed today. UDS & contract completed today. Patient also request flu vaccine. Patient has been taking all of his meds as prescribed. We did increase the Xanax tablets last visit and since then anxiety has improved. Other chronic conditions including SVT, CAD, COPD all are stable without any concerns.      Past Medical History:   Diagnosis Date    Anxiety     Arthritis     Bipolar 1 disorder ([address]     CAD in native artery 3/6/2018    Chronic back pain     COVID-19     Depression     Hyperlipidemia     Hypertension     IBS (irritable bowel syndrome)     Mild intermittent asthma without complication 8/31/6479    Morbidly obese (HCC)     Panic disorder     at times afraid to go outside    Unspecified sleep apnea     bipap        Past Surgical History:   Procedure Laterality Date    ANUS SURGERY      APPENDECTOMY  02/03/2014    CHOLECYSTECTOMY  01/01/2009    COLONOSCOPY  01/01/2012        COLONOSCOPY  10/19/2017    Dr Hilda Brennan, Benign HP, 5 yr recall    COLONOSCOPY  04/29/2009    [facility]    ELECTROCONVULSIVE THERAPY N/A 03/08/2017    ELECTROCONVULSIVE THERAPY performed by Zoë Armstrong DO at [address] [place] [address] 01/01/1990    w/mesh    UMBILICAL HERNIA REPAIR  09/01/1990    VASECTOMY         Social History     Tobacco Use    Smoking status: Every Day     Packs/day: 1.00     Years: 24.00     Pack years: 24.00     Types: Cigarettes     Last attempt to quit: 7/15/2020     Years since quittin.5    Smokeless tobacco: Never   Substance Use Topics    Alcohol use: No     Alcohol/week: 0.0 standard drinks        Current Outpatient Medications   Medication Sig Dispense Refill    metoprolol tartrate (LOPRESSOR) 25 MG tablet TAKE 2 TABLETS BY MOUTH TWICE DAILY 180 tablet 1    LATUDA 20 MG TABS tablet TAKE 1 TABLET BY MOUTH DAILY 60 tablet 1    vitamin D (ERGOCALCIFEROL) 1.25 MG (19438 UT) CAPS capsule TAKE 1 CAPSULE BY MOUTH ONCE A WEEK 12 capsule 1    doxepin (SINEQUAN) 10 MG capsule TAKE 1 CAPSULE BY MOUTH NIGHTLY 30 capsule 5    VRAYLAR 3 MG CAPS capsule TAKE 1 CAPSULE BY MOUTH DAILY 30 capsule 1    atorvastatin (LIPITOR) 80 MG tablet TAKE 1 TABLET BY MOUTH AT NIGHT 30 tablet 2    isosorbide mononitrate (IMDUR) 60 MG extended release tablet TAKE 1 TABLET BY MOUTH TWICE DAILY. 60 tablet 3    busPIRone (BUSPAR) 15 MG tablet TAKE 1 TABLET BY MOUTH THREE TIMES DAILY. 90 tablet 2    lisinopril (PRINIVIL;ZESTRIL) 40 MG tablet TAKE 1 TABLET BY MOUTH DAILY 30 tablet 3    colestipol (COLESTID) 1 g tablet TAKE 1 TABLET BY MOUTH TWICE DAILY. 60 tablet 5    aspirin 81 MG chewable tablet Take 1 tablet by mouth daily 30 tablet 3    nitroGLYCERIN (NITROSTAT) 0.4 MG SL tablet up to max of 3 total doses. If no relief after 1 dose, call 911. 25 tablet 0    cloNIDine (CATAPRES) 0.1 MG tablet Take 1 tablet by mouth daily as needed for High Blood Pressure (140/90) 30 tablet 3    Misc. Devices (ROLLING WALKER/BURGUNDY) MISC Dx: gen weakness and fatigue use daily as directed 1 each 0    HYDROcodone-acetaminophen (NORCO)  MG per tablet Take 1 tablet by mouth every 8 hours as needed for Pain. No current facility-administered medications for this visit.        Allergies   Allergen Reactions    Dye [Gadolinium Derivatives] Hives     Mild rash that last less than 2 hours after MRI or CT scans       Family History   Problem Relation Age of Onset    Diabetes Mother     Cancer Mother uterine CA    Depression Mother     Mental Illness Mother     Hypertension Mother     Other Mother         blood clots    Heart Disease Brother     Depression Brother     Mental Illness Brother     Hypertension Brother     Breast Cancer Maternal Grandmother     Cancer Maternal Grandmother         breast CA    Heart Attack Maternal Grandmother     Other Maternal Grandmother         blood clots    Colon Cancer Neg Hx     Esophageal Cancer Neg Hx     Liver Cancer Neg Hx     Rectal Cancer Neg Hx     Stomach Cancer Neg Hx                Subjective:      Review of Systems   Constitutional: Negative. HENT: Negative. Eyes: Negative. Respiratory: Negative. Cardiovascular: Negative. Gastrointestinal: Negative. Endocrine: Negative. Genitourinary: Negative. Musculoskeletal: Negative. Skin: Negative. Neurological: Negative. Hematological: Negative. Psychiatric/Behavioral: Negative. Objective:     Physical Exam  Vitals and nursing note reviewed. Constitutional:       Appearance: Normal appearance. He is well-developed. He is obese. HENT:      Head: Normocephalic and atraumatic. Right Ear: Hearing, tympanic membrane, ear canal and external ear normal.      Left Ear: Hearing, tympanic membrane, ear canal and external ear normal.      Nose: Nose normal.      Mouth/Throat:      Lips: No lesions. Pharynx: Uvula midline. Eyes:      General: Lids are normal.      Conjunctiva/sclera: Conjunctivae normal.      Pupils: Pupils are equal, round, and reactive to light. Neck:      Thyroid: No thyroid mass or thyromegaly. Trachea: Trachea normal.   Cardiovascular:      Rate and Rhythm: Normal rate and regular rhythm. Pulses: Normal pulses. Heart sounds: Normal heart sounds. Pulmonary:      Effort: Pulmonary effort is normal.      Breath sounds: Normal breath sounds and air entry.    Abdominal:      General: Bowel sounds are normal.      Palpations: Abdomen is soft.   Musculoskeletal:         General: Normal range of motion. Cervical back: Normal, normal range of motion and neck supple. No tenderness. Thoracic back: Normal. No tenderness. Normal range of motion. Lumbar back: Normal. No tenderness. Normal range of motion. Skin:     General: Skin is warm and dry. Capillary Refill: Capillary refill takes less than 2 seconds. Neurological:      Mental Status: He is alert and oriented to person, place, and time. Psychiatric:         Attention and Perception: Attention normal.         Mood and Affect: Mood and affect normal.         Speech: Speech normal.         Behavior: Behavior normal.         Thought Content: Thought content normal.         Cognition and Memory: Cognition normal.         Judgment: Judgment normal.       /82   Pulse 100   Temp 98 °F (36.7 °C)   Ht 6' 2\" (1.88 m)   Wt (!) 361 lb (163.7 kg)   SpO2 100%   BMI 46.35 kg/m²     Assessment:      Diagnosis Orders   1. Bipolar depression ([address]  CBC with Auto Differential    Comprehensive Metabolic Panel      2. Immunization due  Influenza, FLUCELVAX, (age 10 mo+), IM, Preservative Free, 0.5 mL      3. Medication monitoring encounter  POCT Rapid Drug Screen      4. Anxiety        5. Leukocytosis, unspecified type  CBC with Auto Differential    Comprehensive Metabolic Panel      6. Vitamin D deficiency  Vitamin D 25 Hydroxy      7. Body mass index (BMI) 45.0-49.9, adult ([address]        8. Chronic obstructive pulmonary disease, unspecified COPD type ([address]      stable      9. SVT (supraventricular tachycardia) (HCC)      stable      10.  Coronary artery disease involving native coronary artery of native heart with angina pectoris ([address]      stable          Results for orders placed or performed in visit on 01/13/23   POCT Rapid Drug Screen   Result Value Ref Range    Alcohol, Urine -     Amphetamine Screen, Urine -     Barbiturate Screen, Urine -     Benzodiazepine Screen, Urine - Buprenorphine Urine -     Cocaine Metabolite Screen, Urine -     FENTANYL SCREEN, URINE -     Gabapentin Screen, Urine -     MDMA, Urine -     Methadone Screen, Urine -     Methamphetamine, Urine -     Opiate Scrn, Ur -     Oxycodone Screen, Ur -     PCP Screen, Urine -     Propoxyphene Screen, Urine -     Synthetic Cannabinoids (K2) Screen, Urine -     THC Screen, Urine -     Tramadol Scrn, Ur -     Tricyclic Antidepressants, Urine -        Plan:     1. Immunization due  Flu vaccine in office today  - Influenza, FLUCELVAX, (age 10 mo+), IM, Preservative Free, 0.5 mL    2. Medication monitoring encounter    - POCT Rapid Drug Screen    3. Bipolar depression ([facility]  Stable with current regimen. Will continue current regimen.   - CBC with Auto Differential; Future  - Comprehensive Metabolic Panel; Future    4. Anxiety  Stable. Will continue Xanax as prescribed. 5. Leukocytosis, unspecified type  Rechecking level. If still abnormal may need referral to hematology. - CBC with Auto Differential; Future  - Comprehensive Metabolic Panel; Future    6. Vitamin D deficiency    - Vitamin D 25 Hydroxy; Future    7. Body mass index (BMI) 45.0-49.9, adult ([facility]      8. Chronic obstructive pulmonary disease, unspecified COPD type ([facility]  stable    9. SVT (supraventricular tachycardia) (HCC)  stable    10. Coronary artery disease involving native coronary artery of native heart with angina pectoris (HCC)  Stable. Return in about 3 months (around 4/13/2023) for Refill with UDS.     Orders Placed This Encounter   Procedures    Influenza, FLUCELVAX, (age 10 mo+), IM, Preservative Free, 0.5 mL    CBC with Auto Differential     Standing Status:   Future     Number of Occurrences:   1     Standing Expiration Date:   1/13/2024    Comprehensive Metabolic Panel     Standing Status:   Future     Number of Occurrences:   1     Standing Expiration Date:   1/13/2024    Vitamin D 25 Hydroxy     Standing Status:   Future     Number of Occurrences:   1     Standing Expiration Date:   1/13/2024    POCT Rapid Drug Screen       No orders of the defined types were placed in this encounter. Patient offered educational handouts and has had all questions answered. Patient voices understanding and agrees to plans along with risks and benefits of plan. Patient is instructed to continue prior meds, diet, and exercise plans as instructed. Patient agrees to follow up as instructed and sooner if needed. Patient agrees to go to ER if condition becomes emergent. EMR Dragon/transcription disclaimer: Some of this encounter note is an electronic transcription/translation of spoken language to printed text. The electronic translation of spoken language may permit erroneous, or at times, nonsensical words or phrases to be inadvertently transcribed.  Although I have reviewed the note for such errors, some may still exist.    Electronically signed by SANDY Lucas on 1/16/2023 at 12:00 PM

## 2023-01-16 DIAGNOSIS — F41.9 ANXIETY: ICD-10-CM

## 2023-01-16 DIAGNOSIS — D72.829 LEUKOCYTOSIS, UNSPECIFIED TYPE: Primary | ICD-10-CM

## 2023-01-16 ASSESSMENT — ENCOUNTER SYMPTOMS
GASTROINTESTINAL NEGATIVE: 1
EYES NEGATIVE: 1
RESPIRATORY NEGATIVE: 1

## 2023-01-18 DIAGNOSIS — F31.9 BIPOLAR DEPRESSION (HCC): ICD-10-CM

## 2023-01-18 DIAGNOSIS — F41.9 ANXIETY: ICD-10-CM

## 2023-01-18 RX ORDER — CARIPRAZINE 3 MG/1
CAPSULE, GELATIN COATED ORAL
Qty: 30 CAPSULE | Refills: 1 | Status: SHIPPED | OUTPATIENT
Start: 2023-01-18

## 2023-01-18 NOTE — TELEPHONE ENCOUNTER
Arjun Jacobs called requesting a refill of the below medication which has been pended for you:     Requested Prescriptions     Pending Prescriptions Disp Refills    VRAYLAR 3 MG CAPS capsule [Pharmacy Med Name: Pilar Funk 30 capsule 1     Sig: TAKE 1 CAPSULE BY MOUTH DAILY       Last Appointment Date: 1/13/2023  Next Appointment Date: 4/13/2023    Allergies   Allergen Reactions    Dye [Gadolinium Derivatives] Hives     Mild rash that last less than 2 hours after MRI or CT scans

## 2023-01-20 DIAGNOSIS — F41.9 ANXIETY: ICD-10-CM

## 2023-01-20 RX ORDER — BUSPIRONE HYDROCHLORIDE 15 MG/1
TABLET ORAL
Qty: 90 TABLET | Refills: 2 | Status: SHIPPED | OUTPATIENT
Start: 2023-01-20

## 2023-01-20 NOTE — TELEPHONE ENCOUNTER
Leslie Bryant called requesting a refill of the below medication which has been pended for you:     Requested Prescriptions     Pending Prescriptions Disp Refills    busPIRone (BUSPAR) 15 MG tablet 90 tablet 2     Sig: TAKE 1 TABLET BY MOUTH THREE TIMES DAILY.        Last Appointment Date: 1/13/2023  Next Appointment Date: 4/13/2023    Allergies   Allergen Reactions    Dye [Gadolinium Derivatives] Hives     Mild rash that last less than 2 hours after MRI or CT scans

## 2023-02-02 DIAGNOSIS — F41.9 ANXIETY: ICD-10-CM

## 2023-02-02 RX ORDER — ALPRAZOLAM 0.5 MG/1
TABLET ORAL
Qty: 50 TABLET | Refills: 1 | Status: SHIPPED | OUTPATIENT
Start: 2023-02-02 | End: 2023-04-02

## 2023-02-02 NOTE — TELEPHONE ENCOUNTER
Teresa Must called to request a refill on his medication. Last office visit : 1/13/2023   Next office visit : 4/13/2023     Last UDS:   Amphetamine Screen, Urine   Date Value Ref Range Status   01/13/2023 -  Final     Barbiturate Screen, Urine   Date Value Ref Range Status   01/13/2023 -  Final     Benzodiazepine Screen, Urine   Date Value Ref Range Status   01/13/2023 -  Final     Buprenorphine Urine   Date Value Ref Range Status   01/13/2023 -  Final     Cocaine Metabolite Screen, Urine   Date Value Ref Range Status   01/13/2023 -  Final     Gabapentin Screen, Urine   Date Value Ref Range Status   01/13/2023 -  Final     MDMA, Urine   Date Value Ref Range Status   01/13/2023 -  Final     Methamphetamine, Urine   Date Value Ref Range Status   01/13/2023 -  Final     Opiate Scrn, Ur   Date Value Ref Range Status   01/13/2023 -  Final     Oxycodone Screen, Ur   Date Value Ref Range Status   01/13/2023 -  Final     PCP Screen, Urine   Date Value Ref Range Status   01/13/2023 -  Final     Propoxyphene Screen, Urine   Date Value Ref Range Status   01/13/2023 -  Final     THC Screen, Urine   Date Value Ref Range Status   01/13/2023 -  Final     Tricyclic Antidepressants, Urine   Date Value Ref Range Status   01/13/2023 -  Final       Last Gearl People: 2/2/23  Medication Contract: 1/13/23   Last Fill: 1/6/23    Requested Prescriptions     Pending Prescriptions Disp Refills    ALPRAZolam (XANAX) 0.5 MG tablet [Pharmacy Med Name: ALPRAZOLAM 0.5MG TABLET] 50 tablet 1     Sig: TAKE 1 TABLET BY MOUTH TWICE DAILY AS NEEDED FOR ANXIETY         Please approve or refuse this medication.    Bee Cedillo MA

## 2023-02-14 NOTE — PROGRESS NOTES
OP HEMATOLOGY/ONCOLOGY CONSULTATION      Pt Name: Tre Burgos  YOB: 1975  MRN: [id number]  Referring provider: SANDY Olguin  Requesting provider: SANDY Aldridge  Reason for consultation: Leukocytosis  Date of evaluation: 2/15/2023    History Obtained From:  patient, spouse, electronic medical record    CHIEF COMPLAINT:    Chief Complaint   Patient presents with    New Patient     D72.829-LEUKOCYTOSIS, UNSPECIFIED TYPE      HISTORY OF PRESENT ILLNESS:    Tre Burgos is a 49 y.o.  male referred to the clinic by SANDY Aldridge for evaluation of Leukocytosis. Hematology/Oncology consultation is performed 2/15/2023. PMH significant for HTN, hyperlipidemia, asthma, morbid obesity, panic disorder, sleep apnea (compliant with CPAP), IBS, anxiety/depression, arthritis, bipolar disorder, CAD, chronic back pain. Review of prior CBCs reveals a history of chronic leukocytosis dating to at least May, 2012      Mary Wolfe is a current, everyday tobacco user. He reports unattended dental issues. BMI is above goal at 46.57. Mary Wolfe denies B symptoms. He denies family history of blood disorders. CBC at hematology consultation includes a WBC of 13.27, ANC 7.04, ALC 5.3. Hgb 15.9/MCV 92.3 and platelet count 722,466. The chronic nature of leukocytosis is discussed with Mary Wolfe and his wife. Possible causes including benign and malignant discussed. Suspect findings are reactive in nature to include tobacco use, obesity, dental issues, metabolic syndrome, etc.  Request baseline serology as noted in the assessment and plan below. Recommend complete smoking cessation.     Past Medical History:   Diagnosis Date    Anxiety     Arthritis     Bipolar 1 disorder ([address]     CAD in native artery 3/6/2018    Chronic back pain     COVID-19     Depression     Hyperlipidemia     Hypertension     IBS (irritable bowel syndrome)     Mild intermittent asthma without complication 9/72/7641 Morbidly obese (HCC)     Panic disorder     at times afraid to go outside    Unspecified sleep apnea     bipap     Past Surgical History:   Procedure Laterality Date    ABLATION OF DYSRHYTHMIC FOCUS      ANUS SURGERY      APPENDECTOMY  02/03/2014    CHOLECYSTECTOMY  01/01/2009    COLONOSCOPY  01/01/2012        COLONOSCOPY  10/19/2017    Dr Kandice Garrett, Benign HP, 5 yr recall    COLONOSCOPY  04/29/2009    [facility]    CORONARY STENT PLACEMENT      ELECTROCONVULSIVE THERAPY N/A 03/08/2017    ELECTROCONVULSIVE THERAPY performed by Levon Pollard DO at [address] [place] [address] Right 01/01/1990    w/mesh    UMBILICAL HERNIA REPAIR  09/01/1990    VASECTOMY         Current Outpatient Medications:     gabapentin (NEURONTIN) 100 MG capsule, Take 100 mg by mouth 3 times daily. , Disp: , Rfl:     methocarbamol (ROBAXIN) 500 MG tablet, Take 500 mg by mouth 2 times daily as needed, Disp: , Rfl:     busPIRone (BUSPAR) 15 MG tablet, TAKE 1 TABLET BY MOUTH THREE TIMES DAILY. , Disp: 90 tablet, Rfl: 2    VRAYLAR 3 MG CAPS capsule, TAKE 1 CAPSULE BY MOUTH DAILY, Disp: 30 capsule, Rfl: 1    metoprolol tartrate (LOPRESSOR) 25 MG tablet, TAKE 2 TABLETS BY MOUTH TWICE DAILY, Disp: 180 tablet, Rfl: 1    LATUDA 20 MG TABS tablet, TAKE 1 TABLET BY MOUTH DAILY, Disp: 60 tablet, Rfl: 1    vitamin D (ERGOCALCIFEROL) 1.25 MG (98157 UT) CAPS capsule, TAKE 1 CAPSULE BY MOUTH ONCE A WEEK, Disp: 12 capsule, Rfl: 1    doxepin (SINEQUAN) 10 MG capsule, TAKE 1 CAPSULE BY MOUTH NIGHTLY, Disp: 30 capsule, Rfl: 5    atorvastatin (LIPITOR) 80 MG tablet, TAKE 1 TABLET BY MOUTH AT NIGHT, Disp: 30 tablet, Rfl: 2    isosorbide mononitrate (IMDUR) 60 MG extended release tablet, TAKE 1 TABLET BY MOUTH TWICE DAILY. , Disp: 60 tablet, Rfl: 3    lisinopril (PRINIVIL;ZESTRIL) 40 MG tablet, TAKE 1 TABLET BY MOUTH DAILY, Disp: 30 tablet, Rfl: 3    colestipol (COLESTID) 1 g tablet, TAKE 1 TABLET BY MOUTH TWICE DAILY. , Disp: 60 tablet, Rfl: 5    Misc. Devices (ROLLING WALKER/BURGUNDY) MISC, Dx: gen weakness and fatigue use daily as directed, Disp: 1 each, Rfl: 0    HYDROcodone-acetaminophen (NORCO)  MG per tablet, Take 1 tablet by mouth every 8 hours as needed for Pain., Disp: , Rfl:     ALPRAZolam (XANAX) 0.5 MG tablet, TAKE 1 TABLET BY MOUTH TWICE DAILY AS NEEDED FOR ANXIETY, Disp: 50 tablet, Rfl: 1   Allergies:    Allergies   Allergen Reactions    Dye [Gadolinium Derivatives] Hives     Mild rash that last less than 2 hours after MRI or CT scans     Social History     Tobacco Use    Smoking status: Every Day     Packs/day: 1.00     Years: 24.00     Pack years: 24.00     Types: Cigarettes     Start date: 07/2022    Smokeless tobacco: Never   Vaping Use    Vaping Use: Never used   Substance Use Topics    Alcohol use: No     Alcohol/week: 0.0 standard drinks    Drug use: No     Family History   Problem Relation Age of Onset    Diabetes Mother     Cancer Mother         uterine CA    Depression Mother     Mental Illness Mother     Hypertension Mother     Other Mother         blood clots    Alcohol Abuse Father     Heart Disease Brother     Depression Brother     Mental Illness Brother     Hypertension Brother     Breast Cancer Maternal Grandmother     Cancer Maternal Grandmother         breast CA    Heart Attack Maternal Grandmother     Other Maternal Grandmother         blood clots    Colon Cancer Neg Hx     Esophageal Cancer Neg Hx     Liver Cancer Neg Hx     Rectal Cancer Neg Hx     Stomach Cancer Neg Hx      Health Maintenance   Topic Date Due    HIV screen  Never done    Hepatitis C screen  Never done    Colorectal Cancer Screen  Never done    COVID-19 Vaccine (4 - Booster for Dai Peter series) 05/11/2022    DTaP/Tdap/Td vaccine (2 - Td or Tdap) 07/17/2022    A1C test (Diabetic or Prediabetic)  06/24/2023    Lipids  06/24/2023    Depression Monitoring  01/13/2024    Flu vaccine  Completed    Pneumococcal 0-64 years Vaccine  Completed Hepatitis A vaccine  Aged Out    Hib vaccine  Aged Out    Meningococcal (ACWY) vaccine  Aged Out     Subjective   Review of Systems   Constitutional:  Positive for fatigue. Negative for fever. HENT:  Positive for dental problem. Negative for hearing loss, mouth sores, nosebleeds, sore throat and trouble swallowing. Eyes:  Negative for discharge and itching. Respiratory:  Negative for cough, shortness of breath and wheezing. Cardiovascular:  Negative for chest pain, palpitations and leg swelling. Gastrointestinal:  Negative for abdominal pain, constipation, diarrhea, nausea and vomiting. Endocrine: Negative for cold intolerance and heat intolerance. Genitourinary:  Negative for dysuria, frequency, hematuria and urgency. Musculoskeletal:  Positive for arthralgias. Negative for joint swelling and myalgias. Skin:  Negative for pallor and rash. Allergic/Immunologic: Negative for environmental allergies and immunocompromised state. Neurological:  Negative for seizures, syncope and numbness. Hematological:  Negative for adenopathy. Does not bruise/bleed easily. Psychiatric/Behavioral:  Negative for agitation, behavioral problems and confusion. The patient is not nervous/anxious. Anxiety   Objective   Physical Exam  Vitals reviewed. Constitutional:       General: He is not in acute distress. Appearance: He is well-developed. He is obese. He is not toxic-appearing or diaphoretic. HENT:      Head: Normocephalic and atraumatic. Right Ear: External ear normal.      Left Ear: External ear normal.      Nose: Nose normal.      Mouth/Throat:      Mouth: Mucous membranes are moist.      Comments: Missing teeth, dental caries  Eyes:      General: No scleral icterus. Right eye: No discharge. Left eye: No discharge. Conjunctiva/sclera: Conjunctivae normal.   Neck:      Trachea: No tracheal deviation.    Cardiovascular:      Rate and Rhythm: Normal rate and regular rhythm. Pulmonary:      Effort: Pulmonary effort is normal. No respiratory distress. Breath sounds: Normal breath sounds. No wheezing or rales. Abdominal:      General: Bowel sounds are normal. There is no distension. Palpations: Abdomen is soft. Tenderness: There is no abdominal tenderness. There is no guarding. Genitourinary:     Comments: Exam deferred  Musculoskeletal:         General: No tenderness or deformity. Cervical back: Neck supple. No muscular tenderness. Comments: Normal ROM all four extremities   Lymphadenopathy:      Cervical:      Right cervical: No superficial or deep cervical adenopathy. Left cervical: No superficial or deep cervical adenopathy. Upper Body:      Right upper body: No supraclavicular adenopathy. Left upper body: No supraclavicular adenopathy. Comments:      Skin:     General: Skin is warm and dry. Findings: No rash. Neurological:      Mental Status: He is alert and oriented to person, place, and time. Comments: follows commands, non-focal   Psychiatric:         Behavior: Behavior is cooperative. Thought Content: Thought content normal.         Judgment: Judgment normal.      Comments: Alert and oriented to person, place and time. Patient's arms shaking.  Very nervous     /72   Pulse 93   Ht 6' 2\" (1.88 m)   Wt (!) 362 lb 11.2 oz (164.5 kg)   SpO2 96%   BMI 46.57 kg/m²   Wt Readings from Last 3 Encounters:   02/15/23 (!) 362 lb 11.2 oz (164.5 kg)   01/13/23 (!) 361 lb (163.7 kg)   09/06/22 (!) 363 lb (164.7 kg)     Labs reviewed/analyzed by me:  CBC:   Lab Results   Component Value Date    WBC 13.27 (H) 02/15/2023    HGB 15.9 02/15/2023    HCT 47.7 02/15/2023    MCV 92.3 (H) 02/15/2023     02/15/2023    LABLYMP 4.34 05/25/2014    LYMPHOPCT 39.9 02/15/2023    RBC 5.17 02/15/2023    MCH 30.8 02/15/2023    MCHC 33.3 02/15/2023    RDW 13.0 02/15/2023     Lab Results   Component Value Date    NEUTROABS 7.04 (H) 02/15/2023     ASSESSMENT/PLAN:    Rena Gill was seen today for new patient. Diagnoses and all orders for this visit:    Leukocytosis/Lymphocytosis  The chronic nature of leukocytosis is discussed with Rena Gill and his wife. Possible causes including benign and malignant discussed. Suspect findings are reactive in nature to include tobacco use, obesity, dental issues, metabolic syndrome, etc.  Due to [facility] significant anxiety, we will go ahead and request both BCR/ABL as well as flow cytometry and inflammatory to evaluate causes of leukocytosis. Hopefully bone marrow aspirate and biopsy will not be needed, if so, topic was briefly discussed today with Rena Gill and his wife. Recommend address dental concerns/issues. -     Miscellaneous Sendout; Future - FLOW cytometry ([facility])  -     Miscellaneous Sendout; Future - BCR/ABL ([facility])  -     C-Reactive Protein; Future  -     Sedimentation Rate; Future    Tobacco use  Recommend and discussed complete smoking cessation  Patient is willing to entertain and attempt smoking cessation  He states he has 2 remaining pack of cigarettes and has intents of quitting after those are gone. His wife is very supportive of smoking cessation    Morbid obesity with BMI of 45.0-49.9  Body mass index is 46.57 kg/m². Federal guidelines recommend that people under the age of 72 should have a BMI of 18.5-25 and people age 72 and older should have a BMI of 23-30. If yours is outside the range, we recommend you utilize a diet and exercise program to get yours into the range. We also recommend you speak with your primary care doctor should any specific advice be needed regarding special diets or programs which would be appropriate for your circumstances. Care plan discussed with patient and his wife  All questions answered      Tumor Screening:  Colonoscopy 10/19/2017 by Talat Wilkinson at [facility]:  one 5 mm polyp removed.  repeat in 5 yrs   PSA 9/18/2014: 0.39    Return in about 2 weeks (around 3/1/2023) for follow up with SANDY Jackson. I have seen, examined and reviewed this patient medication list, appropriate labs and imaging studies. I reviewed relevant medical records and others physicians notes. I discussed the plan of care with the patient. I answered all questions to the patients satisfaction. I have also reviewed the chief complaint (CC) and part of the history (History of Present Illness (HPI), Past Family Social History [facility]), or Review of Systems (ROS) and made changes when appropriate. Office note and serology completed by SANDY Johnston reviewed. Dictated utilizing Dragon transcription software.         SANDY Chanel  3:10 PM  2/15/2023

## 2023-02-15 ENCOUNTER — OFFICE VISIT (OUTPATIENT)
Dept: HEMATOLOGY | Age: 48
End: 2023-02-15
Payer: MEDICARE

## 2023-02-15 ENCOUNTER — HOSPITAL ENCOUNTER (OUTPATIENT)
Dept: INFUSION THERAPY | Age: 48
Discharge: HOME | End: 2023-02-15
Payer: MEDICARE

## 2023-02-15 VITALS
WEIGHT: 315 LBS | HEIGHT: 74 IN | DIASTOLIC BLOOD PRESSURE: 72 MMHG | SYSTOLIC BLOOD PRESSURE: 132 MMHG | BODY MASS INDEX: 40.43 KG/M2 | HEART RATE: 93 BPM | OXYGEN SATURATION: 96 %

## 2023-02-15 DIAGNOSIS — Z72.0 TOBACCO USE: ICD-10-CM

## 2023-02-15 DIAGNOSIS — D72.829 LEUKOCYTOSIS, UNSPECIFIED TYPE: ICD-10-CM

## 2023-02-15 DIAGNOSIS — Z71.89 CARE PLAN DISCUSSED WITH PATIENT: ICD-10-CM

## 2023-02-15 DIAGNOSIS — E66.01 MORBID OBESITY WITH BMI OF 45.0-49.9, ADULT (HCC): ICD-10-CM

## 2023-02-15 DIAGNOSIS — D72.820 LYMPHOCYTOSIS: Primary | ICD-10-CM

## 2023-02-15 DIAGNOSIS — D72.829 LEUKOCYTOSIS, UNSPECIFIED TYPE: Primary | ICD-10-CM

## 2023-02-15 DIAGNOSIS — D72.820 LYMPHOCYTOSIS: ICD-10-CM

## 2023-02-15 LAB
BASOPHILS ABSOLUTE: 0.05 K/UL (ref 0.01–0.08)
BASOPHILS RELATIVE PERCENT: 0.4 % (ref 0.1–1.2)
C-REACTIVE PROTEIN: <0.3 MG/DL (ref 0–0.5)
EOSINOPHILS ABSOLUTE: 0.17 K/UL (ref 0.04–0.54)
EOSINOPHILS RELATIVE PERCENT: 1.3 % (ref 0.7–7)
HCT VFR BLD CALC: 47.7 % (ref 40.1–51)
HEMOGLOBIN: 15.9 G/DL (ref 13.7–17.5)
LYMPHOCYTES ABSOLUTE: 5.3 K/UL (ref 1.18–3.74)
LYMPHOCYTES RELATIVE PERCENT: 39.9 % (ref 19.3–53.1)
MCH RBC QN AUTO: 30.8 PG (ref 25.7–32.2)
MCHC RBC AUTO-ENTMCNC: 33.3 G/DL (ref 32.3–36.5)
MCV RBC AUTO: 92.3 FL (ref 79–92.2)
MONOCYTES ABSOLUTE: 0.68 K/UL (ref 0.24–0.82)
MONOCYTES RELATIVE PERCENT: 5.1 % (ref 4.7–12.5)
NEUTROPHILS ABSOLUTE: 7.04 K/UL (ref 1.56–6.13)
NEUTROPHILS RELATIVE PERCENT: 53.1 % (ref 34–71.1)
PDW BLD-RTO: 13 % (ref 11.6–14.4)
PLATELET # BLD: 182 K/UL (ref 163–337)
PMV BLD AUTO: 8.8 FL (ref 7.4–10.4)
RBC # BLD: 5.17 M/UL (ref 4.63–6.08)
SEDIMENTATION RATE, ERYTHROCYTE: 15 MM/HR (ref 0–10)
WBC # BLD: 13.27 K/UL (ref 4.23–9.07)

## 2023-02-15 PROCEDURE — 99204 OFFICE O/P NEW MOD 45 MIN: CPT | Performed by: NURSE PRACTITIONER

## 2023-02-15 PROCEDURE — 99212 OFFICE O/P EST SF 10 MIN: CPT

## 2023-02-15 PROCEDURE — 85025 COMPLETE CBC W/AUTO DIFF WBC: CPT

## 2023-02-15 PROCEDURE — 36415 COLL VENOUS BLD VENIPUNCTURE: CPT

## 2023-02-15 PROCEDURE — 3075F SYST BP GE 130 - 139MM HG: CPT | Performed by: NURSE PRACTITIONER

## 2023-02-15 PROCEDURE — 3078F DIAST BP <80 MM HG: CPT | Performed by: NURSE PRACTITIONER

## 2023-02-15 RX ORDER — METHOCARBAMOL 500 MG/1
500 TABLET, FILM COATED ORAL 2 TIMES DAILY PRN
COMMUNITY

## 2023-02-15 RX ORDER — GABAPENTIN 100 MG/1
100 CAPSULE ORAL 3 TIMES DAILY
COMMUNITY

## 2023-02-15 ASSESSMENT — ENCOUNTER SYMPTOMS
EYE DISCHARGE: 0
CONSTIPATION: 0
SORE THROAT: 0
DIARRHEA: 0
WHEEZING: 0
NAUSEA: 0
EYE ITCHING: 0
VOMITING: 0
ABDOMINAL PAIN: 0
COUGH: 0
SHORTNESS OF BREATH: 0
TROUBLE SWALLOWING: 0

## 2023-02-15 ASSESSMENT — PROMIS GLOBAL HEALTH SCALE
IN GENERAL, HOW WOULD YOU RATE YOUR MENTAL HEALTH, INCLUDING YOUR MOOD AND YOUR ABILITY TO THINK [ON A SCALE OF 1 (POOR) TO 5 (EXCELLENT)]?: 1
IN THE PAST 7 DAYS, HOW WOULD YOU RATE YOUR PAIN ON AVERAGE [ON A SCALE FROM 0 (NO PAIN) TO 10 (WORST IMAGINABLE PAIN)]?: 4
IN THE PAST 7 DAYS, HOW OFTEN HAVE YOU BEEN BOTHERED BY EMOTIONAL PROBLEMS, SUCH AS FEELING ANXIOUS, DEPRESSED, OR IRRITABLE [ON A SCALE FROM 1 (NEVER) TO 5 (ALWAYS)]?: 5
IN GENERAL, WOULD YOU SAY YOUR QUALITY OF LIFE IS...[ON A SCALE OF 1 (POOR) TO 5 (EXCELLENT)]: 1
IN GENERAL, PLEASE RATE HOW WELL YOU CARRY OUT YOUR USUAL SOCIAL ACTIVITIES (INCLUDES ACTIVITIES AT HOME, AT WORK, AND IN YOUR COMMUNITY, AND RESPONSIBILITIES AS A PARENT, CHILD, SPOUSE, EMPLOYEE, FRIEND, ETC) [ON A SCALE OF 1 (POOR) TO 5 (EXCELLENT)]?: 1
IN THE PAST 7 DAYS, HOW WOULD YOU RATE YOUR FATIGUE ON AVERAGE [ON A SCALE FROM 1 (NONE) TO 5 (VERY SEVERE)]?: 3
SUM OF RESPONSES TO QUESTIONS 2, 4, 5, & 10: 9
IN GENERAL, WOULD YOU SAY YOUR HEALTH IS...[ON A SCALE OF 1 (POOR) TO 5 (EXCELLENT)]: 1
IN GENERAL, HOW WOULD YOU RATE YOUR SATISFACTION WITH YOUR SOCIAL ACTIVITIES AND RELATIONSHIPS [ON A SCALE OF 1 (POOR) TO 5 (EXCELLENT)]?: 2
SUM OF RESPONSES TO QUESTIONS 3, 6, 7, & 8: 10
TO WHAT EXTENT ARE YOU ABLE TO CARRY OUT YOUR EVERYDAY PHYSICAL ACTIVITIES SUCH AS WALKING, CLIMBING STAIRS, CARRYING GROCERIES, OR MOVING A CHAIR [ON A SCALE OF 1 (NOT AT ALL) TO 5 (COMPLETELY)]?: 2
IN GENERAL, HOW WOULD YOU RATE YOUR PHYSICAL HEALTH [ON A SCALE OF 1 (POOR) TO 5 (EXCELLENT)]?: 1

## 2023-02-27 ENCOUNTER — TELEPHONE (OUTPATIENT)
Dept: HEMATOLOGY | Age: 48
End: 2023-02-27

## 2023-02-27 NOTE — TELEPHONE ENCOUNTER
JACINDA Snow completed Follow up phone visit following [facility] distress screening to assess patient needs. SW introduced self and explained role and source of support. Patient experienced a panic attack as a result of this phone call. He states he has frequent daily panic attacks especially when related to doctors and his health. He states the caller ID and seeing who was calling is what triggered this attack as he was worried he was going to receive bad news. Patient currently takes medication for his panic attacks. He states the medication helps in the short term but not in the \" Big picture\". Through conversation patients sense of panic was reduced. Patient denies any other needs or concerns at this time. SW encouraged the patient to call if assistance is needed in the future.

## 2023-02-28 RX ORDER — ISOSORBIDE MONONITRATE 60 MG/1
60 TABLET, EXTENDED RELEASE ORAL DAILY
Qty: 60 TABLET | Refills: 3 | Status: SHIPPED | OUTPATIENT
Start: 2023-02-28

## 2023-02-28 NOTE — TELEPHONE ENCOUNTER
Received fax from pharmacy requesting refill on pts medication(s). Pt was last seen in office on 4/22/2022  and has a follow up scheduled for Visit date not found. Will send request to  [facility]  for authorization. Requested Prescriptions     Pending Prescriptions Disp Refills    isosorbide mononitrate (IMDUR) 60 MG extended release tablet [Pharmacy Med Name: ISOSORBIDE MONONITRATE ER 60MG TABLET EXTENDED RELEASE 24 HOUR] 60 tablet 3     Sig: TAKE 1 TABLET BY MOUTH TWICE DAILY.

## 2023-03-02 NOTE — PROGRESS NOTES
OP HEMATOLOGY/ONCOLOGY PROGRESS NOTE      Pt Name: Ashlyn Garcia  YOB: 1975  MRN: [id number]  PCP: SANDY Rollins  Date of evaluation: 3/9/2023    History Obtained From:  patient, spouse, electronic medical record    CHIEF COMPLAINT:    Chief Complaint   Patient presents with    Follow-up     Leukocytosis     HISTORY OF PRESENT ILLNESS:    Ashlyn Garcia is a 49 y.o.  male returning to the clinic to discuss serology completed for further evaluation of leukocytosis with lymphocytosis. He was able to quit smoking 9 days ago. He is still in need of dental attention. WBC is 12.26, ANC 5.55, ALC 5.37. HEMATOLOGY HISTORY: Leukocytosis with lymphocytosis  Ashlyn Garcia was seen in hematology consultation 2/15/2023, referred by SANDY Rollins for evaluation of Leukocytosis. PMH significant for HTN, hyperlipidemia, asthma, morbid obesity, panic disorder, sleep apnea (compliant with CPAP), IBS, anxiety/depression, arthritis, bipolar disorder, CAD, chronic back pain. Review of prior CBCs reveals a history of chronic leukocytosis dating to at least May, 2012      Jack Vang is a current, everyday tobacco user. He reports unattended dental issues. BMI is above goal at 46.57. Jack Vang denies B symptoms. He denies family history of blood disorders. CBC at hematology consultation includes a WBC of 13.27, ANC 7.04, ALC 5.3. Hgb 15.9/MCV 92.3 and platelet count 292,485. The chronic nature of leukocytosis is discussed with Jack Vang and his wife. Possible causes including benign and malignant discussed. Suspect findings are reactive in nature to include tobacco use, obesity, dental issues, metabolic syndrome, etc.    Labs 2/15/2023:  CRP: <0.30  Sed rate: 15  BCR-ABL: Negative  Flow: No B-Cell Monoclonality and T-Cell Aberrant antigenic expression. The blasts are not increased. Complete smoking cessation recommended. Jack Vang was able to quit smoking 2/27/2023.     Leukocytosis is reactive, recommend to continue to monitor conservatively. Past Medical History:   Diagnosis Date    Anxiety     Arthritis     Bipolar 1 disorder ([facility] [address]     CAD in native artery 3/6/2018    Chronic back pain     COVID-19     Depression     Hyperlipidemia     Hypertension     IBS (irritable bowel syndrome)     Mild intermittent asthma without complication 7/54/5117    Morbidly obese (HCC)     Panic disorder     at times afraid to go outside    Unspecified sleep apnea     bipap     Past Surgical History:   Procedure Laterality Date    ABLATION OF DYSRHYTHMIC FOCUS      ANUS SURGERY      APPENDECTOMY  02/03/2014    CHOLECYSTECTOMY  01/01/2009    COLONOSCOPY  01/01/2012        COLONOSCOPY  10/19/2017    Dr Judi Phan, Benign HP, 5 yr recall    COLONOSCOPY  04/29/2009    [facility]    CORONARY STENT PLACEMENT      ELECTROCONVULSIVE THERAPY N/A 03/08/2017    ELECTROCONVULSIVE THERAPY performed by Gopal Clinton DO at [address] [facility] 01/01/1990    w/mesh    UMBILICAL HERNIA REPAIR  09/01/1990    VASECTOMY         Current Outpatient Medications:     isosorbide mononitrate (IMDUR) 60 MG extended release tablet, Take 1 tablet by mouth daily, Disp: 60 tablet, Rfl: 3    gabapentin (NEURONTIN) 100 MG capsule, Take 100 mg by mouth 3 times daily. , Disp: , Rfl:     methocarbamol (ROBAXIN) 500 MG tablet, Take 500 mg by mouth 2 times daily as needed, Disp: , Rfl:     ALPRAZolam (XANAX) 0.5 MG tablet, TAKE 1 TABLET BY MOUTH TWICE DAILY AS NEEDED FOR ANXIETY, Disp: 50 tablet, Rfl: 1    busPIRone (BUSPAR) 15 MG tablet, TAKE 1 TABLET BY MOUTH THREE TIMES DAILY. , Disp: 90 tablet, Rfl: 2    VRAYLAR 3 MG CAPS capsule, TAKE 1 CAPSULE BY MOUTH DAILY, Disp: 30 capsule, Rfl: 1    metoprolol tartrate (LOPRESSOR) 25 MG tablet, TAKE 2 TABLETS BY MOUTH TWICE DAILY, Disp: 180 tablet, Rfl: 1    LATUDA 20 MG TABS tablet, TAKE 1 TABLET BY MOUTH DAILY, Disp: 60 tablet, Rfl: 1    vitamin D (ERGOCALCIFEROL) 1.25 MG (52508 UT) CAPS capsule, TAKE 1 CAPSULE BY MOUTH ONCE A WEEK, Disp: 12 capsule, Rfl: 1    doxepin (SINEQUAN) 10 MG capsule, TAKE 1 CAPSULE BY MOUTH NIGHTLY, Disp: 30 capsule, Rfl: 5    atorvastatin (LIPITOR) 80 MG tablet, TAKE 1 TABLET BY MOUTH AT NIGHT, Disp: 30 tablet, Rfl: 2    lisinopril (PRINIVIL;ZESTRIL) 40 MG tablet, TAKE 1 TABLET BY MOUTH DAILY, Disp: 30 tablet, Rfl: 3    colestipol (COLESTID) 1 g tablet, TAKE 1 TABLET BY MOUTH TWICE DAILY. , Disp: 60 tablet, Rfl: 5    Misc. Devices (ROLLING WALKER/BURGUNDY) MISC, Dx: gen weakness and fatigue use daily as directed, Disp: 1 each, Rfl: 0    HYDROcodone-acetaminophen (NORCO)  MG per tablet, Take 1 tablet by mouth every 8 hours as needed for Pain., Disp: , Rfl:    Allergies:    Allergies   Allergen Reactions    Dye [Gadolinium Derivatives] Hives     Mild rash that last less than 2 hours after MRI or CT scans     Social History     Tobacco Use    Smoking status: Former     Packs/day: 1.00     Years: 24.00     Pack years: 24.00     Types: Cigarettes     Start date: 2022     Quit date: 2023     Years since quittin.0    Smokeless tobacco: Never   Vaping Use    Vaping Use: Never used   Substance Use Topics    Alcohol use: No     Alcohol/week: 0.0 standard drinks    Drug use: No     Family History   Problem Relation Age of Onset    Diabetes Mother     Cancer Mother         uterine CA    Depression Mother     Mental Illness Mother     Hypertension Mother     Other Mother         blood clots    Alcohol Abuse Father     Heart Disease Brother     Depression Brother     Mental Illness Brother     Hypertension Brother     Breast Cancer Maternal Grandmother     Cancer Maternal Grandmother         breast CA    Heart Attack Maternal Grandmother     Other Maternal Grandmother         blood clots    Colon Cancer Neg Hx     Esophageal Cancer Neg Hx     Liver Cancer Neg Hx     Rectal Cancer Neg Hx     Stomach Cancer Neg Hx      Health Maintenance   Topic Date Due    HIV screen  Never done    Hepatitis C screen  Never done    COVID-19 Vaccine (4 - Booster for Pfizer series) 05/11/2022    DTaP/Tdap/Td vaccine (2 - Td or Tdap) 07/17/2022    A1C test (Diabetic or Prediabetic)  06/24/2023    Lipids  06/24/2023    Depression Monitoring  01/13/2024    Colorectal Cancer Screen  10/19/2027    Flu vaccine  Completed    Pneumococcal 0-64 years Vaccine  Completed    Hepatitis A vaccine  Aged Out    Hib vaccine  Aged Out    Meningococcal (ACWY) vaccine  Aged Out     Subjective   Review of Systems   Constitutional:  Positive for fatigue. Negative for fever. HENT:  Positive for dental problem. Negative for hearing loss, mouth sores, nosebleeds, sore throat and trouble swallowing. Eyes:  Negative for discharge and itching. Respiratory:  Negative for cough, shortness of breath and wheezing. Cardiovascular:  Negative for chest pain, palpitations and leg swelling. Gastrointestinal:  Negative for abdominal pain, constipation, diarrhea, nausea and vomiting. Endocrine: Negative for cold intolerance and heat intolerance. Genitourinary:  Negative for dysuria, frequency, hematuria and urgency. Musculoskeletal:  Positive for arthralgias. Negative for joint swelling and myalgias. Skin:  Negative for pallor and rash. Allergic/Immunologic: Negative for environmental allergies and immunocompromised state. Neurological:  Negative for seizures, syncope and numbness. Hematological:  Negative for adenopathy. Does not bruise/bleed easily. Psychiatric/Behavioral:  Negative for agitation, behavioral problems and confusion. The patient is nervous/anxious. Objective   Physical Exam  Vitals reviewed. Constitutional:       General: He is not in acute distress. Appearance: He is well-developed. He is obese. He is not toxic-appearing or diaphoretic. HENT:      Head: Normocephalic and atraumatic.       Right Ear: External ear normal.      Left Ear: External ear normal.      Nose: Nose normal.      Mouth/Throat:      Mouth: Mucous membranes are moist.      Comments: Missing teeth, dental caries  Eyes:      General: No scleral icterus. Right eye: No discharge. Left eye: No discharge. Conjunctiva/sclera: Conjunctivae normal.   Neck:      Trachea: No tracheal deviation. Cardiovascular:      Rate and Rhythm: Normal rate and regular rhythm. Pulmonary:      Effort: Pulmonary effort is normal. No respiratory distress. Breath sounds: Normal breath sounds. No wheezing or rales. Abdominal:      General: Bowel sounds are normal. There is no distension. Palpations: Abdomen is soft. Tenderness: There is no abdominal tenderness. There is no guarding. Genitourinary:     Comments: Exam deferred  Musculoskeletal:         General: No tenderness or deformity. Cervical back: Neck supple. No muscular tenderness. Comments: Normal ROM all four extremities   Lymphadenopathy:      Cervical:      Right cervical: No superficial or deep cervical adenopathy. Left cervical: No superficial or deep cervical adenopathy. Upper Body:      Right upper body: No supraclavicular adenopathy. Left upper body: No supraclavicular adenopathy. Comments:      Skin:     General: Skin is warm and dry. Findings: No rash. Neurological:      Mental Status: He is alert and oriented to person, place, and time. Comments: follows commands, non-focal   Psychiatric:         Behavior: Behavior is cooperative. Thought Content: Thought content normal.         Judgment: Judgment normal.      Comments: Alert and oriented to person, place and time.       BP (!) 150/92   Pulse (!) 113   Ht 6' 2\" (1.88 m)   Wt (!) 368 lb 4.8 oz (167.1 kg)   SpO2 97%   BMI 47.29 kg/m²   Wt Readings from Last 3 Encounters:   03/09/23 (!) 368 lb 4.8 oz (167.1 kg)   02/15/23 (!) 362 lb 11.2 oz (164.5 kg)   01/13/23 (!) 361 lb (163.7 kg)     Labs reviewed/analyzed by me:  CBC:   Lab Results   Component Value Date    WBC 12.26 (H) 03/09/2023    HGB 16.4 03/09/2023    HCT 49.1 03/09/2023    MCV 92.5 (H) 03/09/2023     03/09/2023    LABLYMP 4.34 05/25/2014    LYMPHOPCT 43.8 03/09/2023    RBC 5.31 03/09/2023    MCH 30.9 03/09/2023    MCHC 33.4 03/09/2023    RDW 13.0 03/09/2023     Lab Results   Component Value Date    NEUTROABS 5.88 03/09/2023       ASSESSMENT/PLAN:    1. Lymphocytosis    2. Tobacco use    3. Morbid obesity with BMI of 45.0-49.9, adult ([address]    4. Care plan discussed with patient      Leukocytosis/Lymphocytosis  WBC 12.26, ANC 5.88, ALC 5.37. Labs 2/15/2023:  CRP: <0.30  Sed rate: 15  BCR-ABL: Negative  Flow: No B-Cell Monoclonality and T-Cell Aberrant antigenic expression. The blasts are not increased. Recommend conservative monitoring  Address modifiable risk factors including dental care, continued tobacco cessation, weight modification    Tobacco use  Choco Rothman was able to quit smoking on 2/27/2023. He is commended and encouraged to continue complete smoking cessation    Morbid obesity with BMI of 45.0-49.9  Body mass index is 47.29 kg/m². Federal guidelines recommend that people under the age of 72 should have a BMI of 18.5-25 and people age 72 and older should have a BMI of 23-30. If yours is outside the range, we recommend you utilize a diet and exercise program to get yours into the range. We also recommend you speak with your primary care doctor should any specific advice be needed regarding special diets or programs which would be appropriate for your circumstances. Care plan discussed with patient and his wife  All questions answered      Tumor Screening:  Colonoscopy 10/19/2017 by Gustavo Chiang at [facility]:  one 5 mm polyp removed. repeat in 5 yrs   PSA 9/18/2014: 0.39    Return in about 1 year (around 3/9/2024) for follow up with SANDY Bingham.       I have seen, examined and reviewed this patient medication list, appropriate labs and imaging studies. I reviewed relevant medical records and others physicians notes. I discussed the plans of care with the patient. I answered all the questions to the patients satisfaction. I have also reviewed the chief complaint (CC) and part of the history (History of Present Illness (HPI), Past Family Social History [facility]), or Review of Systems (ROS) and made changes when appropriated. The total time (15 min) I spent to see the patient today includes at least one or more of the following: preparing to see the patient by reviewing prior tests, prior notes or other relevant information, performing appropriate independent examination and evaluation, counseling, ordering of medications, tests or procedures, referrals, communicating with other healthcare professionals when appropriated to coordinate care, documenting clinic information in the electronic medical record or other health records, independently interpreting results of tests, managing test results and communicating the results to the patient/family or caregiver. Transcribed utilizing Dragon transcription software.       SANDY Barnes  9:34 PM  3/10/2023

## 2023-03-09 ENCOUNTER — OFFICE VISIT (OUTPATIENT)
Dept: HEMATOLOGY | Age: 48
End: 2023-03-09

## 2023-03-09 ENCOUNTER — HOSPITAL ENCOUNTER (OUTPATIENT)
Dept: INFUSION THERAPY | Age: 48
Discharge: HOME | End: 2023-03-09
Payer: MEDICARE

## 2023-03-09 VITALS
OXYGEN SATURATION: 97 % | DIASTOLIC BLOOD PRESSURE: 92 MMHG | WEIGHT: 315 LBS | HEIGHT: 74 IN | BODY MASS INDEX: 40.43 KG/M2 | HEART RATE: 113 BPM | SYSTOLIC BLOOD PRESSURE: 150 MMHG

## 2023-03-09 DIAGNOSIS — D72.820 LYMPHOCYTOSIS: Primary | ICD-10-CM

## 2023-03-09 DIAGNOSIS — E66.01 MORBID OBESITY WITH BMI OF 45.0-49.9, ADULT (HCC): ICD-10-CM

## 2023-03-09 DIAGNOSIS — Z72.0 TOBACCO USE: ICD-10-CM

## 2023-03-09 DIAGNOSIS — D72.829 LEUKOCYTOSIS, UNSPECIFIED TYPE: ICD-10-CM

## 2023-03-09 DIAGNOSIS — Z71.89 CARE PLAN DISCUSSED WITH PATIENT: ICD-10-CM

## 2023-03-09 LAB
BASOPHILS ABSOLUTE: 0.06 K/UL (ref 0.01–0.08)
BASOPHILS RELATIVE PERCENT: 0.5 % (ref 0.1–1.2)
EOSINOPHILS ABSOLUTE: 0.27 K/UL (ref 0.04–0.54)
EOSINOPHILS RELATIVE PERCENT: 2.2 % (ref 0.7–7)
HCT VFR BLD CALC: 49.1 % (ref 40.1–51)
HEMOGLOBIN: 16.4 G/DL (ref 13.7–17.5)
LYMPHOCYTES ABSOLUTE: 5.37 K/UL (ref 1.18–3.74)
LYMPHOCYTES RELATIVE PERCENT: 43.8 % (ref 19.3–53.1)
MCH RBC QN AUTO: 30.9 PG (ref 25.7–32.2)
MCHC RBC AUTO-ENTMCNC: 33.4 G/DL (ref 32.3–36.5)
MCV RBC AUTO: 92.5 FL (ref 79–92.2)
MONOCYTES ABSOLUTE: 0.64 K/UL (ref 0.24–0.82)
MONOCYTES RELATIVE PERCENT: 5.2 % (ref 4.7–12.5)
NEUTROPHILS ABSOLUTE: 5.88 K/UL (ref 1.56–6.13)
NEUTROPHILS RELATIVE PERCENT: 48 % (ref 34–71.1)
PDW BLD-RTO: 13 % (ref 11.6–14.4)
PLATELET # BLD: 163 K/UL (ref 163–337)
PMV BLD AUTO: 9.2 FL (ref 7.4–10.4)
RBC # BLD: 5.31 M/UL (ref 4.63–6.08)
WBC # BLD: 12.26 K/UL (ref 4.23–9.07)

## 2023-03-09 PROCEDURE — 36415 COLL VENOUS BLD VENIPUNCTURE: CPT

## 2023-03-09 PROCEDURE — 85025 COMPLETE CBC W/AUTO DIFF WBC: CPT

## 2023-03-09 PROCEDURE — 99211 OFF/OP EST MAY X REQ PHY/QHP: CPT

## 2023-03-10 ASSESSMENT — ENCOUNTER SYMPTOMS
COUGH: 0
SORE THROAT: 0
VOMITING: 0
CONSTIPATION: 0
NAUSEA: 0
WHEEZING: 0
ABDOMINAL PAIN: 0
SHORTNESS OF BREATH: 0
EYE ITCHING: 0
EYE DISCHARGE: 0
DIARRHEA: 0
TROUBLE SWALLOWING: 0

## 2023-03-14 DIAGNOSIS — I25.10 CAD IN NATIVE ARTERY: ICD-10-CM

## 2023-03-14 RX ORDER — ATORVASTATIN CALCIUM 80 MG/1
TABLET, FILM COATED ORAL
Qty: 30 TABLET | Refills: 2 | Status: SHIPPED | OUTPATIENT
Start: 2023-03-14

## 2023-03-14 NOTE — TELEPHONE ENCOUNTER
Loraine Holstein called requesting a refill of the below medication which has been pended for you:     Requested Prescriptions     Pending Prescriptions Disp Refills    atorvastatin (LIPITOR) 80 MG tablet [Pharmacy Med Name: ATORVASTATIN CALCIUM 80MG TABLET] 30 tablet 2     Sig: TAKE 1 TABLET BY MOUTH AT NIGHT       Last Appointment Date: 1/13/2023  Next Appointment Date: Visit date not found    Allergies   Allergen Reactions    Dye [Gadolinium Derivatives] Hives     Mild rash that last less than 2 hours after MRI or CT scans

## 2023-03-24 DIAGNOSIS — F41.9 ANXIETY: ICD-10-CM

## 2023-03-27 ENCOUNTER — TELEPHONE (OUTPATIENT)
Dept: FAMILY MEDICINE CLINIC | Age: 48
End: 2023-03-27

## 2023-03-27 DIAGNOSIS — F31.9 BIPOLAR DEPRESSION (HCC): ICD-10-CM

## 2023-03-27 DIAGNOSIS — F41.9 ANXIETY: ICD-10-CM

## 2023-03-27 NOTE — TELEPHONE ENCOUNTER
Pt called stating he is having a COPD Flare and he is needing a Zpak and prednisone sent to pharmacy. He can't make it is today cause wife is at work and has the car.      Will send to provider for authorization

## 2023-03-27 NOTE — TELEPHONE ENCOUNTER
Received fax from pharmacy requesting refill on pts medication(s). Pt was last seen in office on 1/13/2023  and has a follow up scheduled for 3/27/2023. Will send request to  Bronson Matias  for authorization.      Requested Prescriptions     Pending Prescriptions Disp Refills    ALPRAZolam (XANAX) 0.5 MG tablet [Pharmacy Med Name: ALPRAZOLAM 0.5MG TABLET] 50 tablet 1     Sig: TAKE 1 TABLET BY MOUTH TWICE DAILY AS NEEDED FOR ANXIETY

## 2023-03-27 NOTE — TELEPHONE ENCOUNTER
Received fax from pharmacy requesting refill on pts medication(s). Pt was last seen in office on 4/22/2022  and has a follow up scheduled for Visit date not found. Will send request to  Shana Pollard  for authorization. Requested Prescriptions     Pending Prescriptions Disp Refills    colestipol (COLESTID) 1 g tablet [Pharmacy Med Name: COLESTIPOL HCL 1GM TABLET] 60 tablet 5     Sig: TAKE 1 TABLET BY MOUTH TWICE DAILY.     VRAYLAR 3 MG CAPS capsule [Pharmacy Med Name: VRAYLAR 3MG CAPSULE] 30 capsule 1     Sig: TAKE 1 CAPSULE BY MOUTH DAILY

## 2023-03-28 RX ORDER — ALPRAZOLAM 0.5 MG/1
TABLET ORAL
Qty: 50 TABLET | Refills: 0 | Status: SHIPPED | OUTPATIENT
Start: 2023-03-28 | End: 2023-04-28

## 2023-03-28 RX ORDER — AZITHROMYCIN 250 MG/1
250 TABLET, FILM COATED ORAL SEE ADMIN INSTRUCTIONS
Qty: 6 TABLET | Refills: 0 | Status: SHIPPED | OUTPATIENT
Start: 2023-03-28 | End: 2023-04-02

## 2023-03-28 RX ORDER — PREDNISONE 20 MG/1
20 TABLET ORAL 2 TIMES DAILY
Qty: 10 TABLET | Refills: 0 | Status: SHIPPED | OUTPATIENT
Start: 2023-03-28 | End: 2023-04-02

## 2023-03-28 RX ORDER — MONTELUKAST SODIUM 4 MG/1
TABLET, CHEWABLE ORAL
Qty: 60 TABLET | Refills: 5 | Status: SHIPPED | OUTPATIENT
Start: 2023-03-28

## 2023-03-30 NOTE — TELEPHONE ENCOUNTER
It is suppose to be BID dosing. Can you please contact pharmacy and let them know the correct dose?   Thank you!!

## 2023-03-30 NOTE — TELEPHONE ENCOUNTER
Received call from Doug Valencia at [place] [address] requesting a call back to clarify if pt is taking isosorbide mononitrate ER 60 mg daily or BID.      She has a rx that was sent on 10/1/22 with one set of directions and one sent on 2/1/23 with another set of directions,     Will send to provider for clarification

## 2023-03-31 RX ORDER — ISOSORBIDE MONONITRATE 60 MG/1
60 TABLET, EXTENDED RELEASE ORAL 2 TIMES DAILY
Qty: 60 TABLET | Refills: 3 | Status: SHIPPED | OUTPATIENT
Start: 2023-03-31

## 2023-04-17 RX ORDER — LISINOPRIL 20 MG/1
TABLET ORAL
Qty: 60 TABLET | Refills: 2 | OUTPATIENT
Start: 2023-04-17

## 2023-04-17 NOTE — TELEPHONE ENCOUNTER
Received fax from pharmacy requesting refill on pts medication(s). Pt was last seen in office on 4/22/2022  and has a follow up scheduled for Visit date not found. Will send request to  Derek Mcclain  for authorization.      Requested Prescriptions     Pending Prescriptions Disp Refills    lisinopril (PRINIVIL;ZESTRIL) 20 MG tablet [Pharmacy Med Name: LISINOPRIL 20MG TABLET] 60 tablet 2     Sig: TAKE 1 TABLET BY MOUTH ONCE DAILY    LATUDA 20 MG TABS tablet [Pharmacy Med Name: LATUDA 20MG TABLET] 60 tablet 1     Sig: TAKE 1 TABLET BY MOUTH DAILY

## 2023-04-18 DIAGNOSIS — I10 PRIMARY HYPERTENSION: ICD-10-CM

## 2023-04-18 RX ORDER — LURASIDONE HYDROCHLORIDE 20 MG/1
20 TABLET, FILM COATED ORAL DAILY
Qty: 60 TABLET | Refills: 1 | OUTPATIENT
Start: 2023-04-18

## 2023-04-18 RX ORDER — LURASIDONE HYDROCHLORIDE 20 MG/1
TABLET, FILM COATED ORAL
Qty: 60 TABLET | Refills: 1 | Status: SHIPPED | OUTPATIENT
Start: 2023-04-18

## 2023-04-18 RX ORDER — LISINOPRIL 40 MG/1
40 TABLET ORAL DAILY
Qty: 30 TABLET | Refills: 3 | OUTPATIENT
Start: 2023-04-18

## 2023-04-24 DIAGNOSIS — F41.9 ANXIETY: ICD-10-CM

## 2023-04-24 NOTE — TELEPHONE ENCOUNTER
King Arias called requesting a refill of the below medication which has been pended for you:     Requested Prescriptions     Pending Prescriptions Disp Refills    busPIRone (BUSPAR) 15 MG tablet [Pharmacy Med Name: BUSPIRONE HCL 15MG TABLET] 90 tablet 2     Sig: TAKE 1 TABLET BY MOUTH THREE TIMES DAILY.        Last Appointment Date: 4/13/2023  Next Appointment Date: 5/11/2023    Allergies   Allergen Reactions    Dye [Gadolinium Derivatives] Hives     Mild rash that last less than 2 hours after MRI or CT scans

## 2023-04-25 RX ORDER — BUSPIRONE HYDROCHLORIDE 15 MG/1
TABLET ORAL
Qty: 90 TABLET | Refills: 2 | Status: SHIPPED | OUTPATIENT
Start: 2023-04-25

## 2023-05-05 RX ORDER — DOXEPIN HYDROCHLORIDE 10 MG/1
10 CAPSULE ORAL NIGHTLY
Qty: 30 CAPSULE | Refills: 5 | Status: SHIPPED | OUTPATIENT
Start: 2023-05-05

## 2023-05-05 NOTE — TELEPHONE ENCOUNTER
Waqas Pena called requesting a refill of the below medication which has been pended for you:     Requested Prescriptions     Pending Prescriptions Disp Refills    doxepin (SINEQUAN) 10 MG capsule [Pharmacy Med Name: DOXEPIN HYDROCHLORIDE 10MG CAPSULE] 30 capsule 5     Sig: TAKE 1 CAPSULE BY MOUTH NIGHTLY       Last Appointment Date: 4/13/2023  Next Appointment Date: 5/11/2023    Allergies   Allergen Reactions    Dye [Gadolinium Derivatives] Hives     Mild rash that last less than 2 hours after MRI or CT scans

## 2023-05-11 ENCOUNTER — TELEPHONE (OUTPATIENT)
Dept: FAMILY MEDICINE CLINIC | Age: 48
End: 2023-05-11

## 2023-05-11 ENCOUNTER — TELEMEDICINE (OUTPATIENT)
Dept: FAMILY MEDICINE CLINIC | Age: 48
End: 2023-05-11
Payer: MEDICARE

## 2023-05-11 DIAGNOSIS — I10 PRIMARY HYPERTENSION: ICD-10-CM

## 2023-05-11 DIAGNOSIS — F40.00 AGORAPHOBIA: ICD-10-CM

## 2023-05-11 DIAGNOSIS — F31.9 BIPOLAR DEPRESSION (HCC): ICD-10-CM

## 2023-05-11 DIAGNOSIS — F41.9 ANXIETY: Primary | ICD-10-CM

## 2023-05-11 PROCEDURE — 99214 OFFICE O/P EST MOD 30 MIN: CPT | Performed by: NURSE PRACTITIONER

## 2023-05-11 RX ORDER — ALPRAZOLAM 0.5 MG/1
0.5 TABLET ORAL 2 TIMES DAILY PRN
Qty: 60 TABLET | Refills: 0 | Status: SHIPPED | OUTPATIENT
Start: 2023-05-11 | End: 2023-06-10

## 2023-05-11 SDOH — ECONOMIC STABILITY: FOOD INSECURITY: WITHIN THE PAST 12 MONTHS, THE FOOD YOU BOUGHT JUST DIDN'T LAST AND YOU DIDN'T HAVE MONEY TO GET MORE.: SOMETIMES TRUE

## 2023-05-11 SDOH — ECONOMIC STABILITY: TRANSPORTATION INSECURITY
IN THE PAST 12 MONTHS, HAS LACK OF TRANSPORTATION KEPT YOU FROM MEETINGS, WORK, OR FROM GETTING THINGS NEEDED FOR DAILY LIVING?: YES

## 2023-05-11 SDOH — ECONOMIC STABILITY: HOUSING INSECURITY
IN THE LAST 12 MONTHS, WAS THERE A TIME WHEN YOU DID NOT HAVE A STEADY PLACE TO SLEEP OR SLEPT IN A SHELTER (INCLUDING NOW)?: NO

## 2023-05-11 SDOH — ECONOMIC STABILITY: INCOME INSECURITY: HOW HARD IS IT FOR YOU TO PAY FOR THE VERY BASICS LIKE FOOD, HOUSING, MEDICAL CARE, AND HEATING?: HARD

## 2023-05-11 SDOH — ECONOMIC STABILITY: FOOD INSECURITY: WITHIN THE PAST 12 MONTHS, YOU WORRIED THAT YOUR FOOD WOULD RUN OUT BEFORE YOU GOT MONEY TO BUY MORE.: SOMETIMES TRUE

## 2023-05-11 ASSESSMENT — ENCOUNTER SYMPTOMS
GASTROINTESTINAL NEGATIVE: 1
RESPIRATORY NEGATIVE: 1
EYES NEGATIVE: 1

## 2023-05-11 NOTE — TELEPHONE ENCOUNTER
Pt called and LM wanting to know why you stopped his Lisinopril. He said his BP is still running high. Was 150 on the top today. Wants to know if needs to get back on it or if you wanted to keep him off of it for a reason.

## 2023-05-11 NOTE — PROGRESS NOTES
[address], [facility]     Phone:  [phone]  Fax:  [phone]      2023    TELEHEALTH EVALUATION -- Audio/Visual (During TUTEE-68 public health emergency)    HPI:    Chief Complaint   Patient presents with    Anxiety    Depression    Hypertension         Kwame Bentley (:  1975) has requested an audio/video evaluation for the following concern(s): This is a VV for follow up on anxiety and depression. Last visit we increased xanax up to 60 tabs per month so that he could get ou tin public more. He has gotten outside more, but is still having a hard time going around people. All other meds are stable. He does have an appointment with Dilcia Manuel next week. BP has been stable when he checks it as well. Review of Systems   Constitutional: Negative. HENT: Negative. Eyes: Negative. Respiratory: Negative. Cardiovascular: Negative. Gastrointestinal: Negative. Endocrine: Negative. Genitourinary: Negative. Musculoskeletal: Negative. Skin: Negative. Neurological: Negative. Hematological: Negative. Psychiatric/Behavioral:  Positive for behavioral problems. Negative for self-injury and suicidal ideas. The patient is nervous/anxious. Prior to Visit Medications    Medication Sig Taking? Authorizing Provider   ALPRAZolam Don Alcala) 0.5 MG tablet Take 1 tablet by mouth 2 times daily as needed for Anxiety for up to 30 days. Max Daily Amount: 1 mg Yes SANDY Sánchez   doxepin (SINEQUAN) 10 MG capsule TAKE 1 CAPSULE BY MOUTH NIGHTLY  SANDY Sánchez   busPIRone (BUSPAR) 15 MG tablet TAKE 1 TABLET BY MOUTH THREE TIMES DAILY.   SANDY Sánchez   LATUDA 20 MG TABS tablet TAKE 1 TABLET BY MOUTH DAILY  SANDY Sánchez   tadalafil (CIALIS) 5 MG tablet Take 1 tablet by mouth daily as needed for Erectile Dysfunction  SANDY Sánchez   isosorbide mononitrate (IMDUR) 60 MG extended release tablet Take 1 tablet by mouth in the

## 2023-05-12 ENCOUNTER — TELEPHONE (OUTPATIENT)
Dept: FAMILY MEDICINE CLINIC | Age: 48
End: 2023-05-12

## 2023-05-12 DIAGNOSIS — I10 PRIMARY HYPERTENSION: ICD-10-CM

## 2023-05-12 RX ORDER — LISINOPRIL 40 MG/1
40 TABLET ORAL DAILY
Qty: 30 TABLET | Refills: 3 | OUTPATIENT
Start: 2023-05-12

## 2023-05-12 RX ORDER — LISINOPRIL 40 MG/1
40 TABLET ORAL DAILY
Qty: 30 TABLET | Refills: 3 | Status: SHIPPED | OUTPATIENT
Start: 2023-05-12

## 2023-05-12 NOTE — TELEPHONE ENCOUNTER
Pt called stating his BP has been running around 180/93 and is requesting Lisinopril being resent in.  This has been approved and pt aware

## 2023-05-23 RX ORDER — BUSPIRONE HYDROCHLORIDE 15 MG/1
TABLET ORAL
Qty: 90 TABLET | Refills: 2 | OUTPATIENT
Start: 2023-05-23

## 2023-05-31 ENCOUNTER — TELEPHONE (OUTPATIENT)
Dept: FAMILY MEDICINE CLINIC | Age: 48
End: 2023-05-31

## 2023-05-31 DIAGNOSIS — M54.50 ACUTE MIDLINE LOW BACK PAIN WITHOUT SCIATICA: Primary | ICD-10-CM

## 2023-05-31 NOTE — TELEPHONE ENCOUNTER
Pt called stating he was taking Cialis but has quit cause his back started killing him and he thought that maybe it was from the Cialis. He is wanting to know if we can order a Urinalysis on him that he can get done in [place]. I offered him an appointment for today but he said he doesn't drive and his wife is gone. He can get a ride to [place] to do a urine. Will send to [address] for recommendations.

## 2023-05-31 NOTE — TELEPHONE ENCOUNTER
Order for UA has been placed. Please let patient know to go to lab to have this completed.    Thank you!!

## 2023-06-01 DIAGNOSIS — F31.9 BIPOLAR DEPRESSION (HCC): ICD-10-CM

## 2023-06-01 DIAGNOSIS — F41.9 ANXIETY: ICD-10-CM

## 2023-06-01 DIAGNOSIS — M54.50 ACUTE MIDLINE LOW BACK PAIN WITHOUT SCIATICA: ICD-10-CM

## 2023-06-01 LAB
BACTERIA URNS QL MICRO: NEGATIVE /HPF
BILIRUB UR QL STRIP: NEGATIVE
CLARITY UR: CLEAR
COLOR UR: YELLOW
CRYSTALS URNS MICRO: ABNORMAL /HPF
EPI CELLS #/AREA URNS AUTO: 1 /HPF (ref 0–5)
GLUCOSE UR STRIP.AUTO-MCNC: NEGATIVE MG/DL
HGB UR STRIP.AUTO-MCNC: ABNORMAL MG/L
HYALINE CASTS #/AREA URNS AUTO: 0 /HPF (ref 0–8)
KETONES UR STRIP.AUTO-MCNC: NEGATIVE MG/DL
LEUKOCYTE ESTERASE UR QL STRIP.AUTO: NEGATIVE
NITRITE UR QL STRIP.AUTO: NEGATIVE
PH UR STRIP.AUTO: 5.5 [PH] (ref 5–8)
PROT UR STRIP.AUTO-MCNC: NEGATIVE MG/DL
RBC #/AREA URNS AUTO: 2 /HPF (ref 0–4)
SP GR UR STRIP.AUTO: 1.01 (ref 1–1.03)
UROBILINOGEN UR STRIP.AUTO-MCNC: 0.2 E.U./DL
WBC #/AREA URNS AUTO: 2 /HPF (ref 0–5)

## 2023-06-01 RX ORDER — CARIPRAZINE 3 MG/1
CAPSULE, GELATIN COATED ORAL
Qty: 30 CAPSULE | Refills: 1 | Status: SHIPPED | OUTPATIENT
Start: 2023-06-01

## 2023-06-02 ENCOUNTER — TELEPHONE (OUTPATIENT)
Dept: FAMILY MEDICINE CLINIC | Age: 48
End: 2023-06-02

## 2023-06-02 NOTE — TELEPHONE ENCOUNTER
Called patient, spoke with: Patient regarding the results of the patients most recent labs. I advised Patient of Faustino Barger recommendations.    Patient did voice understanding

## 2023-06-02 NOTE — TELEPHONE ENCOUNTER
----- Message from SANDY Pan sent at 6/1/2023 11:11 PM CDT -----  Please let patient know that UA has returned. It was negative for bacteria. Some blood was noted. Will need to recheck UA in 1 month to make sure this has resolved please.

## 2023-06-06 ENCOUNTER — TELEPHONE (OUTPATIENT)
Dept: FAMILY MEDICINE CLINIC | Age: 48
End: 2023-06-06

## 2023-06-06 RX ORDER — ERGOCALCIFEROL 1.25 MG/1
50000 CAPSULE ORAL WEEKLY
Qty: 12 CAPSULE | Refills: 1 | Status: SHIPPED | OUTPATIENT
Start: 2023-06-06

## 2023-06-08 ENCOUNTER — TELEMEDICINE (OUTPATIENT)
Dept: FAMILY MEDICINE CLINIC | Age: 48
End: 2023-06-08
Payer: MEDICARE

## 2023-06-08 DIAGNOSIS — F41.9 ANXIETY: Primary | ICD-10-CM

## 2023-06-08 DIAGNOSIS — R31.9 HEMATURIA, UNSPECIFIED TYPE: ICD-10-CM

## 2023-06-08 PROCEDURE — 99214 OFFICE O/P EST MOD 30 MIN: CPT | Performed by: NURSE PRACTITIONER

## 2023-06-08 RX ORDER — ALPRAZOLAM 0.5 MG/1
0.5 TABLET ORAL 2 TIMES DAILY PRN
Qty: 60 TABLET | Refills: 0 | Status: SHIPPED | OUTPATIENT
Start: 2023-06-08 | End: 2023-07-08

## 2023-06-08 ASSESSMENT — ENCOUNTER SYMPTOMS
GASTROINTESTINAL NEGATIVE: 1
EYES NEGATIVE: 1
RESPIRATORY NEGATIVE: 1

## 2023-06-08 NOTE — PROGRESS NOTES
difficulty breathing or respiratory distress        [] Abnormal      Musculoskeletal:   [x] Normal gait with no signs of ataxia         [x] Normal range of motion of neck        [] Abnormal       Neurological:        [x] No Facial Asymmetry (Cranial nerve 7 motor function) (limited exam to video visit)          [] No gaze palsy        [] Abnormal         Skin:        [x] No significant exanthematous lesions or discoloration noted on facial skin         [] Abnormal            Psychiatric:       [x] Normal Affect [] Abnormal        [] No Hallucinations    Other pertinent observable physical exam findings:-    Due to this being a TeleHealth encounter, evaluation of the following organ systems is limited: Vitals/Constitutional/EENT/Resp/CV/GI//MS/Neuro/Skin/Heme-Lymph-Imm. ASSESSMENT/PLAN:  1. Anxiety  Stable. Continue current regimen  - ALPRAZolam (XANAX) 0.5 MG tablet; Take 1 tablet by mouth 2 times daily as needed for Anxiety for up to 30 days. Max Daily Amount: 1 mg  Dispense: 60 tablet; Refill: 0    2. Hematuria, unspecified type  Will monitor at follow up visit in office. I do believe back pain and hematuria might have been a renal stone and not a side effect of the cialis. Will continue to monitor and reassess if pain starts. Return in about 4 weeks (around 7/6/2023) for VV med refill. An  electronic signature was used to authenticate this note. --SANDY Henderson on 6/8/2023 at 1:33 PM        Pursuant to the emergency declaration under the [facility], [facility] waiver authority and the [employer] Act, this Virtual  Visit was conducted, with patient's consent, to reduce the patient's risk of exposure to COVID-19 and provide continuity of care for an established patient. Services were provided through a video synchronous discussion virtually to substitute for in-person clinic visit.

## 2023-07-11 ENCOUNTER — TELEMEDICINE (OUTPATIENT)
Dept: FAMILY MEDICINE CLINIC | Age: 48
End: 2023-07-11
Payer: MEDICARE

## 2023-07-11 DIAGNOSIS — F41.9 ANXIETY: Primary | ICD-10-CM

## 2023-07-11 DIAGNOSIS — F31.9 BIPOLAR DEPRESSION (HCC): ICD-10-CM

## 2023-07-11 DIAGNOSIS — R31.9 HEMATURIA, UNSPECIFIED TYPE: ICD-10-CM

## 2023-07-11 PROCEDURE — 99214 OFFICE O/P EST MOD 30 MIN: CPT | Performed by: NURSE PRACTITIONER

## 2023-07-11 RX ORDER — ALPRAZOLAM 0.5 MG/1
0.5 TABLET ORAL 2 TIMES DAILY PRN
Qty: 60 TABLET | Refills: 0 | Status: SHIPPED | OUTPATIENT
Start: 2023-07-11 | End: 2023-08-10

## 2023-07-11 ASSESSMENT — ENCOUNTER SYMPTOMS
GASTROINTESTINAL NEGATIVE: 1
EYES NEGATIVE: 1
RESPIRATORY NEGATIVE: 1

## 2023-07-11 NOTE — PROGRESS NOTES
History:   Procedure Laterality Date    ABLATION OF DYSRHYTHMIC FOCUS      ANUS SURGERY      APPENDECTOMY  02/03/2014    CHOLECYSTECTOMY  01/01/2009    COLONOSCOPY  01/01/2012        COLONOSCOPY  10/19/2017    Dr Joaquina Garrett, Benign HP, 5 yr recall    COLONOSCOPY  04/29/2009    [facility]    CORONARY STENT PLACEMENT      ELECTROCONVULSIVE THERAPY N/A 03/08/2017    ELECTROCONVULSIVE THERAPY performed by Shasha Corona DO at [address] Right 01/01/1990    w/mesh    UMBILICAL HERNIA REPAIR  09/01/1990    VASECTOMY     ,   Family History   Problem Relation Age of Onset    Diabetes Mother     Cancer Mother         uterine CA    Depression Mother     Mental Illness Mother     Hypertension Mother     Other Mother         blood clots    Alcohol Abuse Father     Heart Disease Brother     Depression Brother     Mental Illness Brother     Hypertension Brother     Breast Cancer Maternal Grandmother     Cancer Maternal Grandmother         breast CA    Heart Attack Maternal Grandmother     Other Maternal Grandmother         blood clots    Colon Cancer Neg Hx     Esophageal Cancer Neg Hx     Liver Cancer Neg Hx     Rectal Cancer Neg Hx     Stomach Cancer Neg Hx        PHYSICAL EXAMINATION:  [ INSTRUCTIONS:  \"[x]\" Indicates a positive item  \"[]\" Indicates a negative item  -- DELETE ALL ITEMS NOT EXAMINED]  Vital Signs: (As obtained by patient/caregiver at home)        Constitutional: [x] Appears well-developed and well-nourished [x] No apparent distress      [] Abnormal   Mental status  [x] Alert and awake  [x] Oriented to person/place/time [x]Able to follow commands        Eyes:  EOM    [x]  Normal  [] Abnormal-  Sclera  [x]  Normal  [] Abnormal -         Discharge []  None visible  [] Abnormal -    HENT:   [x] Normocephalic, atraumatic.   [] Abnormal   [x] Mouth/Throat: Mucous membranes are moist.     External Ears [x] Normal  [] Abnormal-    Neck: [x] No visualized mass

## 2023-07-24 NOTE — TELEPHONE ENCOUNTER
Received fax from pharmacy requesting refill on pts medication(s). Pt was last seen in office on 7/11/2023  and has a follow up scheduled for 8/9/2023. Will send request to  Rochelle Resendiz  for authorization.      Requested Prescriptions     Pending Prescriptions Disp Refills    isosorbide mononitrate (IMDUR) 60 MG extended release tablet [Pharmacy Med Name: ISOSORBIDE MONONITRATE ER 60MG TABLET EXTENDED RELEASE 24 HOUR] 60 tablet 3     Sig: TAKE 1 TABLET BY MOUTH IN THE MORNING AND AT BEDTIME

## 2023-07-25 ENCOUNTER — TELEPHONE (OUTPATIENT)
Dept: FAMILY MEDICINE CLINIC | Age: 48
End: 2023-07-25

## 2023-07-25 DIAGNOSIS — F31.9 BIPOLAR DEPRESSION (HCC): ICD-10-CM

## 2023-07-25 DIAGNOSIS — F41.9 ANXIETY: ICD-10-CM

## 2023-07-25 RX ORDER — CARIPRAZINE 3 MG/1
CAPSULE, GELATIN COATED ORAL
Qty: 30 CAPSULE | Refills: 1 | Status: SHIPPED | OUTPATIENT
Start: 2023-07-25

## 2023-07-25 RX ORDER — BUSPIRONE HYDROCHLORIDE 15 MG/1
TABLET ORAL
Qty: 90 TABLET | Refills: 2 | Status: SHIPPED | OUTPATIENT
Start: 2023-07-25

## 2023-07-25 RX ORDER — ISOSORBIDE MONONITRATE 60 MG/1
60 TABLET, EXTENDED RELEASE ORAL 2 TIMES DAILY
Qty: 60 TABLET | Refills: 3 | Status: SHIPPED | OUTPATIENT
Start: 2023-07-25

## 2023-07-25 NOTE — TELEPHONE ENCOUNTER
Gricel Cortez called requesting a refill of the below medication which has been pended for you:     Requested Prescriptions     Pending Prescriptions Disp Refills    busPIRone (BUSPAR) 15 MG tablet [Pharmacy Med Name: BUSPIRONE HYDROCHLORIDE 15MG TABLET] 90 tablet 2     Sig: TAKE 1 TABLET BY MOUTH THREE TIMES DAILY.        Last Appointment Date: 7/11/2023  Next Appointment Date: 8/9/2023    Allergies   Allergen Reactions    Dye [Gadolinium Derivatives] Hives     Mild rash that last less than 2 hours after MRI or CT scans

## 2023-07-25 NOTE — TELEPHONE ENCOUNTER
I do not want to call in any other meds since he is on Xanax already. He can take 2 tablets of the xanax about 45 mins prior to the procedure. He needs to make sure dentist is aware that he took this.

## 2023-07-25 NOTE — TELEPHONE ENCOUNTER
Patient called in stating he has a dentist appointment Friday and is having extreme anxiety from it. He wondered if Miles Puga would send him in anything extra for anxiety. He is currently on Xanax. Will send to Sean DELGADO for recommendation.

## 2023-08-18 DIAGNOSIS — F41.9 ANXIETY: ICD-10-CM

## 2023-08-18 RX ORDER — ALPRAZOLAM 0.5 MG/1
TABLET ORAL
Qty: 60 TABLET | Refills: 0 | Status: SHIPPED | OUTPATIENT
Start: 2023-08-18 | End: 2023-09-17

## 2023-08-18 NOTE — TELEPHONE ENCOUNTER
Received fax from pharmacy requesting refill on pts medication(s). Pt was last seen in office on 7/11/2023  and has a follow up scheduled for 8/23/2023. Will send request to  Alok Bello  for authorization.      Requested Prescriptions     Pending Prescriptions Disp Refills    ALPRAZolam (XANAX) 0.5 MG tablet [Pharmacy Med Name: ALPRAZOLAM 0.5MG TABLET] 60 tablet 0     Sig: TAKE 1 TABLET TWICE DAILY AS NEEDED FOR ANXIETY

## 2023-09-07 DIAGNOSIS — I10 PRIMARY HYPERTENSION: ICD-10-CM

## 2023-09-08 RX ORDER — LISINOPRIL 40 MG/1
40 TABLET ORAL DAILY
Qty: 30 TABLET | Refills: 3 | Status: SHIPPED | OUTPATIENT
Start: 2023-09-08

## 2023-09-11 DIAGNOSIS — I25.10 CAD IN NATIVE ARTERY: ICD-10-CM

## 2023-09-12 RX ORDER — ATORVASTATIN CALCIUM 80 MG/1
80 TABLET, FILM COATED ORAL NIGHTLY
Qty: 30 TABLET | Refills: 2 | Status: SHIPPED | OUTPATIENT
Start: 2023-09-12

## 2023-09-18 DIAGNOSIS — F41.9 ANXIETY: ICD-10-CM

## 2023-09-18 RX ORDER — ALPRAZOLAM 0.5 MG/1
TABLET ORAL
Qty: 60 TABLET | Refills: 0 | OUTPATIENT
Start: 2023-09-18 | End: 2023-10-18

## 2023-09-20 DIAGNOSIS — F41.9 ANXIETY: ICD-10-CM

## 2023-09-20 DIAGNOSIS — F31.9 BIPOLAR DEPRESSION (HCC): ICD-10-CM

## 2023-09-20 RX ORDER — MONTELUKAST SODIUM 4 MG/1
TABLET, CHEWABLE ORAL
Qty: 60 TABLET | Refills: 5 | Status: SHIPPED | OUTPATIENT
Start: 2023-09-20

## 2023-09-20 RX ORDER — CARIPRAZINE 3 MG/1
CAPSULE, GELATIN COATED ORAL
Qty: 30 CAPSULE | Refills: 1 | Status: SHIPPED | OUTPATIENT
Start: 2023-09-20

## 2023-10-12 ENCOUNTER — OFFICE VISIT (OUTPATIENT)
Dept: FAMILY MEDICINE CLINIC | Age: 48
End: 2023-10-12
Payer: MEDICARE

## 2023-10-12 VITALS
TEMPERATURE: 98.2 F | DIASTOLIC BLOOD PRESSURE: 88 MMHG | SYSTOLIC BLOOD PRESSURE: 136 MMHG | OXYGEN SATURATION: 99 % | HEIGHT: 74 IN | HEART RATE: 96 BPM | BODY MASS INDEX: 40.43 KG/M2 | WEIGHT: 315 LBS

## 2023-10-12 DIAGNOSIS — F31.9 BIPOLAR DEPRESSION (HCC): ICD-10-CM

## 2023-10-12 DIAGNOSIS — F41.9 ANXIETY: Primary | ICD-10-CM

## 2023-10-12 DIAGNOSIS — Z51.81 MEDICATION MONITORING ENCOUNTER: ICD-10-CM

## 2023-10-12 LAB
ALCOHOL URINE: NORMAL
AMPHETAMINE SCREEN, URINE: NORMAL
BARBITURATE SCREEN, URINE: NORMAL
BENZODIAZEPINE SCREEN, URINE: NORMAL
BUPRENORPHINE URINE: NORMAL
COCAINE METABOLITE SCREEN URINE: NORMAL
FENTANYL SCREEN, URINE: NORMAL
GABAPENTIN SCREEN, URINE: NORMAL
MDMA URINE: NORMAL
METHADONE SCREEN, URINE: NORMAL
METHAMPHETAMINE, URINE: NORMAL
OPIATE SCREEN URINE: POSITIVE
OXYCODONE SCREEN URINE: NORMAL
PHENCYCLIDINE SCREEN URINE: NORMAL
PROPOXYPHENE SCREEN, URINE: NORMAL
SYNTHETIC CANNABINOIDS(K2) SCREEN, URINE: NORMAL
THC SCREEN, URINE: NORMAL
TRAMADOL SCREEN URINE: NORMAL
TRICYCLIC ANTIDEPRESSANTS, UR: NORMAL

## 2023-10-12 PROCEDURE — 99214 OFFICE O/P EST MOD 30 MIN: CPT | Performed by: NURSE PRACTITIONER

## 2023-10-12 PROCEDURE — G0008 ADMIN INFLUENZA VIRUS VAC: HCPCS | Performed by: NURSE PRACTITIONER

## 2023-10-12 PROCEDURE — 3078F DIAST BP <80 MM HG: CPT | Performed by: NURSE PRACTITIONER

## 2023-10-12 PROCEDURE — 3074F SYST BP LT 130 MM HG: CPT | Performed by: NURSE PRACTITIONER

## 2023-10-12 PROCEDURE — 90674 CCIIV4 VAC NO PRSV 0.5 ML IM: CPT | Performed by: NURSE PRACTITIONER

## 2023-10-12 RX ORDER — BUSPIRONE HYDROCHLORIDE 30 MG/1
30 TABLET ORAL 2 TIMES DAILY
Qty: 60 TABLET | Refills: 5 | Status: SHIPPED | OUTPATIENT
Start: 2023-10-12

## 2023-10-12 RX ORDER — ALPRAZOLAM 0.5 MG/1
TABLET ORAL
Qty: 60 TABLET | Refills: 0 | Status: SHIPPED | OUTPATIENT
Start: 2023-10-12 | End: 2023-11-12

## 2023-10-12 ASSESSMENT — ENCOUNTER SYMPTOMS
RESPIRATORY NEGATIVE: 1
EYES NEGATIVE: 1
GASTROINTESTINAL NEGATIVE: 1

## 2023-10-12 NOTE — PROGRESS NOTES
tablet; Refill: 0  - busPIRone (BUSPAR) 30 MG tablet; Take 30 mg by mouth in the morning and at bedtime  Dispense: 60 tablet; Refill: 5    2. Medication monitoring encounter    - POCT Rapid Drug Screen    3. Bipolar depression ([address])  Stable. Keep follow up as scheduled for [facility] therapy       Return in about 3 months (around 1/12/2024) for Follow up chronic conditions. Orders Placed This Encounter   Procedures    Influenza, FLUCELVAX, (age 10 mo+), IM, PF, 0.5 mL    POCT Rapid Drug Screen       Orders Placed This Encounter   Medications    ALPRAZolam (XANAX) 0.5 MG tablet     Sig: TAKE 1 TABLET TWICE DAILY AS NEEDED FOR ANXIETY     Dispense:  60 tablet     Refill:  0    busPIRone (BUSPAR) 30 MG tablet     Sig: Take 30 mg by mouth in the morning and at bedtime     Dispense:  60 tablet     Refill:  5            Patient offered educational handouts and has had all questions answered. Patient voices understanding and agrees to plans along with risks and benefits of plan. Patient is instructed to continue prior meds, diet, and exercise plans as instructed. Patient agrees to follow up as instructed and sooner if needed. Patient agrees to go to ER if condition becomes emergent. EMR Dragon/transcription disclaimer: Some of this encounter note is an electronic transcription/translation of spoken language to printed text. The electronic translation of spoken language may permit erroneous, or at times, nonsensical words or phrases to be inadvertently transcribed.  Although I have reviewed the note for such errors, some may still exist.    Electronically signed by SANDY Mireles on 10/13/2023 at 10:54 PM

## 2023-10-16 NOTE — TELEPHONE ENCOUNTER
Received fax from pharmacy requesting refill on pts medication(s). Pt was last seen in office on 10/12/2023  and has a follow up scheduled for 1/16/2024. Will send request to  Shima Arellano  for authorization.      Requested Prescriptions     Pending Prescriptions Disp Refills    doxepin (SINEQUAN) 10 MG capsule [Pharmacy Med Name: DOXEPIN HYDROCHLORIDE 10MG CAPSULE] 30 capsule 5     Sig: TAKE 1 CAPSULE BY MOUTH NIGHTLY

## 2023-10-17 RX ORDER — DOXEPIN HYDROCHLORIDE 10 MG/1
10 CAPSULE ORAL NIGHTLY
Qty: 30 CAPSULE | Refills: 5 | Status: SHIPPED | OUTPATIENT
Start: 2023-10-17

## 2023-11-16 ENCOUNTER — TELEPHONE (OUTPATIENT)
Dept: FAMILY MEDICINE CLINIC | Age: 48
End: 2023-11-16

## 2023-11-16 DIAGNOSIS — F41.9 ANXIETY: ICD-10-CM

## 2023-11-16 NOTE — TELEPHONE ENCOUNTER
Patient called stating that [address] no longer wants patient to take the Xanax. Patient verbalized need to ween off. Patient was requesting refill of medications. Informed patient the best route since need for weaning would be to make an appointment with Jose Ontiveros to discuss. Patient stated he would call back to schedule.

## 2023-11-16 NOTE — TELEPHONE ENCOUNTER
Pt called stating he saw  [address] yesterday with Krishan Mcdaniel and he acted like he wanted pt to take 2 Xanax a day but he didn't send these to the pharmacy. I asked pt if he had called the pharmacy and he said he did. So I recommended he call Krishan Mcdaniel and speak with them. He will let me know what they say.

## 2023-11-17 ENCOUNTER — TELEMEDICINE (OUTPATIENT)
Dept: FAMILY MEDICINE CLINIC | Age: 48
End: 2023-11-17
Payer: MEDICARE

## 2023-11-17 DIAGNOSIS — F41.9 ANXIETY: ICD-10-CM

## 2023-11-17 DIAGNOSIS — F31.9 BIPOLAR DEPRESSION (HCC): Primary | ICD-10-CM

## 2023-11-17 PROCEDURE — 99213 OFFICE O/P EST LOW 20 MIN: CPT | Performed by: NURSE PRACTITIONER

## 2023-11-17 RX ORDER — LURASIDONE HYDROCHLORIDE 60 MG/1
TABLET, FILM COATED ORAL
COMMUNITY
Start: 2023-11-15

## 2023-11-17 ASSESSMENT — ENCOUNTER SYMPTOMS
EYES NEGATIVE: 1
RESPIRATORY NEGATIVE: 1
GASTROINTESTINAL NEGATIVE: 1

## 2023-11-17 NOTE — PROGRESS NOTES
Black Paz, was evaluated through a synchronous (real-time) audio-video encounter. The patient (or guardian if applicable) is aware that this is a billable service, which includes applicable co-pays. This Virtual Visit was conducted with patient's (and/or legal guardian's) consent. Patient identification was verified, and a caregiver was present when appropriate. The patient was located at Home: [address] [place] [address]  Provider was located at [facility] (Appt Dept): [address],  [address] Black Paz (:  1975) is a Established patient, presenting virtually for evaluation of the following:    Assessment & Plan   Below is the assessment and plan developed based on review of pertinent history, physical exam, labs, studies, and medications. 1. Bipolar depression ([address])  2. Anxiety    Continue follow-up with psych as they will refill medications as needed. Keep follow-up with me as scheduled for other chronic conditions including hypertension. Return for Keep follow up as scheduled. Subjective   HPI  Patient presents today for routine follow up of chronic conditions. He states that psych has called in his xanax. He would just like to touch base today and update on psych. Patient is seeing [facility] therapy. He reports seeing him again in 2 months. Review of Systems   Constitutional: Negative. HENT: Negative. Eyes: Negative. Respiratory: Negative. Cardiovascular: Negative. Gastrointestinal: Negative. Endocrine: Negative. Genitourinary: Negative. Musculoskeletal: Negative. Skin: Negative. Neurological: Negative. Hematological: Negative. Psychiatric/Behavioral:  The patient is nervous/anxious.            Objective   Patient-Reported Vitals  No data recorded     Physical Exam  [INSTRUCTIONS:  \"[x]\" Indicates a positive item  \"[]\" Indicates a negative item  -- DELETE ALL ITEMS NOT EXAMINED]    Constitutional: [x]

## 2023-11-28 RX ORDER — ERGOCALCIFEROL 1.25 MG/1
50000 CAPSULE ORAL WEEKLY
Qty: 12 CAPSULE | Refills: 1 | Status: SHIPPED | OUTPATIENT
Start: 2023-11-28

## 2023-12-01 ENCOUNTER — TELEPHONE (OUTPATIENT)
Dept: FAMILY MEDICINE CLINIC | Age: 48
End: 2023-12-01

## 2023-12-04 DIAGNOSIS — I10 PRIMARY HYPERTENSION: Primary | ICD-10-CM

## 2023-12-05 RX ORDER — DOXEPIN HYDROCHLORIDE 10 MG/1
10 CAPSULE ORAL 2 TIMES DAILY
Qty: 60 CAPSULE | Refills: 3 | Status: SHIPPED | OUTPATIENT
Start: 2023-12-05

## 2023-12-05 RX ORDER — ISOSORBIDE MONONITRATE 60 MG/1
60 TABLET, EXTENDED RELEASE ORAL 2 TIMES DAILY
Qty: 60 TABLET | Refills: 3 | Status: SHIPPED | OUTPATIENT
Start: 2023-12-05

## 2023-12-11 DIAGNOSIS — I25.10 CAD IN NATIVE ARTERY: ICD-10-CM

## 2023-12-12 RX ORDER — ATORVASTATIN CALCIUM 80 MG/1
80 TABLET, FILM COATED ORAL NIGHTLY
Qty: 30 TABLET | Refills: 2 | Status: SHIPPED | OUTPATIENT
Start: 2023-12-12

## 2023-12-28 DIAGNOSIS — I10 PRIMARY HYPERTENSION: ICD-10-CM

## 2023-12-29 RX ORDER — LISINOPRIL 40 MG/1
40 TABLET ORAL DAILY
Qty: 30 TABLET | Refills: 3 | Status: SHIPPED | OUTPATIENT
Start: 2023-12-29

## 2024-02-13 ENCOUNTER — TELEMEDICINE (OUTPATIENT)
Dept: FAMILY MEDICINE CLINIC | Age: 49
End: 2024-02-13
Payer: MEDICARE

## 2024-02-13 DIAGNOSIS — F41.9 ANXIETY: ICD-10-CM

## 2024-02-13 DIAGNOSIS — F31.9 BIPOLAR DEPRESSION (HCC): Primary | ICD-10-CM

## 2024-02-13 DIAGNOSIS — N52.2 DRUG-INDUCED ERECTILE DYSFUNCTION: ICD-10-CM

## 2024-02-13 DIAGNOSIS — I10 WHITE COAT SYNDROME WITH DIAGNOSIS OF HYPERTENSION: ICD-10-CM

## 2024-02-13 PROCEDURE — 99214 OFFICE O/P EST MOD 30 MIN: CPT | Performed by: NURSE PRACTITIONER

## 2024-02-13 RX ORDER — SILDENAFIL 50 MG/1
TABLET, FILM COATED ORAL
Qty: 30 TABLET | Refills: 3 | Status: SHIPPED | OUTPATIENT
Start: 2024-02-13

## 2024-02-13 RX ORDER — ALPRAZOLAM 0.5 MG/1
0.5 TABLET ORAL 2 TIMES DAILY
COMMUNITY

## 2024-02-13 RX ORDER — CLONIDINE HYDROCHLORIDE 0.1 MG/1
0.1 TABLET ORAL DAILY PRN
Qty: 30 TABLET | Refills: 3 | Status: SHIPPED | OUTPATIENT
Start: 2024-02-13

## 2024-02-13 NOTE — PROGRESS NOTES
-            Psychiatric:       [x] Normal Affect [] Abnormal -        [x] No Hallucinations    Other pertinent observable physical exam findings:-             --SANDY Mccullough

## 2024-03-06 ENCOUNTER — TELEPHONE (OUTPATIENT)
Dept: FAMILY MEDICINE CLINIC | Age: 49
End: 2024-03-06

## 2024-03-06 NOTE — TELEPHONE ENCOUNTER
Called patient to see if he would like to change the appointment on 05/07/2024 to a 30 minute and complete his AWV appointment.

## 2024-03-11 DIAGNOSIS — I25.10 CAD IN NATIVE ARTERY: ICD-10-CM

## 2024-03-12 RX ORDER — ATORVASTATIN CALCIUM 80 MG/1
80 TABLET, FILM COATED ORAL NIGHTLY
Qty: 30 TABLET | Refills: 2 | Status: SHIPPED | OUTPATIENT
Start: 2024-03-12

## 2024-03-25 ENCOUNTER — NURSE ONLY (OUTPATIENT)
Dept: FAMILY MEDICINE CLINIC | Age: 49
End: 2024-03-25

## 2024-03-25 DIAGNOSIS — Z13.9 SCREENING DUE: Primary | ICD-10-CM

## 2024-03-25 SDOH — ECONOMIC STABILITY: FOOD INSECURITY: WITHIN THE PAST 12 MONTHS, YOU WORRIED THAT YOUR FOOD WOULD RUN OUT BEFORE YOU GOT MONEY TO BUY MORE.: NEVER TRUE

## 2024-03-25 SDOH — ECONOMIC STABILITY: INCOME INSECURITY: HOW HARD IS IT FOR YOU TO PAY FOR THE VERY BASICS LIKE FOOD, HOUSING, MEDICAL CARE, AND HEATING?: HARD

## 2024-03-25 SDOH — ECONOMIC STABILITY: FOOD INSECURITY: WITHIN THE PAST 12 MONTHS, THE FOOD YOU BOUGHT JUST DIDN'T LAST AND YOU DIDN'T HAVE MONEY TO GET MORE.: NEVER TRUE

## 2024-03-25 NOTE — PROGRESS NOTES
Question 3/25/2024  2:37 PM CDT - Filed by Patient   How hard is it for you to pay for the very basics like food, housing, medical care, and heating? Hard   Within the past 12 months, you worried that your food would run out before you got the money to buy more. Never true   Within the past 12 months, the food you bought just didn’t last and you didn’t have money to get more. Never true   In the past 12 months, has lack of transportation kept you from meetings, work, or from getting things needed for daily living? Yes   In the last 12 months, was there a time when you did not have a steady place to sleep or slept in a shelter (including now)? No   Would you like resources for any of these topics? No, thank you

## 2024-04-08 RX ORDER — DOXEPIN HYDROCHLORIDE 10 MG/1
10 CAPSULE ORAL 2 TIMES DAILY
Qty: 60 CAPSULE | Refills: 0 | Status: SHIPPED | OUTPATIENT
Start: 2024-04-08

## 2024-04-08 NOTE — TELEPHONE ENCOUNTER
Mikey called requesting a refill of the below medication which has been pended for you:     Requested Prescriptions     Pending Prescriptions Disp Refills    doxepin (SINEQUAN) 10 MG capsule [Pharmacy Med Name: DOXEPIN HYDROCHLORIDE 10MG CAPSULE] 60 capsule 3     Sig: TAKE 1 CAPSULE BY MOUTH TWICE DAILY       Last Appointment Date: 2/13/2024  Next Appointment Date: 5/7/2024    Allergies   Allergen Reactions    Dye [Gadolinium Derivatives] Hives     Mild rash that last less than 2 hours after MRI or CT scans

## 2024-04-15 ENCOUNTER — OFFICE VISIT (OUTPATIENT)
Dept: CARDIOLOGY CLINIC | Age: 49
End: 2024-04-15
Payer: MEDICARE

## 2024-04-15 VITALS
HEART RATE: 102 BPM | DIASTOLIC BLOOD PRESSURE: 90 MMHG | SYSTOLIC BLOOD PRESSURE: 146 MMHG | WEIGHT: 315 LBS | OXYGEN SATURATION: 97 % | BODY MASS INDEX: 40.43 KG/M2 | HEIGHT: 74 IN

## 2024-04-15 DIAGNOSIS — I47.10 SVT (SUPRAVENTRICULAR TACHYCARDIA) (HCC): ICD-10-CM

## 2024-04-15 DIAGNOSIS — I10 ESSENTIAL HYPERTENSION: ICD-10-CM

## 2024-04-15 DIAGNOSIS — F41.9 ANXIETY: ICD-10-CM

## 2024-04-15 DIAGNOSIS — I25.10 CORONARY ARTERY DISEASE INVOLVING NATIVE CORONARY ARTERY OF NATIVE HEART WITHOUT ANGINA PECTORIS: Primary | ICD-10-CM

## 2024-04-15 PROCEDURE — 99214 OFFICE O/P EST MOD 30 MIN: CPT | Performed by: NURSE PRACTITIONER

## 2024-04-15 PROCEDURE — 3080F DIAST BP >= 90 MM HG: CPT | Performed by: NURSE PRACTITIONER

## 2024-04-15 PROCEDURE — 3077F SYST BP >= 140 MM HG: CPT | Performed by: NURSE PRACTITIONER

## 2024-04-15 ASSESSMENT — ENCOUNTER SYMPTOMS
SORE THROAT: 0
CHEST TIGHTNESS: 0
COUGH: 0
SHORTNESS OF BREATH: 0
WHEEZING: 0

## 2024-04-15 NOTE — PROGRESS NOTES
[facility]   Established Patient Office Visit  [address]  [address]  [facility] [address]  [phone]        OFFICE VISIT:  4/15/2024    Mikey Fierro - : 1975    Reason For Visit:  Mikey is a 49 y.o. male who is here for Medication Refill    1. Coronary artery disease involving native coronary artery of native heart without angina pectoris    2. SVT (supraventricular tachycardia) (HCC)    3. Essential hypertension          Patient with a history of coronary artery disease, hypertension, hyperlipidemia, Abhilash-Parkinson-White syndrome and sleep apnea.     Patient had ER visit on 2021 for paroxysmal SVT.  Pulse rate 206 bpm.  He was converted after 12 mg of adenosine.  Patient was discharged on beta-blocker.        Patient was seen by Dr. Galvez and underwent ablation on 2021 with Dr. Galvez in [place], [place].    cardiac catheterization on 2021  Single-vessel, branch disease (known OM 2 short segment occlusion). Intermediate lesion in mid LAD, nonobstructive by iFR testing. Normal LV systolic function. Unsuccessful attempt at PCI to OM2 (successful wiring but inability to advance any low-profile balloons across lesion). Further attempts deferred.   Recommendations:  Medical management.      Catheterization 2018 bare-metal stent and was on aspirin and Plavix for 30 days.    Patient presents to clinic today for routine follow-up.  Patient accompanied by wife.  Patient needing to establish with cardiology here.  Did discuss options and he would like to go with Dr. Blood.    Patient denies any chest pain, pressure or tightness.  There is some shortness of breath with his smoking, but no worsening.  There is no orthopnea or PND.  Patient denies any lightheadedness, dizziness or syncope.    Subjective    Mikey Fierro is a 49 y.o. male with the following history as recorded in [facility]:    Patient Active Problem List    Diagnosis Date Noted    Coronary artery

## 2024-04-15 NOTE — TELEPHONE ENCOUNTER
Received fax from pharmacy requesting refill on pts medication(s). Pt was last seen in office on Visit date not found  and has a follow up scheduled for Visit date not found. Will send request to  Aurora Price  for authorization.     Requested Prescriptions     Pending Prescriptions Disp Refills    colestipol (COLESTID) 1 g tablet [Pharmacy Med Name: COLESTIPOL HCL 1GM TABLET] 60 tablet 5     Sig: TAKE 1 TABLET BY MOUTH TWICE DAILY.    busPIRone (BUSPAR) 30 MG tablet [Pharmacy Med Name: BUSPIRONE HYDROCHLORIDE 30MG TABLET] 60 tablet 5     Sig: TAKE 1 TABLET BY MOUTH TWICE DAILY.

## 2024-04-16 RX ORDER — MONTELUKAST SODIUM 4 MG/1
TABLET, CHEWABLE ORAL
Qty: 60 TABLET | Refills: 5 | Status: SHIPPED | OUTPATIENT
Start: 2024-04-16

## 2024-04-16 RX ORDER — BUSPIRONE HYDROCHLORIDE 30 MG/1
30 TABLET ORAL 2 TIMES DAILY
Qty: 60 TABLET | Refills: 5 | Status: SHIPPED | OUTPATIENT
Start: 2024-04-16

## 2024-04-22 DIAGNOSIS — I10 PRIMARY HYPERTENSION: ICD-10-CM

## 2024-04-22 NOTE — TELEPHONE ENCOUNTER
Mikey called requesting a refill of the below medication which has been pended for you:     Requested Prescriptions     Pending Prescriptions Disp Refills    isosorbide mononitrate (IMDUR) 60 MG extended release tablet [Pharmacy Med Name: ISOSORBIDE MONONITRATE ER 60MG TABLET EXTENDED RELEASE 24 HOUR] 60 tablet 3     Sig: TAKE 1 TABLET BY MOUTH IN THE MORNING AND AT BEDTIME       Last Appointment Date: 2/13/2024  Next Appointment Date: 5/7/2024    Allergies   Allergen Reactions    Dye [Gadolinium Derivatives] Hives     Mild rash that last less than 2 hours after MRI or CT scans

## 2024-04-23 RX ORDER — ISOSORBIDE MONONITRATE 60 MG/1
60 TABLET, EXTENDED RELEASE ORAL 2 TIMES DAILY
Qty: 60 TABLET | Refills: 3 | Status: SHIPPED | OUTPATIENT
Start: 2024-04-23

## 2024-05-06 DIAGNOSIS — I10 PRIMARY HYPERTENSION: ICD-10-CM

## 2024-05-07 ENCOUNTER — OFFICE VISIT (OUTPATIENT)
Dept: FAMILY MEDICINE CLINIC | Age: 49
End: 2024-05-07
Payer: MEDICARE

## 2024-05-07 VITALS
DIASTOLIC BLOOD PRESSURE: 84 MMHG | HEIGHT: 74 IN | TEMPERATURE: 97.5 F | HEART RATE: 74 BPM | WEIGHT: 315 LBS | OXYGEN SATURATION: 97 % | BODY MASS INDEX: 40.43 KG/M2 | SYSTOLIC BLOOD PRESSURE: 126 MMHG

## 2024-05-07 DIAGNOSIS — I10 WHITE COAT SYNDROME WITH DIAGNOSIS OF HYPERTENSION: ICD-10-CM

## 2024-05-07 DIAGNOSIS — Z13.31 POSITIVE DEPRESSION SCREENING: ICD-10-CM

## 2024-05-07 DIAGNOSIS — Z00.00 MEDICARE ANNUAL WELLNESS VISIT, SUBSEQUENT: Primary | ICD-10-CM

## 2024-05-07 DIAGNOSIS — Z12.11 COLON CANCER SCREENING: ICD-10-CM

## 2024-05-07 DIAGNOSIS — F31.9 BIPOLAR DEPRESSION (HCC): ICD-10-CM

## 2024-05-07 DIAGNOSIS — F41.9 ANXIETY: ICD-10-CM

## 2024-05-07 DIAGNOSIS — Z13.220 LIPID SCREENING: ICD-10-CM

## 2024-05-07 DIAGNOSIS — I10 PRIMARY HYPERTENSION: ICD-10-CM

## 2024-05-07 DIAGNOSIS — R73.09 ELEVATED GLUCOSE: ICD-10-CM

## 2024-05-07 DIAGNOSIS — Z12.5 ENCOUNTER FOR SPECIAL SCREENING EXAMINATION FOR NEOPLASM OF PROSTATE: ICD-10-CM

## 2024-05-07 PROCEDURE — 3079F DIAST BP 80-89 MM HG: CPT | Performed by: NURSE PRACTITIONER

## 2024-05-07 PROCEDURE — 3074F SYST BP LT 130 MM HG: CPT | Performed by: NURSE PRACTITIONER

## 2024-05-07 PROCEDURE — G0439 PPPS, SUBSEQ VISIT: HCPCS | Performed by: NURSE PRACTITIONER

## 2024-05-07 RX ORDER — BUPROPION HYDROCHLORIDE 150 MG/1
150 TABLET, EXTENDED RELEASE ORAL 2 TIMES DAILY
COMMUNITY

## 2024-05-07 RX ORDER — DOXEPIN HYDROCHLORIDE 10 MG/1
10 CAPSULE ORAL 2 TIMES DAILY
Qty: 60 CAPSULE | Refills: 0 | Status: SHIPPED | OUTPATIENT
Start: 2024-05-07

## 2024-05-07 RX ORDER — LISINOPRIL 40 MG/1
40 TABLET ORAL DAILY
Qty: 90 TABLET | Refills: 3 | Status: SHIPPED | OUTPATIENT
Start: 2024-05-07

## 2024-05-07 ASSESSMENT — PATIENT HEALTH QUESTIONNAIRE - PHQ9
8. MOVING OR SPEAKING SO SLOWLY THAT OTHER PEOPLE COULD HAVE NOTICED. OR THE OPPOSITE, BEING SO FIGETY OR RESTLESS THAT YOU HAVE BEEN MOVING AROUND A LOT MORE THAN USUAL: NOT AT ALL
2. FEELING DOWN, DEPRESSED OR HOPELESS: SEVERAL DAYS
SUM OF ALL RESPONSES TO PHQ QUESTIONS 1-9: 15
SUM OF ALL RESPONSES TO PHQ QUESTIONS 1-9: 15
7. TROUBLE CONCENTRATING ON THINGS, SUCH AS READING THE NEWSPAPER OR WATCHING TELEVISION: NEARLY EVERY DAY
4. FEELING TIRED OR HAVING LITTLE ENERGY: NEARLY EVERY DAY
SUM OF ALL RESPONSES TO PHQ QUESTIONS 1-9: 15
9. THOUGHTS THAT YOU WOULD BE BETTER OFF DEAD, OR OF HURTING YOURSELF: NOT AT ALL
1. LITTLE INTEREST OR PLEASURE IN DOING THINGS: MORE THAN HALF THE DAYS
3. TROUBLE FALLING OR STAYING ASLEEP: MORE THAN HALF THE DAYS
SUM OF ALL RESPONSES TO PHQ9 QUESTIONS 1 & 2: 3
6. FEELING BAD ABOUT YOURSELF - OR THAT YOU ARE A FAILURE OR HAVE LET YOURSELF OR YOUR FAMILY DOWN: NEARLY EVERY DAY
10. IF YOU CHECKED OFF ANY PROBLEMS, HOW DIFFICULT HAVE THESE PROBLEMS MADE IT FOR YOU TO DO YOUR WORK, TAKE CARE OF THINGS AT HOME, OR GET ALONG WITH OTHER PEOPLE: VERY DIFFICULT
SUM OF ALL RESPONSES TO PHQ QUESTIONS 1-9: 15
5. POOR APPETITE OR OVEREATING: SEVERAL DAYS

## 2024-05-07 ASSESSMENT — LIFESTYLE VARIABLES
HOW MANY STANDARD DRINKS CONTAINING ALCOHOL DO YOU HAVE ON A TYPICAL DAY: PATIENT DOES NOT DRINK
HOW OFTEN DO YOU HAVE A DRINK CONTAINING ALCOHOL: NEVER

## 2024-05-07 NOTE — TELEPHONE ENCOUNTER
Received fax from pharmacy requesting refill on pts medication(s). Pt was last seen in office on 2/13/2024  and has a follow up scheduled for 5/7/2024. Will send request to  Aurora Price  for authorization.     Requested Prescriptions     Pending Prescriptions Disp Refills    doxepin (SINEQUAN) 10 MG capsule [Pharmacy Med Name: DOXEPIN HYDROCHLORIDE 10MG CAPSULE] 60 capsule 0     Sig: TAKE 1 CAPSULE BY MOUTH TWICE DAILY

## 2024-05-07 NOTE — PROGRESS NOTES
Medicare Annual Wellness Visit    Mkiey Fierro is here for Medicare AWV    Assessment & Plan   Medicare annual wellness visit, subsequent  Bipolar depression (HCC)  -     CBC with Auto Differential; Future  -     Comprehensive Metabolic Panel; Future  Anxiety  -     TSH with Reflex to FT4; Future  Primary hypertension  -     CBC with Auto Differential; Future  -     Comprehensive Metabolic Panel; Future  -     Urinalysis; Future  White coat syndrome with diagnosis of hypertension  Elevated glucose  -     Hemoglobin A1C; Future  Lipid screening  -     Lipid, Fasting; Future  Encounter for special screening examination for neoplasm of prostate  -     PSA Screening; Future  Positive depression screening  Colon cancer screening  -     Boris Tiwari MD, Gastroenterology, [place]  Recommendations for Preventive Services Due: see orders and patient instructions/AVS.  Recommended screening schedule for the next 5-10 years is provided to the patient in written form: see Patient Instructions/AVS.     Return in about 6 months (around 11/7/2024) for Follow up chronic conditions.     Subjective   The following acute and/or chronic problems were also addressed today:  No concerns     Patient's complete Health Risk Assessment and screening values have been reviewed and are found in Flowsheets. The following problems were reviewed today and where indicated follow up appointments were made and/or referrals ordered.    Positive Risk Factor Screenings with Interventions:        Depression:  PHQ-2 Score: 3  PHQ-9 Total Score: 15    Interpretation:  5-9 mild   10-14 moderate   15-19 moderately severe   20-27 severe     Interventions:  See AVS for additional education material       Controlled Medication Review:      Today's Pain Level: No data recorded   Opioid Risk: (Low risk score <55) Opioid risk score: 15    Patient is low risk for opioid use disorder or overdose.    Last PDMP Ganesh as Reviewed:  Review User Review Instant

## 2024-05-07 NOTE — PATIENT INSTRUCTIONS
enter F075 to learn more about \"A Healthy Heart: Care Instructions.\"  Current as of: June 24, 2023               Content Version: 14.0  © 2006-2024 [employer].   Care instructions adapted under license by [employer]. If you have questions about a medical condition or this instruction, always ask your healthcare professional. [employer] disclaims any warranty or liability for your use of this information.      Personalized Preventive Plan for Mikey Fierro - 5/7/2024  Medicare offers a range of preventive health benefits. Some of the tests and screenings are paid in full while other may be subject to a deductible, co-insurance, and/or copay.    Some of these benefits include a comprehensive review of your medical history including lifestyle, illnesses that may run in your family, and various assessments and screenings as appropriate.    After reviewing your medical record and screening and assessments performed today your provider may have ordered immunizations, labs, imaging, and/or referrals for you.  A list of these orders (if applicable) as well as your Preventive Care list are included within your After Visit Summary for your review.    Other Preventive Recommendations:    A preventive eye exam performed by an eye specialist is recommended every 1-2 years to screen for glaucoma; cataracts, macular degeneration, and other eye disorders.  A preventive dental visit is recommended every 6 months.  Try to get at least 150 minutes of exercise per week or 10,000 steps per day on a pedometer .  Order or download the FREE \"Exercise & Physical Activity: Your Everyday Guide\" from [employer]. Call 1-601.859.2580 or search [employer] online.  You need 2299-5288 mg of calcium and 2342-1760 IU of vitamin D per day. It is possible to meet your calcium requirement with diet alone, but a vitamin D supplement is usually necessary to meet this goal.  When 
Patient to continue with home medications and follow up with her psychiatric NP Mark Carrington ([phone]) for  further adjustments.

## 2024-05-13 DIAGNOSIS — I10 PRIMARY HYPERTENSION: ICD-10-CM

## 2024-05-14 RX ORDER — ISOSORBIDE MONONITRATE 60 MG/1
60 TABLET, EXTENDED RELEASE ORAL 2 TIMES DAILY
Qty: 60 TABLET | Refills: 5 | Status: SHIPPED | OUTPATIENT
Start: 2024-05-14

## 2024-06-06 ENCOUNTER — TELEPHONE (OUTPATIENT)
Dept: GASTROENTEROLOGY | Age: 49
End: 2024-06-06

## 2024-06-06 ENCOUNTER — TELEPHONE (OUTPATIENT)
Dept: FAMILY MEDICINE CLINIC | Age: 49
End: 2024-06-06

## 2024-06-06 NOTE — TELEPHONE ENCOUNTER
Mikey called to schedule an appointment for  colonoscopy . [facility] was unable to accommodate in the time frame needed. Please be advised that the best time to call Anytime.    Thank you.

## 2024-06-06 NOTE — TELEPHONE ENCOUNTER
Patient called asking who he needs to call to schedule colonoscopy- referral was placed to [facility]     I gave patient their #

## 2024-06-07 NOTE — TELEPHONE ENCOUNTER
Received fax from pharmacy requesting refill on pts medication(s). Pt was last seen in office on 5/7/2024  and has a follow up scheduled for 11/7/2024. Will send request to  Aurora Price  for authorization.     Requested Prescriptions     Pending Prescriptions Disp Refills    doxepin (SINEQUAN) 10 MG capsule [Pharmacy Med Name: DOXEPIN HYDROCHLORIDE 10MG CAPSULE] 60 capsule 0     Sig: TAKE 1 CAPSULE BY MOUTH TWICE DAILY

## 2024-06-08 RX ORDER — DOXEPIN HYDROCHLORIDE 10 MG/1
10 CAPSULE ORAL 2 TIMES DAILY
Qty: 60 CAPSULE | Refills: 11 | Status: SHIPPED | OUTPATIENT
Start: 2024-06-08

## 2024-06-11 RX ORDER — ERGOCALCIFEROL 1.25 MG/1
50000 CAPSULE ORAL WEEKLY
Qty: 12 CAPSULE | Refills: 1 | Status: SHIPPED | OUTPATIENT
Start: 2024-06-11

## 2024-06-17 DIAGNOSIS — I25.10 CAD IN NATIVE ARTERY: ICD-10-CM

## 2024-06-18 ENCOUNTER — OFFICE VISIT (OUTPATIENT)
Dept: CARDIOLOGY CLINIC | Age: 49
End: 2024-06-18
Payer: MEDICARE

## 2024-06-18 VITALS
BODY MASS INDEX: 40.43 KG/M2 | WEIGHT: 315 LBS | SYSTOLIC BLOOD PRESSURE: 150 MMHG | HEART RATE: 89 BPM | HEIGHT: 74 IN | DIASTOLIC BLOOD PRESSURE: 92 MMHG

## 2024-06-18 DIAGNOSIS — I47.10 SVT (SUPRAVENTRICULAR TACHYCARDIA) (HCC): Primary | ICD-10-CM

## 2024-06-18 PROCEDURE — 3077F SYST BP >= 140 MM HG: CPT | Performed by: INTERNAL MEDICINE

## 2024-06-18 PROCEDURE — 3080F DIAST BP >= 90 MM HG: CPT | Performed by: INTERNAL MEDICINE

## 2024-06-18 PROCEDURE — 99204 OFFICE O/P NEW MOD 45 MIN: CPT | Performed by: INTERNAL MEDICINE

## 2024-06-18 PROCEDURE — 93000 ELECTROCARDIOGRAM COMPLETE: CPT | Performed by: INTERNAL MEDICINE

## 2024-06-18 RX ORDER — HYDROCHLOROTHIAZIDE 25 MG/1
25 TABLET ORAL EVERY MORNING
Qty: 30 TABLET | Refills: 5 | Status: SHIPPED | OUTPATIENT
Start: 2024-06-18

## 2024-06-18 RX ORDER — ATORVASTATIN CALCIUM 80 MG/1
80 TABLET, FILM COATED ORAL NIGHTLY
Qty: 30 TABLET | Refills: 2 | Status: SHIPPED | OUTPATIENT
Start: 2024-06-18

## 2024-06-18 RX ORDER — AMLODIPINE BESYLATE 5 MG/1
5 TABLET ORAL DAILY
Qty: 30 TABLET | Refills: 5 | Status: SHIPPED | OUTPATIENT
Start: 2024-06-18

## 2024-06-18 NOTE — TELEPHONE ENCOUNTER
Mikey called requesting a refill of the below medication which has been pended for you:     Requested Prescriptions     Pending Prescriptions Disp Refills    atorvastatin (LIPITOR) 80 MG tablet [Pharmacy Med Name: ATORVASTATIN CALCIUM 80MG TABLET] 30 tablet 2     Sig: TAKE 1 TABLET BY MOUTH AT BEDTIME       Last Appointment Date: 5/7/2024  Next Appointment Date: Visit date not found    Allergies   Allergen Reactions    Dye [Gadolinium Derivatives] Hives     Mild rash that last less than 2 hours after MRI or CT scans

## 2024-06-18 NOTE — PROGRESS NOTES
busPIRone (BUSPAR) 30 MG tablet, TAKE 1 TABLET BY MOUTH TWICE DAILY., Disp: 60 tablet, Rfl: 5    ALPRAZolam (XANAX) 0.5 MG tablet, Take 1 tablet by mouth in the morning and at bedtime. Take 1 in the am 1 in the pm, Disp: , Rfl:     cloNIDine (CATAPRES) 0.1 MG tablet, Take 1 tablet by mouth daily as needed for High Blood Pressure (160/90 or higher), Disp: 30 tablet, Rfl: 3    sildenafil (VIAGRA) 50 MG tablet, Take 1-2 tablets as needed for erectile dysfunction, Disp: 30 tablet, Rfl: 3    lurasidone (LATUDA) 40 MG TABS tablet, , Disp: , Rfl:     methocarbamol (ROBAXIN) 500 MG tablet, Take 1 tablet by mouth 2 times daily as needed, Disp: , Rfl:     HYDROcodone-acetaminophen (NORCO)  MG per tablet, Take 1 tablet by mouth every 8 hours as needed for Pain., Disp: , Rfl:     PE:  Vitals:    06/18/24 1430 06/18/24 1435   BP: (!) 150/90 (!) 150/92   Pulse: 89    Weight: (!) 152.9 kg (337 lb)    Height: 1.88 m (6' 2\")        Constitutional: He is oriented to person, place, and time. He appears well-developed and well-nourished in no acute distress.  Neck:  Neck supple without JVD present. No trachea deviation present. No thyromegaly present.   Eyes:Conjunctivae and EOM are normal. Pupils equal and reactive to light.   ENT:Hearing appears normal.conjunctiva and lids are normal, ears and nose appear normal.  Cardiovascular: Normal rate, S1-S2 regular rhythm, normal heart sounds.  No murmur ascultated.  No gallop and no friction rub.  No carotid bruits.  No peripheral edema.    Pulmonary/Chest:  Lungs clear to auscultation bilaterally without evidence of respiratory distress.  He without wheezes. He without rales or ronchi.   Musculoskeletal: Normal range of motion.  Gait is normal  Head is normocephalic and atraumatic.  Skin: Skin is warm and dry without rash or pallor.   Psychiatric:He is alert and oriented to person, place, and time.  He has a normal mood and affect. His behavior is normal. Thought content normal.

## 2024-07-08 ENCOUNTER — HOSPITAL ENCOUNTER (INPATIENT)
Age: 49
LOS: 1 days | Discharge: HOME | DRG: 918 | End: 2024-07-09
Attending: EMERGENCY MEDICINE | Admitting: INTERNAL MEDICINE
Payer: MEDICARE

## 2024-07-08 DIAGNOSIS — T50.902A INTENTIONAL DRUG OVERDOSE, INITIAL ENCOUNTER (HCC): Primary | ICD-10-CM

## 2024-07-08 LAB
ALBUMIN SERPL-MCNC: 3.8 G/DL (ref 3.5–5.2)
ALP SERPL-CCNC: 152 U/L (ref 40–130)
ALT SERPL-CCNC: 35 U/L (ref 5–41)
AMPHET UR QL SCN: NEGATIVE
ANION GAP SERPL CALCULATED.3IONS-SCNC: 13 MMOL/L (ref 7–19)
APAP SERPL-MCNC: <5 UG/ML (ref 10–30)
AST SERPL-CCNC: 28 U/L (ref 5–40)
BACTERIA URNS QL MICRO: NEGATIVE /HPF
BARBITURATES UR QL SCN: NEGATIVE
BASOPHILS # BLD: 0.1 K/UL (ref 0–0.2)
BASOPHILS NFR BLD: 1 % (ref 0–1)
BENZODIAZ UR QL SCN: POSITIVE
BILIRUB SERPL-MCNC: 0.5 MG/DL (ref 0.2–1.2)
BILIRUB UR QL STRIP: NEGATIVE
BUN SERPL-MCNC: 6 MG/DL (ref 6–20)
BUPRENORPHINE URINE: NEGATIVE
CALCIUM SERPL-MCNC: 8.9 MG/DL (ref 8.6–10)
CANNABINOIDS UR QL SCN: NEGATIVE
CHLORIDE SERPL-SCNC: 101 MMOL/L (ref 98–111)
CLARITY UR: CLEAR
CO2 SERPL-SCNC: 22 MMOL/L (ref 22–29)
COCAINE UR QL SCN: NEGATIVE
COLOR UR: YELLOW
CREAT SERPL-MCNC: 0.6 MG/DL (ref 0.5–1.2)
CRYSTALS URNS MICRO: NORMAL /HPF
DRUG SCREEN COMMENT UR-IMP: ABNORMAL
EKG P AXIS: NORMAL DEGREES
EKG P-R INTERVAL: NORMAL MS
EKG Q-T INTERVAL: 290 MS
EKG QRS DURATION: 100 MS
EKG QTC CALCULATION (BAZETT): 430 MS
EKG T AXIS: 45 DEGREES
EOSINOPHIL # BLD: 0.23 K/UL (ref 0–0.6)
EOSINOPHIL NFR BLD: 2 % (ref 0–5)
EPI CELLS #/AREA URNS AUTO: 5 /HPF (ref 0–5)
ERYTHROCYTE [DISTWIDTH] IN BLOOD BY AUTOMATED COUNT: 12.8 % (ref 11.5–14.5)
ETHANOLAMINE SERPL-MCNC: <10 MG/DL (ref 0–0.08)
FENTANYL SCREEN, URINE: NEGATIVE
GLUCOSE SERPL-MCNC: 144 MG/DL (ref 74–109)
GLUCOSE UR STRIP.AUTO-MCNC: NEGATIVE MG/DL
HCT VFR BLD AUTO: 46.7 % (ref 42–52)
HGB BLD-MCNC: 16 G/DL (ref 14–18)
HGB UR STRIP.AUTO-MCNC: ABNORMAL MG/L
HYALINE CASTS #/AREA URNS AUTO: 1 /HPF (ref 0–8)
IMM GRANULOCYTES # BLD: 0 K/UL
KETONES UR STRIP.AUTO-MCNC: NEGATIVE MG/DL
LEUKOCYTE ESTERASE UR QL STRIP.AUTO: ABNORMAL
LYMPHOCYTES # BLD: 6.4 K/UL (ref 1.1–4.5)
LYMPHOCYTES NFR BLD: 53 % (ref 20–40)
MCH RBC QN AUTO: 30.8 PG (ref 27–31)
MCHC RBC AUTO-ENTMCNC: 34.3 G/DL (ref 33–37)
MCV RBC AUTO: 89.8 FL (ref 80–94)
METHADONE UR QL SCN: NEGATIVE
METHAMPHETAMINE, URINE: NEGATIVE
MONOCYTES # BLD: 0.7 K/UL (ref 0–0.9)
MONOCYTES NFR BLD: 6 % (ref 0–10)
NEUTROPHILS # BLD: 3.8 K/UL (ref 1.5–7.5)
NEUTS SEG NFR BLD: 34 % (ref 50–65)
NITRITE UR QL STRIP.AUTO: NEGATIVE
OPIATES UR QL SCN: NEGATIVE
OXYCODONE UR QL SCN: NEGATIVE
PCP UR QL SCN: NEGATIVE
PH UR STRIP.AUTO: 5.5 [PH] (ref 5–8)
PLATELET # BLD AUTO: 164 K/UL (ref 130–400)
PLATELET SLIDE REVIEW: ADEQUATE
PMV BLD AUTO: 9.4 FL (ref 9.4–12.4)
POTASSIUM SERPL-SCNC: 3.7 MMOL/L (ref 3.5–5)
PROT SERPL-MCNC: 7.1 G/DL (ref 6.6–8.7)
PROT UR STRIP.AUTO-MCNC: NEGATIVE MG/DL
RBC # BLD AUTO: 5.2 M/UL (ref 4.7–6.1)
RBC #/AREA URNS AUTO: 1 /HPF (ref 0–4)
RBC MORPH BLD: NORMAL
SALICYLATES SERPL-MCNC: <0.3 MG/DL (ref 3–10)
SODIUM SERPL-SCNC: 136 MMOL/L (ref 136–145)
SP GR UR STRIP.AUTO: 1.02 (ref 1–1.03)
TRICYCLIC ANTIDEPRESSANTS, UR: POSITIVE
TROPONIN, HIGH SENSITIVITY: 9 NG/L (ref 0–22)
UROBILINOGEN UR STRIP.AUTO-MCNC: 0.2 E.U./DL
VARIANT LYMPHS NFR BLD: 4 % (ref 0–8)
WBC # BLD AUTO: 11.3 K/UL (ref 4.8–10.8)
WBC #/AREA URNS AUTO: 4 /HPF (ref 0–5)

## 2024-07-08 PROCEDURE — 85025 COMPLETE CBC W/AUTO DIFF WBC: CPT

## 2024-07-08 PROCEDURE — 80053 COMPREHEN METABOLIC PANEL: CPT

## 2024-07-08 PROCEDURE — 80143 DRUG ASSAY ACETAMINOPHEN: CPT

## 2024-07-08 PROCEDURE — 36415 COLL VENOUS BLD VENIPUNCTURE: CPT

## 2024-07-08 PROCEDURE — 80179 DRUG ASSAY SALICYLATE: CPT

## 2024-07-08 PROCEDURE — 93010 ELECTROCARDIOGRAM REPORT: CPT | Performed by: INTERNAL MEDICINE

## 2024-07-08 PROCEDURE — 93005 ELECTROCARDIOGRAM TRACING: CPT | Performed by: EMERGENCY MEDICINE

## 2024-07-08 PROCEDURE — 2580000003 HC RX 258: Performed by: INTERNAL MEDICINE

## 2024-07-08 PROCEDURE — 84484 ASSAY OF TROPONIN QUANT: CPT

## 2024-07-08 PROCEDURE — 81001 URINALYSIS AUTO W/SCOPE: CPT

## 2024-07-08 PROCEDURE — 80307 DRUG TEST PRSMV CHEM ANLYZR: CPT

## 2024-07-08 PROCEDURE — 99285 EMERGENCY DEPT VISIT HI MDM: CPT

## 2024-07-08 PROCEDURE — 2000000000 HC ICU R&B

## 2024-07-08 PROCEDURE — 82077 ASSAY SPEC XCP UR&BREATH IA: CPT

## 2024-07-08 PROCEDURE — G0480 DRUG TEST DEF 1-7 CLASSES: HCPCS

## 2024-07-08 PROCEDURE — 6360000002 HC RX W HCPCS: Performed by: INTERNAL MEDICINE

## 2024-07-08 PROCEDURE — 2580000003 HC RX 258: Performed by: EMERGENCY MEDICINE

## 2024-07-08 RX ORDER — ONDANSETRON 2 MG/ML
4 INJECTION INTRAMUSCULAR; INTRAVENOUS EVERY 6 HOURS PRN
Status: DISCONTINUED | OUTPATIENT
Start: 2024-07-08 | End: 2024-07-09 | Stop reason: HOSPADM

## 2024-07-08 RX ORDER — LOPERAMIDE HYDROCHLORIDE 2 MG/1
2 CAPSULE ORAL PRN
COMMUNITY

## 2024-07-08 RX ORDER — MAGNESIUM SULFATE IN WATER 40 MG/ML
2000 INJECTION, SOLUTION INTRAVENOUS PRN
Status: DISCONTINUED | OUTPATIENT
Start: 2024-07-08 | End: 2024-07-09 | Stop reason: HOSPADM

## 2024-07-08 RX ORDER — POTASSIUM CHLORIDE 7.45 MG/ML
10 INJECTION INTRAVENOUS PRN
Status: DISCONTINUED | OUTPATIENT
Start: 2024-07-08 | End: 2024-07-09 | Stop reason: HOSPADM

## 2024-07-08 RX ORDER — ONDANSETRON 4 MG/1
4 TABLET, ORALLY DISINTEGRATING ORAL EVERY 8 HOURS PRN
Status: DISCONTINUED | OUTPATIENT
Start: 2024-07-08 | End: 2024-07-09 | Stop reason: HOSPADM

## 2024-07-08 RX ORDER — ENOXAPARIN SODIUM 100 MG/ML
40 INJECTION SUBCUTANEOUS 2 TIMES DAILY
Status: DISCONTINUED | OUTPATIENT
Start: 2024-07-08 | End: 2024-07-09 | Stop reason: HOSPADM

## 2024-07-08 RX ORDER — ACETAMINOPHEN 325 MG/1
650 TABLET ORAL EVERY 6 HOURS PRN
Status: DISCONTINUED | OUTPATIENT
Start: 2024-07-08 | End: 2024-07-09 | Stop reason: HOSPADM

## 2024-07-08 RX ORDER — SODIUM CHLORIDE 9 MG/ML
INJECTION, SOLUTION INTRAVENOUS PRN
Status: DISCONTINUED | OUTPATIENT
Start: 2024-07-08 | End: 2024-07-09 | Stop reason: HOSPADM

## 2024-07-08 RX ORDER — POTASSIUM CHLORIDE 20 MEQ/1
40 TABLET, EXTENDED RELEASE ORAL PRN
Status: DISCONTINUED | OUTPATIENT
Start: 2024-07-08 | End: 2024-07-09 | Stop reason: HOSPADM

## 2024-07-08 RX ORDER — 0.9 % SODIUM CHLORIDE 0.9 %
1000 INTRAVENOUS SOLUTION INTRAVENOUS ONCE
Status: COMPLETED | OUTPATIENT
Start: 2024-07-08 | End: 2024-07-08

## 2024-07-08 RX ORDER — ACETAMINOPHEN 650 MG/1
650 SUPPOSITORY RECTAL EVERY 6 HOURS PRN
Status: DISCONTINUED | OUTPATIENT
Start: 2024-07-08 | End: 2024-07-09 | Stop reason: HOSPADM

## 2024-07-08 RX ORDER — SODIUM CHLORIDE 0.9 % (FLUSH) 0.9 %
10 SYRINGE (ML) INJECTION EVERY 12 HOURS SCHEDULED
Status: DISCONTINUED | OUTPATIENT
Start: 2024-07-08 | End: 2024-07-09 | Stop reason: HOSPADM

## 2024-07-08 RX ORDER — SODIUM CHLORIDE, SODIUM LACTATE, POTASSIUM CHLORIDE, CALCIUM CHLORIDE 600; 310; 30; 20 MG/100ML; MG/100ML; MG/100ML; MG/100ML
INJECTION, SOLUTION INTRAVENOUS CONTINUOUS
Status: DISCONTINUED | OUTPATIENT
Start: 2024-07-08 | End: 2024-07-09

## 2024-07-08 RX ORDER — SODIUM CHLORIDE 0.9 % (FLUSH) 0.9 %
10 SYRINGE (ML) INJECTION PRN
Status: DISCONTINUED | OUTPATIENT
Start: 2024-07-08 | End: 2024-07-09 | Stop reason: HOSPADM

## 2024-07-08 RX ADMIN — SODIUM CHLORIDE, PRESERVATIVE FREE 10 ML: 5 INJECTION INTRAVENOUS at 21:53

## 2024-07-08 RX ADMIN — ENOXAPARIN SODIUM 40 MG: 100 INJECTION SUBCUTANEOUS at 22:00

## 2024-07-08 RX ADMIN — SODIUM CHLORIDE 1000 ML: 9 INJECTION, SOLUTION INTRAVENOUS at 15:20

## 2024-07-08 RX ADMIN — SODIUM CHLORIDE, POTASSIUM CHLORIDE, SODIUM LACTATE AND CALCIUM CHLORIDE: 600; 310; 30; 20 INJECTION, SOLUTION INTRAVENOUS at 21:52

## 2024-07-08 ASSESSMENT — ENCOUNTER SYMPTOMS
NAUSEA: 0
SORE THROAT: 0
ABDOMINAL PAIN: 0
RHINORRHEA: 0
BACK PAIN: 0
VOMITING: 0
DIARRHEA: 0
SHORTNESS OF BREATH: 0

## 2024-07-08 NOTE — ED PROVIDER NOTES
[facility] EMERGENCY DEPT  eMERGENCY dEPARTMENT eNCOUnter      Pt Name: Mikey Fierro  MRN: [id number]  Birthdate 1975  Date of evaluation: 7/8/2024  Provider: Katie Schafer MD    CHIEF COMPLAINT       Chief Complaint   Patient presents with    Drug Overdose     2500 mg methocarbamol         HISTORY OF PRESENT ILLNESS   (Location/Symptom, Timing/Onset,Context/Setting, Quality, Duration, Modifying Factors, Severity)  Note limiting factors.   Mikey Fierro is a 49 y.o. male who presents to the emergency department after an overdose.  Patient took 2500 mg of methocarbamol about 2 hours ago.  Patient is denying this was a suicide attempt.  He denies any suicidal ideation however the SO is here with a citation.  The wife states that he sent her a text message saying \"I am going to end it all today\" and the SO did see this text message.  The patient is currently drowsy tachycardic and disoriented to year.    HPI    NursingNotes were reviewed.    REVIEW OF SYSTEMS    (2-9 systems for level 4, 10 or more for level 5)     Review of Systems   Constitutional:  Negative for chills and fever.   HENT:  Negative for rhinorrhea and sore throat.    Respiratory:  Negative for shortness of breath.    Cardiovascular:  Negative for chest pain and leg swelling.   Gastrointestinal:  Negative for abdominal pain, diarrhea, nausea and vomiting.   Genitourinary:  Negative for difficulty urinating.   Musculoskeletal:  Negative for back pain and neck pain.   Skin:  Negative for rash.   Neurological:  Negative for weakness and headaches.   Psychiatric/Behavioral:  Positive for confusion.         Denies SI at this time       A complete review of systems was performed and is negative except as noted above in the HPI.       PAST MEDICAL HISTORY     Past Medical History:   Diagnosis Date    Anxiety     Arthritis     Bipolar 1 disorder (HCC)     CAD in native artery 3/6/2018    Chronic back pain     COVID-19     Depression     Hyperlipidemia

## 2024-07-08 NOTE — H&P
[facility] Medicine H&P    Patient:  Mikey Fierro  MRN: [id number]    Consulting Physician: Mannie Parra DO  Reason for Consult: Medical Management  Primacy Care Physician: Aurora Price APRN    HISTORY OF PRESENT ILLNESS:   The patient is a 49 y.o. male presents to the emergency department after taking an overdose of Robaxin.  The ER attending tells me that he texted his wife stating that he wanted to \"end things\".  He is markedly tachycardic and altered.  However he is able to maintain his airway without difficulty.  He is confused.  He will be admitted to the ICU for closer monitoring.    Past Medical History:        Diagnosis Date    Anxiety     Arthritis     Bipolar 1 disorder (HCC)     CAD in native artery 3/6/2018    Chronic back pain     COVID-19     Depression     Hyperlipidemia     Hypertension     IBS (irritable bowel syndrome)     Mild intermittent asthma without complication 3/30/2021    Morbidly obese (HCC)     Panic disorder     at times afraid to go outside    Unspecified sleep apnea     bipap       Past Surgical History:        Procedure Laterality Date    ABLATION OF DYSRHYTHMIC FOCUS      ANUS SURGERY      APPENDECTOMY  02/03/2014    CHOLECYSTECTOMY  01/01/2009    COLONOSCOPY  01/01/2012        COLONOSCOPY  10/19/2017    Dr Beckwith, Benign HP, 5 yr recall    COLONOSCOPY  04/29/2009    [facility]    CORONARY STENT PLACEMENT      ELECTROCONVULSIVE THERAPY N/A 03/08/2017    ELECTROCONVULSIVE THERAPY performed by Pratima Hernandez DO at [facility] OR    GALLBLADDER SURGERY      INGUINAL HERNIA REPAIR Right 01/01/1990    w/mesh    UMBILICAL HERNIA REPAIR  09/01/1990    VASECTOMY         Medications: Scheduled Meds:  Continuous Infusions:  PRN Meds:.    Allergies:  Dye [gadolinium derivatives]    Social History:   TOBACCO:   reports that he quit smoking about 16 months ago. His smoking use included cigarettes. He started smoking about 2 years ago. He has a 24.0 pack-year smoking history. He has never

## 2024-07-08 NOTE — ED NOTES
Spoke with Radha Valenzuela with poison control. States that pt needs to be observed for 6hr post consumption or until pt is baseline or asymptomatic.   States that drug will not show up on drug screen or blood work. Recommended to check acetaminophen, salicylate, cmp for mag and potassium. QT may be prolonged due to changed with mag/potassium and can be corrected to return QT to normal.  Look for hypotension, ataxia, headache.

## 2024-07-08 NOTE — ED NOTES
SO arrives with citation. It states pt sent concerning text to his son and wife stating \"It doesn't matter, Im ending it today. It will all be over\". Pt adamantly denies wanting to kill himself.

## 2024-07-09 VITALS
HEIGHT: 74 IN | HEART RATE: 85 BPM | BODY MASS INDEX: 40.43 KG/M2 | OXYGEN SATURATION: 97 % | DIASTOLIC BLOOD PRESSURE: 74 MMHG | RESPIRATION RATE: 11 BRPM | WEIGHT: 315 LBS | SYSTOLIC BLOOD PRESSURE: 139 MMHG | TEMPERATURE: 98.1 F

## 2024-07-09 PROBLEM — T50.902A DRUG OVERDOSE, INTENTIONAL SELF-HARM, INITIAL ENCOUNTER (HCC): Status: RESOLVED | Noted: 2024-07-08 | Resolved: 2024-07-09

## 2024-07-09 PROCEDURE — 94760 N-INVAS EAR/PLS OXIMETRY 1: CPT

## 2024-07-09 PROCEDURE — 6370000000 HC RX 637 (ALT 250 FOR IP): Performed by: INTERNAL MEDICINE

## 2024-07-09 PROCEDURE — 6360000002 HC RX W HCPCS: Performed by: INTERNAL MEDICINE

## 2024-07-09 RX ORDER — LOPERAMIDE HYDROCHLORIDE 2 MG/1
2 CAPSULE ORAL 4 TIMES DAILY PRN
Status: DISCONTINUED | OUTPATIENT
Start: 2024-07-09 | End: 2024-07-09 | Stop reason: HOSPADM

## 2024-07-09 RX ADMIN — ENOXAPARIN SODIUM 40 MG: 100 INJECTION SUBCUTANEOUS at 07:53

## 2024-07-09 RX ADMIN — LOPERAMIDE HYDROCHLORIDE 2 MG: 2 CAPSULE ORAL at 07:52

## 2024-07-09 NOTE — PROGRESS NOTES
07/09/24 1513   Encounter Summary   Encounter Overview/Reason Initial Encounter   Service Provided For Patient and family together  (with wife and son at bedside in ICU, sitter present)   Referral/Consult From Rounding   Support System Family members   Complexity of Encounter Moderate   Begin Time 1100   End Time  1130   Total Time Calculated 30 min   Spiritual/Emotional needs   Type Spiritual Distress   Assessment/Intervention/Outcome   Assessment Anxious;Coping   Intervention Active listening;Discussed belief system/Hinduism practices/abebe;Prayer (assurance of)/[place]   Outcome Expressed feelings, needs, and concerns   Plan and Referrals   Plan/Referrals Continue Support (comment)     Electronically signed by jeaneth Vital Mai on 7/9/2024 at 3:15 PM

## 2024-07-09 NOTE — PROGRESS NOTES
4 Eyes Skin Assessment     NAME:  Mikey Fierro  YOB: 1975  MEDICAL RECORD NUMBER:  [id number]    The patient is being assessed for  Admission    I agree that at least one RN has performed a thorough Head to Toe Skin Assessment on the patient. ALL assessment sites listed below have been assessed.      Areas assessed by both nurses:    Head, Face, Ears, Shoulders, Back, Chest, Arms, Elbows, Hands, Sacrum. Buttock, Coccyx, Ischium, Legs. Feet and Heels, and Under Medical Devices         Does the Patient have a Wound? No noted wound(s)       Josh Prevention initiated by RN: No  Wound Care Orders initiated by RN: No    Pressure Injury (Stage 3,4, Unstageable, DTI, NWPT, and Complex wounds) if present, place Wound referral order by RN under : No    New Ostomies, if present place, Ostomy referral order under : No     Nurse 1 eSignature: Electronically signed by Valarie Giordano RN on 7/9/24 at 12:40 AM CDT    **SHARE this note so that the co-signing nurse can place an eSignature**    Nurse 2 eSignature: Electronically signed by Natalya Friend RN on 7/9/24 at 1:03 AM CDT

## 2024-07-09 NOTE — DISCHARGE SUMMARY
days.  Take medications as directed.  Resume activity as tolerated.    Diet: ADULT DIET; Regular     Disposition: Patient is medically stable and will be discharged to home.    Time spent on discharge 40 minutes.    Signed:  Mannie Parra DO

## 2024-07-10 ENCOUNTER — TELEPHONE (OUTPATIENT)
Dept: FAMILY MEDICINE CLINIC | Age: 49
End: 2024-07-10

## 2024-07-10 NOTE — TELEPHONE ENCOUNTER
Care Transitions Initial Follow Up Call    Outreach made within 2 business days of discharge: Yes    Patient: Mikey Fierro Patient : 1975   MRN: [id number]  Reason for Admission: There are no discharge diagnoses documented for the most recent discharge.  Discharge Date: 24       Spoke with: Mikey    Discharge department/facility: [facility] Interactive Patient Contact:  Was patient able to fill all prescriptions: Yes  Was patient instructed to bring all medications to the follow-up visit: Yes  Is patient taking all medications as directed in the discharge summary? Yes  Does patient understand their discharge instructions: Yes  Does patient have questions or concerns that need addressed prior to 7-14 day follow up office visit: no      Patient is doing well and is worried he will be labeled as high risk. He is embarrassed due to the hospitalization. I tried to talk with him and calm his fears. He appeared to feel more at ease.     Scheduled appointment with PCP within 7-14 days    Follow Up  Future Appointments   Date Time Provider Department Center   2024  3:45 PM David Blood MD [facility]   2024  3:15 PM [facility]       Stephie Nair LPN

## 2024-07-16 ENCOUNTER — OFFICE VISIT (OUTPATIENT)
Dept: FAMILY MEDICINE CLINIC | Age: 49
End: 2024-07-16

## 2024-07-16 VITALS
TEMPERATURE: 97.6 F | HEIGHT: 74 IN | BODY MASS INDEX: 40.43 KG/M2 | WEIGHT: 315 LBS | SYSTOLIC BLOOD PRESSURE: 130 MMHG | HEART RATE: 78 BPM | DIASTOLIC BLOOD PRESSURE: 84 MMHG | OXYGEN SATURATION: 98 %

## 2024-07-16 DIAGNOSIS — Z09 HOSPITAL DISCHARGE FOLLOW-UP: Primary | ICD-10-CM

## 2024-07-16 RX ORDER — ALBUTEROL SULFATE 90 UG/1
2 AEROSOL, METERED RESPIRATORY (INHALATION) 4 TIMES DAILY PRN
Qty: 18 G | Refills: 0 | Status: SHIPPED | OUTPATIENT
Start: 2024-07-16

## 2024-07-16 SDOH — ECONOMIC STABILITY: FOOD INSECURITY: WITHIN THE PAST 12 MONTHS, THE FOOD YOU BOUGHT JUST DIDN'T LAST AND YOU DIDN'T HAVE MONEY TO GET MORE.: NEVER TRUE

## 2024-07-16 SDOH — ECONOMIC STABILITY: FOOD INSECURITY: WITHIN THE PAST 12 MONTHS, YOU WORRIED THAT YOUR FOOD WOULD RUN OUT BEFORE YOU GOT MONEY TO BUY MORE.: NEVER TRUE

## 2024-07-16 SDOH — ECONOMIC STABILITY: INCOME INSECURITY: HOW HARD IS IT FOR YOU TO PAY FOR THE VERY BASICS LIKE FOOD, HOUSING, MEDICAL CARE, AND HEATING?: NOT HARD AT ALL

## 2024-07-16 ASSESSMENT — PATIENT HEALTH QUESTIONNAIRE - PHQ9
SUM OF ALL RESPONSES TO PHQ QUESTIONS 1-9: 12
10. IF YOU CHECKED OFF ANY PROBLEMS, HOW DIFFICULT HAVE THESE PROBLEMS MADE IT FOR YOU TO DO YOUR WORK, TAKE CARE OF THINGS AT HOME, OR GET ALONG WITH OTHER PEOPLE: VERY DIFFICULT
SUM OF ALL RESPONSES TO PHQ QUESTIONS 1-9: 12
SUM OF ALL RESPONSES TO PHQ9 QUESTIONS 1 & 2: 2
2. FEELING DOWN, DEPRESSED OR HOPELESS: SEVERAL DAYS
9. THOUGHTS THAT YOU WOULD BE BETTER OFF DEAD, OR OF HURTING YOURSELF: NOT AT ALL
5. POOR APPETITE OR OVEREATING: NEARLY EVERY DAY
SUM OF ALL RESPONSES TO PHQ QUESTIONS 1-9: 12
SUM OF ALL RESPONSES TO PHQ QUESTIONS 1-9: 12
6. FEELING BAD ABOUT YOURSELF - OR THAT YOU ARE A FAILURE OR HAVE LET YOURSELF OR YOUR FAMILY DOWN: MORE THAN HALF THE DAYS
4. FEELING TIRED OR HAVING LITTLE ENERGY: NEARLY EVERY DAY
3. TROUBLE FALLING OR STAYING ASLEEP: SEVERAL DAYS
1. LITTLE INTEREST OR PLEASURE IN DOING THINGS: SEVERAL DAYS
8. MOVING OR SPEAKING SO SLOWLY THAT OTHER PEOPLE COULD HAVE NOTICED. OR THE OPPOSITE, BEING SO FIGETY OR RESTLESS THAT YOU HAVE BEEN MOVING AROUND A LOT MORE THAN USUAL: NOT AT ALL
7. TROUBLE CONCENTRATING ON THINGS, SUCH AS READING THE NEWSPAPER OR WATCHING TELEVISION: SEVERAL DAYS

## 2024-07-16 NOTE — PROGRESS NOTES
Post-Discharge Transitional Care  Follow Up      Mikey Fierro   YOB: 1975    Date of Office Visit:  7/16/2024  Date of Hospital Admission: 7/8/24  Date of Hospital Discharge: 7/9/24  Risk of hospital readmission (high >=14%. Medium >=10%) :Readmission Risk Score: 6.3      Care management risk score Rising risk (score 2-5) and Complex Care (Scores >=6): No Risk Score On File     Non face to face  following discharge, date last encounter closed (first attempt may have been earlier): 07/10/2024    Call initiated 2 business days of discharge: Yes    ASSESSMENT/PLAN:   Hospital discharge follow-up  -     NC DISCHARGE MEDS RECONCILED W/ CURRENT OUTPATIENT MED LIST  -     Magnesium; Future    Medical Decision Making: high complexity  Return if symptoms worsen or fail to improve, for Keep follow up as scheduled.           Subjective:   HPI:  Follow up of Hospital problems/diagnosis(es): suicidal attempt - unintentional     Inpatient course: Discharge summary reviewed- see chart.    Interval history/Current status:   Patient presents today for hospital follow up. He states that he is having shortness of breath.  This seems to be worse at night.  He is trying to quit smoking.  Has slowed down a lot.  Does use nicotine patches currently.   Patient reports taking Robaxin to make the pain stop in his back.  He reported that the overdose was unintentional.    Patient does go next week to psych.      Patient Active Problem List   Diagnosis    Depression    Hypertension    Anxiety    History of colon polyps    S/P laparoscopic appendectomy    Bipolar depression (HCC)    Cigarette nicotine dependence without complication    Acute chest pain    Leg pain, bilateral    Coronary artery disease involving native coronary artery of native heart with angina pectoris (HCC)    Morbid obesity (HCC)    Chronic obstructive pulmonary disease (HCC)    History of smoking    Shortness of breath    Restrictive lung disease

## 2024-09-04 ENCOUNTER — TELEPHONE (OUTPATIENT)
Dept: CARDIOLOGY CLINIC | Age: 49
End: 2024-09-04

## 2024-09-04 NOTE — TELEPHONE ENCOUNTER
Spoke with patient, gave recommendation per Dr. Blood and advised that patient was supposed to follow up with Dr. Blood and cancelled his appointment.  Advised patient to decrease his metoprolol to 25 mg twice daily as Dr. Blood had directed and scheduled follow up.  Patient verbally understood and appointment made for 9/24.

## 2024-09-04 NOTE — TELEPHONE ENCOUNTER
Patient called in to advise that he's still having problems with ED, he has not weaned off his metoprolol as discussed at his appointment in June.  He advised that it's starting to affect his life and he just can't take it anymore. He's wanting to stop all of his cardiac medications as he would just like to not take any medicines anymore.  I advised that we do not recommend him stopping his medications and to try weaning down off of metoprolol as discussed at his Rachel[facility] appointment.  Patient requested that I speak to you and just ask about stopping all of his medicines.

## 2024-09-23 DIAGNOSIS — I25.10 CAD IN NATIVE ARTERY: ICD-10-CM

## 2024-09-23 RX ORDER — ATORVASTATIN CALCIUM 80 MG/1
80 TABLET, FILM COATED ORAL NIGHTLY
Qty: 30 TABLET | Refills: 2 | Status: SHIPPED | OUTPATIENT
Start: 2024-09-23

## 2024-10-28 DIAGNOSIS — F41.9 ANXIETY: ICD-10-CM

## 2024-10-28 NOTE — TELEPHONE ENCOUNTER
Mikey called requesting a refill of the below medication which has been pended for you:     Requested Prescriptions     Pending Prescriptions Disp Refills    colestipol (COLESTID) 1 g tablet [Pharmacy Med Name: COLESTIPOL HCL 1GM TABLET] 60 tablet 5     Sig: TAKE 1 TABLET BY MOUTH TWICE DAILY.    busPIRone (BUSPAR) 30 MG tablet [Pharmacy Med Name: BUSPIRONE HYDROCHLORIDE 30MG TABLET] 60 tablet 5     Sig: TAKE 1 TABLET BY MOUTH TWICE DAILY.       Last Appointment Date: 7/16/2024  Next Appointment Date: 11/7/2024    Allergies   Allergen Reactions    Dye [Gadolinium Derivatives] Hives     Mild rash that last less than 2 hours after MRI or CT scans

## 2024-10-29 RX ORDER — MONTELUKAST SODIUM 4 MG/1
TABLET, CHEWABLE ORAL
Qty: 60 TABLET | Refills: 5 | Status: SHIPPED | OUTPATIENT
Start: 2024-10-29

## 2024-10-29 RX ORDER — BUSPIRONE HYDROCHLORIDE 30 MG/1
30 TABLET ORAL 2 TIMES DAILY
Qty: 60 TABLET | Refills: 5 | Status: SHIPPED | OUTPATIENT
Start: 2024-10-29

## 2024-11-25 RX ORDER — METOPROLOL TARTRATE 25 MG/1
25 TABLET, FILM COATED ORAL 2 TIMES DAILY
Qty: 180 TABLET | Refills: 3 | Status: SHIPPED | OUTPATIENT
Start: 2024-11-25

## 2024-12-02 ENCOUNTER — TELEPHONE (OUTPATIENT)
Dept: FAMILY MEDICINE CLINIC | Age: 49
End: 2024-12-02

## 2024-12-02 ENCOUNTER — TELEPHONE (OUTPATIENT)
Dept: CARDIOLOGY CLINIC | Age: 49
End: 2024-12-02

## 2024-12-02 NOTE — TELEPHONE ENCOUNTER
----- Message from BarbaraBrianaKetty SABA sent at 12/2/2024 12:22 PM CST -----  Regarding: ECC Escalation To Practice  ECC Escalation To Practice      Type of Escalation: Red Flag Symptom  --------------------------------------------------------------------------------------------------------------------------    Information for Provider:  Patient is looking for appointment for: Symptom/difficulty in breathing   Reasons for Message: Unable to reach practice     Additional Information: Patient is experiencing difficulty in breathing started 2-3 weeks ago and he mentioned that he is smoking.   --------------------------------------------------------------------------------------------------------------------------    Relationship to Patient: Self     Call Back Info: OK to leave message on voicemail  Preferred Call Back Number:  [phone]

## 2024-12-02 NOTE — TELEPHONE ENCOUNTER
Patient called in to advise that he is going to stop all of his medications due to his cardiac meds causing ED.  Advised patient that we have discussed this before and discussed it with Dr. Blood on multiple occasions and that he does not recommend patient stopping his cardiac medications.  Patient stated he didn't care what Dr. Blood said or even if he lived anymore if he couldn't be intimate with his wife.  Advised again we did not recommend him stopping his medications and asked if patient had tried the viagra that Dr. Blood had mentioned he advised he had but it did not work for him and that he just couldn't live like this anymore and something had to be done.  Patient stated again he was just going to stop them and I again advised him the dangers of stopping his medication patient acknowledged and stated he just wanted ot let us know.

## 2024-12-02 NOTE — TELEPHONE ENCOUNTER
Patient would like a recommendation on a PCP in [place] so he does not have to drive back and forth to [facility] for his appointments.

## 2024-12-03 NOTE — TELEPHONE ENCOUNTER
He can return back to the group I was previously working at in [place].  Sarita Bowers or Linette Chaney would be good.

## 2025-01-06 RX ORDER — ERGOCALCIFEROL 1.25 MG/1
50000 CAPSULE, LIQUID FILLED ORAL WEEKLY
Qty: 12 CAPSULE | Refills: 1 | Status: SHIPPED | OUTPATIENT
Start: 2025-01-06

## 2025-01-06 NOTE — TELEPHONE ENCOUNTER
Received fax from pharmacy requesting refill on pts medication(s). Pt was last seen in office on 7/16/2024  and has a follow up scheduled for Visit date not found. Will send request to  Aurora Price  for authorization.     Requested Prescriptions     Pending Prescriptions Disp Refills    vitamin D (ERGOCALCIFEROL) 1.25 MG (63119 UT) CAPS capsule [Pharmacy Med Name: VITAMIN D [id number] CAPSULE] 12 capsule 1     Sig: TAKE 1 CAPSULE BY MOUTH ONCE A WEEK

## 2025-01-09 ENCOUNTER — OFFICE VISIT (OUTPATIENT)
Dept: PULMONOLOGY | Age: 50
End: 2025-01-09
Payer: MEDICARE

## 2025-01-09 VITALS
HEIGHT: 74 IN | TEMPERATURE: 98.5 F | RESPIRATION RATE: 20 BRPM | HEART RATE: 139 BPM | SYSTOLIC BLOOD PRESSURE: 152 MMHG | OXYGEN SATURATION: 98 % | BODY MASS INDEX: 40.43 KG/M2 | WEIGHT: 315 LBS | DIASTOLIC BLOOD PRESSURE: 94 MMHG

## 2025-01-09 DIAGNOSIS — I47.10 SVT (SUPRAVENTRICULAR TACHYCARDIA) (HCC): ICD-10-CM

## 2025-01-09 DIAGNOSIS — R06.02 SHORTNESS OF BREATH: ICD-10-CM

## 2025-01-09 DIAGNOSIS — F17.210 CIGARETTE NICOTINE DEPENDENCE WITHOUT COMPLICATION: ICD-10-CM

## 2025-01-09 DIAGNOSIS — E66.01 MORBID OBESITY: ICD-10-CM

## 2025-01-09 DIAGNOSIS — G47.33 SEVERE OBSTRUCTIVE SLEEP APNEA: Primary | ICD-10-CM

## 2025-01-09 PROCEDURE — 99204 OFFICE O/P NEW MOD 45 MIN: CPT | Performed by: INTERNAL MEDICINE

## 2025-01-09 PROCEDURE — 3080F DIAST BP >= 90 MM HG: CPT | Performed by: INTERNAL MEDICINE

## 2025-01-09 PROCEDURE — 3077F SYST BP >= 140 MM HG: CPT | Performed by: INTERNAL MEDICINE

## 2025-01-09 RX ORDER — PREGABALIN 75 MG/1
75 CAPSULE ORAL 2 TIMES DAILY
COMMUNITY
Start: 2024-12-26

## 2025-01-09 RX ORDER — NITROGLYCERIN 0.4 MG/1
TABLET SUBLINGUAL
COMMUNITY

## 2025-01-09 ASSESSMENT — ENCOUNTER SYMPTOMS
ABDOMINAL PAIN: 0
ABDOMINAL DISTENTION: 0
RHINORRHEA: 0
CHEST TIGHTNESS: 0
COUGH: 0
WHEEZING: 1
BACK PAIN: 0
APNEA: 0
SHORTNESS OF BREATH: 1
ANAL BLEEDING: 0

## 2025-01-09 NOTE — PROGRESS NOTES
Pulmonary and Sleep Medicine    Mikey Fierro (:  1975) is a 49 y.o. male,Established patient, here for evaluation of the following chief complaint(s):  New Patient (NP is here for WANG/Pt was a former pt in ./Pt states that his CPAP machine is not working. He states it says \"check power source\" and will not turn on. So he has been unable to use it.//Pt states that he has been experiencing SOB)      Referring physician:  No referring provider defined for this encounter.     ASSESSMENT/PLAN:  1. Severe obstructive sleep apnea.  Apnea-hypopnea index of 122 events per hour on split-night sleep study done in May 2021  2. SVT (supraventricular tachycardia) (HCC)  3. Shortness of breath  -     Full PFT Study With Bronchodilator; Future  -     Exercise stress test; Future  -     Echo (TTE) complete (PRN contrast/bubble/strain/3D); Future  4. Morbid obesity  5. Cigarette nicotine dependence without complication. Smokes 1ppd. Smoked for 40 years.  -     Ambulatory referral to Smoking Cessation Program        Will order a new BiPAP machine for the patient.  He would like to change [employer] companies also.  Will get a repeat pulmonary function testing.  He had a PFT 3 years ago that showed restrictive pattern that was felt to be secondary to his body habitus.  At that time his autoimmune workup was negative.  We discussed the CT of the chest for screening for lung cancer since he meets the criteria.  He is reluctant to undergo the study.  Follow-up after the above.    Smoking Cessation:    Smoking cessation discussed with the patient for 3.5 minutes. Discussion included harmful effects of smoking including increased risk of cancer and cardiovascular events. Smoking cessation strategies were also discussed including but limited to nicotine replacement and smoking cessation classes. The patient is ready for smoking cessation.     The following smoking cessation strategy implemented:  Smoking cessation

## 2025-01-14 ENCOUNTER — TELEPHONE (OUTPATIENT)
Dept: OTHER | Age: 50
End: 2025-01-14

## 2025-01-14 ENCOUNTER — TELEPHONE (OUTPATIENT)
Dept: PULMONOLOGY | Age: 50
End: 2025-01-14

## 2025-01-14 NOTE — TELEPHONE ENCOUNTER
Dr. Vega ordered smoking cessation referral for patient due to nicotine dependence.  Offered smoking cessation class beginning in January.  All pertinent information provided.  Pt accepted offer and will take workbook and leave in mailbox.  Also verified his email address and Zoom link sent.  Pt has \"mental issues\" when trying to quit.  He is currently on Wellbutrin.  He has a new patient appt with SANDY Negrete on 1/27 and will discuss plan with her.  No further questions at this time.

## 2025-01-14 NOTE — TELEPHONE ENCOUNTER
Patient is requesting a return call from the office in regards to his Bi-pap machine. Patient states he has called [facility] and they have not received the order. Please return his call.    Thank you!

## 2025-01-17 ENCOUNTER — TELEPHONE (OUTPATIENT)
Dept: PULMONOLOGY | Age: 50
End: 2025-01-17

## 2025-01-17 NOTE — TELEPHONE ENCOUNTER
I spoke to Anisa with [facility] and she says that the patient's insurance requires him to have another sleep study.  She also said that Mr Fierro has an old machine and that he is going to bring it in for them to try to fix, but if they cannot fix it he will have to have a new sleep study to get a new machine.

## 2025-01-17 NOTE — TELEPHONE ENCOUNTER
Mikey Fierro called  requesting order for home sleep study. Per pt [facility] advised sleep study was needed to get  approval from insurance for bi pap machine.   Please call pt advise.

## 2025-01-27 ENCOUNTER — OFFICE VISIT (OUTPATIENT)
Dept: PRIMARY CARE CLINIC | Age: 50
End: 2025-01-27

## 2025-01-27 VITALS
OXYGEN SATURATION: 97 % | WEIGHT: 315 LBS | DIASTOLIC BLOOD PRESSURE: 90 MMHG | HEART RATE: 115 BPM | TEMPERATURE: 98 F | BODY MASS INDEX: 40.43 KG/M2 | HEIGHT: 74 IN | SYSTOLIC BLOOD PRESSURE: 148 MMHG

## 2025-01-27 DIAGNOSIS — Z13.29 SCREENING FOR HYPOTHYROIDISM: ICD-10-CM

## 2025-01-27 DIAGNOSIS — F17.210 CIGARETTE NICOTINE DEPENDENCE WITHOUT COMPLICATION: ICD-10-CM

## 2025-01-27 DIAGNOSIS — G47.30 SEVERE SLEEP APNEA: ICD-10-CM

## 2025-01-27 DIAGNOSIS — E66.01 MORBID OBESITY: ICD-10-CM

## 2025-01-27 DIAGNOSIS — Z76.89 ENCOUNTER TO ESTABLISH CARE: Primary | ICD-10-CM

## 2025-01-27 DIAGNOSIS — G47.8 NON-RESTORATIVE SLEEP: ICD-10-CM

## 2025-01-27 DIAGNOSIS — R06.83 SNORING: Primary | ICD-10-CM

## 2025-01-27 DIAGNOSIS — J44.9 CHRONIC OBSTRUCTIVE PULMONARY DISEASE, UNSPECIFIED COPD TYPE (HCC): ICD-10-CM

## 2025-01-27 DIAGNOSIS — Z12.5 SCREENING FOR PROSTATE CANCER: ICD-10-CM

## 2025-01-27 DIAGNOSIS — I10 PRIMARY HYPERTENSION: ICD-10-CM

## 2025-01-27 DIAGNOSIS — Z23 NEED FOR INFLUENZA VACCINATION: ICD-10-CM

## 2025-01-27 DIAGNOSIS — Z13.1 ENCOUNTER FOR SCREENING FOR DIABETES MELLITUS: ICD-10-CM

## 2025-01-27 DIAGNOSIS — I25.10 CAD IN NATIVE ARTERY: ICD-10-CM

## 2025-01-27 SDOH — HEALTH STABILITY: PHYSICAL HEALTH: ON AVERAGE, HOW MANY DAYS PER WEEK DO YOU ENGAGE IN MODERATE TO STRENUOUS EXERCISE (LIKE A BRISK WALK)?: 0 DAYS

## 2025-01-27 SDOH — ECONOMIC STABILITY: FOOD INSECURITY: WITHIN THE PAST 12 MONTHS, THE FOOD YOU BOUGHT JUST DIDN'T LAST AND YOU DIDN'T HAVE MONEY TO GET MORE.: PATIENT DECLINED

## 2025-01-27 SDOH — ECONOMIC STABILITY: FOOD INSECURITY: WITHIN THE PAST 12 MONTHS, YOU WORRIED THAT YOUR FOOD WOULD RUN OUT BEFORE YOU GOT MONEY TO BUY MORE.: PATIENT DECLINED

## 2025-01-27 ASSESSMENT — PATIENT HEALTH QUESTIONNAIRE - PHQ9: DEPRESSION UNABLE TO ASSESS: PT REFUSES

## 2025-01-27 NOTE — PROGRESS NOTES
[address] [place] [address] Drive   [address] [facility], [address]     Phone:  [phone]  Fax:  [phone]      Mikey Fierro is a 49 y.o. male who presents today for his medical conditions/complaints as noted below.  Mikey Fierro is c/o of New Patient (Former patient Aurora Price) and Shortness of Breath      Chief Complaint   Patient presents with    New Patient     Former patient Aurora Price    Shortness of Breath       HPI:     Patient presents to establish as a new patient. Patient reports having some shortness of breath for the last two weeks that comes and goes. Also reports had seen pulmonology and request his Bipap machine be ordered but insurance reports needs a sleep study and unable to get in touch with them to order home sleep study. Currently has a bipap machine that is broken. Has been 1 month since he last used his bipap machine. Sees Dr. Jimenez and they have ordred an echo, stress test and sleep study. Has COPD and CAD. Hypertension is not well controlled. Blood pressure is 148/90 in office today.     Sees Manjeet at [facility] as his psychiatrist for anxiety and bipolar.     Smokes 3-4 cigarettes per day currently. Has smoked 1 ppd x 35 years.      Had ddd lumbar spine from hard labor sees pain management for pain injections.    Past Medical History:   Diagnosis Date    Anxiety     Arthritis     Bipolar 1 disorder (HCC)     CAD in native artery 3/6/2018    Chronic back pain     COVID-19     Depression     Hyperlipidemia     Hypertension     IBS (irritable bowel syndrome)     Mild intermittent asthma without complication 3/30/2021    Morbidly obese     Panic disorder     at times afraid to go outside    Unspecified sleep apnea     bipap        Past Surgical History:   Procedure Laterality Date    ABLATION OF DYSRHYTHMIC FOCUS      ANUS SURGERY      APPENDECTOMY  02/03/2014    CHOLECYSTECTOMY  01/01/2009    COLONOSCOPY  01/01/2012        COLONOSCOPY  10/19/2017    Dr eBckwith, Benign

## 2025-01-27 NOTE — PATIENT INSTRUCTIONS
Quit smoking  Eat a clean diet  Exercise as tolerated  Call Cardiology and make follow up appointment  Wait for Pulmonology to call with appointments  Complete fasting labs

## 2025-01-30 ASSESSMENT — ENCOUNTER SYMPTOMS
ABDOMINAL PAIN: 0
SORE THROAT: 0
NAUSEA: 0
COLOR CHANGE: 0
BACK PAIN: 1
DIARRHEA: 0
COUGH: 0
CHEST TIGHTNESS: 0
SHORTNESS OF BREATH: 1
VOMITING: 0

## 2025-02-04 ENCOUNTER — TELEPHONE (OUTPATIENT)
Dept: OTHER | Age: 50
End: 2025-02-04

## 2025-02-04 NOTE — TELEPHONE ENCOUNTER
Pt still wants to continue with the smoking cessation classes, he just forgot last week.  He said his new patient appt with his PCP went well.  Reminded him of the class this Thursday at 4:30.

## 2025-02-13 DIAGNOSIS — G47.33 SEVERE OBSTRUCTIVE SLEEP APNEA: Primary | ICD-10-CM

## 2025-02-20 ENCOUNTER — APPOINTMENT (OUTPATIENT)
Dept: PULMONOLOGY | Age: 50
End: 2025-02-20
Attending: INTERNAL MEDICINE
Payer: MEDICARE

## 2025-02-25 DIAGNOSIS — I25.10 CAD IN NATIVE ARTERY: ICD-10-CM

## 2025-02-25 RX ORDER — ATORVASTATIN CALCIUM 80 MG/1
80 TABLET, FILM COATED ORAL NIGHTLY
Qty: 30 TABLET | Refills: 2 | Status: SHIPPED | OUTPATIENT
Start: 2025-02-25

## 2025-03-04 ENCOUNTER — HOSPITAL ENCOUNTER (OUTPATIENT)
Dept: SLEEP CENTER | Age: 50
Discharge: HOME | End: 2025-03-06
Payer: MEDICARE

## 2025-03-04 PROCEDURE — 95810 POLYSOM 6/> YRS 4/> PARAM: CPT

## 2025-03-05 ENCOUNTER — HOSPITAL ENCOUNTER (OUTPATIENT)
Dept: SLEEP CENTER | Age: 50
Discharge: HOME | End: 2025-03-07
Payer: MEDICARE

## 2025-03-05 PROCEDURE — 95811 POLYSOM 6/>YRS CPAP 4/> PARM: CPT

## 2025-03-05 NOTE — PROGRESS NOTES
and asked to end the study. The study was ended and the pt was unhooked. Pt exited the sleep center at 0118. The SpO2 kamla was 90%. EKG showed NSR. Limb movements were noted. Nocturia x 2.    DME: [facility]         The study was reviewed briefly with Mikey Sri.  Mr. Fierro will be notified of the formal results and recommendations after the study is scored and interpreted.  The report will be sent to the patient's referring provider.    Technician: FILIPE Harris, XOCHITL,RPSGT,RST

## 2025-03-06 NOTE — PROGRESS NOTES
noted. Respiratory events were noted after sleep onset. During the diagnostic portion of the testing the SpO2 kamla was 84%. After adequate sleep was accrued, a CPAP trial was conducted. Pt was sized and fitted with a large Maya-Paykel Vitera full-face mask. CPAP was started at 6cmH2O. Pt was back to sleep quickly after therapy was started. CPAP was titrated to 05vbD3W. Supine REM was recorded at the final pressure of 04mhY3F. EKG showed NSR. Limb movements were noted. Nocturia x 0.      DME:   [facility]       Final PAP settings: [id number]   Mask Type:  Full-face   Mask:   Maya-Paykel Vitera   Mask Size:  Large        The study was reviewed briefly with Mikey Fierro.  Mr. Fierro will be notified of the formal results and recommendations after the study is scored and interpreted.  The report will be sent to the patient's referring provider.    Technician: FILIPE Harris, XOCHITL,RPSGT,RST

## 2025-03-07 NOTE — PROGRESS NOTES
snoring) 82.6% 17.8%         Therapy Titration   IPAP EPAP TIB Sleep REM Apneas Hypopneas RERAs   Min     (min) (min) (min) CA# OA# MA# Index # Index # Index AHI RDI SpO2    6 6 27.7 24.3 0.0 0 1 0 2.5 2 4.9 0 0.0 7.4 7.4 91    8 8 26.0 26.0 0.0 0 0 0 0.0 0 0.0 0 0.0 0.0 0.0 93    10 10 96.9 94.9 5.4 0 0 0 0.0 1 0.6 0 0.0 0.6 0.6 89    12 12 68.3 68.3 52.8 0 0 0 0.0 0 0.0 0 0.0 0.0 0.0 92      Note:   The interpreting physician named above, reviewed this record in its entirety, including sleep staging, EMG activity, EEG, EKG, breathing parameters, oxygen saturation, body position, and behavior unless otherwise noted.  The interpretation is based on this information in addition to available clinical history and physical examination data.        Interpretation:     Severe obstructive sleep apnea.   Morbid obesity.   Subjective hypersomnia.   Hypoxia during sleep.     Recommendations:    CPAP at 12 cm water pressure.   Weight loss and exercise.   Avoid risky activity such as driving if sleepy.   Discuss sleep hygiene with the patient.        Corrina Pena MD, [facility], [facility]

## 2025-03-09 DIAGNOSIS — G47.33 OSA (OBSTRUCTIVE SLEEP APNEA): Primary | ICD-10-CM

## 2025-03-10 ENCOUNTER — FOLLOWUP TELEPHONE ENCOUNTER (OUTPATIENT)
Dept: SLEEP CENTER | Age: 50
End: 2025-03-10

## 2025-03-10 NOTE — TELEPHONE ENCOUNTER
Called and spoke to patient and discussed the results of the split night sleep study.  Explained order for CPAP.  Order for CPAP was sent to [facility].  Dr. Vega is the referring provider.

## 2025-03-20 DIAGNOSIS — I10 PRIMARY HYPERTENSION: ICD-10-CM

## 2025-03-20 DIAGNOSIS — Z13.29 SCREENING FOR HYPOTHYROIDISM: ICD-10-CM

## 2025-03-20 DIAGNOSIS — Z13.1 ENCOUNTER FOR SCREENING FOR DIABETES MELLITUS: ICD-10-CM

## 2025-03-20 DIAGNOSIS — Z12.5 SCREENING FOR PROSTATE CANCER: ICD-10-CM

## 2025-03-20 DIAGNOSIS — I25.10 CAD IN NATIVE ARTERY: ICD-10-CM

## 2025-03-20 LAB
ALBUMIN SERPL-MCNC: 3.8 G/DL (ref 3.5–5.2)
ALP SERPL-CCNC: 124 U/L (ref 40–129)
ALT SERPL-CCNC: 39 U/L (ref 10–50)
ANION GAP SERPL CALCULATED.3IONS-SCNC: 10 MMOL/L (ref 8–16)
AST SERPL-CCNC: 42 U/L (ref 10–50)
BASOPHILS # BLD: 0 K/UL (ref 0–0.2)
BASOPHILS NFR BLD: 0.4 % (ref 0–1)
BILIRUB SERPL-MCNC: 0.3 MG/DL (ref 0.2–1.2)
BUN SERPL-MCNC: 4 MG/DL (ref 6–20)
CALCIUM SERPL-MCNC: 9 MG/DL (ref 8.6–10)
CHLORIDE SERPL-SCNC: 98 MMOL/L (ref 98–107)
CHOLEST SERPL-MCNC: 104 MG/DL (ref 0–199)
CO2 SERPL-SCNC: 29 MMOL/L (ref 22–29)
CREAT SERPL-MCNC: 0.8 MG/DL (ref 0.7–1.2)
CREAT UR-MCNC: 22.4 MG/DL (ref 39–259)
EOSINOPHIL # BLD: 0.2 K/UL (ref 0–0.6)
EOSINOPHIL NFR BLD: 1.9 % (ref 0–5)
ERYTHROCYTE [DISTWIDTH] IN BLOOD BY AUTOMATED COUNT: 13.8 % (ref 11.5–14.5)
GLUCOSE SERPL-MCNC: 107 MG/DL (ref 70–99)
HBA1C MFR BLD: 5.7 % (ref 4–5.6)
HCT VFR BLD AUTO: 42.4 % (ref 42–52)
HDLC SERPL-MCNC: 42 MG/DL (ref 40–60)
HGB BLD-MCNC: 14.5 G/DL (ref 14–18)
IMM GRANULOCYTES # BLD: 0 K/UL
LDLC SERPL CALC-MCNC: 20 MG/DL
LYMPHOCYTES # BLD: 4.7 K/UL (ref 1.1–4.5)
LYMPHOCYTES NFR BLD: 44.3 % (ref 20–40)
MCH RBC QN AUTO: 31.8 PG (ref 27–31)
MCHC RBC AUTO-ENTMCNC: 34.2 G/DL (ref 33–37)
MCV RBC AUTO: 93 FL (ref 80–94)
MICROALBUMIN UR-MCNC: <1.2 MG/DL (ref 0–1.99)
MICROALBUMIN/CREAT UR-RTO: ABNORMAL MG/G (ref 0–29)
MONOCYTES # BLD: 0.4 K/UL (ref 0–0.9)
MONOCYTES NFR BLD: 4 % (ref 0–10)
NEUTROPHILS # BLD: 5.2 K/UL (ref 1.5–7.5)
NEUTS SEG NFR BLD: 49.1 % (ref 50–65)
PLATELET # BLD AUTO: 226 K/UL (ref 130–400)
PMV BLD AUTO: 9.2 FL (ref 9.4–12.4)
POTASSIUM SERPL-SCNC: 3.5 MMOL/L (ref 3.5–5.1)
PROT SERPL-MCNC: 6.9 G/DL (ref 6.4–8.3)
PSA SERPL-MCNC: 0.18 NG/ML (ref 0–4)
RBC # BLD AUTO: 4.56 M/UL (ref 4.7–6.1)
SODIUM SERPL-SCNC: 137 MMOL/L (ref 136–145)
TRIGL SERPL-MCNC: 210 MG/DL (ref 0–149)
TSH SERPL DL<=0.005 MIU/L-ACNC: 1.02 UIU/ML (ref 0.27–4.2)
WBC # BLD AUTO: 10.6 K/UL (ref 4.8–10.8)

## 2025-03-24 ENCOUNTER — RESULTS FOLLOW-UP (OUTPATIENT)
Dept: PRIMARY CARE CLINIC | Age: 50
End: 2025-03-24

## 2025-03-28 ENCOUNTER — OFFICE VISIT (OUTPATIENT)
Dept: PRIMARY CARE CLINIC | Age: 50
End: 2025-03-28
Payer: MEDICARE

## 2025-03-28 VITALS
BODY MASS INDEX: 40.43 KG/M2 | DIASTOLIC BLOOD PRESSURE: 86 MMHG | OXYGEN SATURATION: 97 % | HEIGHT: 74 IN | TEMPERATURE: 97.9 F | HEART RATE: 102 BPM | SYSTOLIC BLOOD PRESSURE: 126 MMHG | WEIGHT: 315 LBS

## 2025-03-28 DIAGNOSIS — N52.9 ERECTILE DYSFUNCTION, UNSPECIFIED ERECTILE DYSFUNCTION TYPE: Primary | ICD-10-CM

## 2025-03-28 DIAGNOSIS — G47.30 SEVERE SLEEP APNEA: ICD-10-CM

## 2025-03-28 DIAGNOSIS — N50.811 PAIN IN RIGHT TESTICLE: ICD-10-CM

## 2025-03-28 DIAGNOSIS — Z12.11 SCREENING FOR COLON CANCER: ICD-10-CM

## 2025-03-28 DIAGNOSIS — N50.89 MASS OF RIGHT TESTICLE: ICD-10-CM

## 2025-03-28 DIAGNOSIS — E78.1 HYPERTRIGLYCERIDEMIA: ICD-10-CM

## 2025-03-28 DIAGNOSIS — I10 PRIMARY HYPERTENSION: ICD-10-CM

## 2025-03-28 PROCEDURE — 3079F DIAST BP 80-89 MM HG: CPT | Performed by: NURSE PRACTITIONER

## 2025-03-28 PROCEDURE — 99214 OFFICE O/P EST MOD 30 MIN: CPT | Performed by: NURSE PRACTITIONER

## 2025-03-28 PROCEDURE — 3074F SYST BP LT 130 MM HG: CPT | Performed by: NURSE PRACTITIONER

## 2025-03-28 RX ORDER — ATORVASTATIN CALCIUM 80 MG/1
80 TABLET, FILM COATED ORAL DAILY
COMMUNITY

## 2025-03-28 ASSESSMENT — PATIENT HEALTH QUESTIONNAIRE - PHQ9
SUM OF ALL RESPONSES TO PHQ QUESTIONS 1-9: 12
SUM OF ALL RESPONSES TO PHQ QUESTIONS 1-9: 12
9. THOUGHTS THAT YOU WOULD BE BETTER OFF DEAD, OR OF HURTING YOURSELF: NOT AT ALL
4. FEELING TIRED OR HAVING LITTLE ENERGY: SEVERAL DAYS
SUM OF ALL RESPONSES TO PHQ QUESTIONS 1-9: 12
2. FEELING DOWN, DEPRESSED OR HOPELESS: NEARLY EVERY DAY
6. FEELING BAD ABOUT YOURSELF - OR THAT YOU ARE A FAILURE OR HAVE LET YOURSELF OR YOUR FAMILY DOWN: NOT AT ALL
SUM OF ALL RESPONSES TO PHQ QUESTIONS 1-9: 12
7. TROUBLE CONCENTRATING ON THINGS, SUCH AS READING THE NEWSPAPER OR WATCHING TELEVISION: SEVERAL DAYS
8. MOVING OR SPEAKING SO SLOWLY THAT OTHER PEOPLE COULD HAVE NOTICED. OR THE OPPOSITE, BEING SO FIGETY OR RESTLESS THAT YOU HAVE BEEN MOVING AROUND A LOT MORE THAN USUAL: SEVERAL DAYS
3. TROUBLE FALLING OR STAYING ASLEEP: NEARLY EVERY DAY
5. POOR APPETITE OR OVEREATING: NOT AT ALL
1. LITTLE INTEREST OR PLEASURE IN DOING THINGS: NEARLY EVERY DAY
10. IF YOU CHECKED OFF ANY PROBLEMS, HOW DIFFICULT HAVE THESE PROBLEMS MADE IT FOR YOU TO DO YOUR WORK, TAKE CARE OF THINGS AT HOME, OR GET ALONG WITH OTHER PEOPLE: EXTREMELY DIFFICULT

## 2025-03-28 NOTE — PROGRESS NOTES
[address] [place] [address] Drive   [address] [place] [address], [address]     Phone:  [phone]  Fax:  [phone]      Mikey Fierro is a 50 y.o. male who presents today for his medical conditions/complaints as noted below.  Mikey Fierro is c/o of Discuss Labs and Erectile Dysfunction      Chief Complaint   Patient presents with    Discuss Labs    Erectile Dysfunction       HPI:     History of Present Illness  The patient presents for evaluation of erectile dysfunction, right testicular pain, elevated triglycerides, hypertension, sleep apnea, and health maintenance.    Erectile dysfunction has been reported, and a consultation with a urologist, specifically Dr. Cantu, is being sought regarding the potential for an implant. No previous consultations with a urologist have been made.    Additionally, a palpable abnormality in the right testicle, described as a knot rather than a lump, has been present for approximately 6 months. This abnormality is associated with mild pain upon palpation but does not exhibit any external signs of inflammation such as redness or swelling. An ultrasound has not been performed for this issue. The pain is particularly noticeable during sexual intercourse when there is direct contact with the area, described as \"painful enough to let me know that it's there.\"    Recent blood work results show normal thyroid and prostate levels. Hemoglobin A1c is reported at 5.7, indicating no diabetes concerns. Cholesterol levels are well controlled, although triglycerides are elevated at 210, above the cutoff of 150. The metabolic panel shows a glucose level of 107, with other values stable and normal. The blood count is stable, with the white blood cell count returning to normal range. Albumin creatinine ratio for urine shows slightly decreased creatinine but no other concerns.    Current medications include amlodipine, lisinopril, hydrochlorothiazide, and a statin. Metoprolol has been discontinued,

## 2025-04-03 ENCOUNTER — HOSPITAL ENCOUNTER (OUTPATIENT)
Age: 50
End: 2025-04-03
Attending: INTERNAL MEDICINE
Payer: MEDICARE

## 2025-04-03 ENCOUNTER — HOSPITAL ENCOUNTER (OUTPATIENT)
Dept: PULMONOLOGY | Age: 50
Discharge: HOME | End: 2025-04-03
Attending: INTERNAL MEDICINE
Payer: MEDICARE

## 2025-04-03 ENCOUNTER — OFFICE VISIT (OUTPATIENT)
Dept: PULMONOLOGY | Age: 50
End: 2025-04-03
Attending: INTERNAL MEDICINE
Payer: MEDICARE

## 2025-04-03 VITALS
SYSTOLIC BLOOD PRESSURE: 116 MMHG | HEIGHT: 74 IN | BODY MASS INDEX: 40.43 KG/M2 | RESPIRATION RATE: 22 BRPM | WEIGHT: 315 LBS | HEART RATE: 94 BPM | DIASTOLIC BLOOD PRESSURE: 80 MMHG | OXYGEN SATURATION: 96 % | TEMPERATURE: 97 F

## 2025-04-03 VITALS
HEART RATE: 87 BPM | HEIGHT: 74 IN | OXYGEN SATURATION: 97 % | SYSTOLIC BLOOD PRESSURE: 110 MMHG | RESPIRATION RATE: 16 BRPM | BODY MASS INDEX: 40.43 KG/M2 | DIASTOLIC BLOOD PRESSURE: 72 MMHG | WEIGHT: 315 LBS

## 2025-04-03 DIAGNOSIS — R06.02 SHORTNESS OF BREATH: ICD-10-CM

## 2025-04-03 DIAGNOSIS — E66.01 MORBID OBESITY: ICD-10-CM

## 2025-04-03 DIAGNOSIS — F17.210 CIGARETTE NICOTINE DEPENDENCE WITHOUT COMPLICATION: ICD-10-CM

## 2025-04-03 DIAGNOSIS — G47.33 SEVERE OBSTRUCTIVE SLEEP APNEA: Primary | ICD-10-CM

## 2025-04-03 DIAGNOSIS — I47.10 SVT (SUPRAVENTRICULAR TACHYCARDIA): ICD-10-CM

## 2025-04-03 LAB
ECHO AO ASC DIAM: 2.8 CM
ECHO AO ROOT DIAM: 2.6 CM
ECHO AO SINUS VALSALVA DIAM: 3 CM
ECHO AO ST JNCT DIAM: 2.8 CM
ECHO AV AREA PEAK VELOCITY: 2.8 CM2
ECHO AV AREA VTI: 2.6 CM2
ECHO AV MEAN GRADIENT: 3 MMHG
ECHO AV MEAN VELOCITY: 0.8 M/S
ECHO AV PEAK GRADIENT: 5 MMHG
ECHO AV PEAK VELOCITY: 1.1 M/S
ECHO AV VELOCITY RATIO: 0.82
ECHO AV VTI: 18.3 CM
ECHO LA AREA 2C: 12.7 CM2
ECHO LA AREA 4C: 14.7 CM2
ECHO LA DIAMETER: 3.2 CM
ECHO LA MAJOR AXIS: 5.8 CM
ECHO LA MINOR AXIS: 5.4 CM
ECHO LA TO AORTIC ROOT RATIO: 1.23
ECHO LA VOL BP: 28 ML (ref 18–58)
ECHO LA VOL MOD A2C: 24 ML (ref 18–58)
ECHO LA VOL MOD A4C: 31 ML (ref 18–58)
ECHO LV E' LATERAL VELOCITY: 6.2 CM/S
ECHO LV E' SEPTAL VELOCITY: 7.62 CM/S
ECHO LV EDV A2C: 65 ML
ECHO LV EDV A4C: 84 ML
ECHO LV EJECTION FRACTION A2C: 72 %
ECHO LV EJECTION FRACTION A4C: 69 %
ECHO LV EJECTION FRACTION BIPLANE: 69 % (ref 55–100)
ECHO LV ESV A2C: 18 ML
ECHO LV ESV A4C: 27 ML
ECHO LV FRACTIONAL SHORTENING: 26 % (ref 28–44)
ECHO LV INTERNAL DIMENSION DIASTOLIC: 4.3 CM (ref 4.2–5.9)
ECHO LV INTERNAL DIMENSION SYSTOLIC: 3.2 CM
ECHO LV IVSD: 1.7 CM (ref 0.6–1)
ECHO LV MASS 2D: 299.7 G (ref 88–224)
ECHO LV POSTERIOR WALL DIASTOLIC: 1.6 CM (ref 0.6–1)
ECHO LV RELATIVE WALL THICKNESS RATIO: 0.74
ECHO LVOT AREA: 3.5 CM2
ECHO LVOT AV VTI INDEX: 0.76
ECHO LVOT DIAM: 2.1 CM
ECHO LVOT MEAN GRADIENT: 2 MMHG
ECHO LVOT PEAK GRADIENT: 3 MMHG
ECHO LVOT PEAK VELOCITY: 0.9 M/S
ECHO LVOT SV: 48.1 ML
ECHO LVOT VTI: 13.9 CM
ECHO MV A VELOCITY: 0.67 M/S
ECHO MV AREA VTI: 2.5 CM2
ECHO MV E DECELERATION TIME (DT): 187 MS
ECHO MV E VELOCITY: 0.66 M/S
ECHO MV E/A RATIO: 0.99
ECHO MV E/E' LATERAL: 10.65
ECHO MV E/E' RATIO (AVERAGED): 9.65
ECHO MV E/E' SEPTAL: 8.66
ECHO MV LVOT VTI INDEX: 1.37
ECHO MV MAX VELOCITY: 0.9 M/S
ECHO MV MEAN GRADIENT: 1 MMHG
ECHO MV MEAN VELOCITY: 0.6 M/S
ECHO MV PEAK GRADIENT: 3 MMHG
ECHO MV VTI: 19.1 CM
ECHO PULMONARY ARTERY END DIASTOLIC PRESSURE: 5 MMHG
ECHO PV REGURGITANT MAX VELOCITY: 1.1 M/S
ECHO RA AREA 4C: 7.5 CM2
ECHO RA VOLUME: 13 ML
ECHO RV BASAL DIMENSION: 3 CM
ECHO RV INTERNAL DIMENSION: 2.7 CM
ECHO RV LONGITUDINAL DIMENSION: 6.9 CM
ECHO RV MID DIMENSION: 4.7 CM
ECHO RV TAPSE: 2.3 CM (ref 1.7–?)

## 2025-04-03 PROCEDURE — 6360000004 HC RX CONTRAST MEDICATION: Performed by: INTERNAL MEDICINE

## 2025-04-03 PROCEDURE — 3079F DIAST BP 80-89 MM HG: CPT | Performed by: INTERNAL MEDICINE

## 2025-04-03 PROCEDURE — C8929 TTE W OR WO FOL WCON,DOPPLER: HCPCS

## 2025-04-03 PROCEDURE — 99214 OFFICE O/P EST MOD 30 MIN: CPT | Performed by: INTERNAL MEDICINE

## 2025-04-03 PROCEDURE — 3074F SYST BP LT 130 MM HG: CPT | Performed by: INTERNAL MEDICINE

## 2025-04-03 RX ORDER — ALBUTEROL SULFATE 0.83 MG/ML
2.5 SOLUTION RESPIRATORY (INHALATION) 2 TIMES DAILY PRN
Status: DISCONTINUED | OUTPATIENT
Start: 2025-04-03 | End: 2025-04-03

## 2025-04-03 RX ADMIN — SULFUR HEXAFLUORIDE 2 ML: 60.7; .19; .19 INJECTION, POWDER, LYOPHILIZED, FOR SUSPENSION INTRAVENOUS; INTRAVESICAL at 11:11

## 2025-04-03 ASSESSMENT — ENCOUNTER SYMPTOMS
COUGH: 0
APNEA: 1
SHORTNESS OF BREATH: 1
RHINORRHEA: 0
ABDOMINAL PAIN: 0
WHEEZING: 0
ANAL BLEEDING: 0
BACK PAIN: 0
ABDOMINAL DISTENTION: 0
CHEST TIGHTNESS: 0

## 2025-04-03 NOTE — PROGRESS NOTES
Patient stated during screening that he is unsure if he has/had a respiratory illness in the last month, but he has been coughing up yellow/green mucus. We rescheduled his appointment for his PFT for 4/25/25 @ 8 AM. Declined AVS.

## 2025-04-03 NOTE — PROGRESS NOTES
Pulmonary and Sleep Medicine    Mikey Fierro (:  1975) is a 50 y.o. male,Established patient, here for evaluation of the following chief complaint(s):  Follow-up (6 week follow- WANG/PFT & Echo-4/3/2025./Sleep Study-3/5/2025./DME compliance report uploaded-3/31/2025)      Referring physician:  Corrina Pena Md  [facility] [place] [address]     ASSESSMENT/PLAN:  1. Severe obstructive sleep apnea.  Apnea-hypopnea index of 122 events per hour on split-night sleep study done in May 2021  2. Shortness of breath  3. Morbid obesity  4. SVT (supraventricular tachycardia)  5. Cigarette nicotine dependence without complication. Smokes 1ppd. Smoked for 40 years.        Will change his CPAP setting to titrating between 12 and 20.  He is doing well on the current setting however he does not feel as if the pressure is high enough.  Dyspnea.  Await pulmonary function testing.  He could not perform the testing due to what seems to be acute bronchitis.         Cor[facility]ena MD, [facility], [facility]    Return in about 6 weeks (around 5/15/2025).    SUBJECTIVE/OBJECTIVE:        Patient is here for follow-up on sleep apnea and shortness of breath.  He had a sleep study done that showed severe obstructive sleep apnea with an apnea-hypopnea index of about 46 events per hour.  He did well on the CPAP at 12.  However he says that CPAP at 12 does not seem to be enough pressure for him.  His CPAP compliance data was reviewed.  My interpretation of the download is that he is using the CPAP on average about 9 and half hours a night.  His apnea-hypopnea index while on the CPAP is about 1.2 events per hour.  He feels that the CPAP is helping him significantly.  His echo stress test was unremarkable.          Continue the following medications as reported by the patient:    Prior to Visit Medications    Medication Sig Taking? Authorizing Provider   atorvastatin (LIPITOR) 80 MG tablet Take 1 tablet by

## 2025-04-04 ENCOUNTER — HOSPITAL ENCOUNTER (OUTPATIENT)
Dept: ULTRASOUND IMAGING | Age: 50
Discharge: HOME | End: 2025-04-04
Payer: MEDICARE

## 2025-04-04 DIAGNOSIS — N50.811 PAIN IN RIGHT TESTICLE: ICD-10-CM

## 2025-04-04 DIAGNOSIS — N50.89 MASS OF RIGHT TESTICLE: ICD-10-CM

## 2025-04-04 PROCEDURE — 76870 US EXAM SCROTUM: CPT

## 2025-04-07 ENCOUNTER — OFFICE VISIT (OUTPATIENT)
Dept: UROLOGY | Age: 50
End: 2025-04-07
Payer: MEDICARE

## 2025-04-07 VITALS — TEMPERATURE: 97.3 F | HEIGHT: 74 IN | WEIGHT: 315 LBS | BODY MASS INDEX: 40.43 KG/M2

## 2025-04-07 DIAGNOSIS — R35.0 BENIGN PROSTATIC HYPERPLASIA WITH URINARY FREQUENCY: Primary | ICD-10-CM

## 2025-04-07 DIAGNOSIS — N52.9 ERECTILE DYSFUNCTION, UNSPECIFIED ERECTILE DYSFUNCTION TYPE: ICD-10-CM

## 2025-04-07 DIAGNOSIS — N40.1 BENIGN PROSTATIC HYPERPLASIA WITH URINARY FREQUENCY: Primary | ICD-10-CM

## 2025-04-07 DIAGNOSIS — N44.2 TESTICULAR CYST: ICD-10-CM

## 2025-04-07 LAB
BACTERIA URINE, POC: 0
BILIRUBIN URINE: 0 MG/DL
BLOOD, URINE: POSITIVE
CASTS URINE, POC: 0
CLARITY, UA: CLEAR
COLOR, UA: YELLOW
CRYSTALS URINE, POC: 0
EPI CELLS URINE, POC: NORMAL
GLUCOSE URINE: NORMAL
KETONES, URINE: NEGATIVE
LEUKOCYTE EST, POC: NORMAL
NITRITE, URINE: NEGATIVE
PH UA: 6 (ref 4.5–8)
PROTEIN UA: NEGATIVE
RBC URINE, POC: 1
SPECIFIC GRAVITY UA: 1.01 (ref 1–1.03)
UROBILINOGEN, URINE: NORMAL
WBC URINE, POC: 0
YEAST URINE, POC: 0

## 2025-04-07 PROCEDURE — 81001 URINALYSIS AUTO W/SCOPE: CPT | Performed by: NURSE PRACTITIONER

## 2025-04-07 PROCEDURE — 99204 OFFICE O/P NEW MOD 45 MIN: CPT | Performed by: NURSE PRACTITIONER

## 2025-04-07 RX ORDER — TADALAFIL 5 MG/1
5 TABLET ORAL DAILY
Qty: 60 TABLET | Refills: 1 | Status: SHIPPED | OUTPATIENT
Start: 2025-04-07

## 2025-04-07 ASSESSMENT — ENCOUNTER SYMPTOMS
BACK PAIN: 0
VOMITING: 0
NAUSEA: 0
ABDOMINAL DISTENTION: 0
ABDOMINAL PAIN: 0

## 2025-04-07 NOTE — PROGRESS NOTES
I  have reviewed the document for such errors, some may still exist.  
on a low-dose 5 mg daily Cialis.  Discussed side effects.  Follow-up 4 to 6 weeks    3. Testicular cyst.  A small cyst measuring 0.4 cm is present on his right testicle. Given its size, no intervention is necessary at this time. He has been reassured that this is not indicative of testicular cancer. He has been advised to wear tight-fitting underwear to alleviate chronic pain.  Nothing on physical exam to suggest mass, varicocele, hydrocele, infection.  Discussed surgical intervention however given his obesity and size of cyst he is not a candidate for surgery.      1. Benign prostatic hyperplasia with urinary frequency    - tadalafil (CIALIS) 5 MG tablet; Take 1 tablet by mouth daily  Dispense: 60 tablet; Refill: 1  - POCT Urinalysis Dipstick w/ Micro (Auto)    2. Erectile dysfunction, unspecified erectile dysfunction type    - tadalafil (CIALIS) 5 MG tablet; Take 1 tablet by mouth daily  Dispense: 60 tablet; Refill: 1  - POCT Urinalysis Dipstick w/ Micro (Auto)    3. Testicular cyst        Orders Placed This Encounter   Procedures    POCT Urinalysis Dipstick w/ Micro (Auto)        Return in about 6 weeks (around 5/19/2025).    All information inputted into the note by the MA to include chief complaint, past medical history, past surgical history, medications, allergies, social and family history and review of systems has been reviewed and updated as needed by me.    EMR Dragon/transcription disclaimer: Much of this documentt is electronic  transcription/translation of spoken language to printed text. The  electronic translation of spoken language may be erroneous, or at times,  nonsensical words or phrases may be inadvertently transcribed. Although I  have reviewed the document for such errors, some may still exist.

## 2025-04-25 ENCOUNTER — HOSPITAL ENCOUNTER (OUTPATIENT)
Dept: PULMONOLOGY | Age: 50
Discharge: HOME | End: 2025-04-25
Attending: INTERNAL MEDICINE
Payer: MEDICARE

## 2025-04-25 DIAGNOSIS — R06.02 SHORTNESS OF BREATH: Primary | ICD-10-CM

## 2025-04-25 PROCEDURE — 94726 PLETHYSMOGRAPHY LUNG VOLUMES: CPT

## 2025-04-25 PROCEDURE — 94010 BREATHING CAPACITY TEST: CPT

## 2025-04-25 PROCEDURE — 94729 DIFFUSING CAPACITY: CPT

## 2025-04-25 NOTE — PROCEDURES
Media Information        Pulmonary Function Study    Interpretation:    The FVC is moderately reduced. FEV1 is moderately reduced. FEV1/FVC ratio is Normal.  Total lung capacity is mildly reduced. Residual volume is Normal.      Diffusing lung capacity when corrected for alveolar volume is Normal.         Impression:    Mild restrictive lung disease.         Corrina Pena MD, FCCP, [facility]

## 2025-05-01 DIAGNOSIS — F41.9 ANXIETY: Primary | ICD-10-CM

## 2025-05-01 RX ORDER — DOXEPIN HYDROCHLORIDE 10 MG/1
10 CAPSULE ORAL 2 TIMES DAILY
Qty: 60 CAPSULE | Refills: 11 | OUTPATIENT
Start: 2025-05-01

## 2025-05-01 RX ORDER — DOXEPIN HYDROCHLORIDE 10 MG/1
10 CAPSULE ORAL 2 TIMES DAILY
Qty: 60 CAPSULE | Refills: 5 | Status: SHIPPED | OUTPATIENT
Start: 2025-05-01

## 2025-05-01 NOTE — TELEPHONE ENCOUNTER
Received refill request via pt's [employer]. Refusing rx request due to pt no longer under [facility] provider care.    Requested Prescriptions     Refused Prescriptions Disp Refills    doxepin (SINEQUAN) 10 MG capsule 60 capsule 11     Sig: Take 1 capsule by mouth 2 times daily     Refused By: HOMAR MENDEZ     Reason for Refusal: Patient no longer under prescriber care

## 2025-05-02 ENCOUNTER — TELEPHONE (OUTPATIENT)
Dept: GASTROENTEROLOGY | Age: 50
End: 2025-05-02

## 2025-05-02 ENCOUNTER — ANESTHESIA (OUTPATIENT)
Dept: ENDOSCOPY | Age: 50
End: 2025-05-02
Payer: MEDICARE

## 2025-05-02 ENCOUNTER — HOSPITAL ENCOUNTER (OUTPATIENT)
Age: 50
Setting detail: OUTPATIENT SURGERY
Discharge: HOME | End: 2025-05-02
Attending: INTERNAL MEDICINE | Admitting: INTERNAL MEDICINE
Payer: MEDICARE

## 2025-05-02 ENCOUNTER — ANCILLARY PROCEDURE (OUTPATIENT)
Dept: ENDOSCOPY | Age: 50
End: 2025-05-02
Attending: INTERNAL MEDICINE
Payer: MEDICARE

## 2025-05-02 ENCOUNTER — ANESTHESIA EVENT (OUTPATIENT)
Dept: ENDOSCOPY | Age: 50
End: 2025-05-02
Payer: MEDICARE

## 2025-05-02 VITALS
RESPIRATION RATE: 18 BRPM | HEIGHT: 74 IN | WEIGHT: 315 LBS | BODY MASS INDEX: 40.43 KG/M2 | TEMPERATURE: 97.8 F | DIASTOLIC BLOOD PRESSURE: 76 MMHG | HEART RATE: 80 BPM | SYSTOLIC BLOOD PRESSURE: 101 MMHG | OXYGEN SATURATION: 98 %

## 2025-05-02 DIAGNOSIS — Z86.0100 HISTORY OF COLON POLYPS: ICD-10-CM

## 2025-05-02 DIAGNOSIS — Z12.11 SCREEN FOR COLON CANCER: ICD-10-CM

## 2025-05-02 PROCEDURE — 3700000000 HC ANESTHESIA ATTENDED CARE: Performed by: INTERNAL MEDICINE

## 2025-05-02 PROCEDURE — 45385 COLONOSCOPY W/LESION REMOVAL: CPT | Performed by: INTERNAL MEDICINE

## 2025-05-02 PROCEDURE — 7100000010 HC PHASE II RECOVERY - FIRST 15 MIN: Performed by: INTERNAL MEDICINE

## 2025-05-02 PROCEDURE — 88305 TISSUE EXAM BY PATHOLOGIST: CPT | Performed by: PATHOLOGY

## 2025-05-02 PROCEDURE — 3609010600 HC COLONOSCOPY POLYPECTOMY SNARE/COLD BIOPSY: Performed by: INTERNAL MEDICINE

## 2025-05-02 PROCEDURE — 45380 COLONOSCOPY AND BIOPSY: CPT | Performed by: INTERNAL MEDICINE

## 2025-05-02 PROCEDURE — 7100000011 HC PHASE II RECOVERY - ADDTL 15 MIN: Performed by: INTERNAL MEDICINE

## 2025-05-02 PROCEDURE — 3700000001 HC ADD 15 MINUTES (ANESTHESIA): Performed by: INTERNAL MEDICINE

## 2025-05-02 PROCEDURE — 45388 COLONOSCOPY W/ABLATION: CPT | Performed by: INTERNAL MEDICINE

## 2025-05-02 PROCEDURE — 6360000002 HC RX W HCPCS: Performed by: NURSE ANESTHETIST, CERTIFIED REGISTERED

## 2025-05-02 PROCEDURE — 88305 TISSUE EXAM BY PATHOLOGIST: CPT

## 2025-05-02 PROCEDURE — 2580000003 HC RX 258: Performed by: ANESTHESIOLOGY

## 2025-05-02 PROCEDURE — 2709999900 HC NON-CHARGEABLE SUPPLY: Performed by: INTERNAL MEDICINE

## 2025-05-02 PROCEDURE — C1889 IMPLANT/INSERT DEVICE, NOC: HCPCS | Performed by: INTERNAL MEDICINE

## 2025-05-02 DEVICE — WORKING LENGTH 235CM, WORKING CHANNEL 2.8MM
Type: IMPLANTABLE DEVICE | Site: TRANSVERSE COLON | Status: FUNCTIONAL
Brand: RESOLUTION 360 CLIP

## 2025-05-02 RX ORDER — PROPOFOL 10 MG/ML
INJECTION, EMULSION INTRAVENOUS
Status: DISCONTINUED | OUTPATIENT
Start: 2025-05-02 | End: 2025-05-02 | Stop reason: SDUPTHER

## 2025-05-02 RX ORDER — LIDOCAINE HYDROCHLORIDE 10 MG/ML
INJECTION, SOLUTION INFILTRATION; PERINEURAL
Status: DISCONTINUED | OUTPATIENT
Start: 2025-05-02 | End: 2025-05-02 | Stop reason: SDUPTHER

## 2025-05-02 RX ORDER — SODIUM CHLORIDE, SODIUM LACTATE, POTASSIUM CHLORIDE, CALCIUM CHLORIDE 600; 310; 30; 20 MG/100ML; MG/100ML; MG/100ML; MG/100ML
INJECTION, SOLUTION INTRAVENOUS CONTINUOUS
Status: DISCONTINUED | OUTPATIENT
Start: 2025-05-02 | End: 2025-05-02 | Stop reason: HOSPADM

## 2025-05-02 RX ADMIN — PROPOFOL 80 MG: 10 INJECTION, EMULSION INTRAVENOUS at 11:04

## 2025-05-02 RX ADMIN — PROPOFOL 20 MG: 10 INJECTION, EMULSION INTRAVENOUS at 11:15

## 2025-05-02 RX ADMIN — PROPOFOL 20 MG: 10 INJECTION, EMULSION INTRAVENOUS at 11:16

## 2025-05-02 RX ADMIN — PROPOFOL 20 MG: 10 INJECTION, EMULSION INTRAVENOUS at 11:07

## 2025-05-02 RX ADMIN — PROPOFOL 20 MG: 10 INJECTION, EMULSION INTRAVENOUS at 11:06

## 2025-05-02 RX ADMIN — PROPOFOL 20 MG: 10 INJECTION, EMULSION INTRAVENOUS at 11:12

## 2025-05-02 RX ADMIN — PROPOFOL 20 MG: 10 INJECTION, EMULSION INTRAVENOUS at 11:09

## 2025-05-02 RX ADMIN — PROPOFOL 20 MG: 10 INJECTION, EMULSION INTRAVENOUS at 11:05

## 2025-05-02 RX ADMIN — PROPOFOL 20 MG: 10 INJECTION, EMULSION INTRAVENOUS at 11:10

## 2025-05-02 RX ADMIN — LIDOCAINE HYDROCHLORIDE 50 MG: 10 INJECTION, SOLUTION INFILTRATION; PERINEURAL at 11:04

## 2025-05-02 RX ADMIN — SODIUM CHLORIDE, SODIUM LACTATE, POTASSIUM CHLORIDE, AND CALCIUM CHLORIDE: .6; .31; .03; .02 INJECTION, SOLUTION INTRAVENOUS at 09:58

## 2025-05-02 ASSESSMENT — PAIN - FUNCTIONAL ASSESSMENT
PAIN_FUNCTIONAL_ASSESSMENT: 0-10
PAIN_FUNCTIONAL_ASSESSMENT: NONE - DENIES PAIN

## 2025-05-02 ASSESSMENT — ENCOUNTER SYMPTOMS: SHORTNESS OF BREATH: 1

## 2025-05-02 NOTE — ANESTHESIA PRE PROCEDURE
No results found for: \"PREGTESTUR\", \"PREGSERUM\", \"HCG\", \"HCGQUANT\"     ABGs: No results found for: \"PHART\", \"PO2ART\", \"ENS7ZNZ\", \"PSK4TJK\", \"BEART\", \"C1GKXMYR\"     Type & Screen (If Applicable):  No results found for: \"LABABO\"    Anesthesia Evaluation  Patient summary reviewed  Airway: Mallampati: II  TM distance: <3 FB   Neck ROM: full  Mouth opening: < 3 FB   Dental:          Pulmonary:normal exam    (+)  COPD:  shortness of breath:   sleep apnea:       asthma:                           ROS comment: Smokes 1ppd/25 years   Cardiovascular:    (+) hypertension:, angina:, CAD:                  Neuro/Psych:   (+) psychiatric history:             ROS comment: Takes Norco everyday for back pain  Morbidly obese GI/Hepatic/Renal: Neg GI/Hepatic/Renal ROS            Endo/Other: Negative Endo/Other ROS                    Abdominal:             Vascular:          Other Findings: Full beard            Anesthesia Plan      general and TIVA     ASA 3       Induction: intravenous.      Anesthetic plan and risks discussed with patient.                        Max Vo, APRN - CRNA   5/2/2025

## 2025-05-02 NOTE — TELEPHONE ENCOUNTER
5-2-2025   Per Dr Shay op note recommendations             Routed to Jose Magdaleno to schedule colonoscopy next week

## 2025-05-02 NOTE — ANESTHESIA POSTPROCEDURE EVALUATION
Department of Anesthesiology  Postprocedure Note    Patient: Mikey Fierro  MRN: [id number]  YOB: 1975  Date of evaluation: 5/2/2025    Procedure Summary       Date: 05/02/25 Room / Location: [place] [address] / [facility]    Anesthesia Start: 1057 Anesthesia Stop: 1120    Procedure: COLONOSCOPY POLYPECTOMY SNARE/BIOPSY Diagnosis:       Screen for colon cancer      History of colon polyps      (Screen for colon cancer [Z12.11])      (History of colon polyps [Z86.0100])    Surgeons: Shannon Shay MD Responsible Provider: Max Vo APRN - CRNA    Anesthesia Type: general, TIVA ASA Status: 3            Anesthesia Type: No value filed.    Isabel Phase I: Isabel Score: 10    Isabel Phase II:      Anesthesia Post Evaluation    Patient location during evaluation: PACU  Patient participation: complete - patient participated  Level of consciousness: sleepy but conscious  Pain score: 0  Airway patency: patent  Nausea & Vomiting: no nausea and no vomiting  Cardiovascular status: blood pressure returned to baseline  Respiratory status: acceptable  Pain management: adequate        No notable events documented.

## 2025-05-02 NOTE — OP NOTE
Patient: Mikey Fierro : 1975  Med Rec#: [id number] Acc#: [id number]   Primary Care Provider Linette Chaney APRN - CNP    Date of Procedure:  2025    Endoscopist: Shannon Shay MD, MD    Referring Provider: Linette Chaney APRN - CNP,     Operation Performed: Colonoscopy up to cecum with    Hot snare resection of polyps,  Application of a single metallic hemostatic clip at the site of polypectomy in transverse colon,  Hot cautery ablation of a diminutive proximal transverse colon polyp and  Random colon cold biopsy to check for microscopic colitis    Indications: Personal history of colon polyps; needs colonoscopy for surveillance; also complains of recent intermittent bouts of diarrhea    Anesthesia:  Sedation was administered by anesthesia who monitored the patient during the procedure.    I met with Mikey Fierro prior to procedure. We discussed the procedure itself, and I have discussed the risks of endoscopy (including-- but not limited to-- pain, discomfort, bleeding potentially requiring second endoscopic procedure and/or blood transfusion, organ perforation requiring operative repair, damage to organs near the colon, infection, aspiration, cardiopulmonary/allergic reaction), benefits, indications to endoscopy. Additionally, we discussed options other than colonoscopy. The patient expressed understanding. All questions answered. The patient decided to proceed with the procedure.  Signed informed consent was placed on the chart.    Blood Loss: minimal    Withdrawal time: More than 6 minutes  Bowel Prep: Fair to poor with thick solid and semisolid stool scattered in segments throughout the colon obscuring the underlying mucosa. Small lesions including polyps may have been missed.     Complications: no immediate complications    DESCRIPTION OF PROCEDURE:     A time out was performed. After written informed consent was obtained, the patient was placed in the left lateral position.

## 2025-05-02 NOTE — H&P
Patient Name: Mikey Fierro  : 1975  MRN: [id number]  DATE: 25    Allergies:   Allergies   Allergen Reactions    Albuterol Shortness Of Breath    Dye [Gadolinium Derivatives] Hives     Mild rash that last less than 2 hours after MRI or CT scans        ENDOSCOPY  History and Physical    Procedure:    [] Diagnostic Colonoscopy       [x] Screening Colonoscopy  [] EGD      [] ERCP      [] EUS       [] Other    [x] Previous office notes/History and Physical reviewed from the patients chart. Please see EMR for further details of HPI. I have examined the patient's status immediately prior to the procedure and:      Indications/HPI:    []Abdominal Pain   []Cancer- GI/Lung     []Fhx of colon CA/polyps  []History of Polyps  []Barretts            []Melena  []Abnormal Imaging              []Dysphagia              []Persistent Pneumonia   []Anemia                            []Food Impaction        [x]History of Polyps  [] GI Bleed             []Pulmonary nodule/Mass   []Change in bowel habits []Heartburn/Reflux  []Rectal Bleed (BRBPR)  []Chest Pain - Non Cardiac []Heme (+) Stool []Ulcers  []Constipation  []Hemoptysis  []Varices  []Diarrhea  []Hypoxemia    []Nausea/Vomiting   []Screening   []Crohns/Colitis  []Other:     Anesthesia:   [x] MAC [] Moderate Sedation   [] General   [] None     ROS: 12 pt Review of Symptoms was negative unless mentioned above    Medications:   Prior to Admission medications    Medication Sig Start Date End Date Taking? Authorizing Provider   doxepin (SINEQUAN) 10 MG capsule TAKE 1 CAPSULE BY MOUTH TWICE DAILY 25   Linette Chaney APRN - CNP   tadalafil (CIALIS) 5 MG tablet Take 1 tablet by mouth daily 25   Filomena Castelan APRN - CNP   atorvastatin (LIPITOR) 80 MG tablet Take 1 tablet by mouth daily    ProviderAnila MD   nitroGLYCERIN (NITROSTAT) 0.4 MG SL tablet UP TO MAX OF 3 TOTAL DOSES. IF NO RELIEF AFTER 1 DOSE, CALL 911.    Anila Robles MD

## 2025-05-02 NOTE — DISCHARGE INSTRUCTIONS
1. Repeat colonoscopy: pending pathology -based on the poor quality of his prep and multiple polyps noted despite the poor prep, next Monday with a strict 2-day clear liquid diet over this weekend and with a repeat prep using Plenvu or a similar solution along with Reglan 10 mg or Zofran 4 mg p.o. every 4 hours as needed for any prep associated nausea or vomiting.  If patient is unable to have it done this coming Monday, he can be scheduled in 2 weeks here at the hospital on Monday or Thursday with a strict 2-day clear liquid diet and a 2-day prep as outlined above.  2. Await biopsy results-you will receive a letter with your results within 7-10 days  - Resume previous meds and diet  - GI clinic f/u 6-8 weeks with Ms. Pablo or Rachael or Margo   - Keep scheduled f/u appts with other MDs   - NO ASA/NSAIDs x 2 weeks     Colonoscopy: What to Expect at Home  Your Recovery    After the test, you may be bloated or have gas pains. You may need to pass gas. If a biopsy was done or a polyp was removed, you may have streaks of blood in your stool (feces) for a few days. Problems such as heavy rectal bleeding may not occur until several weeks after the test. This isn't common. But it can happen after polyps are removed.  This care sheet gives you a general idea about how long it will take for you to recover. But each person recovers at a different pace. Follow the steps below to get better as quickly as possible.    How can you care for yourself at home?  Activity    Rest when you feel tired.     You can do your normal activities when it feels okay to do so.   Diet    You may resume your regular diet.     Drink plenty of fluids. This helps to replace the fluids that were lost during the colon prep.     Do not drink alcohol.   Medicines    Your doctor will tell you if and when you can restart your medicines. You will also be given instructions about taking any new medicines.     If you stopped taking aspirin or some other

## 2025-05-05 ENCOUNTER — RESULTS FOLLOW-UP (OUTPATIENT)
Dept: GASTROENTEROLOGY | Age: 50
End: 2025-05-05

## 2025-05-05 ENCOUNTER — RESULTS FOLLOW-UP (OUTPATIENT)
Dept: PRIMARY CARE CLINIC | Age: 50
End: 2025-05-05

## 2025-05-06 ENCOUNTER — TELEPHONE (OUTPATIENT)
Dept: GASTROENTEROLOGY | Age: 50
End: 2025-05-06

## 2025-05-06 NOTE — TELEPHONE ENCOUNTER
05- pt called and LVM stated that he is wanting to get his Pathology results     Routed to Nitza BASURTO

## 2025-05-20 DIAGNOSIS — I10 PRIMARY HYPERTENSION: ICD-10-CM

## 2025-05-20 RX ORDER — LISINOPRIL 40 MG/1
40 TABLET ORAL DAILY
Qty: 90 TABLET | Refills: 3 | OUTPATIENT
Start: 2025-05-20

## 2025-05-20 RX ORDER — COLESTIPOL HYDROCHLORIDE 1 G/1
1 TABLET ORAL 2 TIMES DAILY
Qty: 60 TABLET | Refills: 5 | OUTPATIENT
Start: 2025-05-20

## 2025-05-20 NOTE — TELEPHONE ENCOUNTER
Received fax from pharmacy requesting refill on pts medication(s). Pt was last seen in office on 7/16/2024 and has a follow up scheduled for Visit date not found. Will send request to  [facility]  for patient.     Requested Prescriptions     Pending Prescriptions Disp Refills    lisinopril (PRINIVIL;ZESTRIL) 40 MG tablet [Pharmacy Med Name: LISINOPRIL 40MG TABLET] 90 tablet 3     Sig: TAKE 1 TABLET BY MOUTH DAILY    colestipol (COLESTID) 1 g tablet [Pharmacy Med Name: COLESTIPOL HYDROCHLORIDE 1GM TABLET] 60 tablet 5     Sig: TAKE 1 TABLET BY MOUTH TWICE DAILY.

## 2025-05-21 DIAGNOSIS — I10 PRIMARY HYPERTENSION: ICD-10-CM

## 2025-05-21 RX ORDER — LISINOPRIL 40 MG/1
40 TABLET ORAL DAILY
Qty: 14 TABLET | Refills: 0 | Status: SHIPPED | OUTPATIENT
Start: 2025-05-21

## 2025-05-21 RX ORDER — COLESTIPOL HYDROCHLORIDE 1 G/1
1 TABLET ORAL 2 TIMES DAILY
Qty: 28 TABLET | Refills: 0 | Status: SHIPPED | OUTPATIENT
Start: 2025-05-21

## 2025-05-27 RX ORDER — ATORVASTATIN CALCIUM 80 MG/1
80 TABLET, FILM COATED ORAL NIGHTLY
Qty: 30 TABLET | Refills: 0 | Status: SHIPPED | OUTPATIENT
Start: 2025-05-27

## 2025-05-28 SDOH — HEALTH STABILITY: PHYSICAL HEALTH: ON AVERAGE, HOW MANY MINUTES DO YOU ENGAGE IN EXERCISE AT THIS LEVEL?: 0 MIN

## 2025-05-28 SDOH — HEALTH STABILITY: PHYSICAL HEALTH: ON AVERAGE, HOW MANY DAYS PER WEEK DO YOU ENGAGE IN MODERATE TO STRENUOUS EXERCISE (LIKE A BRISK WALK)?: 0 DAYS

## 2025-05-28 ASSESSMENT — PATIENT HEALTH QUESTIONNAIRE - PHQ9
4. FEELING TIRED OR HAVING LITTLE ENERGY: SEVERAL DAYS
SUM OF ALL RESPONSES TO PHQ QUESTIONS 1-9: 7
SUM OF ALL RESPONSES TO PHQ QUESTIONS 1-9: 7
7. TROUBLE CONCENTRATING ON THINGS, SUCH AS READING THE NEWSPAPER OR WATCHING TELEVISION: NOT AT ALL
8. MOVING OR SPEAKING SO SLOWLY THAT OTHER PEOPLE COULD HAVE NOTICED. OR THE OPPOSITE, BEING SO FIGETY OR RESTLESS THAT YOU HAVE BEEN MOVING AROUND A LOT MORE THAN USUAL: NOT AT ALL
9. THOUGHTS THAT YOU WOULD BE BETTER OFF DEAD, OR OF HURTING YOURSELF: NOT AT ALL
2. FEELING DOWN, DEPRESSED OR HOPELESS: SEVERAL DAYS
1. LITTLE INTEREST OR PLEASURE IN DOING THINGS: MORE THAN HALF THE DAYS
SUM OF ALL RESPONSES TO PHQ QUESTIONS 1-9: 7
6. FEELING BAD ABOUT YOURSELF - OR THAT YOU ARE A FAILURE OR HAVE LET YOURSELF OR YOUR FAMILY DOWN: SEVERAL DAYS
10. IF YOU CHECKED OFF ANY PROBLEMS, HOW DIFFICULT HAVE THESE PROBLEMS MADE IT FOR YOU TO DO YOUR WORK, TAKE CARE OF THINGS AT HOME, OR GET ALONG WITH OTHER PEOPLE: VERY DIFFICULT
3. TROUBLE FALLING OR STAYING ASLEEP: SEVERAL DAYS
5. POOR APPETITE OR OVEREATING: SEVERAL DAYS
SUM OF ALL RESPONSES TO PHQ QUESTIONS 1-9: 7

## 2025-05-28 ASSESSMENT — LIFESTYLE VARIABLES
HOW MANY STANDARD DRINKS CONTAINING ALCOHOL DO YOU HAVE ON A TYPICAL DAY: PATIENT DOES NOT DRINK
HOW OFTEN DO YOU HAVE A DRINK CONTAINING ALCOHOL: 1
HOW MANY STANDARD DRINKS CONTAINING ALCOHOL DO YOU HAVE ON A TYPICAL DAY: 0
HOW OFTEN DO YOU HAVE A DRINK CONTAINING ALCOHOL: NEVER
HOW OFTEN DO YOU HAVE SIX OR MORE DRINKS ON ONE OCCASION: 1

## 2025-06-02 ENCOUNTER — OFFICE VISIT (OUTPATIENT)
Dept: PULMONOLOGY | Age: 50
End: 2025-06-02
Payer: MEDICARE

## 2025-06-02 VITALS
HEIGHT: 74 IN | DIASTOLIC BLOOD PRESSURE: 85 MMHG | BODY MASS INDEX: 40.43 KG/M2 | WEIGHT: 315 LBS | OXYGEN SATURATION: 96 % | SYSTOLIC BLOOD PRESSURE: 122 MMHG | TEMPERATURE: 98.4 F

## 2025-06-02 DIAGNOSIS — E66.01 MORBID OBESITY (HCC): ICD-10-CM

## 2025-06-02 DIAGNOSIS — F17.210 CIGARETTE NICOTINE DEPENDENCE WITHOUT COMPLICATION: ICD-10-CM

## 2025-06-02 DIAGNOSIS — I47.10 SVT (SUPRAVENTRICULAR TACHYCARDIA): ICD-10-CM

## 2025-06-02 DIAGNOSIS — R06.02 SHORTNESS OF BREATH: ICD-10-CM

## 2025-06-02 DIAGNOSIS — G47.33 SEVERE OBSTRUCTIVE SLEEP APNEA: Primary | ICD-10-CM

## 2025-06-02 DIAGNOSIS — J98.4 RESTRICTIVE LUNG DISEASE: ICD-10-CM

## 2025-06-02 PROCEDURE — 3074F SYST BP LT 130 MM HG: CPT | Performed by: NURSE PRACTITIONER

## 2025-06-02 PROCEDURE — 99213 OFFICE O/P EST LOW 20 MIN: CPT | Performed by: NURSE PRACTITIONER

## 2025-06-02 PROCEDURE — 3079F DIAST BP 80-89 MM HG: CPT | Performed by: NURSE PRACTITIONER

## 2025-06-02 ASSESSMENT — ENCOUNTER SYMPTOMS
GASTROINTESTINAL NEGATIVE: 1
EYES NEGATIVE: 1
ALLERGIC/IMMUNOLOGIC NEGATIVE: 1
APNEA: 1
SHORTNESS OF BREATH: 1

## 2025-06-02 NOTE — PROGRESS NOTES
Pulmonary and Sleep Medicine    Mikey Fierro (:  1975) is a 50 y.o. male,Established patient, here for evaluation of the following chief complaint(s):  Follow-up (6 week f/u catia on cpap, dme report , reports doing better with CPAP since settings were changed)      Referring physician:  No referring provider defined for this encounter.     ASSESSMENT/PLAN:  1. Severe obstructive sleep apnea.  Apnea-hypopnea index of 122 events per hour on split-night sleep study done in May 2021  2. Shortness of breath  3. Morbid obesity (HCC)  4. SVT (supraventricular tachycardia)  5. Cigarette nicotine dependence without complication. Smokes 1ppd. Smoked for 40 years.  6. Restrictive lung disease        Continue management with CPAP as he feels it helps and he is compliant. We discussed need for stress test however he declines at this time. He says he was unable to tolerate the test before.      Smoking Cessation:    Smoking cessation discussed with the patient for 3.5 minutes. Discussion included harmful effects of smoking including increased risk of cancer and cardiovascular events. Smoking cessation strategies were also discussed including but limited to nicotine replacement and smoking cessation classes. The patient is not ready for smoking cessation.     The following smoking cessation strategy implemented:  None        Return in about 3 months (around 2025).    SUBJECTIVE/OBJECTIVE:        HPI    Patient presents for follow up for obstructive sleep apnea. He feels setting changes made last visit have helped significantly. He no longer feels short of breath with CPAP. My interpretation of compliance download is that he is using CPAP an average of 8 hours per night. Apnea-hypopnea index with CPAP is 0.4 events per hour. He feel sit helps significantly. PFT was completed and shows mild restrictive lung disease, unchanged from . At that time autoimmune and CTD work up was negative. He describes taking

## 2025-06-03 ENCOUNTER — TELEPHONE (OUTPATIENT)
Dept: UROLOGY | Age: 50
End: 2025-06-03

## 2025-06-03 NOTE — TELEPHONE ENCOUNTER
Contacted patient and let him know that due to needing a bladderscan here in office, he cannot be switched to a virtual for this visit. He stated he is unsure if he can find a ride. I let him know that if he was unable to give us a call and we can reschedule appointment if needed. Patient voiced understanding.

## 2025-06-04 DIAGNOSIS — F41.9 ANXIETY: ICD-10-CM

## 2025-06-04 DIAGNOSIS — I10 PRIMARY HYPERTENSION: ICD-10-CM

## 2025-06-04 RX ORDER — LISINOPRIL 40 MG/1
40 TABLET ORAL DAILY
Qty: 14 TABLET | Refills: 0 | Status: SHIPPED | OUTPATIENT
Start: 2025-06-04

## 2025-06-04 RX ORDER — COLESTIPOL HYDROCHLORIDE 1 G/1
1 TABLET ORAL 2 TIMES DAILY
Qty: 14 TABLET | Refills: 0 | Status: SHIPPED | OUTPATIENT
Start: 2025-06-04

## 2025-06-04 RX ORDER — BUSPIRONE HYDROCHLORIDE 30 MG/1
30 TABLET ORAL 2 TIMES DAILY
Qty: 14 TABLET | Refills: 0 | Status: SHIPPED | OUTPATIENT
Start: 2025-06-04

## 2025-06-10 ENCOUNTER — OFFICE VISIT (OUTPATIENT)
Dept: PRIMARY CARE CLINIC | Age: 50
End: 2025-06-10

## 2025-06-10 VITALS
WEIGHT: 315 LBS | OXYGEN SATURATION: 97 % | BODY MASS INDEX: 40.43 KG/M2 | HEART RATE: 98 BPM | HEIGHT: 74 IN | TEMPERATURE: 98.6 F | SYSTOLIC BLOOD PRESSURE: 130 MMHG | DIASTOLIC BLOOD PRESSURE: 86 MMHG

## 2025-06-10 DIAGNOSIS — Z87.891 PERSONAL HISTORY OF TOBACCO USE: ICD-10-CM

## 2025-06-10 DIAGNOSIS — Z00.00 MEDICARE ANNUAL WELLNESS VISIT, SUBSEQUENT: Primary | ICD-10-CM

## 2025-06-10 DIAGNOSIS — I10 PRIMARY HYPERTENSION: ICD-10-CM

## 2025-06-10 DIAGNOSIS — F41.9 ANXIETY: ICD-10-CM

## 2025-06-10 DIAGNOSIS — E78.1 HYPERTRIGLYCERIDEMIA: ICD-10-CM

## 2025-06-10 RX ORDER — BUSPIRONE HYDROCHLORIDE 30 MG/1
30 TABLET ORAL 2 TIMES DAILY
Qty: 60 TABLET | Refills: 2 | Status: SHIPPED | OUTPATIENT
Start: 2025-06-10

## 2025-06-10 RX ORDER — CLONIDINE HYDROCHLORIDE 0.1 MG/1
0.1 TABLET ORAL DAILY PRN
Qty: 30 TABLET | Refills: 0 | Status: SHIPPED | OUTPATIENT
Start: 2025-06-10

## 2025-06-10 RX ORDER — COLESTIPOL HYDROCHLORIDE 1 G/1
1 TABLET ORAL 2 TIMES DAILY
Qty: 30 TABLET | Refills: 3 | Status: CANCELLED | OUTPATIENT
Start: 2025-06-10

## 2025-06-10 RX ORDER — ALPRAZOLAM 1 MG/1
TABLET ORAL
COMMUNITY
Start: 2025-05-20

## 2025-06-10 RX ORDER — AMLODIPINE BESYLATE 5 MG/1
5 TABLET ORAL DAILY
Qty: 30 TABLET | Refills: 2 | Status: SHIPPED | OUTPATIENT
Start: 2025-06-10

## 2025-06-10 RX ORDER — ATORVASTATIN CALCIUM 80 MG/1
80 TABLET, FILM COATED ORAL NIGHTLY
Qty: 30 TABLET | Refills: 2 | Status: SHIPPED | OUTPATIENT
Start: 2025-06-10

## 2025-06-10 ASSESSMENT — LIFESTYLE VARIABLES
HOW OFTEN DO YOU HAVE A DRINK CONTAINING ALCOHOL: NEVER
HOW MANY STANDARD DRINKS CONTAINING ALCOHOL DO YOU HAVE ON A TYPICAL DAY: PATIENT DOES NOT DRINK

## 2025-06-10 NOTE — PROGRESS NOTES
Medicare Annual Wellness Visit    Mikey Fierro is here for Medicare AWV and Medication Refill    Assessment & Plan   Medicare annual wellness visit, subsequent  Anxiety  -     busPIRone (BUSPAR) 30 MG tablet; Take 30 mg by mouth 2 times daily, Disp-60 tablet, R-2Normal  Personal history of tobacco use  -     IL VISIT TO DISCUSS LUNG CA SCREEN W LDCT  Primary hypertension  -     amLODIPine (NORVASC) 5 MG tablet; Take 1 tablet by mouth daily, Disp-30 tablet, R-2Normal  -     cloNIDine (CATAPRES) 0.1 MG tablet; Take 1 tablet by mouth daily as needed for High Blood Pressure (160/90 or higher), Disp-30 tablet, R-0Normal  Hypertriglyceridemia  -     atorvastatin (LIPITOR) 80 MG tablet; Take 1 tablet by mouth nightly at bedtime., Disp-30 tablet, R-2Normal  -     Lipid Panel; Future       Return in 3 months (on 9/10/2025) for htn, hyperlipidmia.     Subjective   The following acute and/or chronic problems were also addressed today:  He has experienced several falls in the past month, including a significant incident 2 weeks ago where he felt knee weakness and dizziness, leading to a loss of consciousness. His wife had to shake him to wake him up. Since then, he has noticed a change in his voice and difficulty yelling at his dogs. He also reported 2 instances of losing balance on his porch, occurring a few weeks prior to the aforementioned incident. He initially attributed these episodes to clonidine-induced hypotension, but after taking clonidine today without experiencing dizziness, he is uncertain about this correlation. He reports no injuries from the falls. He maintains hydration with sweet tea and water.    His mood has been negatively affected due to marital issues, which he believes are his fault. He was previously under the care of a counselor but has since transitioned to psychiatric care, with appointments every 3 months. Despite 10 years of counseling, he feels his mental state remains unchanged. He

## 2025-06-11 RX ORDER — COLESTIPOL HYDROCHLORIDE 1 G/1
1 TABLET ORAL 2 TIMES DAILY
Qty: 14 TABLET | Refills: 0 | OUTPATIENT
Start: 2025-06-11

## 2025-06-16 DIAGNOSIS — I10 PRIMARY HYPERTENSION: ICD-10-CM

## 2025-06-16 RX ORDER — LISINOPRIL 40 MG/1
40 TABLET ORAL DAILY
Qty: 30 TABLET | Refills: 2 | Status: ON HOLD | OUTPATIENT
Start: 2025-06-16

## 2025-06-21 ENCOUNTER — HOSPITAL ENCOUNTER (INPATIENT)
Age: 50
LOS: 4 days | Discharge: NURSING, REHAB OR LONG-TERM CARE | DRG: 918 | End: 2025-06-25
Attending: EMERGENCY MEDICINE | Admitting: INTERNAL MEDICINE
Payer: MEDICARE

## 2025-06-21 DIAGNOSIS — R45.851 DEPRESSION WITH SUICIDAL IDEATION: ICD-10-CM

## 2025-06-21 DIAGNOSIS — F10.920 ACUTE ALCOHOLIC INTOXICATION WITHOUT COMPLICATION: ICD-10-CM

## 2025-06-21 DIAGNOSIS — T50.902A INTENTIONAL DRUG OVERDOSE, INITIAL ENCOUNTER (HCC): Primary | ICD-10-CM

## 2025-06-21 DIAGNOSIS — F32.A DEPRESSION WITH SUICIDAL IDEATION: ICD-10-CM

## 2025-06-21 PROBLEM — T42.4X2A BENZODIAZEPINE (TRANQUILIZER) OVERDOSE, INTENTIONAL SELF-HARM, INITIAL ENCOUNTER (HCC): Status: ACTIVE | Noted: 2025-06-21

## 2025-06-21 LAB
ALBUMIN SERPL-MCNC: 3.9 G/DL (ref 3.5–5.2)
ALP SERPL-CCNC: 129 U/L (ref 40–129)
ALT SERPL-CCNC: 54 U/L (ref 10–50)
AMPHET UR QL SCN: NEGATIVE
ANION GAP SERPL CALCULATED.3IONS-SCNC: 12 MMOL/L (ref 8–16)
APAP SERPL-MCNC: <5 UG/ML (ref 10–30)
AST SERPL-CCNC: 71 U/L (ref 10–50)
BARBITURATES UR QL SCN: NEGATIVE
BENZODIAZ UR QL SCN: POSITIVE
BILIRUB SERPL-MCNC: 0.5 MG/DL (ref 0.2–1.2)
BUN SERPL-MCNC: 6 MG/DL (ref 6–20)
BUPRENORPHINE URINE: NEGATIVE
CALCIUM SERPL-MCNC: 9.1 MG/DL (ref 8.6–10)
CANNABINOIDS UR QL SCN: NEGATIVE
CHLORIDE SERPL-SCNC: 99 MMOL/L (ref 98–107)
CO2 SERPL-SCNC: 21 MMOL/L (ref 22–29)
COCAINE UR QL SCN: NEGATIVE
CREAT SERPL-MCNC: 0.7 MG/DL (ref 0.7–1.2)
DRUG SCREEN COMMENT UR-IMP: ABNORMAL
ERYTHROCYTE [DISTWIDTH] IN BLOOD BY AUTOMATED COUNT: 12.5 % (ref 11.5–14.5)
ETHANOLAMINE SERPL-MCNC: 108 MG/DL (ref 0–11)
ETHANOLAMINE SERPL-MCNC: 69 MG/DL (ref 0–11)
FENTANYL SCREEN, URINE: NEGATIVE
GLUCOSE SERPL-MCNC: 92 MG/DL (ref 70–99)
HCT VFR BLD AUTO: 40.3 % (ref 42–52)
HGB BLD-MCNC: 14.3 G/DL (ref 14–18)
MCH RBC QN AUTO: 32.2 PG (ref 27–31)
MCHC RBC AUTO-ENTMCNC: 35.5 G/DL (ref 33–37)
MCV RBC AUTO: 90.8 FL (ref 80–94)
METHADONE UR QL SCN: NEGATIVE
METHAMPHETAMINE, URINE: NEGATIVE
OPIATES UR QL SCN: NEGATIVE
OXYCODONE UR QL SCN: NEGATIVE
PCP UR QL SCN: NEGATIVE
PLATELET # BLD AUTO: 191 K/UL (ref 130–400)
PMV BLD AUTO: 9 FL (ref 9.4–12.4)
POTASSIUM SERPL-SCNC: 4.4 MMOL/L (ref 3.5–5.1)
PROT SERPL-MCNC: 6.9 G/DL (ref 6.4–8.3)
RBC # BLD AUTO: 4.44 M/UL (ref 4.7–6.1)
SALICYLATES SERPL-MCNC: 0.5 MG/DL (ref 3–10)
SARS-COV-2 RDRP RESP QL NAA+PROBE: NOT DETECTED
SODIUM SERPL-SCNC: 132 MMOL/L (ref 136–145)
TRICYCLIC ANTIDEPRESSANTS, UR: NEGATIVE
WBC # BLD AUTO: 10.2 K/UL (ref 4.8–10.8)

## 2025-06-21 PROCEDURE — 80143 DRUG ASSAY ACETAMINOPHEN: CPT

## 2025-06-21 PROCEDURE — 6360000002 HC RX W HCPCS: Performed by: INTERNAL MEDICINE

## 2025-06-21 PROCEDURE — 2580000003 HC RX 258: Performed by: INTERNAL MEDICINE

## 2025-06-21 PROCEDURE — 87635 SARS-COV-2 COVID-19 AMP PRB: CPT

## 2025-06-21 PROCEDURE — 99285 EMERGENCY DEPT VISIT HI MDM: CPT

## 2025-06-21 PROCEDURE — 36415 COLL VENOUS BLD VENIPUNCTURE: CPT

## 2025-06-21 PROCEDURE — 80179 DRUG ASSAY SALICYLATE: CPT

## 2025-06-21 PROCEDURE — 85027 COMPLETE CBC AUTOMATED: CPT

## 2025-06-21 PROCEDURE — 93005 ELECTROCARDIOGRAM TRACING: CPT | Performed by: EMERGENCY MEDICINE

## 2025-06-21 PROCEDURE — 80053 COMPREHEN METABOLIC PANEL: CPT

## 2025-06-21 PROCEDURE — 51702 INSERT TEMP BLADDER CATH: CPT

## 2025-06-21 PROCEDURE — 2500000003 HC RX 250 WO HCPCS: Performed by: INTERNAL MEDICINE

## 2025-06-21 PROCEDURE — G0480 DRUG TEST DEF 1-7 CLASSES: HCPCS

## 2025-06-21 PROCEDURE — 2000000000 HC ICU R&B

## 2025-06-21 PROCEDURE — 80307 DRUG TEST PRSMV CHEM ANLYZR: CPT

## 2025-06-21 PROCEDURE — 82077 ASSAY SPEC XCP UR&BREATH IA: CPT

## 2025-06-21 RX ORDER — POTASSIUM CHLORIDE 7.45 MG/ML
10 INJECTION INTRAVENOUS PRN
Status: DISCONTINUED | OUTPATIENT
Start: 2025-06-21 | End: 2025-06-25 | Stop reason: HOSPADM

## 2025-06-21 RX ORDER — ACETAMINOPHEN 325 MG/1
650 TABLET ORAL EVERY 6 HOURS PRN
Status: DISCONTINUED | OUTPATIENT
Start: 2025-06-21 | End: 2025-06-25 | Stop reason: HOSPADM

## 2025-06-21 RX ORDER — SODIUM CHLORIDE 0.9 % (FLUSH) 0.9 %
10 SYRINGE (ML) INJECTION PRN
Status: DISCONTINUED | OUTPATIENT
Start: 2025-06-21 | End: 2025-06-25 | Stop reason: HOSPADM

## 2025-06-21 RX ORDER — MAGNESIUM SULFATE IN WATER 40 MG/ML
2000 INJECTION, SOLUTION INTRAVENOUS PRN
Status: DISCONTINUED | OUTPATIENT
Start: 2025-06-21 | End: 2025-06-25 | Stop reason: HOSPADM

## 2025-06-21 RX ORDER — SODIUM CHLORIDE 0.9 % (FLUSH) 0.9 %
10 SYRINGE (ML) INJECTION EVERY 12 HOURS SCHEDULED
Status: DISCONTINUED | OUTPATIENT
Start: 2025-06-21 | End: 2025-06-25 | Stop reason: HOSPADM

## 2025-06-21 RX ORDER — ENOXAPARIN SODIUM 100 MG/ML
60 INJECTION SUBCUTANEOUS 2 TIMES DAILY
Status: DISCONTINUED | OUTPATIENT
Start: 2025-06-21 | End: 2025-06-22

## 2025-06-21 RX ORDER — ACETAMINOPHEN 650 MG/1
650 SUPPOSITORY RECTAL EVERY 6 HOURS PRN
Status: DISCONTINUED | OUTPATIENT
Start: 2025-06-21 | End: 2025-06-25 | Stop reason: HOSPADM

## 2025-06-21 RX ORDER — ONDANSETRON 4 MG/1
4 TABLET, ORALLY DISINTEGRATING ORAL EVERY 8 HOURS PRN
Status: DISCONTINUED | OUTPATIENT
Start: 2025-06-21 | End: 2025-06-25 | Stop reason: HOSPADM

## 2025-06-21 RX ORDER — POTASSIUM CHLORIDE 1500 MG/1
40 TABLET, EXTENDED RELEASE ORAL PRN
Status: DISCONTINUED | OUTPATIENT
Start: 2025-06-21 | End: 2025-06-25 | Stop reason: HOSPADM

## 2025-06-21 RX ORDER — SODIUM CHLORIDE 9 MG/ML
INJECTION, SOLUTION INTRAVENOUS PRN
Status: DISCONTINUED | OUTPATIENT
Start: 2025-06-21 | End: 2025-06-25 | Stop reason: HOSPADM

## 2025-06-21 RX ORDER — SODIUM CHLORIDE, SODIUM LACTATE, POTASSIUM CHLORIDE, CALCIUM CHLORIDE 600; 310; 30; 20 MG/100ML; MG/100ML; MG/100ML; MG/100ML
INJECTION, SOLUTION INTRAVENOUS CONTINUOUS
Status: DISCONTINUED | OUTPATIENT
Start: 2025-06-21 | End: 2025-06-22

## 2025-06-21 RX ORDER — ONDANSETRON 2 MG/ML
4 INJECTION INTRAMUSCULAR; INTRAVENOUS EVERY 6 HOURS PRN
Status: DISCONTINUED | OUTPATIENT
Start: 2025-06-21 | End: 2025-06-25 | Stop reason: HOSPADM

## 2025-06-21 RX ORDER — ENOXAPARIN SODIUM 100 MG/ML
40 INJECTION SUBCUTANEOUS 2 TIMES DAILY
Status: DISCONTINUED | OUTPATIENT
Start: 2025-06-21 | End: 2025-06-21 | Stop reason: DRUGHIGH

## 2025-06-21 RX ADMIN — SODIUM CHLORIDE, PRESERVATIVE FREE 10 ML: 5 INJECTION INTRAVENOUS at 21:32

## 2025-06-21 RX ADMIN — SODIUM CHLORIDE, SODIUM LACTATE, POTASSIUM CHLORIDE, AND CALCIUM CHLORIDE: .6; .31; .03; .02 INJECTION, SOLUTION INTRAVENOUS at 19:02

## 2025-06-21 RX ADMIN — ENOXAPARIN SODIUM 60 MG: 100 INJECTION SUBCUTANEOUS at 21:32

## 2025-06-21 NOTE — PROGRESS NOTES
4 Eyes Skin Assessment     NAME:  Mikey Fierro  YOB: 1975  MEDICAL RECORD NUMBER:  [id number]    The patient is being assessed for  Admission    I agree that at least one RN has performed a thorough Head to Toe Skin Assessment on the patient. ALL assessment sites listed below have been assessed.      Areas assessed by both nurses:    Head, Face, Ears, Shoulders, Back, Chest, Arms, Elbows, Hands, Sacrum. Buttock, Coccyx, Ischium, and Legs. Feet and Heels        Does the Patient have a Wound? No noted wound(s)       Josh Prevention initiated by RN: No  Wound Care Orders initiated by RN: No    Pressure Injury (Stage 3,4, Unstageable, DTI, NWPT, and Complex wounds) if present, place Wound referral order by RN under : No    New Ostomies, if present place, Ostomy referral order under : No     Nurse 1 eSignature: Electronically signed by Shantal Strickland RN on 6/21/25 at 6:04 PM CDT    **SHARE this note so that the co-signing nurse can place an eSignature**    Nurse 2 eSignature: Electronically signed by Susan Benton RN on 6/21/25 at 6:19 PM CDT

## 2025-06-21 NOTE — PROGRESS NOTES
Pharmacy Automatic Dose Adjustment                Subcutaneous Anticoagulant for Prophylaxis    Mikey Fierro is a 50 y.o. male.     Recent Labs     06/21/25  1308   CREATININE 0.7       Estimated Creatinine Clearance: 204 mL/min (based on SCr of 0.7 mg/dL).    Weight:  Wt Readings from Last 1 Encounters:   06/21/25 (!) 171.9 kg (379 lb)           Pharmacy has adjusted subcutaneous anticoagulant for prophylaxis to Enoxaparin 60 mg SC twice daily based on the patient's weight and estimated CrCl per [facility] policy.               Electronically signed by Nitza Li RPH on 6/21/2025 at 5:24 PM     "    Established Patient        Chief Complaint:   Chief Complaint   Patient presents with   • Follow-up     HLD        Mahesh Guevara is a 72 y.o. male    History of Present Illness:   Here for scheduled follow-up visit concerning his known dyslipidemia patient denies any problems with his current medication regimen.  [facility], as well as refill prescriptions.    Subjective     The following portions of the patient's history were reviewed and updated as appropriate: allergies, current medications, past family history, past medical history, past social history, past surgical history and problem list.    No Known Allergies    Review of Systems  1. Constitutional: Negative for fever. Negative for chills, diaphoresis, fatigue and unexpected weight change.   2. HENT: No dysphagia; no changes to vision/hearing/smell/taste; no epistaxis  3. Eyes: Negative for redness and visual disturbance.   4. Respiratory: negative for shortness of breath. Negative for chest pain . Negative for cough and chest tightness.   5. Cardiovascular: Negative for chest pain and palpitations.   6. Gastrointestinal: Negative for abdominal distention, abdominal pain and blood in stool.   7. Endocrine: Negative for cold intolerance and heat intolerance.   8. Genitourinary: Negative for difficulty urinating, dysuria and frequency.   9. Musculoskeletal: Negative for arthralgias, back pain and myalgias.   10. Skin: Negative for color change, rash and wound.   11. Neurological: Negative for syncope, weakness and headaches.   12. Hematological: Negative for adenopathy. Does not bruise/bleed easily.   13. Psychiatric/Behavioral: Negative for confusion. The patient is not nervous/anxious.    Objective     Physical Exam   Vital Signs: /70   Pulse 77   Temp 98.6 °F (37 °C)   Ht 172.7 cm (68\")   Wt 79.4 kg (175 lb)   SpO2 96%   BMI 26.61 kg/m²     General Appearance: alert, oriented x " 3, no acute distress.  Skin: warm and dry.   HEENT: Atraumatic.  pupils round and reactive to light and accommodation, oral mucosa pink and moist.  Nares patent without epistaxis.  External auditory canals are patent tympanic membranes intact.  Neck: supple, no JVD, trachea midline.  No thyromegaly  Lungs: CTA, unlabored breathing effort.  Heart: RRR, normal S1 and S2, no S3, no rub.  Abdomen: soft, non-tender, no palpable bladder, present bowel sounds to auscultation ×4.  No guarding or rigidity.  Extremities: no clubbing, cyanosis or edema.  Good range of motion actively and passively.  Symmetric muscle strength and development  Neuro: normal speech and mental status.  Cranial nerves II through XII intact.  No anosmia. DTR 2+; proprioception intact.  No focal motor/sensory deficits.    Assessment and Plan      Assessment:   Mahesh was seen today for follow-up.    Diagnoses and all orders for this visit:    Dyslipidemia    BMI 26.0-26.9,adult    Screen for colon cancer  -     Cologuard - Stool, Per Rectum; Future        Plan:  Pt to have labs done at [facility], to include FLP/CMP.    Pt would like to undergo colon cancer screening; he elects to have cologuard; order placed.    Medicare Wellness visit at next visit.    Patient reports receiving his tetanus vaccine booster approximately 3 years ago.    Patient admits to pneumococcal vaccine received 3 years ago.    Patient has completed both shingles vaccinations approximately 1 year ago.  Discussion Summary:    Discussed plan of care in detail with pt today; pt verb understanding and agrees.  Follow up:  Return in about 6 months (around 5/14/2020) for Medicare Wellness.     There are no Patient Instructions on file for this visit.    Ghanshyam Darling,   11/14/19  11:05 PM      I confirm accuracy of unchanged data/findings which have been carried forward from previous visit, as well as I have updated appropriately those that have changed.        Please note that  portions of this note may have been completed with a voice recognition program. Efforts were made to edit the dictations, but occasionally words are mistranscribed.

## 2025-06-21 NOTE — PROGRESS NOTES
Mikey Fierro received from ER to room # 147 .  Mental Status: Patient is oriented and alert.   Vitals:    06/21/25 1731   BP: 101/66   Pulse: 88   Resp: 30   Temp:    SpO2: 94%     Placed on cardiac monitor: Yes. Bedside monitor.  Belongings: None location is not applicable .  Family at bedside No.  Oriented Patient to room.  Call light within reach. Yes.    Transfer was: Well tolerated by patient..    Electronically signed by Shantal Strickland RN on 6/21/2025 at 6:03 PM

## 2025-06-21 NOTE — ED PROVIDER NOTES
DYSRHYTHMIC FOCUS      ANUS SURGERY      APPENDECTOMY  02/03/2014    CHOLECYSTECTOMY  01/01/2009    COLONOSCOPY  01/01/2012        COLONOSCOPY  10/19/2017    Dr Beckwith, Benign HP, 5 yr recall    COLONOSCOPY  04/29/2009    [facility]    COLONOSCOPY N/A 05/02/2025    Dr Shay, (-) TA (-) dysplasia, (-) mirco colitis,  diminutive sigmoid colon polyp appears to have disintegrated post cautery, hemo clip, diminutive 4 mm diameter sessile flat prox transverse colon polyp was ablated, int hem Gr 1, repeat next week    CORONARY STENT PLACEMENT      ELECTROCONVULSIVE THERAPY N/A 03/08/2017    ELECTROCONVULSIVE THERAPY performed by Pratima Hernandez,  at [facility] OR    GALLBLADDER SURGERY      INGUINAL HERNIA REPAIR Right 01/01/1990    w/mesh    UMBILICAL HERNIA REPAIR  09/01/1990    VASECTOMY           CURRENT MEDICATIONS       Previous Medications    ALPRAZOLAM (XANAX) 1 MG TABLET    TAKE 1 TABLET BY MOUTH TWICE DAILY PLUS 1 EXTRA AS NEEDED.    AMLODIPINE (NORVASC) 5 MG TABLET    Take 1 tablet by mouth daily    ATORVASTATIN (LIPITOR) 80 MG TABLET    Take 1 tablet by mouth nightly at bedtime.    BUPROPION (WELLBUTRIN SR) 150 MG EXTENDED RELEASE TABLET    Take 1 tablet by mouth daily    BUSPIRONE (BUSPAR) 30 MG TABLET    Take 30 mg by mouth 2 times daily    CLONIDINE (CATAPRES) 0.1 MG TABLET    Take 1 tablet by mouth daily as needed for High Blood Pressure (160/90 or higher)    DOXEPIN (SINEQUAN) 10 MG CAPSULE    TAKE 1 CAPSULE BY MOUTH TWICE DAILY    HYDROCHLOROTHIAZIDE (HYDRODIURIL) 25 MG TABLET    Take 1 tablet by mouth every morning    HYDROCODONE-ACETAMINOPHEN (NORCO)  MG PER TABLET    Take 1 tablet by mouth every 8 hours as needed for Pain.     LISINOPRIL (PRINIVIL;ZESTRIL) 40 MG TABLET    TAKE 1 TABLET BY MOUTH DAILY    LOPERAMIDE (IMODIUM) 2 MG CAPSULE    Take 1 capsule by mouth as needed for Diarrhea    LURASIDONE (LATUDA) 40 MG TABS TABLET        METHOCARBAMOL (ROBAXIN) 500 MG TABLET    Take 1

## 2025-06-21 NOTE — H&P
[facility] Medicine H&P    Patient:  Mikey Fierro  MRN: [id number]    Consulting Physician: Mannie Parra DO  Reason for Consult: Medical Management  Primacy Care Physician: Linette Chaney APRN - CNP    HISTORY OF PRESENT ILLNESS:   The patient is a 50 y.o. male presents to the ER after an overdose of benzodiazepines.  This was intentional.  Unknown quantity; possibly 30+ tablets of Xanax 1 mg.  He also had alcohol onboard.  He is able to maintain his airway.  We will admit to ICU for further monitoring.    Past Medical History:        Diagnosis Date    Acute chest pain     Anxiety     Arthritis     Bipolar 1 disorder (HCC)     CAD in native artery 03/06/2018    Chronic back pain     COVID-19     Depression     Hyperlipidemia     Hypertension     IBS (irritable bowel syndrome)     Mild intermittent asthma without complication 03/30/2021    Morbidly obese (HCC)     Panic disorder     at times afraid to go outside    Sleep apnea     c pap    Unspecified sleep apnea     bipap       Past Surgical History:        Procedure Laterality Date    ABLATION OF DYSRHYTHMIC FOCUS      ANUS SURGERY      APPENDECTOMY  02/03/2014    CHOLECYSTECTOMY  01/01/2009    COLONOSCOPY  01/01/2012        COLONOSCOPY  10/19/2017    Dr Beckwith, Benign HP, 5 yr recall    COLONOSCOPY  04/29/2009    [facility]    COLONOSCOPY N/A 05/02/2025    Dr Shay, (-) TA (-) dysplasia, (-) mirco colitis,  diminutive sigmoid colon polyp appears to have disintegrated post cautery, hemo clip, diminutive 4 mm diameter sessile flat prox transverse colon polyp was ablated, int hem Gr 1, repeat next week    CORONARY STENT PLACEMENT      ELECTROCONVULSIVE THERAPY N/A 03/08/2017    ELECTROCONVULSIVE THERAPY performed by Pratima Hernandez DO at [facility] OR    GALLBLADDER SURGERY      INGUINAL HERNIA REPAIR Right 01/01/1990    w/mesh    UMBILICAL HERNIA REPAIR  09/01/1990    VASECTOMY         Medications: Scheduled Meds:  Continuous Infusions:  PRN

## 2025-06-22 LAB
ALBUMIN SERPL-MCNC: 3.7 G/DL (ref 3.5–5.2)
ALP SERPL-CCNC: 114 U/L (ref 40–129)
ALT SERPL-CCNC: 55 U/L (ref 10–50)
ANION GAP SERPL CALCULATED.3IONS-SCNC: 9 MMOL/L (ref 8–16)
AST SERPL-CCNC: 55 U/L (ref 10–50)
BASOPHILS # BLD: 0.1 K/UL (ref 0–0.2)
BASOPHILS NFR BLD: 0.7 % (ref 0–1)
BILIRUB SERPL-MCNC: 0.7 MG/DL (ref 0.2–1.2)
BUN SERPL-MCNC: 6 MG/DL (ref 6–20)
CALCIUM SERPL-MCNC: 8.9 MG/DL (ref 8.6–10)
CHLORIDE SERPL-SCNC: 100 MMOL/L (ref 98–107)
CO2 SERPL-SCNC: 27 MMOL/L (ref 22–29)
CREAT SERPL-MCNC: 0.8 MG/DL (ref 0.7–1.2)
EOSINOPHIL # BLD: 0.2 K/UL (ref 0–0.6)
EOSINOPHIL NFR BLD: 1.7 % (ref 0–5)
ERYTHROCYTE [DISTWIDTH] IN BLOOD BY AUTOMATED COUNT: 12.7 % (ref 11.5–14.5)
GLUCOSE SERPL-MCNC: 82 MG/DL (ref 70–99)
HCT VFR BLD AUTO: 41.1 % (ref 42–52)
HGB BLD-MCNC: 14 G/DL (ref 14–18)
IMM GRANULOCYTES # BLD: 0 K/UL
LYMPHOCYTES # BLD: 3.5 K/UL (ref 1.1–4.5)
LYMPHOCYTES NFR BLD: 33.8 % (ref 20–40)
MCH RBC QN AUTO: 31.8 PG (ref 27–31)
MCHC RBC AUTO-ENTMCNC: 34.1 G/DL (ref 33–37)
MCV RBC AUTO: 93.4 FL (ref 80–94)
MONOCYTES # BLD: 0.6 K/UL (ref 0–0.9)
MONOCYTES NFR BLD: 5.6 % (ref 0–10)
NEUTROPHILS # BLD: 5.9 K/UL (ref 1.5–7.5)
NEUTS SEG NFR BLD: 58 % (ref 50–65)
PHOSPHATE SERPL-MCNC: 3.9 MG/DL (ref 2.5–4.5)
PLATELET # BLD AUTO: 179 K/UL (ref 130–400)
PMV BLD AUTO: 9 FL (ref 9.4–12.4)
POTASSIUM SERPL-SCNC: 4.5 MMOL/L (ref 3.5–5)
PROT SERPL-MCNC: 6.4 G/DL (ref 6.4–8.3)
RBC # BLD AUTO: 4.4 M/UL (ref 4.7–6.1)
SODIUM SERPL-SCNC: 136 MMOL/L (ref 136–145)
WBC # BLD AUTO: 10.2 K/UL (ref 4.8–10.8)

## 2025-06-22 PROCEDURE — 99222 1ST HOSP IP/OBS MODERATE 55: CPT | Performed by: PSYCHIATRY & NEUROLOGY

## 2025-06-22 PROCEDURE — 94760 N-INVAS EAR/PLS OXIMETRY 1: CPT

## 2025-06-22 PROCEDURE — 1200000000 HC SEMI PRIVATE

## 2025-06-22 PROCEDURE — 6360000002 HC RX W HCPCS: Performed by: INTERNAL MEDICINE

## 2025-06-22 PROCEDURE — 6370000000 HC RX 637 (ALT 250 FOR IP): Performed by: HOSPITALIST

## 2025-06-22 PROCEDURE — 36415 COLL VENOUS BLD VENIPUNCTURE: CPT

## 2025-06-22 PROCEDURE — 84100 ASSAY OF PHOSPHORUS: CPT

## 2025-06-22 PROCEDURE — 6370000000 HC RX 637 (ALT 250 FOR IP): Performed by: INTERNAL MEDICINE

## 2025-06-22 PROCEDURE — 2500000003 HC RX 250 WO HCPCS: Performed by: INTERNAL MEDICINE

## 2025-06-22 PROCEDURE — 85025 COMPLETE CBC W/AUTO DIFF WBC: CPT

## 2025-06-22 PROCEDURE — 80053 COMPREHEN METABOLIC PANEL: CPT

## 2025-06-22 RX ORDER — NICOTINE 21 MG/24HR
1 PATCH, TRANSDERMAL 24 HOURS TRANSDERMAL DAILY
Status: DISCONTINUED | OUTPATIENT
Start: 2025-06-22 | End: 2025-06-25 | Stop reason: HOSPADM

## 2025-06-22 RX ORDER — LOPERAMIDE HYDROCHLORIDE 2 MG/1
2 CAPSULE ORAL NIGHTLY PRN
Status: DISCONTINUED | OUTPATIENT
Start: 2025-06-23 | End: 2025-06-23

## 2025-06-22 RX ORDER — LOPERAMIDE HYDROCHLORIDE 2 MG/1
2 CAPSULE ORAL ONCE
Status: COMPLETED | OUTPATIENT
Start: 2025-06-22 | End: 2025-06-22

## 2025-06-22 RX ORDER — ENOXAPARIN SODIUM 100 MG/ML
40 INJECTION SUBCUTANEOUS 2 TIMES DAILY
Status: DISCONTINUED | OUTPATIENT
Start: 2025-06-22 | End: 2025-06-25 | Stop reason: HOSPADM

## 2025-06-22 RX ADMIN — SODIUM CHLORIDE, PRESERVATIVE FREE 10 ML: 5 INJECTION INTRAVENOUS at 20:37

## 2025-06-22 RX ADMIN — LOPERAMIDE HYDROCHLORIDE 2 MG: 2 CAPSULE ORAL at 21:46

## 2025-06-22 RX ADMIN — ENOXAPARIN SODIUM 40 MG: 100 INJECTION SUBCUTANEOUS at 20:35

## 2025-06-22 RX ADMIN — ENOXAPARIN SODIUM 60 MG: 100 INJECTION SUBCUTANEOUS at 10:13

## 2025-06-22 NOTE — PLAN OF CARE
Problem: Self Harm/Suicidality  Goal: Will have no self-injury during hospital stay  Description: INTERVENTIONS:  1.  Ensure constant observer at bedside with Q15M safety checks  2.  Maintain a safe environment  3.  Secure patient belongings  4.  Ensure family/visitors adhere to safety recommendations  5.  Ensure safety tray has been added to patient's diet order  6.  Every shift and PRN: Re-assess suicidal risk via Frequent Screener    6/22/2025 1028 by Shantal Strickland, RN  Outcome: Progressing  6/22/2025 0024 by Rommel Izquierdo, RN  Outcome: Progressing     Problem: Discharge Planning  Goal: Discharge to home or other facility with appropriate resources  6/22/2025 1028 by Shantal Strickland RN  Outcome: Progressing  6/22/2025 0024 by Rommel Izquierdo, RN  Outcome: Progressing

## 2025-06-22 NOTE — PROGRESS NOTES
Poison control updated on patients condition. Spoke with Jo-Ann, she states to watch 4-6 hours after ingestion. If patient is hypotensive can watch until hypotension resolves and neuro status is back to baseline.

## 2025-06-22 NOTE — PLAN OF CARE
Problem: Self Harm/Suicidality  Goal: Will have no self-injury during hospital stay  Description: INTERVENTIONS:  1.  Ensure constant observer at bedside with Q15M safety checks  2.  Maintain a safe environment  3.  Secure patient belongings  4.  Ensure family/visitors adhere to safety recommendations  5.  Ensure safety tray has been added to patient's diet order  6.  Every shift and PRN: Re-assess suicidal risk via Frequent Screener    Outcome: Progressing     Problem: Discharge Planning  Goal: Discharge to home or other facility with appropriate resources  Outcome: Progressing

## 2025-06-22 NOTE — PROGRESS NOTES
Mikey Fierro transferred to 147 from [facility] via bed.    Reason for transfer: Lower level of care   Explained reason for transfer to Patient and Family.   Belongings: None location is not applicable .   Soft chart transferred with patient: Yes.  Telemetry box number N/A transferred with patient: N/A.  Report given to: Doretha GIVENS, via telephone.      Electronically signed by Shantal Strickland RN on 6/22/2025 at 1:49 PM

## 2025-06-22 NOTE — PROGRESS NOTES
Patient's wife Radha states she would like to speak with psychiatrist related to patient's behavioral health plan of care and be involved in the conversation as the patient's support system. This nurse notified Sandra on  unit that the patient's wife requested a phone call from the  provider. Wife also states she will be bringing the patient's cpap from home for him to use tonight. Electronically signed by Eva Srinivasan RN on 6/22/2025 at 3:27 PM

## 2025-06-22 NOTE — PROGRESS NOTES
Hospitalist Progress Note    Patient:  Mikey Fierro  YOB: 1975  Date of Service: 6/22/2025  MRN: [id number]   Acct: [id number]   Primary Care Physician: Linette Chaney APRN - CNP  Advance Directive: Full Code  Admit Date: 6/21/2025       Hospital Day: 1  Referring Provider: Mannie Parra DO    Patient Seen, Chart, Consults, Notes, Labs, Radiology studies reviewed.    Subjective:  Mikey Fierro is a 50 y.o. male  whom we are following for benzodiazepine overdose, suicidal ideation.  He did well overnight.  He is feeling much better.  Neurostatus is back to baseline.  He is stable for transfer out of ICU.    Allergies:  Albuterol and Dye [gadolinium derivatives]    Medicines:  Current Facility-Administered Medications   Medication Dose Route Frequency Provider Last Rate Last Admin    nicotine (NICODERM CQ) 21 MG/24HR 1 patch  1 patch TransDERmal Daily Mannie Parra DO   1 patch at 06/22/25 1014    sodium chloride flush 0.9 % injection 10 mL  10 mL IntraVENous 2 times per day Mannie Parra DO   10 mL at 06/21/25 2132    sodium chloride flush 0.9 % injection 10 mL  10 mL IntraVENous PRN Mannie Parra DO        0.9 % sodium chloride infusion   IntraVENous PRN Mannie Parra DO        potassium chloride (KLOR-CON M) extended release tablet 40 mEq  40 mEq Oral PRN Mannie Parra DO        Or    potassium bicarb-citric acid (EFFER-K) effervescent tablet 40 mEq  40 mEq Oral PRN Mannie Parra DO        Or    potassium chloride 10 mEq/100 mL IVPB (Peripheral Line)  10 mEq IntraVENous PRN Mannie Parra DO        magnesium sulfate 2000 mg in 50 mL IVPB premix  2,000 mg IntraVENous PRN Mannie Parra DO        ondansetron (ZOFRAN-ODT) disintegrating tablet 4 mg  4 mg Oral Q8H PRN Mannie Parra DO        Or    ondansetron (ZOFRAN) injection 4 mg  4 mg IntraVENous Q6H PRN Mannie Parra DO        magnesium hydroxide (MILK OF

## 2025-06-22 NOTE — CONSULTS
c pap    Unspecified sleep apnea     bipap       Family History:  Family History   Problem Relation Age of Onset    Diabetes Mother     Cancer Mother         uterine CA    Depression Mother     Mental Illness Mother     Hypertension Mother     Other Mother         blood clots    Alcohol Abuse Father     Heart Disease Brother     Depression Brother     Mental Illness Brother     Hypertension Brother     Breast Cancer Maternal Grandmother     Cancer Maternal Grandmother         breast CA    Heart Attack Maternal Grandmother     Other Maternal Grandmother         blood clots    Colon Cancer Neg Hx     Esophageal Cancer Neg Hx     Liver Cancer Neg Hx     Rectal Cancer Neg Hx     Stomach Cancer Neg Hx        Social History:  Patient is  and lives with his wife.  He is disabled.    Review of Systems: 14 point review of systems negative except as described above    Vitals:    06/22/25 1415   BP: 131/87   Pulse: 89   Resp: 20   Temp: 97.7 °F (36.5 °C)   SpO2: 96%       Mental Status Exam:   Appearance: Appropriately groomed and in hospital attire. Made good eye contact.  Gait not assessed.  No abnormal movements or tremor.  Behavior: Cooperative, drowsy  Speech: Slurred  Mood: \"tired\"   Affect: Mood congruent.   Thought Process: Appears linear  Thought Content: Denies active suicidal and homicidal ideation. No overt delusions or paranoia appreciated.   Perceptions: Denies auditory or visual hallucinations at present time. Not responding to internal stimuli.   Concentration: Intact.   Orientation: to person, place, date, and situation.   Language: Intact.   Fund of information: Intact.   Memory: Recent and remote appear intact.   Impulsivity: Questionable  Neurovegitative: Poor appetite and sleep.   Insight: Impaired.   Judgment: Impaired.    Assessment  DSM-5 DIAGNOSIS:  Impression   Mood disorder, unspecified  Status post suicide attempt  Insomnia unspecified  Alcohol use disorder  Tobacco use disorder  Coronary

## 2025-06-22 NOTE — PROGRESS NOTES
Pharmacy Automatic Dose Adjustment                Subcutaneous Anticoagulant for Prophylaxis    Mikey Fierro is a 50 y.o. male.     Recent Labs     06/21/25  1308 06/22/25  0134   CREATININE 0.7 0.8       Estimated Creatinine Clearance: 181 mL/min (based on SCr of 0.8 mg/dL).    Weight:  Wt Readings from Last 1 Encounters:   06/22/25 (!) 170.1 kg (375 lb)           Pharmacy has adjusted subcutaneous anticoagulant for prophylaxis to Enoxaparin 40 mg SC twice daily based on the patient's weight and estimated CrCl per [facility] policy.               Electronically signed by Nitza Li RPH on 6/22/2025 at 2:22 PM

## 2025-06-23 PROBLEM — T50.902A INTENTIONAL DRUG OVERDOSE (HCC): Status: ACTIVE | Noted: 2025-06-21

## 2025-06-23 LAB
EKG P AXIS: 53 DEGREES
EKG P-R INTERVAL: 148 MS
EKG Q-T INTERVAL: 356 MS
EKG QRS DURATION: 116 MS
EKG QTC CALCULATION (BAZETT): 415 MS
EKG T AXIS: 47 DEGREES

## 2025-06-23 PROCEDURE — 6370000000 HC RX 637 (ALT 250 FOR IP): Performed by: HOSPITALIST

## 2025-06-23 PROCEDURE — 99233 SBSQ HOSP IP/OBS HIGH 50: CPT | Performed by: PSYCHIATRY & NEUROLOGY

## 2025-06-23 PROCEDURE — 1200000000 HC SEMI PRIVATE

## 2025-06-23 PROCEDURE — 2500000003 HC RX 250 WO HCPCS: Performed by: INTERNAL MEDICINE

## 2025-06-23 PROCEDURE — 94760 N-INVAS EAR/PLS OXIMETRY 1: CPT

## 2025-06-23 PROCEDURE — 6370000000 HC RX 637 (ALT 250 FOR IP): Performed by: PSYCHIATRY & NEUROLOGY

## 2025-06-23 PROCEDURE — 6370000000 HC RX 637 (ALT 250 FOR IP): Performed by: INTERNAL MEDICINE

## 2025-06-23 PROCEDURE — 6360000002 HC RX W HCPCS: Performed by: INTERNAL MEDICINE

## 2025-06-23 RX ORDER — LOPERAMIDE HYDROCHLORIDE 2 MG/1
2 CAPSULE ORAL 4 TIMES DAILY PRN
Status: DISCONTINUED | OUTPATIENT
Start: 2025-06-23 | End: 2025-06-25 | Stop reason: HOSPADM

## 2025-06-23 RX ORDER — BUPROPION HYDROCHLORIDE 150 MG/1
300 TABLET ORAL DAILY
Status: DISCONTINUED | OUTPATIENT
Start: 2025-06-23 | End: 2025-06-25 | Stop reason: HOSPADM

## 2025-06-23 RX ORDER — LITHIUM CARBONATE 300 MG/1
600 CAPSULE ORAL 2 TIMES DAILY WITH MEALS
Status: DISCONTINUED | OUTPATIENT
Start: 2025-06-23 | End: 2025-06-25 | Stop reason: HOSPADM

## 2025-06-23 RX ADMIN — SODIUM CHLORIDE, PRESERVATIVE FREE 10 ML: 5 INJECTION INTRAVENOUS at 20:14

## 2025-06-23 RX ADMIN — ENOXAPARIN SODIUM 40 MG: 100 INJECTION SUBCUTANEOUS at 10:29

## 2025-06-23 RX ADMIN — BUPROPION HYDROCHLORIDE 300 MG: 150 TABLET, EXTENDED RELEASE ORAL at 12:56

## 2025-06-23 RX ADMIN — LITHIUM CARBONATE 600 MG: 300 CAPSULE, GELATIN COATED ORAL at 12:56

## 2025-06-23 RX ADMIN — LOPERAMIDE HYDROCHLORIDE 2 MG: 2 CAPSULE ORAL at 10:29

## 2025-06-23 RX ADMIN — DOXEPIN HYDROCHLORIDE 75 MG: 50 CAPSULE ORAL at 20:13

## 2025-06-23 RX ADMIN — LITHIUM CARBONATE 600 MG: 300 CAPSULE, GELATIN COATED ORAL at 18:31

## 2025-06-23 RX ADMIN — ENOXAPARIN SODIUM 40 MG: 100 INJECTION SUBCUTANEOUS at 20:14

## 2025-06-23 NOTE — PROGRESS NOTES
Progress Note    Date:2025       Room:0517/517-01  Patient Name:Mikey Fierro     YOB: 1975     Age:50 y.o.      Subjective    Subjective: 50-year-old male with a history of anxiety and depression, COPD not on home O2, coronary artery disease, sleep apnea, chronic pain, hypertension, who presented to the emergency room  with concerns of intentional overdose on Xanax.  On admission patient noted to be drowsy, vitals remained stable, was monitored in the ICU and subsequently transition to the medical smith.  Evaluated in-house by psychiatry and recommended holding Wellbutrin given high risk of withdrawal seizures.      Seen in house today with spouse present.  Denied any acute overnight event, was alert and conversant and answers questions appropriately.  Tolerated diet without any issues.  Awaiting psych reevaluation.       Review of Systems: 8 point system review negative except as stated above.    Objective         Vitals Last 24 Hours:  TEMPERATURE:  Temp  Av.6 °F (36.4 °C)  Min: 97.2 °F (36.2 °C)  Max: 98 °F (36.7 °C)  RESPIRATIONS RANGE: Resp  Av  Min: 16  Max: 20  PULSE OXIMETRY RANGE: SpO2  Av.9 %  Min: 94 %  Max: 97 %  PULSE RANGE: Pulse  Av.3  Min: 79  Max: 93  BLOOD PRESSURE RANGE: Systolic (24hrs), Av , Min:113 , Max:136   ; Diastolic (24hrs), Av, Min:69, Max:88    I/O (24Hr):    Intake/Output Summary (Last 24 hours) at 2025 1156  Last data filed at 2025 1300  Gross per 24 hour   Intake 240 ml   Output --   Net 240 ml         Physical Examination:  General: Well-developed, no acute distress lying comfortably in bed.  HEENT: Atraumatic normocephalic, range of motion normal, no JVD, no tracheal deviation noted.  Cardiac: Normal S1-S2 no murmurs rub or gallop.  Respiratory: clear To auscultation bilaterally, no rhonchi or rales, no wheezing  Abdomen: Soft, positive bowel sounds in all quadrants, no distention, nontender to palpation, no

## 2025-06-23 NOTE — CARE COORDINATION
Wife called [facility] and inquired about a referral to a dual treatment facility (substance abuse/mental health). SW advised PT wife call back Tuesday morning after 8a and ask for Tamy on the Behavioral Health floor.

## 2025-06-24 LAB
ALBUMIN SERPL-MCNC: 3.7 G/DL (ref 3.5–5.2)
ALP SERPL-CCNC: 118 U/L (ref 40–129)
ALT SERPL-CCNC: 39 U/L (ref 10–50)
ANION GAP SERPL CALCULATED.3IONS-SCNC: 8 MMOL/L (ref 8–16)
AST SERPL-CCNC: 35 U/L (ref 10–50)
BASOPHILS # BLD: 0 K/UL (ref 0–0.2)
BASOPHILS NFR BLD: 0.5 % (ref 0–1)
BILIRUB SERPL-MCNC: 0.5 MG/DL (ref 0.2–1.2)
BUN SERPL-MCNC: 6 MG/DL (ref 6–20)
CALCIUM SERPL-MCNC: 9.3 MG/DL (ref 8.6–10)
CHLORIDE SERPL-SCNC: 101 MMOL/L (ref 98–107)
CO2 SERPL-SCNC: 28 MMOL/L (ref 22–29)
CREAT SERPL-MCNC: 0.8 MG/DL (ref 0.7–1.2)
EOSINOPHIL # BLD: 0.1 K/UL (ref 0–0.6)
EOSINOPHIL NFR BLD: 1.5 % (ref 0–5)
ERYTHROCYTE [DISTWIDTH] IN BLOOD BY AUTOMATED COUNT: 12.4 % (ref 11.5–14.5)
GLUCOSE SERPL-MCNC: 93 MG/DL (ref 70–99)
HCT VFR BLD AUTO: 38.9 % (ref 42–52)
HGB BLD-MCNC: 13.4 G/DL (ref 14–18)
IMM GRANULOCYTES # BLD: 0 K/UL
LYMPHOCYTES # BLD: 3.2 K/UL (ref 1.1–4.5)
LYMPHOCYTES NFR BLD: 39.6 % (ref 20–40)
MCH RBC QN AUTO: 31.8 PG (ref 27–31)
MCHC RBC AUTO-ENTMCNC: 34.4 G/DL (ref 33–37)
MCV RBC AUTO: 92.2 FL (ref 80–94)
MONOCYTES # BLD: 0.5 K/UL (ref 0–0.9)
MONOCYTES NFR BLD: 5.6 % (ref 0–10)
NEUTROPHILS # BLD: 4.2 K/UL (ref 1.5–7.5)
NEUTS SEG NFR BLD: 52.7 % (ref 50–65)
PLATELET # BLD AUTO: 156 K/UL (ref 130–400)
PMV BLD AUTO: 9 FL (ref 9.4–12.4)
POTASSIUM SERPL-SCNC: 3.7 MMOL/L (ref 3.5–5)
PROT SERPL-MCNC: 6.4 G/DL (ref 6.4–8.3)
RBC # BLD AUTO: 4.22 M/UL (ref 4.7–6.1)
SODIUM SERPL-SCNC: 137 MMOL/L (ref 136–145)
WBC # BLD AUTO: 8 K/UL (ref 4.8–10.8)

## 2025-06-24 PROCEDURE — 6370000000 HC RX 637 (ALT 250 FOR IP): Performed by: HOSPITALIST

## 2025-06-24 PROCEDURE — 85025 COMPLETE CBC W/AUTO DIFF WBC: CPT

## 2025-06-24 PROCEDURE — 1200000000 HC SEMI PRIVATE

## 2025-06-24 PROCEDURE — 6370000000 HC RX 637 (ALT 250 FOR IP): Performed by: PSYCHIATRY & NEUROLOGY

## 2025-06-24 PROCEDURE — 94760 N-INVAS EAR/PLS OXIMETRY 1: CPT

## 2025-06-24 PROCEDURE — 99233 SBSQ HOSP IP/OBS HIGH 50: CPT | Performed by: PSYCHIATRY & NEUROLOGY

## 2025-06-24 PROCEDURE — 6370000000 HC RX 637 (ALT 250 FOR IP): Performed by: INTERNAL MEDICINE

## 2025-06-24 PROCEDURE — 36415 COLL VENOUS BLD VENIPUNCTURE: CPT

## 2025-06-24 PROCEDURE — 80053 COMPREHEN METABOLIC PANEL: CPT

## 2025-06-24 PROCEDURE — 6360000002 HC RX W HCPCS: Performed by: INTERNAL MEDICINE

## 2025-06-24 PROCEDURE — 2500000003 HC RX 250 WO HCPCS: Performed by: INTERNAL MEDICINE

## 2025-06-24 RX ADMIN — SODIUM CHLORIDE, PRESERVATIVE FREE 10 ML: 5 INJECTION INTRAVENOUS at 20:37

## 2025-06-24 RX ADMIN — LOPERAMIDE HYDROCHLORIDE 2 MG: 2 CAPSULE ORAL at 09:08

## 2025-06-24 RX ADMIN — ENOXAPARIN SODIUM 40 MG: 100 INJECTION SUBCUTANEOUS at 20:36

## 2025-06-24 RX ADMIN — LITHIUM CARBONATE 600 MG: 300 CAPSULE, GELATIN COATED ORAL at 17:35

## 2025-06-24 RX ADMIN — LITHIUM CARBONATE 600 MG: 300 CAPSULE, GELATIN COATED ORAL at 08:22

## 2025-06-24 RX ADMIN — ENOXAPARIN SODIUM 40 MG: 100 INJECTION SUBCUTANEOUS at 08:22

## 2025-06-24 RX ADMIN — DOXEPIN HYDROCHLORIDE 75 MG: 50 CAPSULE ORAL at 20:36

## 2025-06-24 RX ADMIN — SODIUM CHLORIDE, PRESERVATIVE FREE 10 ML: 5 INJECTION INTRAVENOUS at 08:25

## 2025-06-24 RX ADMIN — BUPROPION HYDROCHLORIDE 300 MG: 150 TABLET, EXTENDED RELEASE ORAL at 08:22

## 2025-06-24 NOTE — PROGRESS NOTES
Progress Note    Date:2025       Room:0517/517-01  Patient Name:Mikey Fierro     YOB: 1975     Age:50 y.o.      Subjective    Subjective: 50-year-old male with a history of anxiety and depression, COPD not on home O2, coronary artery disease, sleep apnea, chronic pain, hypertension, who presented to the emergency room  with concerns of intentional overdose on Xanax.  On admission patient noted to be drowsy, vitals remained stable, was monitored in the ICU and subsequently transition to the medical smith.  Evaluated in-house by psychiatry and recommended holding Wellbutrin given high risk of withdrawal seizures.      Seen in house today with spouse present.  Denied any acute overnight event, was alert and conversant and answers questions appropriately.  Tolerated diet without any issues.  Awaiting psych reevaluation.       Review of Systems: 8 point system review negative except as stated above.    Objective         Vitals Last 24 Hours:  TEMPERATURE:  Temp  Av.7 °F (36.5 °C)  Min: 97.3 °F (36.3 °C)  Max: 98.1 °F (36.7 °C)  RESPIRATIONS RANGE: Resp  Av  Min: 16  Max: 20  PULSE OXIMETRY RANGE: SpO2  Av.2 %  Min: 90 %  Max: 100 %  PULSE RANGE: Pulse  Av.5  Min: 72  Max: 94  BLOOD PRESSURE RANGE: Systolic (24hrs), Av , Min:123 , Max:144   ; Diastolic (24hrs), Av, Min:78, Max:91    I/O (24Hr):  No intake or output data in the 24 hours ending 25 1409        Physical Examination:  General: Well-developed, no acute distress lying comfortably in bed.  HEENT: Atraumatic normocephalic, range of motion normal, no JVD, no tracheal deviation noted.  Cardiac: Normal S1-S2 no murmurs rub or gallop.  Respiratory: clear To auscultation bilaterally, no rhonchi or rales, no wheezing  Abdomen: Soft, positive bowel sounds in all quadrants, no distention, nontender to palpation, no organomegaly noted, no rebound noted.    Extremities: no tenderness, no edema, moves all

## 2025-06-24 NOTE — CARE COORDINATION
MEET met with Pt and spoke w his spouse over the phone. Pt would prefer to try to find an outside location for rehab placement. Pt spouse stated she spoke [facility] and requested a referral to be sent there. Referral has been sent to the access center.     [phone]; option 1  Ask for Access Center  [email or web address]  [phone] fax number for clinical updates    [facility]  [address] [id number]    Electronically signed by Socrates Rahman on 6/24/2025 at 12:07 PM    [facility] is unable to accept Pt's insurance nor are they able to assist w a scholarship/funding for their program. MEET has reached out to [facility] to see if they are able to get Pt into their facility.     Electronically signed by Socrates Rahman on 6/24/2025 at 2:11 PM

## 2025-06-25 VITALS
RESPIRATION RATE: 20 BRPM | TEMPERATURE: 96.8 F | HEIGHT: 74 IN | DIASTOLIC BLOOD PRESSURE: 83 MMHG | WEIGHT: 315 LBS | HEART RATE: 77 BPM | SYSTOLIC BLOOD PRESSURE: 125 MMHG | BODY MASS INDEX: 40.43 KG/M2 | OXYGEN SATURATION: 98 %

## 2025-06-25 PROCEDURE — 6360000002 HC RX W HCPCS: Performed by: INTERNAL MEDICINE

## 2025-06-25 PROCEDURE — 6370000000 HC RX 637 (ALT 250 FOR IP): Performed by: HOSPITALIST

## 2025-06-25 PROCEDURE — 6370000000 HC RX 637 (ALT 250 FOR IP): Performed by: PSYCHIATRY & NEUROLOGY

## 2025-06-25 PROCEDURE — 6370000000 HC RX 637 (ALT 250 FOR IP): Performed by: INTERNAL MEDICINE

## 2025-06-25 PROCEDURE — 99233 SBSQ HOSP IP/OBS HIGH 50: CPT | Performed by: PSYCHIATRY & NEUROLOGY

## 2025-06-25 PROCEDURE — 2500000003 HC RX 250 WO HCPCS: Performed by: INTERNAL MEDICINE

## 2025-06-25 PROCEDURE — 94760 N-INVAS EAR/PLS OXIMETRY 1: CPT

## 2025-06-25 RX ORDER — DOXEPIN HYDROCHLORIDE 75 MG/1
75 CAPSULE ORAL NIGHTLY
Qty: 30 CAPSULE | Refills: 3 | Status: SHIPPED | OUTPATIENT
Start: 2025-06-25

## 2025-06-25 RX ORDER — LITHIUM CARBONATE 600 MG/1
600 CAPSULE ORAL 2 TIMES DAILY WITH MEALS
Qty: 60 CAPSULE | Refills: 3 | Status: SHIPPED | OUTPATIENT
Start: 2025-06-25

## 2025-06-25 RX ADMIN — LITHIUM CARBONATE 600 MG: 300 CAPSULE, GELATIN COATED ORAL at 08:42

## 2025-06-25 RX ADMIN — SODIUM CHLORIDE, PRESERVATIVE FREE 10 ML: 5 INJECTION INTRAVENOUS at 08:46

## 2025-06-25 RX ADMIN — LOPERAMIDE HYDROCHLORIDE 2 MG: 2 CAPSULE ORAL at 10:25

## 2025-06-25 RX ADMIN — BUPROPION HYDROCHLORIDE 300 MG: 150 TABLET, EXTENDED RELEASE ORAL at 08:42

## 2025-06-25 RX ADMIN — ENOXAPARIN SODIUM 40 MG: 100 INJECTION SUBCUTANEOUS at 08:42

## 2025-06-25 NOTE — PLAN OF CARE
Problem: Self Harm/Suicidality  Goal: Will have no self-injury during hospital stay  Description: INTERVENTIONS:  1.  Ensure constant observer at bedside with Q15M safety checks  2.  Maintain a safe environment  3.  Secure patient belongings  4.  Ensure family/visitors adhere to safety recommendations  5.  Ensure safety tray has been added to patient's diet order  6.  Every shift and PRN: Re-assess suicidal risk via Frequent Screener    6/24/2025 2336 by Shelia Bonilla RN  Outcome: Progressing  6/24/2025 1104 by Doretha Yang RN  Outcome: Progressing     Problem: Discharge Planning  Goal: Discharge to home or other facility with appropriate resources  6/24/2025 2336 by Shelia Bonilla RN  Outcome: Progressing  Flowsheets (Taken 6/24/2025 2036)  Discharge to home or other facility with appropriate resources: Identify barriers to discharge with patient and caregiver  6/24/2025 1104 by Doretha Yang RN  Outcome: Progressing  Flowsheets (Taken 6/24/2025 0822)  Discharge to home or other facility with appropriate resources:   Identify barriers to discharge with patient and caregiver   Arrange for needed discharge resources and transportation as appropriate   Identify discharge learning needs (meds, wound care, etc)     Problem: Seizure Precautions  Goal: Remains free of injury related to seizures activity  6/24/2025 2336 by Shelia Bonilla RN  Outcome: Progressing  6/24/2025 1104 by Doretha Yang RN  Outcome: Progressing     Problem: Safety - Adult  Goal: Free from fall injury  6/24/2025 2336 by Shelia Bonilla RN  Outcome: Progressing  6/24/2025 1104 by Doretha Yang RN  Outcome: Progressing

## 2025-06-25 NOTE — CARE COORDINATION
06/25/25 1523   IMM Letter   IMM Letter given to Patient/Family/Significant other/Guardian/POA/by: given to patient by zaina kirk   IMM Letter date given: 06/25/25   IMM Letter time given: 0320     Second IMM given to patient and explained with patient verbalizing understanding.  All questions and concerns addressed     Signed letter placed in pt soft chart  Patient declined waiting 4 hr period prior to discharge.  Electronically signed by Zaina Shane on 6/25/2025 at 3:23 PM

## 2025-06-25 NOTE — BH NOTE
Department of Psychiatry  Progress Note    Chief Complaint: Intentional overdose on benzodiazepines      SUBJECTIVE:    50 years old male with previous psychiatric history of bipolar disorder, depression and anxiety, who has been admitted to medical services secondary intentional overdose on unknown amount of Xanax.  Patient's UDS in ER was positive for benzodiazepines, his blood alcohol level 108.     Patient has been seen in his room with presence of one-to-one sitter, patient's wife, patient's daughter and patient's niece.  Patient was lying down on the bed and was wearing hospital attire.  Patient reported that his condition significantly improved during this hospital stay, stated that his mood is \"fine\" today.  He is compliant with currently prescribed medications and denies any side effects.  He endorses good appetite and improved quality of sleep with prescribed psychotropic medications, stated that he did not experience significant difficulties to fall asleep or stay asleep during the last night.  Patient denies significant affective symptomatology during the interview, denies depression, anxiety or psychotic symptoms.  He continues to endorse lower back pain, stating that pain is a major factor, which limits his activities and ambulation.  However, patient's wife stated that patient does not have significant difficulties to ambulate at home.  Patient denies current active suicidal and homicidal ideations, denies any plans.  He denies auditory and visual hallucinations.  Patient did not endorse any delusions or paranoid thoughts.    During the interview patient's niece was using inappropriate language and when I asked her to use proper language during the conversation she continues to be disrespectful and I excused myself from the room.    I discussed with patient's wife and patient daughter our treatment plan, informed them that today I do not plan any adjustment of psychotropic medications and will follow 
Department of Psychiatry  Progress Note    Chief Complaint: Intentional overdose on benzodiazepines      SUBJECTIVE:    50 years old male with previous psychiatric history of bipolar disorder, depression and anxiety, who has been admitted to medical services secondary intentional overdose on unknown amount of Xanax. Patient's UDS in ER was positive for benzodiazepines, his blood alcohol level 108.     Patient has been seen in his room with presence of the patient's wife.  Patient reported that his condition significantly improved since time of admission to the hospital, stated that his mood is \"good\" today.  He endorses good appetite and good quality of sleep during the last night with prescribed psychotropic medications, stated that he did not experience any difficulties to fall asleep and stay asleep and woke up rested well in the morning.  Patient is compliant with currently prescribed medications and denies any side effects.  He denies any affective symptomatology during the interview, denies feeling of depression, anxiety or psychotic symptoms.  Patient denies current active suicidal and homicidal ideations, denies any plans.  He denies auditory and visual hallucinations.  Patient did not endorse any delusions or paranoid thoughts.    Discussed with patient and his wife possibility of patient's transfer to our psychiatric unit to continue observation and psychotropic medication management, however, patient refused to be admitted to our psychiatric unit, stated that it would make him feel more anxious.    Also, patient's wife reported that requests for patient's transfer have been sent to [facility] and [facility] rehab facility.  However, patient refused to consider transfer to [facility] and agreed for transfer to [facility].    I discussed with  patient's request and was informed that there is no response from [facility] about patient's acceptance yet.   will try 
disease  Hyperlipidemia  Hypertension  Obesity  COPD  Sleep apnea  Chronic pain   Irritable bowel syndrome with constant diarrhea    Recommendations:  Will discontinue Latuda due to lack of the indications.  Will discontinuing Xanax, as benzodiazepines contraindicated for patients with sleep apnea due to worsening of the respiratory function.  Will start patient on lithium 600 mg twice a day for mood stabilization, chronic suicidal ideations and self injurious behavior.  Will start patient on doxepin 75 mg at bedtime for insomnia.  Will recommend to continue sitter for at least next 24 hours due to safety concern.    Psychiatry will reevaluate patient's condition tomorrow.    YUE OTT MD  6/23/2025 11:01 AM

## 2025-06-25 NOTE — PROGRESS NOTES
Progress Note    Date:2025       Room:0517/517-01  Patient Name:Mikey Fierro     YOB: 1975     Age:50 y.o.      Subjective    Subjective: 50-year-old male with a history of anxiety and depression, COPD not on home O2, coronary artery disease, sleep apnea, chronic pain, hypertension, who presented to the emergency room  with concerns of intentional overdose on Xanax.  On admission patient noted to be drowsy, vitals remained stable, was monitored in the ICU and subsequently transition to the medical smith.  Evaluated in-house by psychiatry and recommended holding Wellbutrin given high risk of withdrawal seizures.      Seen in house today, per documentation, refused IPP unit due to worsening anxiety. SW looking into [facility]. Per psych if no acceptance can discharge home. Denied any acute overnight event, was alert and conversant and answers questions appropriately.  Tolerated diet without any issues.        Review of Systems: 8 point system review negative except as stated above.    Objective         Vitals Last 24 Hours:  TEMPERATURE:  Temp  Av.5 °F (36.4 °C)  Min: 96.8 °F (36 °C)  Max: 98.1 °F (36.7 °C)  RESPIRATIONS RANGE: Resp  Av  Min: 18  Max: 20  PULSE OXIMETRY RANGE: SpO2  Av %  Min: 95 %  Max: 99 %  PULSE RANGE: Pulse  Av.5  Min: 77  Max: 91  BLOOD PRESSURE RANGE: Systolic (24hrs), Av , Min:112 , Max:130   ; Diastolic (24hrs), Av, Min:70, Max:83    I/O (24Hr):    Intake/Output Summary (Last 24 hours) at 2025 1223  Last data filed at 2025 2327  Gross per 24 hour   Intake 360 ml   Output --   Net 360 ml           Physical Examination:  General: Well-developed, no acute distress lying comfortably in bed.  HEENT: Atraumatic normocephalic, range of motion normal, no JVD, no tracheal deviation noted.  Cardiac: Normal S1-S2 no murmurs rub or gallop.  Respiratory: clear To auscultation bilaterally, no rhonchi or rales, no wheezing  Abdomen: Soft,

## 2025-06-25 NOTE — CARE COORDINATION
Sw called the transfer center to have them send a referral to [facility].  [facility]  P:[phone]   F:[phone]   Electronically signed by Mikala Shane on 6/25/2025 at 9:41 AM

## 2025-06-25 NOTE — DISCHARGE SUMMARY
Discharge Summary    Date:6/25/2025        Patient Name:Mikey Fierro     YOB: 1975     Age:50 y.o.    Admit Date:6/21/2025   Admission Condition:fair   Discharged Condition:stable  Discharge Date: 06/25/25       Discharge Diagnoses   Principal Problem:    Intentional drug overdose (HCC)  Resolved Problems:    * No resolved hospital problems. *      Hospital Stay   Narrative of Hospital Course:     50-year-old male with a history of anxiety and depression, COPD not on home O2, coronary artery disease, sleep apnea, chronic pain, hypertension, who presented to the emergency room 6/21/25 with concerns of intentional overdose on Xanax.  On admission patient noted to be drowsy, vitals remained stable, was monitored in the ICU and subsequently transition to the medical smith.  Evaluated in-house by psychiatry and recommended holding Wellbutrin given high risk of withdrawal seizures.  Refused IPP unit due to worsening anxiety. Accepted into [facility] for alcohol rehab. Cleared by psych for discharge to rehab. Patient Wellbutrin was resumed on discharge as no concerns for withdrawal in house. Medications adjusted by psychiatrist, discontinued Xanax. Latuda and started on Lithium and Doxepin.      Physical Examination:  General: Well-developed, no acute distress lying comfortably in bed.  HEENT: Atraumatic normocephalic, range of motion normal, no JVD, no tracheal deviation noted.  Cardiac: Normal S1-S2 no murmurs rub or gallop.  Respiratory: clear To auscultation bilaterally, no rhonchi or rales, no wheezing  Abdomen: Soft, positive bowel sounds in all quadrants, no distention, nontender to palpation, no organomegaly noted, no rebound noted.    Extremities: no tenderness, no edema, moves all extremities  Psych: Affect flat, good eye contact, behavioral normal. Anhedonia       Consultants:   IP CONSULT TO PSYCHIATRY    Time Spent on Discharge:  35 minutes were spent in patient examination, evaluation,

## 2025-06-25 NOTE — PROGRESS NOTES
CLINICAL PHARMACY NOTE: MEDS TO BEDS    Total # of Prescriptions Filled: 2   The following medications were delivered to the patient:  Current Discharge Medication List        START taking these medications    Details   lithium 600 MG capsule Take 1 capsule by mouth 2 times daily (with meals)  Qty: 60 capsule, Refills: 3           Doxepin 75 mg    Additional Documentation:    Patient wife picked up RX in pharmacy. Paid with card.

## 2025-06-25 NOTE — CARE COORDINATION
Pt got accepted at [facility]. [address].  Call for report: [phone]  [facility]  P:[phone]  F:[phone]  Electronically signed by Mikala Shane on 6/25/2025 at 2:24 PM

## 2025-06-25 NOTE — DISCHARGE INSTRUCTIONS
Keep follow up appointments  Take medications as directed  Return to ER or seek medical assistance for recurrent or worsening symptoms

## 2025-06-25 NOTE — DISCHARGE INSTR - DIET

## 2025-07-24 LAB
ANION GAP SERPL CALCULATED.3IONS-SCNC: 10 MMOL/L (ref 8–16)
BUN SERPL-MCNC: 12 MG/DL (ref 6–20)
CALCIUM SERPL-MCNC: 9.8 MG/DL (ref 8.6–10)
CHLORIDE SERPL-SCNC: 97 MMOL/L (ref 98–107)
CO2 SERPL-SCNC: 25 MMOL/L (ref 22–29)
CREAT SERPL-MCNC: 1.1 MG/DL (ref 0.7–1.2)
GLUCOSE SERPL-MCNC: 121 MG/DL (ref 70–99)
LITHIUM SERPL-MCNC: 1.51 MMOL/L (ref 0.6–1.2)
POTASSIUM SERPL-SCNC: 3.6 MMOL/L (ref 3.5–5.1)
SODIUM SERPL-SCNC: 132 MMOL/L (ref 136–145)
T4 FREE SERPL-MCNC: 1.23 NG/DL (ref 0.93–1.7)
TSH SERPL DL<=0.005 MIU/L-ACNC: 1.92 UIU/ML (ref 0.27–4.2)

## 2025-07-26 ENCOUNTER — APPOINTMENT (OUTPATIENT)
Dept: GENERAL RADIOLOGY | Age: 50
DRG: 640 | End: 2025-07-26
Payer: MEDICARE

## 2025-07-26 ENCOUNTER — HOSPITAL ENCOUNTER (INPATIENT)
Age: 50
LOS: 2 days | Discharge: HOME | DRG: 640 | End: 2025-07-28
Attending: EMERGENCY MEDICINE | Admitting: STUDENT IN AN ORGANIZED HEALTH CARE EDUCATION/TRAINING PROGRAM
Payer: MEDICARE

## 2025-07-26 ENCOUNTER — APPOINTMENT (OUTPATIENT)
Dept: CT IMAGING | Age: 50
DRG: 640 | End: 2025-07-26
Payer: MEDICARE

## 2025-07-26 DIAGNOSIS — R41.82 ALTERED MENTAL STATUS, UNSPECIFIED ALTERED MENTAL STATUS TYPE: Primary | ICD-10-CM

## 2025-07-26 DIAGNOSIS — E87.1 HYPONATREMIA: ICD-10-CM

## 2025-07-26 DIAGNOSIS — E87.6 HYPOKALEMIA: ICD-10-CM

## 2025-07-26 LAB
ALBUMIN SERPL-MCNC: 4.2 G/DL (ref 3.5–5.2)
ALP SERPL-CCNC: 144 U/L (ref 40–129)
ALT SERPL-CCNC: 39 U/L (ref 10–50)
AMPHET UR QL SCN: NEGATIVE
ANION GAP SERPL CALCULATED.3IONS-SCNC: 14 MMOL/L (ref 8–16)
APAP SERPL-MCNC: <5 UG/ML (ref 10–30)
AST SERPL-CCNC: 35 U/L (ref 10–50)
BARBITURATES UR QL SCN: NEGATIVE
BASOPHILS # BLD: 0.3 K/UL (ref 0–0.2)
BASOPHILS NFR BLD: 1 % (ref 0–1)
BENZODIAZ UR QL SCN: NEGATIVE
BILIRUB SERPL-MCNC: 0.9 MG/DL (ref 0.2–1.2)
BILIRUB UR QL STRIP: NEGATIVE
BUN SERPL-MCNC: 13 MG/DL (ref 6–20)
BUPRENORPHINE URINE: NEGATIVE
CALCIUM SERPL-MCNC: 9.4 MG/DL (ref 8.6–10)
CANNABINOIDS UR QL SCN: NEGATIVE
CHLORIDE SERPL-SCNC: 94 MMOL/L (ref 98–107)
CK SERPL-CCNC: 47 U/L (ref 39–308)
CLARITY UR: CLEAR
CO2 SERPL-SCNC: 21 MMOL/L (ref 22–29)
COCAINE UR QL SCN: NEGATIVE
COLOR UR: YELLOW
CREAT SERPL-MCNC: 1.1 MG/DL (ref 0.7–1.2)
DRUG SCREEN COMMENT UR-IMP: NORMAL
EOSINOPHIL # BLD: 0 K/UL (ref 0–0.6)
EOSINOPHIL NFR BLD: 0 % (ref 0–5)
ERYTHROCYTE [DISTWIDTH] IN BLOOD BY AUTOMATED COUNT: 12.9 % (ref 11.5–14.5)
ETHANOLAMINE SERPL-MCNC: <11 MG/DL (ref 0–11)
FENTANYL SCREEN, URINE: NEGATIVE
GLUCOSE BLD-MCNC: 229 MG/DL (ref 70–99)
GLUCOSE SERPL-MCNC: 155 MG/DL (ref 70–99)
GLUCOSE UR STRIP.AUTO-MCNC: NEGATIVE MG/DL
HCT VFR BLD AUTO: 42 % (ref 42–52)
HGB BLD-MCNC: 14.9 G/DL (ref 14–18)
HGB UR STRIP.AUTO-MCNC: NEGATIVE MG/L
IMM GRANULOCYTES # BLD: 0.2 K/UL
INR PPP: 1.04 (ref 0.88–1.18)
KETONES UR STRIP.AUTO-MCNC: NEGATIVE MG/DL
LACTATE BLDV-SCNC: 2.4 MMOL/L (ref 0.5–1.9)
LACTATE BLDV-SCNC: 5.7 MMOL/L (ref 0.5–1.9)
LEUKOCYTE ESTERASE UR QL STRIP.AUTO: NEGATIVE
LITHIUM SERPL-MCNC: 1.32 MMOL/L (ref 0.6–1.2)
LYMPHOCYTES # BLD: 11.3 K/UL (ref 1.1–4.5)
LYMPHOCYTES NFR BLD: 36 % (ref 20–40)
MAGNESIUM SERPL-MCNC: 2 MG/DL (ref 1.6–2.6)
MCH RBC QN AUTO: 31.9 PG (ref 27–31)
MCHC RBC AUTO-ENTMCNC: 35.5 G/DL (ref 33–37)
MCV RBC AUTO: 89.9 FL (ref 80–94)
METHADONE UR QL SCN: NEGATIVE
METHAMPHETAMINE, URINE: NEGATIVE
MONOCYTES # BLD: 1.8 K/UL (ref 0–0.9)
MONOCYTES NFR BLD: 6 % (ref 0–10)
NEUTROPHILS # BLD: 17.1 K/UL (ref 1.5–7.5)
NEUTS SEG NFR BLD: 56 % (ref 50–65)
NITRITE UR QL STRIP.AUTO: NEGATIVE
OPIATES UR QL SCN: NEGATIVE
OXYCODONE UR QL SCN: NEGATIVE
PCP UR QL SCN: NEGATIVE
PERFORMED ON: ABNORMAL
PH UR STRIP.AUTO: 6.5 [PH] (ref 5–8)
PLATELET # BLD AUTO: 313 K/UL (ref 130–400)
PLATELET SLIDE REVIEW: ADEQUATE
PMV BLD AUTO: 9.1 FL (ref 9.4–12.4)
POTASSIUM SERPL-SCNC: 2.8 MMOL/L (ref 3.5–5.1)
PROCALCITONIN: 0.07 NG/ML (ref 0–0.09)
PROT SERPL-MCNC: 7.5 G/DL (ref 6.4–8.3)
PROT UR STRIP.AUTO-MCNC: NEGATIVE MG/DL
PROTHROMBIN TIME: 13.6 SEC (ref 12–14.6)
RBC # BLD AUTO: 4.67 M/UL (ref 4.7–6.1)
SALICYLATES SERPL-MCNC: 0.5 MG/DL (ref 3–10)
SODIUM SERPL-SCNC: 129 MMOL/L (ref 136–145)
SP GR UR STRIP.AUTO: 1 (ref 1–1.03)
TRICYCLIC ANTIDEPRESSANTS, UR: NEGATIVE
TROPONIN, HIGH SENSITIVITY: 10 NG/L (ref 0–22)
UROBILINOGEN UR STRIP.AUTO-MCNC: 0.2 E.U./DL
VARIANT LYMPHS NFR BLD: 1 % (ref 0–8)
WBC # BLD AUTO: 30.5 K/UL (ref 4.8–10.8)

## 2025-07-26 PROCEDURE — G0480 DRUG TEST DEF 1-7 CLASSES: HCPCS

## 2025-07-26 PROCEDURE — 6360000002 HC RX W HCPCS: Performed by: EMERGENCY MEDICINE

## 2025-07-26 PROCEDURE — 84300 ASSAY OF URINE SODIUM: CPT

## 2025-07-26 PROCEDURE — 6370000000 HC RX 637 (ALT 250 FOR IP): Performed by: EMERGENCY MEDICINE

## 2025-07-26 PROCEDURE — 84484 ASSAY OF TROPONIN QUANT: CPT

## 2025-07-26 PROCEDURE — 81003 URINALYSIS AUTO W/O SCOPE: CPT

## 2025-07-26 PROCEDURE — 85025 COMPLETE CBC W/AUTO DIFF WBC: CPT

## 2025-07-26 PROCEDURE — 83935 ASSAY OF URINE OSMOLALITY: CPT

## 2025-07-26 PROCEDURE — 1200000000 HC SEMI PRIVATE

## 2025-07-26 PROCEDURE — 82077 ASSAY SPEC XCP UR&BREATH IA: CPT

## 2025-07-26 PROCEDURE — 6370000000 HC RX 637 (ALT 250 FOR IP): Performed by: STUDENT IN AN ORGANIZED HEALTH CARE EDUCATION/TRAINING PROGRAM

## 2025-07-26 PROCEDURE — 71045 X-RAY EXAM CHEST 1 VIEW: CPT

## 2025-07-26 PROCEDURE — 83735 ASSAY OF MAGNESIUM: CPT

## 2025-07-26 PROCEDURE — 6360000002 HC RX W HCPCS: Performed by: STUDENT IN AN ORGANIZED HEALTH CARE EDUCATION/TRAINING PROGRAM

## 2025-07-26 PROCEDURE — 84145 PROCALCITONIN (PCT): CPT

## 2025-07-26 PROCEDURE — 96375 TX/PRO/DX INJ NEW DRUG ADDON: CPT

## 2025-07-26 PROCEDURE — 82962 GLUCOSE BLOOD TEST: CPT

## 2025-07-26 PROCEDURE — 82550 ASSAY OF CK (CPK): CPT

## 2025-07-26 PROCEDURE — 80143 DRUG ASSAY ACETAMINOPHEN: CPT

## 2025-07-26 PROCEDURE — 80178 ASSAY OF LITHIUM: CPT

## 2025-07-26 PROCEDURE — 99285 EMERGENCY DEPT VISIT HI MDM: CPT

## 2025-07-26 PROCEDURE — 93005 ELECTROCARDIOGRAM TRACING: CPT | Performed by: EMERGENCY MEDICINE

## 2025-07-26 PROCEDURE — 80307 DRUG TEST PRSMV CHEM ANLYZR: CPT

## 2025-07-26 PROCEDURE — 96365 THER/PROPH/DIAG IV INF INIT: CPT

## 2025-07-26 PROCEDURE — 2580000003 HC RX 258: Performed by: STUDENT IN AN ORGANIZED HEALTH CARE EDUCATION/TRAINING PROGRAM

## 2025-07-26 PROCEDURE — 70450 CT HEAD/BRAIN W/O DYE: CPT

## 2025-07-26 PROCEDURE — 83605 ASSAY OF LACTIC ACID: CPT

## 2025-07-26 PROCEDURE — 80053 COMPREHEN METABOLIC PANEL: CPT

## 2025-07-26 PROCEDURE — 85610 PROTHROMBIN TIME: CPT

## 2025-07-26 PROCEDURE — 87040 BLOOD CULTURE FOR BACTERIA: CPT

## 2025-07-26 PROCEDURE — 80179 DRUG ASSAY SALICYLATE: CPT

## 2025-07-26 PROCEDURE — 36415 COLL VENOUS BLD VENIPUNCTURE: CPT

## 2025-07-26 PROCEDURE — 2580000003 HC RX 258: Performed by: EMERGENCY MEDICINE

## 2025-07-26 RX ORDER — SODIUM CHLORIDE 0.9 % (FLUSH) 0.9 %
5-40 SYRINGE (ML) INJECTION PRN
Status: DISCONTINUED | OUTPATIENT
Start: 2025-07-26 | End: 2025-07-28 | Stop reason: HOSPADM

## 2025-07-26 RX ORDER — POTASSIUM CHLORIDE 7.45 MG/ML
10 INJECTION INTRAVENOUS PRN
Status: DISCONTINUED | OUTPATIENT
Start: 2025-07-26 | End: 2025-07-28 | Stop reason: HOSPADM

## 2025-07-26 RX ORDER — ACETAMINOPHEN 325 MG/1
650 TABLET ORAL EVERY 6 HOURS PRN
Status: DISCONTINUED | OUTPATIENT
Start: 2025-07-26 | End: 2025-07-28 | Stop reason: HOSPADM

## 2025-07-26 RX ORDER — BISACODYL 10 MG
10 SUPPOSITORY, RECTAL RECTAL DAILY PRN
Status: DISCONTINUED | OUTPATIENT
Start: 2025-07-26 | End: 2025-07-28 | Stop reason: HOSPADM

## 2025-07-26 RX ORDER — POLYETHYLENE GLYCOL 3350 17 G/17G
17 POWDER, FOR SOLUTION ORAL DAILY PRN
Status: DISCONTINUED | OUTPATIENT
Start: 2025-07-26 | End: 2025-07-28 | Stop reason: HOSPADM

## 2025-07-26 RX ORDER — ACETAMINOPHEN 650 MG/1
650 SUPPOSITORY RECTAL EVERY 6 HOURS PRN
Status: DISCONTINUED | OUTPATIENT
Start: 2025-07-26 | End: 2025-07-28 | Stop reason: HOSPADM

## 2025-07-26 RX ORDER — SODIUM CHLORIDE 9 MG/ML
INJECTION, SOLUTION INTRAVENOUS PRN
Status: DISCONTINUED | OUTPATIENT
Start: 2025-07-26 | End: 2025-07-28 | Stop reason: HOSPADM

## 2025-07-26 RX ORDER — ONDANSETRON 2 MG/ML
4 INJECTION INTRAMUSCULAR; INTRAVENOUS EVERY 6 HOURS PRN
Status: DISCONTINUED | OUTPATIENT
Start: 2025-07-26 | End: 2025-07-28 | Stop reason: HOSPADM

## 2025-07-26 RX ORDER — MAGNESIUM SULFATE IN WATER 40 MG/ML
2000 INJECTION, SOLUTION INTRAVENOUS PRN
Status: DISCONTINUED | OUTPATIENT
Start: 2025-07-26 | End: 2025-07-28 | Stop reason: HOSPADM

## 2025-07-26 RX ORDER — DEXTROSE MONOHYDRATE 100 MG/ML
INJECTION, SOLUTION INTRAVENOUS CONTINUOUS PRN
Status: DISCONTINUED | OUTPATIENT
Start: 2025-07-26 | End: 2025-07-28 | Stop reason: HOSPADM

## 2025-07-26 RX ORDER — ASPIRIN 81 MG/1
81 TABLET, CHEWABLE ORAL DAILY
COMMUNITY

## 2025-07-26 RX ORDER — FAMOTIDINE 20 MG/1
20 TABLET, FILM COATED ORAL 2 TIMES DAILY
Status: DISCONTINUED | OUTPATIENT
Start: 2025-07-26 | End: 2025-07-28 | Stop reason: HOSPADM

## 2025-07-26 RX ORDER — SODIUM CHLORIDE 9 MG/ML
INJECTION, SOLUTION INTRAVENOUS CONTINUOUS
Status: DISCONTINUED | OUTPATIENT
Start: 2025-07-26 | End: 2025-07-27

## 2025-07-26 RX ORDER — SODIUM CHLORIDE 0.9 % (FLUSH) 0.9 %
5-40 SYRINGE (ML) INJECTION EVERY 12 HOURS SCHEDULED
Status: DISCONTINUED | OUTPATIENT
Start: 2025-07-26 | End: 2025-07-28 | Stop reason: HOSPADM

## 2025-07-26 RX ORDER — LOPERAMIDE HYDROCHLORIDE 2 MG/1
2 CAPSULE ORAL 4 TIMES DAILY PRN
Status: DISCONTINUED | OUTPATIENT
Start: 2025-07-26 | End: 2025-07-28 | Stop reason: HOSPADM

## 2025-07-26 RX ORDER — GLUCAGON 1 MG/ML
1 KIT INJECTION PRN
Status: DISCONTINUED | OUTPATIENT
Start: 2025-07-26 | End: 2025-07-28 | Stop reason: HOSPADM

## 2025-07-26 RX ORDER — INSULIN LISPRO 100 [IU]/ML
0-4 INJECTION, SOLUTION INTRAVENOUS; SUBCUTANEOUS
Status: DISCONTINUED | OUTPATIENT
Start: 2025-07-27 | End: 2025-07-28 | Stop reason: HOSPADM

## 2025-07-26 RX ORDER — POTASSIUM CHLORIDE 7.45 MG/ML
10 INJECTION INTRAVENOUS ONCE
Status: COMPLETED | OUTPATIENT
Start: 2025-07-26 | End: 2025-07-26

## 2025-07-26 RX ORDER — ONDANSETRON 4 MG/1
4 TABLET, ORALLY DISINTEGRATING ORAL EVERY 8 HOURS PRN
Status: DISCONTINUED | OUTPATIENT
Start: 2025-07-26 | End: 2025-07-28 | Stop reason: HOSPADM

## 2025-07-26 RX ORDER — BUSPIRONE HYDROCHLORIDE 30 MG/1
30 TABLET ORAL 2 TIMES DAILY
COMMUNITY

## 2025-07-26 RX ORDER — POTASSIUM CHLORIDE 7.45 MG/ML
10 INJECTION INTRAVENOUS
Status: COMPLETED | OUTPATIENT
Start: 2025-07-27 | End: 2025-07-27

## 2025-07-26 RX ORDER — LITHIUM CARBONATE 300 MG
300 TABLET ORAL 3 TIMES DAILY
COMMUNITY

## 2025-07-26 RX ORDER — 0.9 % SODIUM CHLORIDE 0.9 %
1000 INTRAVENOUS SOLUTION INTRAVENOUS ONCE
Status: COMPLETED | OUTPATIENT
Start: 2025-07-26 | End: 2025-07-26

## 2025-07-26 RX ORDER — ENOXAPARIN SODIUM 100 MG/ML
40 INJECTION SUBCUTANEOUS 2 TIMES DAILY
Status: DISCONTINUED | OUTPATIENT
Start: 2025-07-26 | End: 2025-07-28 | Stop reason: HOSPADM

## 2025-07-26 RX ORDER — POTASSIUM CHLORIDE 1500 MG/1
40 TABLET, EXTENDED RELEASE ORAL ONCE
Status: COMPLETED | OUTPATIENT
Start: 2025-07-26 | End: 2025-07-26

## 2025-07-26 RX ORDER — POTASSIUM CHLORIDE 1500 MG/1
40 TABLET, EXTENDED RELEASE ORAL PRN
Status: DISCONTINUED | OUTPATIENT
Start: 2025-07-26 | End: 2025-07-28 | Stop reason: HOSPADM

## 2025-07-26 RX ORDER — LORAZEPAM 2 MG/ML
2 INJECTION INTRAMUSCULAR ONCE
Status: COMPLETED | OUTPATIENT
Start: 2025-07-26 | End: 2025-07-26

## 2025-07-26 RX ADMIN — POTASSIUM CHLORIDE 10 MEQ: 7.46 INJECTION, SOLUTION INTRAVENOUS at 20:34

## 2025-07-26 RX ADMIN — Medication 2 MG: at 20:27

## 2025-07-26 RX ADMIN — FAMOTIDINE 20 MG: 20 TABLET, FILM COATED ORAL at 23:47

## 2025-07-26 RX ADMIN — ENOXAPARIN SODIUM 40 MG: 100 INJECTION SUBCUTANEOUS at 23:48

## 2025-07-26 RX ADMIN — VANCOMYCIN HYDROCHLORIDE 2500 MG: 5 INJECTION, POWDER, LYOPHILIZED, FOR SOLUTION INTRAVENOUS at 20:59

## 2025-07-26 RX ADMIN — LOPERAMIDE HYDROCHLORIDE 2 MG: 2 CAPSULE ORAL at 23:47

## 2025-07-26 RX ADMIN — SODIUM CHLORIDE 1000 ML: 0.9 INJECTION, SOLUTION INTRAVENOUS at 20:03

## 2025-07-26 RX ADMIN — SODIUM CHLORIDE 1000 ML: 0.9 INJECTION, SOLUTION INTRAVENOUS at 21:34

## 2025-07-26 RX ADMIN — PREDNISONE 50 MG: 20 TABLET ORAL at 21:37

## 2025-07-26 RX ADMIN — CEFEPIME 2000 MG: 2 INJECTION, POWDER, FOR SOLUTION INTRAVENOUS at 20:13

## 2025-07-26 RX ADMIN — SODIUM CHLORIDE: 0.9 INJECTION, SOLUTION INTRAVENOUS at 23:45

## 2025-07-26 RX ADMIN — POTASSIUM CHLORIDE 40 MEQ: 1500 TABLET, EXTENDED RELEASE ORAL at 21:37

## 2025-07-27 PROBLEM — R73.9 HYPERGLYCEMIA: Status: ACTIVE | Noted: 2025-07-27

## 2025-07-27 PROBLEM — E78.5 HYPERLIPIDEMIA: Status: ACTIVE | Noted: 2025-06-10

## 2025-07-27 PROBLEM — E87.6 HYPOKALEMIA: Status: ACTIVE | Noted: 2025-07-27

## 2025-07-27 PROBLEM — E87.1 HYPONATREMIA: Status: ACTIVE | Noted: 2025-07-27

## 2025-07-27 LAB
ANION GAP SERPL CALCULATED.3IONS-SCNC: 10 MMOL/L (ref 8–16)
BASOPHILS # BLD: 0 K/UL (ref 0–0.2)
BASOPHILS NFR BLD: 0.2 % (ref 0–1)
BUN SERPL-MCNC: 14 MG/DL (ref 6–20)
CALCIUM SERPL-MCNC: 8.7 MG/DL (ref 8.6–10)
CHLORIDE SERPL-SCNC: 104 MMOL/L (ref 98–107)
CO2 SERPL-SCNC: 19 MMOL/L (ref 22–29)
CREAT SERPL-MCNC: 0.9 MG/DL (ref 0.7–1.2)
EOSINOPHIL # BLD: 0 K/UL (ref 0–0.6)
EOSINOPHIL NFR BLD: 0 % (ref 0–5)
ERYTHROCYTE [DISTWIDTH] IN BLOOD BY AUTOMATED COUNT: 12.8 % (ref 11.5–14.5)
GLUCOSE BLD-MCNC: 118 MG/DL (ref 70–99)
GLUCOSE BLD-MCNC: 120 MG/DL (ref 70–99)
GLUCOSE BLD-MCNC: 126 MG/DL (ref 70–99)
GLUCOSE BLD-MCNC: 133 MG/DL (ref 70–99)
GLUCOSE BLD-MCNC: 152 MG/DL (ref 70–99)
GLUCOSE SERPL-MCNC: 130 MG/DL (ref 70–99)
HBA1C MFR BLD: 5.4 % (ref 4–5.6)
HCT VFR BLD AUTO: 38.2 % (ref 42–52)
HGB BLD-MCNC: 13.3 G/DL (ref 14–18)
IMM GRANULOCYTES # BLD: 0.1 K/UL
LYMPHOCYTES # BLD: 1.9 K/UL (ref 1.1–4.5)
LYMPHOCYTES NFR BLD: 10.9 % (ref 20–40)
MCH RBC QN AUTO: 31.9 PG (ref 27–31)
MCHC RBC AUTO-ENTMCNC: 34.8 G/DL (ref 33–37)
MCV RBC AUTO: 91.6 FL (ref 80–94)
MONOCYTES # BLD: 0.2 K/UL (ref 0–0.9)
MONOCYTES NFR BLD: 1.2 % (ref 0–10)
NEUTROPHILS # BLD: 15.5 K/UL (ref 1.5–7.5)
NEUTS SEG NFR BLD: 87.2 % (ref 50–65)
OSMOLALITY SERPL CALC.SUM OF ELEC: 283 MOSM/KG (ref 275–300)
PERFORMED ON: ABNORMAL
PLATELET # BLD AUTO: 206 K/UL (ref 130–400)
PMV BLD AUTO: 8.9 FL (ref 9.4–12.4)
POTASSIUM SERPL-SCNC: 4.6 MMOL/L (ref 3.5–5)
RBC # BLD AUTO: 4.17 M/UL (ref 4.7–6.1)
SODIUM SERPL-SCNC: 133 MMOL/L (ref 136–145)
TSH SERPL DL<=0.005 MIU/L-ACNC: 1.45 UIU/ML (ref 0.27–4.2)
WBC # BLD AUTO: 17.8 K/UL (ref 4.8–10.8)

## 2025-07-27 PROCEDURE — 36415 COLL VENOUS BLD VENIPUNCTURE: CPT

## 2025-07-27 PROCEDURE — 80048 BASIC METABOLIC PNL TOTAL CA: CPT

## 2025-07-27 PROCEDURE — 83930 ASSAY OF BLOOD OSMOLALITY: CPT

## 2025-07-27 PROCEDURE — 82962 GLUCOSE BLOOD TEST: CPT

## 2025-07-27 PROCEDURE — 83036 HEMOGLOBIN GLYCOSYLATED A1C: CPT

## 2025-07-27 PROCEDURE — 85025 COMPLETE CBC W/AUTO DIFF WBC: CPT

## 2025-07-27 PROCEDURE — 94760 N-INVAS EAR/PLS OXIMETRY 1: CPT

## 2025-07-27 PROCEDURE — 6370000000 HC RX 637 (ALT 250 FOR IP): Performed by: STUDENT IN AN ORGANIZED HEALTH CARE EDUCATION/TRAINING PROGRAM

## 2025-07-27 PROCEDURE — 6360000002 HC RX W HCPCS: Performed by: STUDENT IN AN ORGANIZED HEALTH CARE EDUCATION/TRAINING PROGRAM

## 2025-07-27 PROCEDURE — 2580000003 HC RX 258: Performed by: STUDENT IN AN ORGANIZED HEALTH CARE EDUCATION/TRAINING PROGRAM

## 2025-07-27 PROCEDURE — 84443 ASSAY THYROID STIM HORMONE: CPT

## 2025-07-27 PROCEDURE — 99223 1ST HOSP IP/OBS HIGH 75: CPT | Performed by: PSYCHIATRY & NEUROLOGY

## 2025-07-27 PROCEDURE — 1200000000 HC SEMI PRIVATE

## 2025-07-27 PROCEDURE — 6370000000 HC RX 637 (ALT 250 FOR IP)

## 2025-07-27 RX ORDER — LISINOPRIL 20 MG/1
40 TABLET ORAL DAILY
Status: DISCONTINUED | OUTPATIENT
Start: 2025-07-27 | End: 2025-07-28 | Stop reason: HOSPADM

## 2025-07-27 RX ORDER — NICOTINE 21 MG/24HR
1 PATCH, TRANSDERMAL 24 HOURS TRANSDERMAL DAILY
Status: DISCONTINUED | OUTPATIENT
Start: 2025-07-27 | End: 2025-07-28 | Stop reason: HOSPADM

## 2025-07-27 RX ORDER — CLONIDINE HYDROCHLORIDE 0.1 MG/1
0.1 TABLET ORAL DAILY PRN
Status: DISCONTINUED | OUTPATIENT
Start: 2025-07-27 | End: 2025-07-28 | Stop reason: HOSPADM

## 2025-07-27 RX ORDER — PREDNISONE 50 MG/1
50 TABLET ORAL 3 TIMES DAILY
Status: DISCONTINUED | OUTPATIENT
Start: 2025-07-27 | End: 2025-07-27 | Stop reason: SDUPTHER

## 2025-07-27 RX ORDER — BUSPIRONE HYDROCHLORIDE 15 MG/1
30 TABLET ORAL 2 TIMES DAILY
Status: DISCONTINUED | OUTPATIENT
Start: 2025-07-27 | End: 2025-07-28 | Stop reason: HOSPADM

## 2025-07-27 RX ORDER — HYDROCHLOROTHIAZIDE 25 MG/1
25 TABLET ORAL EVERY MORNING
Status: DISCONTINUED | OUTPATIENT
Start: 2025-07-27 | End: 2025-07-28 | Stop reason: HOSPADM

## 2025-07-27 RX ORDER — PREDNISONE 50 MG/1
50 TABLET ORAL
Status: COMPLETED | OUTPATIENT
Start: 2025-07-28 | End: 2025-07-28

## 2025-07-27 RX ORDER — PREDNISONE 50 MG/1
50 TABLET ORAL ONCE
Status: COMPLETED | OUTPATIENT
Start: 2025-07-27 | End: 2025-07-27

## 2025-07-27 RX ORDER — ASPIRIN 81 MG/1
81 TABLET, CHEWABLE ORAL DAILY
Status: DISCONTINUED | OUTPATIENT
Start: 2025-07-27 | End: 2025-07-28 | Stop reason: HOSPADM

## 2025-07-27 RX ORDER — LITHIUM CARBONATE 300 MG/1
300 CAPSULE ORAL
Status: DISCONTINUED | OUTPATIENT
Start: 2025-07-27 | End: 2025-07-28 | Stop reason: HOSPADM

## 2025-07-27 RX ORDER — ATORVASTATIN CALCIUM 80 MG/1
80 TABLET, FILM COATED ORAL NIGHTLY
Status: DISCONTINUED | OUTPATIENT
Start: 2025-07-27 | End: 2025-07-28 | Stop reason: HOSPADM

## 2025-07-27 RX ADMIN — SODIUM CHLORIDE: 0.9 INJECTION, SOLUTION INTRAVENOUS at 05:54

## 2025-07-27 RX ADMIN — ASPIRIN 81 MG: 81 TABLET, CHEWABLE ORAL at 08:25

## 2025-07-27 RX ADMIN — LISINOPRIL 40 MG: 20 TABLET ORAL at 08:24

## 2025-07-27 RX ADMIN — ENOXAPARIN SODIUM 40 MG: 100 INJECTION SUBCUTANEOUS at 20:06

## 2025-07-27 RX ADMIN — ATORVASTATIN CALCIUM 80 MG: 80 TABLET, FILM COATED ORAL at 20:05

## 2025-07-27 RX ADMIN — POTASSIUM CHLORIDE 10 MEQ: 7.46 INJECTION, SOLUTION INTRAVENOUS at 02:56

## 2025-07-27 RX ADMIN — FAMOTIDINE 20 MG: 20 TABLET, FILM COATED ORAL at 20:39

## 2025-07-27 RX ADMIN — BUSPIRONE HYDROCHLORIDE 30 MG: 15 TABLET ORAL at 20:05

## 2025-07-27 RX ADMIN — ATORVASTATIN CALCIUM 80 MG: 80 TABLET, FILM COATED ORAL at 01:35

## 2025-07-27 RX ADMIN — BUSPIRONE HYDROCHLORIDE 30 MG: 15 TABLET ORAL at 08:25

## 2025-07-27 RX ADMIN — POTASSIUM CHLORIDE 10 MEQ: 7.46 INJECTION, SOLUTION INTRAVENOUS at 02:05

## 2025-07-27 RX ADMIN — ENOXAPARIN SODIUM 40 MG: 100 INJECTION SUBCUTANEOUS at 08:25

## 2025-07-27 RX ADMIN — POTASSIUM CHLORIDE 10 MEQ: 7.46 INJECTION, SOLUTION INTRAVENOUS at 00:03

## 2025-07-27 RX ADMIN — LOPERAMIDE HYDROCHLORIDE 2 MG: 2 CAPSULE ORAL at 08:26

## 2025-07-27 RX ADMIN — LITHIUM CARBONATE 300 MG: 300 CAPSULE, GELATIN COATED ORAL at 08:25

## 2025-07-27 RX ADMIN — LITHIUM CARBONATE 300 MG: 300 CAPSULE, GELATIN COATED ORAL at 17:25

## 2025-07-27 RX ADMIN — DOXEPIN HYDROCHLORIDE 75 MG: 50 CAPSULE ORAL at 20:48

## 2025-07-27 RX ADMIN — POTASSIUM CHLORIDE 10 MEQ: 7.46 INJECTION, SOLUTION INTRAVENOUS at 01:06

## 2025-07-27 RX ADMIN — PREDNISONE 50 MG: 50 TABLET ORAL at 23:10

## 2025-07-27 RX ADMIN — FAMOTIDINE 20 MG: 20 TABLET, FILM COATED ORAL at 08:24

## 2025-07-27 RX ADMIN — LITHIUM CARBONATE 300 MG: 300 CAPSULE, GELATIN COATED ORAL at 12:44

## 2025-07-27 RX ADMIN — BUSPIRONE HYDROCHLORIDE 30 MG: 15 TABLET ORAL at 01:35

## 2025-07-27 NOTE — CONSULTS
[facility] Neurology Consultation/Initial H&P    PATIENT PRIVACY NOTICE     The 21st Century Cures Act makes medical notes like these available to patients in the interest of transparency. Please be advised that this is a medical document. Medical documents are intended to carry relevant information and the clinical opinion of the practitioner. The medical note is intended as medical provider to provider communication, and may appear blunt or direct. It is written in medical language, and may contain abbreviations or verbiage that are unfamiliar.      MRN: [id number]  Patient Name: Mikey Fierro  Gender: male    Attending Neurologist:  Mitch Ames M.D.      REASON FOR CONSULT:  Altered mentation    History of Present Illness:  Mikey Fierro is a 50 y.o. male with a PMHx of anxiety, bipolar disorder, CAD, depression, hyperlipidemia, HTN, IBS and recent hospitalizations for suicide attempt at the end of June with benzodiazepine overdose.  Since then he has been admitted to a psychiatric facility and released on July 8.  He has been off of benzodiazepines and had been started on lithium and Seroquel.  He had toxicity and lithium dose had been reduced and Seroquel have been stopped.  He presents to ED for further evaluation of altered mental status. wife at bedside assists with history. She states that he is not responding appropriately, could not answer simple questions, could not put his seatbelt on.  He appeared to be easily startled.   The patient himself has recollection of most of this but does also have some blank gaps. Patient was able to follow commands initially in the ED however was not speaking.  The patient was given 2 mg of ativan IV in the ED with resolution of his symptoms.   Symptoms have not recurred.         In ED: CXR normal chest radiograph; CT head visualized and unremarkable. UDS negative; UA unremarkable.  Of note, WBC 30.5, H&H 14.9/42, lithium level 1.32, lactic acid 5.7>>2.4, potassium 2.8,

## 2025-07-27 NOTE — CODE DOCUMENTATION
Rapid response called  Patient tele showed vtach rate of 190-202 no chest pain / patient confused with agitation  Glucose 91  /107  Hr 190  Sat % 100 on 2L   Temp 98.4  Resp 18  Stat EKG per resp suha duong  House supervisor barbara at bedside

## 2025-07-27 NOTE — PROGRESS NOTES
4 Eyes Skin Assessment     NAME:  Mikey Fierro  YOB: 1975  MEDICAL RECORD NUMBER:  [id number]    The patient is being assessed for  Admission    I agree that at least one RN has performed a thorough Head to Toe Skin Assessment on the patient. ALL assessment sites listed below have been assessed.      Areas assessed by both nurses:    Head, Face, Ears, Shoulders, Back, Chest, Arms, Elbows, Hands, Sacrum. Buttock, Coccyx, Ischium, Legs. Feet and Heels, and Under Medical Devices         Does the Patient have a Wound? No noted wound(s)       Josh Prevention initiated by RN: No  Wound Care Orders initiated by RN: No    For hospital-acquired stage 1 & 2 and ALL Stage 3,4, Unstageable, DTI, NWPT, and Complex wounds: place order “IP Wound Care/Ostomy Nurse Eval and Treat” by RN under : No    New Ostomies, if present place, Ostomy referral order under : No     Nurse 1 eSignature: Electronically signed by Cary Parker RN on 7/27/25 at 12:32 AM CDT    **SHARE this note so that the co-signing nurse can place an eSignature**    Nurse 2 eSignature: Electronically signed by Irma Cantor RN on 7/27/25 at 5:16 AM CDT

## 2025-07-27 NOTE — PROGRESS NOTES
Physical Therapy  RN states pt is up ad natasha and ind in room and does not require skilled pt services in this setting  Electronically signed by Amy Bautista PT on 7/27/2025 at 1:55 PM

## 2025-07-27 NOTE — H&P
[facility]      Hospitalist - History & Physical      PCP: Linette Chaney APRN - CNP    Date of Admission: 7/26/2025    Date of Service: 7/26/2025    Chief Complaint:  Altered mental status    History Of Present Illness:   The patient is a 50 y.o. male with anxiety, bipolar disorder, CAD, depression, hyperlipidemia, HTN, IBS comes to ED for further evaluation of altered mental status.  Patient is present with wife at bedside and states that they were in the car cooking dinner when she reminded patient if disabled on any others unable to.  Wife had assist patient putting in seatbelt on.  She states that he is not responding appropriately.  He was unable to follow commands, appeared confused, was easily startled and patient decided to bring him here for evaluation. Patient was able to follow commands initially in the ED however was not speaking. According to the ED provider report he almost seemed catatonic. Patient was noted to have myoclonic jerking movement as well as unequal pupils and was given 2 mg of ativan IV in the ED with resolution of his symptoms. Patient is awake and alert but unsure of the events of what happened that prompted him coming to the ED. Patient does state he remembers being in the car and hearing his wife talk but he was unable to answer. He does not remember anything after his arrival to the ED. Of note, patient has significant psychiatric history including a recent admission to inpatient psych facility for xanax overdose. He did have medication changes as a result of that and is now on lithium and was on seroquel until 2 days ago when it was recommended that medication be stopped.    In ED: CXR normal chest radiograph; CT head with no acute intracranial abnormality, involutional changes of the brain and suspected sequela of microvascular disease; UDS negative; UA unremarkable for UTI; blood culture pending; WBC 30.5, H&H 14.9/42, lithium level 1.32, lactic acid 5.7>>2.4, potassium  the brain and suspected sequelae of microvascular disease.   _________________________________________  All CT scans are performed using dose optimization techniques as appropriate to the performed exam and include at least one of the following: Automated exposure control, adjustment of the mA and/or kV according to size, and the use of iterative reconstruction technique.  ______________________________________ Electronically signed by: SANAM NEWELL M.D. Date:     07/26/2025 Time:    19:33       Assessment/Plan:  Principal Problem:    AMS (altered mental status)  Active Problems:    Depression    Hypertension    Hyperlipidemia    Hyperglycemia    Hypokalemia    Hyponatremia  Resolved Problems:    * No resolved hospital problems. *   Principal Problem:    AMS (altered mental status)   - Consult to neurology   - MRI brain w/wo contrast   - EEG   - Daily weights   - Strict I/O   - PT/OT eval and treat   - Telemetry   - CBC w/ diff, BMP w/ reflex to mag in AM   - TSH with reflex to t4   - IVF NS @ 125 cc/hr    Active Problems:    Depression   - Continue buspar, lithium       Hypertension   - Continue lisinopril      Hyperlipidemia   - Continue ASA, atorvastatin      Hyperglycemia   - POCT glucose AC/HS   - Low-dose SSI   - Hypoglycemia treatment protocol      Hypokalemia   - 40 mEq IV potassium ordered   - Recheck potassium levels   - Potassium replacement protocol      Hyponatremia   - Serum osmolality   - Urine sodium, osmo    Resolved Problems:    * No resolved hospital problems. *    DVT prophylaxis: Lovenox 40 mg SQ BID  GI prophylaxis: Pepcid    Signed:  SANDY Elizabeth - CNP, 7/26/2025 11:56 PM       EMR Dragon/Transcription disclaimer:   Much of this encounter note is an electronic transcription/translation of spoken language to printed text. The electronic translation of spoken language may permit erroneous, or at times, nonsensical words or phrases to be inadvertently transcribed; although attempts

## 2025-07-27 NOTE — ED PROVIDER NOTES
[facility] 5 SURG SERVICES  EMERGENCY DEPARTMENT ENCOUNTER      Pt Name: Mikey Fierro  MRN: [id number]  Birthdate 1975  Date of evaluation: 7/26/2025  Provider: Valarie Valenzuela DO  4:53 AM    CHIEF COMPLAINT       Chief Complaint   Patient presents with    Altered Mental Status         HISTORY OF PRESENT ILLNESS        This is a 50-year-old male with a history of hypertension, hyperlipidemia, bipolar disorder, panic disorder, and sleep apnea presenting to the emergency department with wife secondary to altered mental status.  The wife reports that the patient was recently released from an inpatient psychiatric institution after an overdose on Xanax.  He has been home for the last couple of weeks on lithium and has been doing well.  Today, they were getting ready to go eat at a restaurant and she noticed that he was not responding appropriately.  He appeared confused, jittery, and had to be told what to do as if he was a child.  Now, he is not speaking but following commands.  Upon ED arrival, the patient appears paranoid but follows commands without any focal deficits.    The history is provided by the spouse. The history is limited by the condition of the patient.       Nursing Notes were reviewed.    REVIEW OF SYSTEMS       Review of Systems   Unable to perform ROS: Mental status change       Except as noted above the remainder of the review of systems was reviewed and negative.       PAST MEDICAL HISTORY     Past Medical History:   Diagnosis Date    Acute chest pain     Anxiety     Arthritis     Bipolar 1 disorder (HCC)     CAD in native artery 03/06/2018    Chronic back pain     COVID-19     Depression     Hyperlipidemia     Hypertension     IBS (irritable bowel syndrome)     Mild intermittent asthma without complication 03/30/2021    Morbidly obese (HCC)     Panic disorder     at times afraid to go outside    Sleep apnea     c pap    Unspecified sleep apnea     bipap         SURGICAL HISTORY       Past Surgical

## 2025-07-27 NOTE — ED NOTES
ED TO INPATIENT SBAR HANDOFF    Patient Name: Mikey Fierro   : 1975  50 y.o.   Family/Caregiver Present: Yes  Code Status Order: Full Code    C-SSRS:    Sitter No  Restraints:         Situation  Chief Complaint:   Chief Complaint   Patient presents with    Altered Mental Status     Patient Diagnosis: AMS (altered mental status) [R41.82]     Brief Description of Patient's Condition: Per MD \"50-year-old male with a history of hypertension, hyperlipidemia, bipolar disorder, panic disorder, and sleep apnea presented to the emergency department with wife secondary to altered mental status. The wife reports that the patient was recently released from an inpatient psychiatric institution after an overdose on Xanax. He has been home for the last couple weeks on lithium and has been doing well. Today, they were getting ready to go eat at a restaurant and she noticed that he was not responding appropriately. He appeared confused, jittery, and had to be told what to do as if he was a child. Now, he is not speaking but following commands. Upon ED arrival, the patient appears paranoid but follows commands without any focal deficits.\"    Pt alert and oriented at this time. Upon arrival to ED pt was alert but unable to follow commands or answer questions. Pt was given Ativan and became alert. Pt initial lactic was 5.7. pt was given 2L NS in ER and repeat lactic is in process. Pt potassium was 2.8, pt was given 10meq of IV potassium and 40 PO. Pt tolerated PO medications without difficulty. Pts Lithium level was elevated at 1.32. Pt has also been given Cefepime, Vancomycin, 50 mg PO of Prednisone. Bilateral 18g to AC's. Pt follows commands at this time and able to answer questions.     Mental Status: oriented, alert, coherent, logical, thought processes intact, and able to concentrate and follow conversation  Arrived from: home    Imaging:   XR CHEST PORTABLE   Final Result   1.  Normal chest radiograph.

## 2025-07-27 NOTE — ED NOTES
In and out cath performed by denice GIVENS to obtain a urine sample. Sterile technique maintained. Pt tolerated well. This RN and Paula GIVENS as chaperone.

## 2025-07-28 ENCOUNTER — APPOINTMENT (OUTPATIENT)
Dept: MRI IMAGING | Age: 50
DRG: 640 | End: 2025-07-28
Payer: MEDICARE

## 2025-07-28 VITALS
RESPIRATION RATE: 20 BRPM | OXYGEN SATURATION: 99 % | WEIGHT: 315 LBS | SYSTOLIC BLOOD PRESSURE: 123 MMHG | HEIGHT: 74 IN | HEART RATE: 80 BPM | BODY MASS INDEX: 40.43 KG/M2 | DIASTOLIC BLOOD PRESSURE: 79 MMHG | TEMPERATURE: 99 F

## 2025-07-28 LAB
ANION GAP SERPL CALCULATED.3IONS-SCNC: 8 MMOL/L (ref 8–16)
BASOPHILS # BLD: 0.1 K/UL (ref 0–0.2)
BASOPHILS NFR BLD: 0.4 % (ref 0–1)
BUN SERPL-MCNC: 10 MG/DL (ref 6–20)
CALCIUM SERPL-MCNC: 9.3 MG/DL (ref 8.6–10)
CHLORIDE SERPL-SCNC: 105 MMOL/L (ref 98–107)
CO2 SERPL-SCNC: 22 MMOL/L (ref 22–29)
CREAT SERPL-MCNC: 0.8 MG/DL (ref 0.7–1.2)
EOSINOPHIL # BLD: 0.1 K/UL (ref 0–0.6)
EOSINOPHIL NFR BLD: 0.4 % (ref 0–5)
ERYTHROCYTE [DISTWIDTH] IN BLOOD BY AUTOMATED COUNT: 13.1 % (ref 11.5–14.5)
GLUCOSE BLD-MCNC: 135 MG/DL (ref 70–99)
GLUCOSE BLD-MCNC: 152 MG/DL (ref 70–99)
GLUCOSE SERPL-MCNC: 135 MG/DL (ref 70–99)
HCT VFR BLD AUTO: 37.9 % (ref 42–52)
HGB BLD-MCNC: 12.8 G/DL (ref 14–18)
IMM GRANULOCYTES # BLD: 0 K/UL
LYMPHOCYTES # BLD: 2.7 K/UL (ref 1.1–4.5)
LYMPHOCYTES NFR BLD: 19.6 % (ref 20–40)
MCH RBC QN AUTO: 32 PG (ref 27–31)
MCHC RBC AUTO-ENTMCNC: 33.8 G/DL (ref 33–37)
MCV RBC AUTO: 94.8 FL (ref 80–94)
MONOCYTES # BLD: 0.3 K/UL (ref 0–0.9)
MONOCYTES NFR BLD: 2.3 % (ref 0–10)
NEUTROPHILS # BLD: 10.6 K/UL (ref 1.5–7.5)
NEUTS SEG NFR BLD: 77 % (ref 50–65)
OSMOLALITY UR: 155 MOSM/KG (ref 250–1200)
PERFORMED ON: ABNORMAL
PERFORMED ON: ABNORMAL
PLATELET # BLD AUTO: 200 K/UL (ref 130–400)
PMV BLD AUTO: 9.1 FL (ref 9.4–12.4)
POTASSIUM SERPL-SCNC: 4.2 MMOL/L (ref 3.5–5)
RBC # BLD AUTO: 4 M/UL (ref 4.7–6.1)
SODIUM SERPL-SCNC: 135 MMOL/L (ref 136–145)
SODIUM UR-SCNC: 32 MMOL/L
WBC # BLD AUTO: 13.8 K/UL (ref 4.8–10.8)

## 2025-07-28 PROCEDURE — 6370000000 HC RX 637 (ALT 250 FOR IP)

## 2025-07-28 PROCEDURE — 6360000004 HC RX CONTRAST MEDICATION: Performed by: EMERGENCY MEDICINE

## 2025-07-28 PROCEDURE — 82962 GLUCOSE BLOOD TEST: CPT

## 2025-07-28 PROCEDURE — 85025 COMPLETE CBC W/AUTO DIFF WBC: CPT

## 2025-07-28 PROCEDURE — 80048 BASIC METABOLIC PNL TOTAL CA: CPT

## 2025-07-28 PROCEDURE — 94760 N-INVAS EAR/PLS OXIMETRY 1: CPT

## 2025-07-28 PROCEDURE — 36415 COLL VENOUS BLD VENIPUNCTURE: CPT

## 2025-07-28 PROCEDURE — 99233 SBSQ HOSP IP/OBS HIGH 50: CPT | Performed by: PSYCHIATRY & NEUROLOGY

## 2025-07-28 PROCEDURE — 70553 MRI BRAIN STEM W/O & W/DYE: CPT

## 2025-07-28 PROCEDURE — A9577 INJ MULTIHANCE: HCPCS | Performed by: EMERGENCY MEDICINE

## 2025-07-28 PROCEDURE — 6360000002 HC RX W HCPCS: Performed by: NURSE PRACTITIONER

## 2025-07-28 PROCEDURE — 6370000000 HC RX 637 (ALT 250 FOR IP): Performed by: STUDENT IN AN ORGANIZED HEALTH CARE EDUCATION/TRAINING PROGRAM

## 2025-07-28 PROCEDURE — 6360000002 HC RX W HCPCS: Performed by: STUDENT IN AN ORGANIZED HEALTH CARE EDUCATION/TRAINING PROGRAM

## 2025-07-28 PROCEDURE — 95816 EEG AWAKE AND DROWSY: CPT | Performed by: PSYCHIATRY & NEUROLOGY

## 2025-07-28 PROCEDURE — 95819 EEG AWAKE AND ASLEEP: CPT

## 2025-07-28 PROCEDURE — 2500000003 HC RX 250 WO HCPCS: Performed by: STUDENT IN AN ORGANIZED HEALTH CARE EDUCATION/TRAINING PROGRAM

## 2025-07-28 RX ORDER — LORAZEPAM 2 MG/ML
1 INJECTION INTRAMUSCULAR ONCE
Status: COMPLETED | OUTPATIENT
Start: 2025-07-28 | End: 2025-07-28

## 2025-07-28 RX ORDER — DOXEPIN HYDROCHLORIDE 75 MG/1
75 CAPSULE ORAL NIGHTLY
Qty: 30 CAPSULE | Refills: 3 | Status: SHIPPED | OUTPATIENT
Start: 2025-07-28

## 2025-07-28 RX ADMIN — LOPERAMIDE HYDROCHLORIDE 2 MG: 2 CAPSULE ORAL at 06:34

## 2025-07-28 RX ADMIN — BUSPIRONE HYDROCHLORIDE 30 MG: 15 TABLET ORAL at 09:05

## 2025-07-28 RX ADMIN — Medication 1 MG: at 10:10

## 2025-07-28 RX ADMIN — ASPIRIN 81 MG: 81 TABLET, CHEWABLE ORAL at 09:05

## 2025-07-28 RX ADMIN — LITHIUM CARBONATE 300 MG: 300 CAPSULE, GELATIN COATED ORAL at 09:05

## 2025-07-28 RX ADMIN — LISINOPRIL 40 MG: 20 TABLET ORAL at 09:05

## 2025-07-28 RX ADMIN — PREDNISONE 50 MG: 50 TABLET ORAL at 09:36

## 2025-07-28 RX ADMIN — FAMOTIDINE 20 MG: 20 TABLET, FILM COATED ORAL at 09:05

## 2025-07-28 RX ADMIN — PREDNISONE 50 MG: 50 TABLET ORAL at 04:35

## 2025-07-28 RX ADMIN — GADOBENATE DIMEGLUMINE 20 ML: 529 INJECTION, SOLUTION INTRAVENOUS at 11:01

## 2025-07-28 RX ADMIN — SODIUM CHLORIDE, PRESERVATIVE FREE 10 ML: 5 INJECTION INTRAVENOUS at 09:06

## 2025-07-28 RX ADMIN — LITHIUM CARBONATE 300 MG: 300 CAPSULE, GELATIN COATED ORAL at 12:59

## 2025-07-28 RX ADMIN — ENOXAPARIN SODIUM 40 MG: 100 INJECTION SUBCUTANEOUS at 09:05

## 2025-07-28 NOTE — DISCHARGE INSTR - DIET

## 2025-07-28 NOTE — CARE COORDINATION
Case Management Assessment  Initial Evaluation    Date/Time of Evaluation: 7/28/2025 11:32 AM  Assessment Completed by: EVELIA RAMSAY    If patient is discharged prior to next notation, then this note serves as note for discharge by case management.    Patient Name: Mikey Fierro                   YOB: 1975  Diagnosis: Hypokalemia [E87.6]  Hyponatremia [E87.1]  Altered mental status, unspecified altered mental status type [R41.82]  AMS (altered mental status) [R41.82]                   Date / Time: 7/26/2025  7:03 PM    Patient Admission Status: Inpatient   Readmission Risk (Low < 19, Mod (19-27), High > 27): Readmission Risk Score: 16.5    Current PCP: Linette Chaney APRN - CNP  PCP verified by CM? (P) Yes    Chart Reviewed: Yes      History Provided by: (P) Patient  Patient Orientation: (P) Alert and Oriented, Person, Place, Situation, Self    Patient Cognition: (P) Alert    Hospitalization in the last 30 days (Readmission):  No    If yes, Readmission Assessment in  Navigator will be completed.    Advance Directives:      Code Status: Full Code   Patient's Primary Decision Maker is: (P) Legal Next of Kin    Primary Decision Maker: Radha Fierro - Spouse - [phone]    Discharge Planning:    Patient lives with: (P) Spouse/Significant Other Type of Home: (P) House  Primary Care Giver: (P) Self  Patient Support Systems include: (P) Spouse/Significant Other   Current Financial resources: (P) Medicare  Current community resources: (P) None  Current services prior to admission: (P) None            Current DME: (P) Oxygen Therapy (Comment)            Type of Home Care services:  (P) None    ADLS  Prior functional level: (P) Independent in ADLs/IADLs  Current functional level: (P) Independent in ADLs/IADLs    PT AM-PAC:   /24  OT AM-PAC:   /24    Family can provide assistance at DC: (P) Yes  Would you like Case Management to discuss the discharge plan with any other family members/significant

## 2025-07-28 NOTE — PROGRESS NOTES
A Neurology Progress Note    MRN: [id number]  Patient Name: Mkiey Fierro  Gender: male    Attending Neurologist: Shawn Abdalla M.D.      REASON FOR CONSULT:  Altered mental status    INTERVAL HISTORY  7/28: resolved encephalopathy. Eeg and mri unrevealing.     History of Present Illness:  Mikey Fierro is a 50 y.o. male with a PMHx of anxiety, bipolar disorder, CAD, depression, hyperlipidemia, HTN, IBS and recent hospitalizations for suicide attempt at the end of June with benzodiazepine overdose.  Since then he has been admitted to a psychiatric facility and released on July 8.  He has been off of benzodiazepines and had been started on lithium and Seroquel.  He had toxicity and lithium dose had been reduced and Seroquel have been stopped.  He presents to ED for further evaluation of altered mental status. wife at bedside assists with history. She states that he is not responding appropriately, could not answer simple questions, could not put his seatbelt on.  He appeared to be easily startled.   The patient himself has recollection of most of this but does also have some blank gaps. Patient was able to follow commands initially in the ED however was not speaking.  The patient was given 2 mg of ativan IV in the ED with resolution of his symptoms.   Symptoms have not recurred.      Review of Systems - Negative except as per HPI    Social History:  Social History     Socioeconomic History    Marital status:      Spouse name: None    Number of children: None    Years of education: None    Highest education level: None   Tobacco Use    Smoking status: Every Day     Current packs/day: 1.00     Average packs/day: 1 pack/day for 24.5 years (24.5 ttl pk-yrs)     Types: Cigarettes     Start date: 07/2022     Last attempt to quit: 1/1/2024    Smokeless tobacco: Never   Vaping Use    Vaping status: Never Used   Substance and Sexual Activity    Alcohol use: Yes    Drug use: No    Sexual activity: Yes     Partners:  Oral, BID, Kelin Mendoza MD, 20 mg at 07/28/25 0905    acetaminophen (TYLENOL) tablet 650 mg, 650 mg, Oral, Q6H PRN **OR** acetaminophen (TYLENOL) suppository 650 mg, 650 mg, Rectal, Q6H PRN, Kelin Mendoza MD    loperamide (IMODIUM) capsule 2 mg, 2 mg, Oral, 4x Daily PRN, Kelin Mendoza MD, 2 mg at 07/28/25 0634    glucose chewable tablet 16 g, 4 tablet, Oral, PRN, Kelin Mendoza MD    dextrose bolus 10% 125 mL, 125 mL, IntraVENous, PRN **OR** dextrose bolus 10% 250 mL, 250 mL, IntraVENous, PRN, Kelin Mendoza MD    glucagon injection 1 mg, 1 mg, SubCUTAneous, PRN, Kelin Mendoza MD    dextrose 10 % infusion, , IntraVENous, Continuous PRN, Kelin Mendoza MD    insulin lispro (HUMALOG,ADMELOG) injection vial 0-4 Units, 0-4 Units, SubCUTAneous, 4x Daily AC & HS, Kelin Mendoza MD    Family History:  Family History   Problem Relation Age of Onset    Diabetes Mother     Cancer Mother         uterine CA    Depression Mother     Mental Illness Mother     Hypertension Mother     Other Mother         blood clots    Alcohol Abuse Father     Heart Disease Brother     Depression Brother     Mental Illness Brother     Hypertension Brother     Breast Cancer Maternal Grandmother     Cancer Maternal Grandmother         breast CA    Heart Attack Maternal Grandmother     Other Maternal Grandmother         blood clots    Colon Cancer Neg Hx     Esophageal Cancer Neg Hx     Liver Cancer Neg Hx     Rectal Cancer Neg Hx     Stomach Cancer Neg Hx        Physical Exam:    Vital Signs: /79   Pulse 80   Temp 99 °F (37.2 °C) (Temporal)   Resp 20   Ht 1.88 m (6' 2\")   Wt (!) 170.1 kg (375 lb)   SpO2 99%   BMI 48.15 kg/m²   Physical Exam:  General: in no distress  HEENT:  There was no sinus tenderness. There was no cervical lymphadenopathy.   Cardiac Exam:A regular rate and rhythm was present.   Pulmonary Exam:Chest was clear   Abdominal Exam: No palpable hepatosplenomegaly.   Extremity Exam:Showed no clubbing, edema, or  Alert-The patient is alert, awake and responds to voice. The patient is oriented to time, place, and person. The triage nurse is able to obtain subjective information.

## 2025-07-28 NOTE — DISCHARGE SUMMARY
Discharge Summary    NAME: Mikey Fierro  :  1975  MRN:  [id number]    Admit date:  2025  Discharge date:  2025    Admitting Physician:  No admitting provider for patient encounter.    Advance Directive: Full Code    Consults: neurology    Primary Care Physician:  Linette Chaney, APRN - CNP    Discharge Diagnoses:  Principal Problem:    AMS (altered mental status)  Active Problems:    Depression    Hypertension    Hyperlipidemia    Hyperglycemia    Hypokalemia    Hyponatremia  Resolved Problems:    * No resolved hospital problems. *      Significant Diagnostic Studies:   XR CHEST PORTABLE  Result Date: 2025  EXAM: CHEST RADIOGRAPH  TECHNIQUE: Single frontal chest radiograph.  HISTORY: Altered mental status.  COMPARISON: 2021  FINDINGS: The lungs are clear. The heart size is normal. There is no pleural effusion. There is no pneumothorax.      1.  Normal chest radiograph.    ______________________________________ Electronically signed by: JOSEPH HONG M.D. Date:     2025 Time:    21:37     CT Head W/O Contrast  Result Date: 2025  EXAM: CT OF THE BRAIN WITHOUT CONTRAST  CLINICAL INDICATION: altered mental status  COMPARISON: None.  PROCEDURE: Contiguous axial tomographic sections were obtained from the skull base to the vertex.  FINDINGS: There is mild generalized cerebral involutional change. No acute intracranial hemorrhage, extra-axial fluid collection, hydrocephalus, mass effect, or midline shift. The ventricles, cisterns and sulci are normal. Gray-white differentiation is maintained.  There are mild patchy areas of hypodensity in the periventricular white matter, nonspecific but most commonly encountered in the setting of chronic microvascular disease.  The visualized paranasal sinuses and mastoid air cells are clear.  The visualized portions of the globes and orbits appear normal. No acute calvarial abnormalities are seen.  _________________________________________       1. No  for High Blood Pressure (160/90 or higher)     doxepin 75 MG capsule  Commonly known as: SINEQUAN  Take 1 capsule by mouth nightly     lisinopril 40 MG tablet  Commonly known as: PRINIVIL;ZESTRIL  TAKE 1 TABLET BY MOUTH DAILY     loperamide 2 MG capsule  Commonly known as: IMODIUM     vitamin D 1.25 MG (47593 UT) Caps capsule  Commonly known as: ERGOCALCIFEROL  TAKE 1 CAPSULE BY MOUTH ONCE A WEEK            STOP taking these medications      buPROPion 150 MG extended release tablet  Commonly known as: WELLBUTRIN SR     hydroCHLOROthiazide 25 MG tablet  Commonly known as: HYDRODIURIL     pregabalin 75 MG capsule  Commonly known as: LYRICA               Where to Get Your Medications        These medications were sent to [facility] - [place] - [address] - P [phone] - F [phone]  [address] [place], [place] [address]      Phone: [phone]   doxepin 75 MG capsule         Discharge Instructions:   Follow up with Linette Chaney APRN - CNP in 3-7 days.  Take medications as directed.  Resume activity as tolerated.    Diet: ADULT DIET; Regular     Disposition: Patient is medically stable and will be discharged home.    Time spent on discharge 40.    Signed:  SANDY Parisi CNP  7/28/2025 4:26 PM

## 2025-07-28 NOTE — PROCEDURES
ADULT INPATIENT ELECTROENCEPHALOGRAM REPORT    Patient:   Mikey Fierro  MR#:    [id number]  Room #:    INPATIENT  YOB: 1975  Date of Evaluation:  7/28/2025  Primary Physician:     Linette Chaney APRN - CNP   Referring Physician:   Kelin Mendoza MD      CLINICAL INFORMATION:     This patient is a 50 y.o. male with a history of AMS.    MEDICATIONS:     See MAR.    RECORDING CONDITIONS:     This EEG was performed utilizing standard International 10-20 System of electrode placement, with additional channels monitored for eye movement. One channel electrocardiogram was monitored. Data was obtained, stored, and interpreted according to ACNS guidelines (J Clin Neurophysiol 2006;23(2):) utilizing referential montage recording, with reformatting to longitudinal, transverse bipolar, and referential montages as necessary for interpretation, along with the digital/automated EEG analysis. Patient tolerated entire procedure well. Photic stimulation and hyperventilation were utilized as activation procedures unless otherwise specified below.     E.E.G. DESCRIPTION:     The resting predominant posterior background frequency is a 9-10 Hz 30-40 uV rhythm.  No overt focal, lateralizing, or paroxysmal abnormalities were noted through the recording. Drowsiness and sleep were not demonstrated. Hyperventilation was not performed.  Photic stimulation was performed and had little change on the recording. Muscle, motion, and frequent eye movement / blinking artifacts were noted.       EEG INTERPRETATION:    Normal EEG for age in the awake state.       CLINICAL CORRELATION:     The absence of epileptiform abnormalities does not preclude a clinical diagnosis of seizures.       Fabian Briscoe DO  Board Certified Neurologist    Date reported: 7/28/2025  Date signed: 7/28/2025

## 2025-07-28 NOTE — PROGRESS NOTES
[facility]      Progress Note    Patient:  Mikey Fierro  YOB: 1975  Date of Service: 7/28/2025  MRN: [id number]   Acct: [id number]   Primary Care Physician: Linette Chaney APRN - CNP  Advance Directive: Full Code  Admit Date: 7/26/2025       Hospital Day: 2    Portions of this note have been copied forward, however, updated to reflect the most current clinical status of this patient.     CHIEF COMPLAINT altered mental status    SUBJECTIVE: Reports that the Ativan he got for the MRI cleared his head fully and he is back to normal      CUMULATIVE HOSPITAL COURSE:   Patient is 50-year-old with past medical history of bipolar disease, recent suicide attempt, hyperlipidemia and hypertension.  Patient brought to the ER with acute encephalopathy.  Wife reports that on the day of admission patient was unable to follow commands and acting very paranoid and spaced out.  He had a recent suicide attempt on 6/21 with Xanax and alcohol.  And was eventually moved to inpatient psych.  Latuda and Wellbutrin were stopped while he was there and he was discharged on lithium 600 twice daily and Seroquel follow-up appointment with psychiatrist 2 days prior to admission his lithium level was elevated and he had decreased dose to 300 in the morning and 600 in the evening.  Also recommended that he stop Seroquel.  On arrival to ER reports of having myoclonic jerking motions and was given Ativan with complete resolution of his symptoms.  Patient was also sent for MRI this morning with given 1 mg Ativan p.o. and he reports that totally cleared his head and he is back to his baseline and has no \"brain fog\".  Neurology has seen him and signed off his EEG is normal his MRI is normal.  Awaiting psych consult.  His sodium has increased to 135 and his white count has declined to 13.8        Objective:   VITALS:  /79   Pulse 80   Temp 99 °F (37.2 °C) (Temporal)   Resp 20   Ht 1.88 m (6' 2\")   Wt (!) 170.1 kg (375  glycol, bisacodyl, acetaminophen **OR** acetaminophen, loperamide, glucose, dextrose bolus **OR** dextrose bolus, glucagon (rDNA), dextrose  ADULT DIET; Regular     Lab and other Data:     Recent Labs     07/26/25 1922 07/27/25 0442 07/28/25 0226   WBC 30.5* 17.8* 13.8*   HGB 14.9 13.3* 12.8*    206 200     Recent Labs     07/26/25 1922 07/27/25 0442 07/28/25 0226   * 133* 135*   K 2.8* 4.6 4.2   CL 94* 104 105   CO2 21* 19* 22   BUN 13 14 10   CREATININE 1.1 0.9 0.8   GLUCOSE 155* 130* 135*     Recent Labs     07/26/25 1922   AST 35   ALT 39   BILITOT 0.9   ALKPHOS 144*     Troponin T: No results for input(s): \"TROPONINI\" in the last 72 hours.  Pro-BNP: No results for input(s): \"BNP\" in the last 72 hours.  INR:   Recent Labs     07/26/25 1922   INR 1.04     UA:  Recent Labs     07/26/25 1955   COLORU YELLOW   PHUR 6.5   CLARITYU Clear   LEUKOCYTESUR Negative   UROBILINOGEN 0.2   BILIRUBINUR Negative   BLOODU Negative   GLUCOSEU Negative     A1C:   Recent Labs     07/27/25 0442   LABA1C 5.4     ABG:No results for input(s): \"PHART\", \"SGJ3SXG\", \"PO2ART\", \"HYG6XBN\", \"BEART\", \"HGBAE\", \"F4TVGEPH\", \"CARBOXHGBART\" in the last 72 hours.    RAD:   XR CHEST PORTABLE  Result Date: 7/26/2025  EXAM: CHEST RADIOGRAPH  TECHNIQUE: Single frontal chest radiograph.  HISTORY: Altered mental status.  COMPARISON: 11/11/2021  FINDINGS: The lungs are clear. The heart size is normal. There is no pleural effusion. There is no pneumothorax.      1.  Normal chest radiograph.    ______________________________________ Electronically signed by: JOSEPH HONG M.D. Date:     07/26/2025 Time:    21:37     CT Head W/O Contrast  Result Date: 7/26/2025  EXAM: CT OF THE BRAIN WITHOUT CONTRAST  CLINICAL INDICATION: altered mental status  COMPARISON: None.  PROCEDURE: Contiguous axial tomographic sections were obtained from the skull base to the vertex.  FINDINGS: There is mild generalized cerebral involutional change. No acute

## 2025-07-29 LAB
EKG P AXIS: 53 DEGREES
EKG P-R INTERVAL: 110 MS
EKG Q-T INTERVAL: 326 MS
EKG QRS DURATION: 112 MS
EKG QTC CALCULATION (BAZETT): 441 MS
EKG T AXIS: 46 DEGREES

## 2025-07-29 PROCEDURE — 93010 ELECTROCARDIOGRAM REPORT: CPT | Performed by: INTERNAL MEDICINE

## 2025-07-29 RX ORDER — ERGOCALCIFEROL 1.25 MG/1
50000 CAPSULE, LIQUID FILLED ORAL WEEKLY
Qty: 12 CAPSULE | Refills: 0 | Status: SHIPPED | OUTPATIENT
Start: 2025-07-29

## 2025-07-29 RX ORDER — ERGOCALCIFEROL 1.25 MG/1
50000 CAPSULE, LIQUID FILLED ORAL WEEKLY
Qty: 12 CAPSULE | Refills: 1 | OUTPATIENT
Start: 2025-07-29

## 2025-07-29 NOTE — TELEPHONE ENCOUNTER
Mikey Fierro called to request a refill on his medication.  Last Vit D level 1/13/23    Last office visit : 6/10/2025   Next office visit : 8/4/2025     Requested Prescriptions     Pending Prescriptions Disp Refills    vitamin D (ERGOCALCIFEROL) 1.25 MG (79061 UT) CAPS capsule [Pharmacy Med Name: VITAMIN D [id number] CAPSULE] 12 capsule 1     Sig: TAKE 1 CAPSULE BY MOUTH ONCE A WEEK            Leslie Rebolledo LPN

## 2025-07-30 ENCOUNTER — TELEPHONE (OUTPATIENT)
Dept: PRIMARY CARE CLINIC | Age: 50
End: 2025-07-30

## 2025-07-30 NOTE — TELEPHONE ENCOUNTER
Care Transitions Initial Follow Up Call    Outreach made within 2 business days of discharge: Yes    Patient: Mikey Fierro     Patient : 1975     MRN: [id number]      Reason for Admission: altered mental status     Discharge Date: 25       Spoke with: Patient stated he is doing good no longer having any problems. Patient picked up Doxepin 75 MG medication from the pharmacy and started the medication.      Discharge department/facility: - Stop taking per discharge, Bupropion 150 MG, Hydrochlorothiazide 25 mg, Pregabalin 75 MG.     TCM Interactive Patient Contact:  Was patient able to fill all prescriptions: Yes  Was patient instructed to bring all medications to the follow-up visit: Yes  Is patient taking all medications as directed in the discharge summary? Yes  Does patient understand their discharge instructions: Yes  Does patient have questions or concerns that need addressed prior to 7-14 day follow up office visit: no    Scheduled appointment with PCP within 7-14 days Yes     Follow Up  Future Appointments   Date Time Provider Department Center   2025 11:45 AM [facility]   2025  9:00 AM Corrina Pena MD [facility]   9/15/2025  8:15 AM Chaney, Linette[facility] [place]

## 2025-07-31 LAB
BACTERIA BLD CULT ORG #2: NORMAL
BACTERIA BLD CULT: NORMAL

## 2025-08-04 ENCOUNTER — OFFICE VISIT (OUTPATIENT)
Dept: PRIMARY CARE CLINIC | Age: 50
End: 2025-08-04

## 2025-08-04 VITALS
TEMPERATURE: 98 F | HEIGHT: 74 IN | SYSTOLIC BLOOD PRESSURE: 132 MMHG | HEART RATE: 90 BPM | DIASTOLIC BLOOD PRESSURE: 82 MMHG | WEIGHT: 315 LBS | BODY MASS INDEX: 40.43 KG/M2 | OXYGEN SATURATION: 98 %

## 2025-08-04 DIAGNOSIS — F33.1 MODERATE EPISODE OF RECURRENT MAJOR DEPRESSIVE DISORDER (HCC): ICD-10-CM

## 2025-08-04 DIAGNOSIS — Z09 HOSPITAL DISCHARGE FOLLOW-UP: Primary | ICD-10-CM

## 2025-08-04 DIAGNOSIS — I10 PRIMARY HYPERTENSION: ICD-10-CM

## 2025-08-04 DIAGNOSIS — E78.1 HYPERTRIGLYCERIDEMIA: ICD-10-CM

## 2025-08-04 RX ORDER — ATORVASTATIN CALCIUM 80 MG/1
80 TABLET, FILM COATED ORAL DAILY
Qty: 90 TABLET | Refills: 0 | Status: SHIPPED | OUTPATIENT
Start: 2025-08-04

## 2025-08-04 RX ORDER — ASPIRIN 81 MG/1
TABLET, CHEWABLE ORAL
Qty: 90 TABLET | Refills: 0 | Status: SHIPPED | OUTPATIENT
Start: 2025-08-04

## (undated) DEVICE — ENDO KIT,LOURDES HOSPITAL: Brand: MEDLINE INDUSTRIES, INC.

## (undated) DEVICE — MSK O2 MD CONCENTR A/ LF 7FT 1P/U

## (undated) DEVICE — THE SINGLE USE ETRAP – POLYP TRAP IS USED FOR SUCTION RETRIEVAL OF ENDOSCOPICALLY REMOVED POLYPS.: Brand: ETRAP

## (undated) DEVICE — MASK VENTILATOR MED AD SUPERNOVA ET

## (undated) DEVICE — SINGLE-USE POLYPECTOMY SNARE: Brand: CAPTIVATOR

## (undated) DEVICE — TBG SMPL FLTR LINE NASL 02/C02 A/ BX/100

## (undated) DEVICE — ENDOGATOR AUXILIARY WATER JET CONNECTOR: Brand: ENDOGATOR

## (undated) DEVICE — SNAR POLYP SENSATION MICRO OVL 13 240X40

## (undated) DEVICE — Device: Brand: DEFENDO AIR/WATER/SUCTION AND BIOPSY VALVE

## (undated) DEVICE — FORCEPS BX L240CM JAW DIA2.8MM L CAP W/ NDL MIC MESH TOOTH

## (undated) DEVICE — THE CHANNEL CLEANING BRUSH IS A NYLON FLEXI BRUSH ATTACHED TO A FLEXIBLE PLASTIC SHEATH DESIGNED TO SAFELY REMOVE DEBRIS FROM FLEXIBLE ENDOSCOPES.

## (undated) DEVICE — CUFF,BP,DISP,1 TUBE,ADULT,HP: Brand: MEDLINE

## (undated) DEVICE — SENSR O2 OXIMAX FNGR A/ 18IN NONSTR